# Patient Record
Sex: FEMALE | Race: WHITE | NOT HISPANIC OR LATINO | Employment: FULL TIME | URBAN - METROPOLITAN AREA
[De-identification: names, ages, dates, MRNs, and addresses within clinical notes are randomized per-mention and may not be internally consistent; named-entity substitution may affect disease eponyms.]

---

## 2019-11-26 PROBLEM — I10 ESSENTIAL HYPERTENSION: Status: ACTIVE | Noted: 2019-11-26

## 2019-11-26 PROBLEM — E55.9 VITAMIN D DEFICIENCY: Status: ACTIVE | Noted: 2019-11-26

## 2019-11-26 PROBLEM — E74.39 GLUCOSE INTOLERANCE: Status: ACTIVE | Noted: 2019-11-26

## 2019-11-26 PROBLEM — G47.33 OBSTRUCTIVE SLEEP APNEA: Status: ACTIVE | Noted: 2019-11-26

## 2019-11-26 PROBLEM — E03.9 HYPOTHYROIDISM: Status: ACTIVE | Noted: 2019-11-26

## 2019-11-26 PROBLEM — E04.9 GOITER, NODULAR: Status: ACTIVE | Noted: 2019-11-26

## 2019-11-26 PROBLEM — E66.01 MORBIDLY OBESE: Status: ACTIVE | Noted: 2019-11-26

## 2019-11-26 PROBLEM — G47.00 INSOMNIA: Status: ACTIVE | Noted: 2019-11-26

## 2019-11-26 PROBLEM — F41.9 ANXIETY: Status: ACTIVE | Noted: 2019-11-26

## 2019-12-23 PROBLEM — R01.1 UNDIAGNOSED CARDIAC MURMURS: Status: ACTIVE | Noted: 2019-12-23

## 2019-12-23 PROBLEM — R06.02 SHORTNESS OF BREATH: Status: ACTIVE | Noted: 2019-12-23

## 2019-12-30 ENCOUNTER — OFFICE VISIT (OUTPATIENT)
Dept: DERMATOLOGY | Facility: CLINIC | Age: 49
End: 2019-12-30
Payer: COMMERCIAL

## 2019-12-30 VITALS — BODY MASS INDEX: 47.09 KG/M2 | WEIGHT: 293 LBS | HEIGHT: 66 IN

## 2019-12-30 DIAGNOSIS — L81.4 LENTIGINES: ICD-10-CM

## 2019-12-30 DIAGNOSIS — L82.1 SEBORRHEIC KERATOSES: Primary | ICD-10-CM

## 2019-12-30 DIAGNOSIS — D22.9 BENIGN NEVUS: ICD-10-CM

## 2019-12-30 PROCEDURE — 99999 PR PBB SHADOW E&M-EST. PATIENT-LVL II: CPT | Mod: PBBFAC,,, | Performed by: DERMATOLOGY

## 2019-12-30 PROCEDURE — 3008F PR BODY MASS INDEX (BMI) DOCUMENTED: ICD-10-PCS | Mod: CPTII,S$GLB,, | Performed by: DERMATOLOGY

## 2019-12-30 PROCEDURE — 99202 OFFICE O/P NEW SF 15 MIN: CPT | Mod: S$GLB,,, | Performed by: DERMATOLOGY

## 2019-12-30 PROCEDURE — 3008F BODY MASS INDEX DOCD: CPT | Mod: CPTII,S$GLB,, | Performed by: DERMATOLOGY

## 2019-12-30 PROCEDURE — 99202 PR OFFICE/OUTPT VISIT, NEW, LEVL II, 15-29 MIN: ICD-10-PCS | Mod: S$GLB,,, | Performed by: DERMATOLOGY

## 2019-12-30 PROCEDURE — 99999 PR PBB SHADOW E&M-EST. PATIENT-LVL II: ICD-10-PCS | Mod: PBBFAC,,, | Performed by: DERMATOLOGY

## 2019-12-30 NOTE — PROGRESS NOTES
Subjective:       Patient ID:  Christine Alfaro is a 49 y.o. female who presents for   Chief Complaint   Patient presents with    Mole     50 yo F presents for evaluation of a lesion on her scalp.  It has been present for unknown amount of time and is tender when she parker her hair. No prior treatments    Denies personal h/o skin cancer  Dad with h/o melanoma on face ( 15 y ago; worked outdoors)      Past Medical History:   Diagnosis Date    Anxiety     GERD (gastroesophageal reflux disease)     Hyperlipidemia     Hypertension     Hypothyroidism     Insomnia     Observed sleep apnea     Vitamin D deficiency      Review of Systems   Hematologic/Lymphatic: Bruises/bleeds easily.   Allergic/Immunologic: Positive for environmental allergies.        Objective:    Physical Exam   Constitutional: She appears well-developed and well-nourished. No distress.   Neurological: She is alert and oriented to person, place, and time. She is not disoriented.   Psychiatric: She has a normal mood and affect.   Skin:   Areas Examined (abnormalities noted in diagram):   Scalp / Hair Palpated and Inspected  Head / Face Inspection Performed  Neck Inspection Performed  Abdomen Inspection Performed  Back Inspection Performed  RUE Inspected  LUE Inspection Performed                   Diagram Legend     Erythematous scaling macule/papule c/w actinic keratosis       Vascular papule c/w angioma      Pigmented verrucoid papule/plaque c/w seborrheic keratosis      Yellow umbilicated papule c/w sebaceous hyperplasia      Irregularly shaped tan macule c/w lentigo     1-2 mm smooth white papules consistent with Milia      Movable subcutaneous cyst with punctum c/w epidermal inclusion cyst      Subcutaneous movable cyst c/w pilar cyst      Firm pink to brown papule c/w dermatofibroma      Pedunculated fleshy papule(s) c/w skin tag(s)      Evenly pigmented macule c/w junctional nevus     Mildly variegated pigmented, slightly  irregular-bordered macule c/w mildly atypical nevus      Flesh colored to evenly pigmented papule c/w intradermal nevus       Pink pearly papule/plaque c/w basal cell carcinoma      Erythematous hyperkeratotic cursted plaque c/w SCC      Surgical scar with no sign of skin cancer recurrence      Open and closed comedones      Inflammatory papules and pustules      Verrucoid papule consistent consistent with wart     Erythematous eczematous patches and plaques     Dystrophic onycholytic nail with subungual debris c/w onychomycosis     Umbilicated papule    Erythematous-base heme-crusted tan verrucoid plaque consistent with inflamed seborrheic keratosis     Erythematous Silvery Scaling Plaque c/w Psoriasis     See annotation      Assessment / Plan:        Seborrheic keratoses  These are benign inherited growths without a malignant potential. Reassurance given to patient. No treatment is necessary.     Lentigines  These are benign hyperpigmented sun induced lesions. Daily sun protection will reduce the number of new lesions    Benign nevus  Reassurance           Follow up in about 1 year (around 12/30/2020).

## 2020-01-06 ENCOUNTER — PATIENT MESSAGE (OUTPATIENT)
Dept: CARDIOLOGY | Facility: CLINIC | Age: 50
End: 2020-01-06

## 2020-01-28 ENCOUNTER — PATIENT MESSAGE (OUTPATIENT)
Dept: FAMILY MEDICINE | Facility: CLINIC | Age: 50
End: 2020-01-28

## 2020-01-28 DIAGNOSIS — E66.9 OBESITY, UNSPECIFIED CLASSIFICATION, UNSPECIFIED OBESITY TYPE, UNSPECIFIED WHETHER SERIOUS COMORBIDITY PRESENT: Primary | ICD-10-CM

## 2020-01-28 RX ORDER — PHENTERMINE HYDROCHLORIDE 37.5 MG/1
37.5 TABLET ORAL
Qty: 30 TABLET | Refills: 2 | Status: SHIPPED | OUTPATIENT
Start: 2020-01-28 | End: 2020-02-27

## 2020-03-08 ENCOUNTER — PATIENT MESSAGE (OUTPATIENT)
Dept: FAMILY MEDICINE | Facility: CLINIC | Age: 50
End: 2020-03-08

## 2020-03-09 RX ORDER — LISINOPRIL 20 MG/1
TABLET ORAL
Qty: 90 TABLET | Refills: 3 | Status: SHIPPED | OUTPATIENT
Start: 2020-03-09 | End: 2020-11-30 | Stop reason: SDUPTHER

## 2020-03-18 ENCOUNTER — PATIENT MESSAGE (OUTPATIENT)
Dept: CARDIOLOGY | Facility: CLINIC | Age: 50
End: 2020-03-18

## 2020-04-05 ENCOUNTER — NURSE TRIAGE (OUTPATIENT)
Dept: ADMINISTRATIVE | Facility: CLINIC | Age: 50
End: 2020-04-05

## 2020-04-05 NOTE — TELEPHONE ENCOUNTER
Patient presented c cough, fever,fatigue,body aches and headaches. No SOB. Highest fever 101.4 and is taking tylenol to treat. She was possibly exposed by her daughter that works at a nursing home, who  Also has symptoms. Patient is calling b/c she wants to be tested. Home care advice reviewed c patient. Instructed pat on [employer] [facility] care. Patient sates she will download denisse on phone and use [employer] [facility] care today. Routed this triage call to patients PCP.     Reason for Disposition   1] COVID-19 infection diagnosed or suspected AND [2] mild symptoms (fever, cough) AND [2] no trouble breathing or other complications    Additional Information   Negative: SEVERE difficulty breathing (e.g., struggling for each breath, speaks in single words)   Negative: Difficult to awaken or acting confused (e.g., disoriented, slurred speech)   Negative: Bluish (or gray) lips or face now   Negative: Shock suspected (e.g., cold/pale/clammy skin, too weak to stand, low BP, rapid pulse)   Negative: Sounds like a life-threatening emergency to the triager   Negative: [1] COVID-19 suspected (e.g., cough, fever, shortness of breath) AND [2] public health department recommends testing   Negative: [1] COVID-19 exposure AND [2] no symptoms   Negative: COVID-19 and Breastfeeding, questions about   Negative: SEVERE or constant chest pain (Exception: mild central chest pain, present only when coughing)   Negative: MODERATE difficulty breathing (e.g., speaks in phrases, SOB even at rest, pulse 100-120)   Negative: Patient sounds very sick or weak to the triager   Negative: MILD difficulty breathing (e.g., minimal/no SOB at rest, SOB with walking, pulse <100)   Negative: Chest pain   Negative: Fever > 103 F (39.4 C)   Negative: [1] Fever > 101 F (38.3 C) AND [2] age > 60   Negative: [1] Fever > 100.0 F (37.8 C) AND [2] bedridden (e.g., nursing home patient, CVA, chronic illness, recovering from surgery)   Negative: HIGH  RISK patient (e.g., age > 64 years, diabetes, heart or lung disease, weak immune system)   Negative: Fever present > 3 days (72 hours)   Negative: [1] Fever returns after gone for over 24 hours AND [2] symptoms worse or not improved   Negative: [1] Continuous (nonstop) coughing interferes with work or school AND [2] no improvement using cough treatment per protocol   Negative: Cough present > 3 weeks    Protocols used: CORONAVIRUS (COVID-19) DIAGNOSED OR [id number]

## 2020-05-06 ENCOUNTER — PATIENT MESSAGE (OUTPATIENT)
Dept: ADMINISTRATIVE | Facility: HOSPITAL | Age: 50
End: 2020-05-06

## 2020-06-10 RX ORDER — LEVOTHYROXINE SODIUM 200 UG/1
TABLET ORAL
Qty: 90 TABLET | Refills: 1 | Status: SHIPPED | OUTPATIENT
Start: 2020-06-10 | End: 2020-08-12

## 2020-06-10 NOTE — TELEPHONE ENCOUNTER
*Demetra pt* Please approve.  Last OV 11/29/19  Last lab 12/23/19  Last refill date 12/31/20 #90 x 1R

## 2020-06-18 ENCOUNTER — TELEPHONE (OUTPATIENT)
Dept: CARDIOLOGY | Facility: CLINIC | Age: 50
End: 2020-06-18

## 2020-06-18 ENCOUNTER — HOSPITAL ENCOUNTER (OUTPATIENT)
Dept: RADIOLOGY | Facility: HOSPITAL | Age: 50
Discharge: HOME | End: 2020-06-18
Attending: FAMILY MEDICINE
Payer: COMMERCIAL

## 2020-06-18 ENCOUNTER — OFFICE VISIT (OUTPATIENT)
Dept: OBSTETRICS AND GYNECOLOGY | Facility: CLINIC | Age: 50
End: 2020-06-18
Payer: COMMERCIAL

## 2020-06-18 VITALS
DIASTOLIC BLOOD PRESSURE: 76 MMHG | HEIGHT: 65 IN | SYSTOLIC BLOOD PRESSURE: 118 MMHG | WEIGHT: 293 LBS | BODY MASS INDEX: 48.82 KG/M2

## 2020-06-18 DIAGNOSIS — Z01.419 GYNECOLOGIC EXAM NORMAL: Primary | ICD-10-CM

## 2020-06-18 DIAGNOSIS — Z12.31 BREAST CANCER SCREENING BY MAMMOGRAM: ICD-10-CM

## 2020-06-18 PROCEDURE — 77063 MAMMO DIGITAL SCREENING BILAT WITH TOMOSYNTHESIS_CAD: ICD-10-PCS | Mod: 26,,, | Performed by: RADIOLOGY

## 2020-06-18 PROCEDURE — 77067 SCR MAMMO BI INCL CAD: CPT | Mod: TC,PN

## 2020-06-18 PROCEDURE — 77067 SCR MAMMO BI INCL CAD: CPT | Mod: 26,,, | Performed by: RADIOLOGY

## 2020-06-18 PROCEDURE — 77067 MAMMO DIGITAL SCREENING BILAT WITH TOMOSYNTHESIS_CAD: ICD-10-PCS | Mod: 26,,, | Performed by: RADIOLOGY

## 2020-06-18 PROCEDURE — 3074F SYST BP LT 130 MM HG: CPT | Mod: CPTII,S$GLB,, | Performed by: OBSTETRICS & GYNECOLOGY

## 2020-06-18 PROCEDURE — 99386 PREV VISIT NEW AGE 40-64: CPT | Mod: S$GLB,,, | Performed by: OBSTETRICS & GYNECOLOGY

## 2020-06-18 PROCEDURE — 3078F DIAST BP <80 MM HG: CPT | Mod: CPTII,S$GLB,, | Performed by: OBSTETRICS & GYNECOLOGY

## 2020-06-18 PROCEDURE — 99999 PR PBB SHADOW E&M-EST. PATIENT-LVL IV: ICD-10-PCS | Mod: PBBFAC,,, | Performed by: OBSTETRICS & GYNECOLOGY

## 2020-06-18 PROCEDURE — 99999 PR PBB SHADOW E&M-EST. PATIENT-LVL IV: CPT | Mod: PBBFAC,,, | Performed by: OBSTETRICS & GYNECOLOGY

## 2020-06-18 PROCEDURE — 3074F PR MOST RECENT SYSTOLIC BLOOD PRESSURE < 130 MM HG: ICD-10-PCS | Mod: CPTII,S$GLB,, | Performed by: OBSTETRICS & GYNECOLOGY

## 2020-06-18 PROCEDURE — 77063 BREAST TOMOSYNTHESIS BI: CPT | Mod: 26,,, | Performed by: RADIOLOGY

## 2020-06-18 PROCEDURE — 88175 CYTOPATH C/V AUTO FLUID REDO: CPT

## 2020-06-18 PROCEDURE — 99386 PR PREVENTIVE VISIT,NEW,40-64: ICD-10-PCS | Mod: S$GLB,,, | Performed by: OBSTETRICS & GYNECOLOGY

## 2020-06-18 PROCEDURE — 87624 HPV HI-RISK TYP POOLED RSLT: CPT

## 2020-06-18 PROCEDURE — 3078F PR MOST RECENT DIASTOLIC BLOOD PRESSURE < 80 MM HG: ICD-10-PCS | Mod: CPTII,S$GLB,, | Performed by: OBSTETRICS & GYNECOLOGY

## 2020-06-18 RX ORDER — LEVOCETIRIZINE DIHYDROCHLORIDE 5 MG/1
TABLET, FILM COATED ORAL
COMMUNITY
End: 2022-03-04

## 2020-06-18 RX ORDER — PHENTERMINE HYDROCHLORIDE 37.5 MG/1
TABLET ORAL
COMMUNITY
Start: 2020-04-30 | End: 2020-11-30

## 2020-06-18 RX ORDER — FLUCONAZOLE 150 MG/1
150 TABLET ORAL ONCE
Qty: 1 TABLET | Refills: 0 | Status: SHIPPED | OUTPATIENT
Start: 2020-06-18 | End: 2020-06-22

## 2020-06-18 RX ORDER — MONTELUKAST SODIUM 10 MG/1
TABLET ORAL
COMMUNITY
Start: 2020-04-05 | End: 2022-03-04

## 2020-06-18 NOTE — TELEPHONE ENCOUNTER
----- Message from Fox Matthews sent at 6/18/2020  1:22 PM CDT -----  Regarding: pt  Type: Needs Medical Advice    Who Called:  pt    Best Call Back Number: [phone]  Additional Information: pt needs to know if still needs ot come in for todays appt b/c is for 8 wk folllow up that was Rescheduled From Thu Apr 23, 2020 on 3/18/2020 2:05 PM to today. Pt is feeling ok.

## 2020-06-18 NOTE — LETTER
June 18, 2020      Layo Mccrary MD  [address] [address]           [facility]  [address] [facility] [address]  [facility] [address]  Phone: [phone]  Fax: [phone]          Patient: Christine Alfaro   MR Number: [id number]   YOB: 1970   Date of Visit: 6/18/2020       Dear Dr. Layo Mccrary:    Thank you for referring Christine Alfaro to me for evaluation. Attached you will find relevant portions of my assessment and plan of care.    If you have questions, please do not hesitate to call me. I look forward to following Christine Alfaro along with you.    Sincerely,    Mely Braxton MD    Enclosure  CC:  No Recipients    If you would like to receive this communication electronically, please contact [email or web address] or [phone] to request more information on [employer] Link access.    For providers and/or their staff who would like to refer a patient to [facility], please contact us through our one-stop-shop provider referral line, [facility], at [phone].    If you feel you have received this communication in error or would no longer like to receive these types of communications, please e-mail [email or web address]

## 2020-06-18 NOTE — PROGRESS NOTES
Chief Complaint   Patient presents with    [facility]    Well Woman    Mammogram       History of Present Illness: Christine Alfaro is a 49 y.o. female that presents today 6/18/2020 for well gyn visit.    Past Medical History:   Diagnosis Date    Anxiety     GERD (gastroesophageal reflux disease)     Hyperlipidemia     Hypertension     Hypothyroidism     Insomnia     Observed sleep apnea     Vitamin D deficiency        Past Surgical History:   Procedure Laterality Date    BTL      CHOLECYSTECTOMY      SURGICAL REMOVAL OF BONE SPUR      left heel    TONSILLECTOMY         Current Outpatient Medications   Medication Sig Dispense Refill    aspirin (ECOTRIN) 81 MG EC tablet Take 1 tablet (81 mg total) by mouth once daily. 90 tablet 3    atorvastatin (LIPITOR) 40 MG tablet Take 1 tablet (40 mg total) by mouth every evening. 90 tablet 3    escitalopram oxalate (LEXAPRO) 20 MG tablet Take 1 tablet (20 mg total) by mouth once daily. 90 tablet 3    fenofibrate 160 MG Tab Take 1 tablet (160 mg total) by mouth once daily. 90 tablet 3    furosemide (LASIX) 20 MG tablet Take 1 tablet (20 mg total) by mouth once daily. 90 tablet 3    levocetirizine (XYZAL) 5 MG tablet levocetirizine 5 mg tablet   TAKE 1 TABLET BY MOUTH EVERY DAY      levothyroxine (SYNTHROID) 200 MCG tablet TAKE 1 TABLET BY MOUTH DAILY 90 tablet 1    levothyroxine (SYNTHROID) 25 MCG tablet TK 1 T PO QAM BEFORE BREAKFAST  11    lisinopril (PRINIVIL,ZESTRIL) 20 MG tablet lisinopril 20 mg tablet   TAKE 1 TABLET BY MOUTH EVERY DAY 90 tablet 3    metFORMIN (GLUCOPHAGE-XR) 500 MG 24 hr tablet Take 1,000 mg by mouth 2 (two) times daily with meals. TAKES TWO TABLETS BID  3    montelukast (SINGULAIR) 10 mg tablet TK 1 T PO QD UTD UNTIL TOLD TO  STOP      nitroGLYCERIN (NITROSTAT) 0.4 MG SL tablet Place 1 tablet (0.4 mg total) under the tongue every 5 (five) minutes as needed. 25 tablet 0    omeprazole (PRILOSEC) 20 MG capsule Take 20 mg  by mouth.      phentermine (ADIPEX-P) 37.5 mg tablet TK 1 T PO QAM BEFORE BREAKFAST      traZODone (DESYREL) 100 MG tablet Take 1 tablet (100 mg total) by mouth every evening. 90 tablet 3     No current facility-administered medications for this visit.        Review of patient's allergies indicates:   Allergen Reactions    Sulfa (sulfonamide antibiotics) Rash and Swelling       Family History   Problem Relation Age of Onset    Heart disease Mother     Bone cancer Mother     Cancer Mother         cervical    Heart disease Father     Diabetes type I Father     Lung cancer Father     Heart disease Brother     No Known Problems Daughter     No Known Problems Daughter     Breast cancer Neg Hx     Ovarian cancer Neg Hx        Social History     Socioeconomic History    Marital status:      Spouse name: Not on file    Number of children: Not on file    Years of education: Not on file    Highest education level: Not on file   Occupational History    Not on file   Social Needs    Financial resource strain: Not on file    Food insecurity     Worry: Not on file     Inability: Not on file    Transportation needs     Medical: Not on file     Non-medical: Not on file   Tobacco Use    Smoking status: Never Smoker    Smokeless tobacco: Never Used   Substance and Sexual Activity    Alcohol use: Not Currently    Drug use: Never    Sexual activity: Yes   Lifestyle    Physical activity     Days per week: Not on file     Minutes per session: Not on file    Stress: Not on file   Relationships    Social connections     Talks on phone: Not on file     Gets together: Not on file     Attends [employer] service: Not on file     Active member of club or organization: Not on file     Attends meetings of clubs or organizations: Not on file     Relationship status: Not on file   Other Topics Concern    Not on file   Social History Narrative    Not on file       OB History    Para Term  AB Living  "  3 3 2 1   2   SAB TAB Ectopic Multiple Live Births           3      # Outcome Date GA Lbr Calvin/2nd Weight Sex Delivery Anes PTL Lv   3 Term      Vag-Spont   SHIRA   2 Term      Vag-Spont   SHIRA   1       Vag-Spont   ND       Review of Symptoms:  GENERAL: Denies weight gain or weight loss. Feeling well overall.   SKIN: Denies rash or lesions.   HEAD: Denies head injury or headache.   NODES: Denies enlarged lymph nodes.   CHEST: Denies chest pain or shortness of breath.   CARDIOVASCULAR: Denies palpitations or left sided chest pain.   ABDOMEN: No abdominal pain, constipation, diarrhea, nausea, vomiting or rectal bleeding.   URINARY: No frequency, dysuria, hematuria, or burning on urination.  HEMATOLOGIC: No easy bruisability or excessive bleeding.   MUSCULOSKELETAL: Denies joint pain or swelling.     /76   Ht 5' 5" (1.651 m)   Wt (!) 146.1 kg (322 lb 1.5 oz)   LMP 2020   Physical Exam:  APPEARANCE: Well nourished, well developed, in no acute distress.  SKIN: Normal skin turgor, no lesions.  NECK: Neck symmetric without masses   RESPIRATORY: Normal respiratory effort with no retractions or use of accessory muscles  CARDIOVASCULAR: Peripheral vascular system with no swelling no varicosities and palpation of pulses normal  LYMPHATIC: No enlargements of the lymph nodes noted in the neck, axillae, or groin  ABDOMEN: Soft. No tenderness or masses. No hepatosplenomegaly. No hernias.  BREASTS: Symmetrical, no skin changes or visible lesions. No palpable masses, nipple discharge or adenopathy bilaterally.  PELVIC: Normal external female genitalia without lesions. Normal hair distribution. Adequate perineal body, normal urethral meatus. Urethra with no masses.  Bladder nontender. Vagina moist and well rugated without lesions or discharge. Cervix pink and without lesions. No significant cystocele or rectocele. Bimanual exam showed uterus normal size, shape, position, mobile and nontender. Adnexa without " masses or tenderness. Urethra and bladder normal.   EXTREMITIES: No clubbing cyanosis or edema.    ASSESSMENT/PLAN:  Gynecologic exam normal  -     Liquid-Based Pap Smear, Screening  -     HPV High Risk Genotypes, PCR          Patient was counseled today on Pelvic exams and Pap Smear guidelines.   We discussed STD screening if at high risk for a STD.  We discussed recommendation for breast cancer screening with mammogram every other year after the age of 40 and annually after the age of 50.    We discussed colon cancer screening when indicated.   Osteoporosis screening discussed when indicated.   She was advised to see her primary care physician for all other health maintenance.     FOLLOW-UP with me for next routine visit.

## 2020-06-18 NOTE — TELEPHONE ENCOUNTER
Spoke to pt, she stated she will call back to reschedule her appt when she gets her school schedule.

## 2020-06-23 ENCOUNTER — PATIENT MESSAGE (OUTPATIENT)
Dept: FAMILY MEDICINE | Facility: CLINIC | Age: 50
End: 2020-06-23

## 2020-06-23 NOTE — LETTER
[facility]  [address]  [facility] [address]  Phone: [phone]  Fax: [phone]                 Christine Alfaro  1970          To whom it May concern    The above named patient, Mrs. Alfaro, was previously diagnosed with corona virus like symptoms.  She is currently asymptomatic and outside the range of infectivity.  Please allow her to in her your facility to receive treatment.    Sincerely,              Layo Mccrary MD

## 2020-06-24 LAB
FINAL PATHOLOGIC DIAGNOSIS: NORMAL
Lab: NORMAL

## 2020-06-26 LAB
HPV HR 12 DNA SPEC QL NAA+PROBE: NEGATIVE
HPV16 AG SPEC QL: NEGATIVE
HPV18 DNA SPEC QL NAA+PROBE: NEGATIVE

## 2020-07-08 ENCOUNTER — PATIENT MESSAGE (OUTPATIENT)
Dept: ENDOCRINOLOGY | Facility: CLINIC | Age: 50
End: 2020-07-08

## 2020-07-08 DIAGNOSIS — E03.9 HYPOTHYROIDISM, UNSPECIFIED TYPE: Primary | ICD-10-CM

## 2020-07-08 NOTE — TELEPHONE ENCOUNTER
Only certain metformin extended release have been recalled. She will need to contact her pharmacy to see if her Prescription was impacted  We have some available at [facility] if there is an issue    We can check a TSH. If normal ok to continue the diet supplement

## 2020-07-14 ENCOUNTER — LAB VISIT (OUTPATIENT)
Dept: LAB | Facility: HOSPITAL | Age: 50
End: 2020-07-14
Attending: INTERNAL MEDICINE
Payer: COMMERCIAL

## 2020-07-14 DIAGNOSIS — E03.9 HYPOTHYROIDISM, UNSPECIFIED TYPE: ICD-10-CM

## 2020-07-14 LAB
T4 FREE SERPL-MCNC: 0.87 NG/DL (ref 0.71–1.51)
TSH SERPL DL<=0.005 MIU/L-ACNC: 12.72 UIU/ML (ref 0.4–4)

## 2020-07-14 PROCEDURE — 84439 ASSAY OF FREE THYROXINE: CPT

## 2020-07-14 PROCEDURE — 36415 COLL VENOUS BLD VENIPUNCTURE: CPT | Mod: PO

## 2020-07-14 PROCEDURE — 84443 ASSAY THYROID STIM HORMONE: CPT

## 2020-07-15 ENCOUNTER — TELEPHONE (OUTPATIENT)
Dept: ENDOCRINOLOGY | Facility: CLINIC | Age: 50
End: 2020-07-15

## 2020-07-17 ENCOUNTER — PATIENT MESSAGE (OUTPATIENT)
Dept: FAMILY MEDICINE | Facility: CLINIC | Age: 50
End: 2020-07-17

## 2020-07-17 DIAGNOSIS — E74.39 GLUCOSE INTOLERANCE: Primary | ICD-10-CM

## 2020-07-17 RX ORDER — EMPAGLIFLOZIN 10 MG/1
10 TABLET, FILM COATED ORAL DAILY
Qty: 30 TABLET | Refills: 11 | Status: SHIPPED | OUTPATIENT
Start: 2020-07-17 | End: 2021-07-26

## 2020-08-11 ENCOUNTER — LAB VISIT (OUTPATIENT)
Dept: LAB | Facility: HOSPITAL | Age: 50
End: 2020-08-11
Attending: INTERNAL MEDICINE
Payer: COMMERCIAL

## 2020-08-11 DIAGNOSIS — E03.9 HYPOTHYROIDISM, UNSPECIFIED TYPE: ICD-10-CM

## 2020-08-11 PROCEDURE — 36415 COLL VENOUS BLD VENIPUNCTURE: CPT | Mod: PO

## 2020-08-11 PROCEDURE — 84443 ASSAY THYROID STIM HORMONE: CPT

## 2020-08-11 PROCEDURE — 84439 ASSAY OF FREE THYROXINE: CPT

## 2020-08-12 ENCOUNTER — TELEPHONE (OUTPATIENT)
Dept: ENDOCRINOLOGY | Facility: CLINIC | Age: 50
End: 2020-08-12

## 2020-08-12 DIAGNOSIS — E03.9 HYPOTHYROIDISM, UNSPECIFIED TYPE: Primary | ICD-10-CM

## 2020-08-12 LAB
T4 FREE SERPL-MCNC: 0.86 NG/DL (ref 0.71–1.51)
TSH SERPL DL<=0.005 MIU/L-ACNC: 11.03 UIU/ML (ref 0.4–4)

## 2020-08-12 RX ORDER — LEVOTHYROXINE SODIUM 125 UG/1
TABLET ORAL
Qty: 60 TABLET | Refills: 11 | Status: SHIPPED | OUTPATIENT
Start: 2020-08-12 | End: 2021-09-08

## 2020-08-12 NOTE — TELEPHONE ENCOUNTER
Let her know it looks like she still needs more synthroid    Increase synthroid from 225 mcg daily to:  Synthroid 125 mcg- take 2 tablets daily for a total of 250 mcg daily    Check TSH 6 weeks

## 2020-08-12 NOTE — TELEPHONE ENCOUNTER
----- Message from Halima Lundberg sent at 8/12/2020  4:04 PM CDT -----  Regarding: return call  Contact: Patient/[phone] (home)  Type:  Patient Returning Call    Who Called:  Patient/[phone] ([place])     Who Left Message for Patient:  n/a  Does the patient know what this is regarding?:  medicine changes

## 2020-08-12 NOTE — TELEPHONE ENCOUNTER
Spoke with pt who verbalized understanding  Will start new dose and then do labs in 6 weeks in [facility] 9/26/20

## 2020-09-16 ENCOUNTER — OFFICE VISIT (OUTPATIENT)
Dept: FAMILY MEDICINE | Facility: CLINIC | Age: 50
End: 2020-09-16
Payer: COMMERCIAL

## 2020-09-16 VITALS
WEIGHT: 293 LBS | OXYGEN SATURATION: 98 % | DIASTOLIC BLOOD PRESSURE: 70 MMHG | SYSTOLIC BLOOD PRESSURE: 118 MMHG | BODY MASS INDEX: 48.82 KG/M2 | HEIGHT: 65 IN | TEMPERATURE: 99 F | HEART RATE: 97 BPM

## 2020-09-16 DIAGNOSIS — B96.89 BACTERIAL SINUSITIS: Primary | ICD-10-CM

## 2020-09-16 DIAGNOSIS — J32.9 BACTERIAL SINUSITIS: Primary | ICD-10-CM

## 2020-09-16 PROCEDURE — 99999 PR PBB SHADOW E&M-EST. PATIENT-LVL IV: CPT | Mod: PBBFAC,,, | Performed by: FAMILY MEDICINE

## 2020-09-16 PROCEDURE — 3078F DIAST BP <80 MM HG: CPT | Mod: CPTII,S$GLB,, | Performed by: FAMILY MEDICINE

## 2020-09-16 PROCEDURE — 99214 PR OFFICE/OUTPT VISIT, EST, LEVL IV, 30-39 MIN: ICD-10-PCS | Mod: S$GLB,,, | Performed by: FAMILY MEDICINE

## 2020-09-16 PROCEDURE — 99214 OFFICE O/P EST MOD 30 MIN: CPT | Mod: S$GLB,,, | Performed by: FAMILY MEDICINE

## 2020-09-16 PROCEDURE — 3008F PR BODY MASS INDEX (BMI) DOCUMENTED: ICD-10-PCS | Mod: CPTII,S$GLB,, | Performed by: FAMILY MEDICINE

## 2020-09-16 PROCEDURE — 3008F BODY MASS INDEX DOCD: CPT | Mod: CPTII,S$GLB,, | Performed by: FAMILY MEDICINE

## 2020-09-16 PROCEDURE — 3074F SYST BP LT 130 MM HG: CPT | Mod: CPTII,S$GLB,, | Performed by: FAMILY MEDICINE

## 2020-09-16 PROCEDURE — 3078F PR MOST RECENT DIASTOLIC BLOOD PRESSURE < 80 MM HG: ICD-10-PCS | Mod: CPTII,S$GLB,, | Performed by: FAMILY MEDICINE

## 2020-09-16 PROCEDURE — 3074F PR MOST RECENT SYSTOLIC BLOOD PRESSURE < 130 MM HG: ICD-10-PCS | Mod: CPTII,S$GLB,, | Performed by: FAMILY MEDICINE

## 2020-09-16 PROCEDURE — 99999 PR PBB SHADOW E&M-EST. PATIENT-LVL IV: ICD-10-PCS | Mod: PBBFAC,,, | Performed by: FAMILY MEDICINE

## 2020-09-16 RX ORDER — LIFITEGRAST 50 MG/ML
SOLUTION/ DROPS OPHTHALMIC
COMMUNITY
Start: 2020-09-02

## 2020-09-16 RX ORDER — AMOXICILLIN AND CLAVULANATE POTASSIUM 500; 125 MG/1; MG/1
1 TABLET, FILM COATED ORAL 2 TIMES DAILY
Qty: 20 TABLET | Refills: 0 | Status: SHIPPED | OUTPATIENT
Start: 2020-09-16 | End: 2020-11-30

## 2020-09-16 RX ORDER — FLUTICASONE PROPIONATE 50 MCG
2 SPRAY, SUSPENSION (ML) NASAL
COMMUNITY
Start: 2020-09-01 | End: 2022-03-04

## 2020-09-16 NOTE — LETTER
September 16, 2020      [facility]  [address]  [place] [address]  Phone: [phone]  Fax: [phone]       Patient: Christine Alfaro   YOB: 1970  Date of Visit: 09/16/2020    To Whom It May Concern:    Arnel Alfaro  was at [facility] on 09/16/2020. I have a low suspicion for coronavirus and am treating her for sinusitis with follow up instruction. He may return to work/school on 9/17/2020 with no restrictions. If you have any questions or concerns, or if I can be of further assistance, please do not hesitate to contact me.    Sincerely,            Layo Mccrary MD

## 2020-09-16 NOTE — PROGRESS NOTES
THIS DOCUMENT WAS MADE IN PART WITH VOICE RECOGNITION SOFTWARE.  OCCASIONALLY THIS SOFTWARE WILL MISINTERPRET WORDS OR PHRASES.    Assessment and Plan:    1. Bacterial sinusitis  Recommended Flonase, nasal rinses, continue Singulair and Xyzal  If shortness of breath occurs or coughing worsens will have to get COVID test  - amoxicillin-clavulanate 500-125mg (AUGMENTIN) 500-125 mg Tab; Take 1 tablet (500 mg total) by mouth 2 (two) times daily.  Dispense: 20 tablet; Refill: 0        ______________________________________________________________________  Subjective:    Chief Complaint:  Chief Complaint   Patient presents with    Sinusitis     post nasal drip, congestion, scratchy throat, cough, and fatigue         HPI:  Christine is a 49 y.o. year old     Sinusitis symptoms  Associated with postnasal drip, congestion, scratchy throat, cough, fatigue, hoarse  Duration - 3 weeks  T-max 100°  Attempted treatments include anti-inflammatories, cool liquids  Sore throat severity 3/10        Answers for HPI/ROS submitted by the patient on 9/16/2020   Sore throat  Chronicity: recurrent  Onset: 1 to 4 weeks ago  Progression since onset: gradually worsening  Pain worse on: right  Fever: 100 - 100.9 F  Fever duration: 1 to 2 days  Pain - numeric: 3/10  hoarse voice: Yes  plugged ear sensation: Yes  swollen glands: No  strep: No  mono: No  Treatments tried: NSAIDs, cool liquids  Improvement on treatment: no relief  Pain severity: moderate    Past Medical History:  Past Medical History:   Diagnosis Date    Anxiety     GERD (gastroesophageal reflux disease)     Hyperlipidemia     Hypertension     Hypothyroidism     Insomnia     Observed sleep apnea     Vitamin D deficiency        Past Surgical History:  Past Surgical History:   Procedure Laterality Date    BTL      CHOLECYSTECTOMY      SURGICAL REMOVAL OF BONE SPUR      left heel    TONSILLECTOMY         Family History:  Family History   Problem Relation Age of Onset     Heart disease Mother     Bone cancer Mother     Cancer Mother         cervical    Heart disease Father     Diabetes type I Father     Lung cancer Father     Heart disease Brother     No Known Problems Daughter     No Known Problems Daughter     Breast cancer Neg Hx     Ovarian cancer Neg Hx        Social History:  Social History     Socioeconomic History    Marital status:      Spouse name: Not on file    Number of children: Not on file    Years of education: Not on file    Highest education level: Not on file   Occupational History    Not on file   Social Needs    Financial resource strain: Not hard at all    Food insecurity     Worry: Never true     Inability: Never true    Transportation needs     Medical: No     Non-medical: No   Tobacco Use    Smoking status: Never Smoker    Smokeless tobacco: Never Used   Substance and Sexual Activity    Alcohol use: Not Currently     Frequency: Monthly or less     Drinks per session: 1 or 2     Binge frequency: Never    Drug use: Never    Sexual activity: Yes   Lifestyle    Physical activity     Days per week: Not on file     Minutes per session: 20 min    Stress: Rather much   Relationships    Social connections     Talks on phone: More than three times a week     Gets together: Twice a week     Attends [employer] service: Not on file     Active member of club or organization: Yes     Attends meetings of clubs or organizations: More than 4 times per year     Relationship status:    Other Topics Concern    Not on file   Social History Narrative    Not on file       Medications:  Current Outpatient Medications on File Prior to Visit   Medication Sig Dispense Refill    aspirin (ECOTRIN) 81 MG EC tablet Take 1 tablet (81 mg total) by mouth once daily. 90 tablet 3    atorvastatin (LIPITOR) 40 MG tablet Take 1 tablet (40 mg total) by mouth every evening. 90 tablet 3    empagliflozin (JARDIANCE) 10 mg tablet Take 1 tablet (10 mg total) by  mouth once daily. 30 tablet 11    escitalopram oxalate (LEXAPRO) 20 MG tablet Take 1 tablet (20 mg total) by mouth once daily. 90 tablet 3    fenofibrate 160 MG Tab Take 1 tablet (160 mg total) by mouth once daily. 90 tablet 3    fluconazole (DIFLUCAN) 150 MG Tab TAKE 1 TABLET(150 MG) BY MOUTH 1 TIME FOR 1 DOSE 1 tablet 0    fluticasone propionate (FLONASE) 50 mcg/actuation nasal spray 2 sprays by Nasal route.      furosemide (LASIX) 20 MG tablet Take 1 tablet (20 mg total) by mouth once daily. 90 tablet 3    levocetirizine (XYZAL) 5 MG tablet levocetirizine 5 mg tablet   TAKE 1 TABLET BY MOUTH EVERY DAY      levothyroxine (SYNTHROID) 125 MCG tablet take 2 tablets daily for a total of 250 mcg daily 60 tablet 11    lisinopril (PRINIVIL,ZESTRIL) 20 MG tablet lisinopril 20 mg tablet   TAKE 1 TABLET BY MOUTH EVERY DAY 90 tablet 3    montelukast (SINGULAIR) 10 mg tablet TK 1 T PO QD UTD UNTIL TOLD TO  STOP      nitroGLYCERIN (NITROSTAT) 0.4 MG SL tablet Place 1 tablet (0.4 mg total) under the tongue every 5 (five) minutes as needed. 25 tablet 0    omeprazole (PRILOSEC) 20 MG capsule Take 20 mg by mouth.      phentermine (ADIPEX-P) 37.5 mg tablet TK 1 T PO QAM BEFORE BREAKFAST      traZODone (DESYREL) 100 MG tablet TAKE 1 TABLET(100 MG) BY MOUTH EVERY EVENING 90 tablet 3    XIIDRA 5 % Dpet APPLY 1 DROP TO BOTH EYES BID       No current facility-administered medications on file prior to visit.        Allergies:  Sulfamethoxazole-trimethoprim and Sulfa (sulfonamide antibiotics)    Immunizations:  Immunization History   Administered Date(s) Administered    Tdap 11/26/2019       Review of Systems:  Review of Systems   HENT: Positive for congestion, ear pain and sore throat. Negative for drooling, ear discharge and trouble swallowing.    Respiratory: Positive for cough and stridor. Negative for shortness of breath.    Gastrointestinal: Negative for abdominal pain, diarrhea and vomiting.   Musculoskeletal:  "Positive for neck pain.   Neurological: Positive for headaches.   All other systems reviewed and are negative.      Objective:    Vitals:  Vitals:    09/16/20 1336   BP: 118/70   Pulse: 97   Temp: 99 °F (37.2 °C)   TempSrc: Oral   SpO2: 98%   Weight: (!) 148.6 kg (327 lb 11.4 oz)   Height: 5' 5" (1.651 m)   PainSc:   5   PainLoc: Generalized       Physical Exam  Constitutional:       Appearance: She is well-developed.   HENT:      Head: Normocephalic.      Nose: Mucosal edema and rhinorrhea present.      Mouth/Throat:      Pharynx: Posterior oropharyngeal erythema present. No oropharyngeal exudate.   Neck:      Musculoskeletal: Normal range of motion and neck supple.   Cardiovascular:      Rate and Rhythm: Normal rate and regular rhythm.   Pulmonary:      Effort: Pulmonary effort is normal.      Breath sounds: Normal breath sounds.   Abdominal:      Palpations: Abdomen is soft.   Skin:     General: Skin is warm.      Findings: No rash.         Data:  No previous labs, imaging, or notes available.        Layo Mccrary MD  Family Medicine      "

## 2020-09-29 ENCOUNTER — PATIENT OUTREACH (OUTPATIENT)
Dept: ADMINISTRATIVE | Facility: OTHER | Age: 50
End: 2020-09-29

## 2020-09-29 NOTE — PROGRESS NOTES
LINKS immunization registry not responding  Care Everywhere updated  Health Maintenance updated  Chart reviewed for overdue Proactive [facility] Encounters (RE[facility]I) health maintenance testing (CRS, Breast Ca, Diabetic Eye Exam)   Orders entered:N/A

## 2020-10-01 ENCOUNTER — LAB VISIT (OUTPATIENT)
Dept: LAB | Facility: HOSPITAL | Age: 50
End: 2020-10-01
Attending: INTERNAL MEDICINE
Payer: COMMERCIAL

## 2020-10-01 DIAGNOSIS — E03.9 HYPOTHYROIDISM, UNSPECIFIED TYPE: ICD-10-CM

## 2020-10-01 PROCEDURE — 84443 ASSAY THYROID STIM HORMONE: CPT

## 2020-10-01 PROCEDURE — 36415 COLL VENOUS BLD VENIPUNCTURE: CPT | Mod: PO

## 2020-10-02 LAB — TSH SERPL DL<=0.005 MIU/L-ACNC: 1.75 UIU/ML (ref 0.4–4)

## 2020-10-22 ENCOUNTER — HOSPITAL ENCOUNTER (OUTPATIENT)
Dept: RADIOLOGY | Facility: HOSPITAL | Age: 50
Discharge: HOME | End: 2020-10-22
Attending: ORTHOPAEDIC SURGERY
Payer: COMMERCIAL

## 2020-10-22 ENCOUNTER — OFFICE VISIT (OUTPATIENT)
Dept: ORTHOPEDICS | Facility: CLINIC | Age: 50
End: 2020-10-22
Payer: COMMERCIAL

## 2020-10-22 VITALS — WEIGHT: 293 LBS | RESPIRATION RATE: 16 BRPM | BODY MASS INDEX: 48.82 KG/M2 | HEIGHT: 65 IN

## 2020-10-22 DIAGNOSIS — M25.562 ACUTE PAIN OF BOTH KNEES: ICD-10-CM

## 2020-10-22 DIAGNOSIS — M25.561 ACUTE PAIN OF BOTH KNEES: ICD-10-CM

## 2020-10-22 DIAGNOSIS — M25.562 ACUTE PAIN OF BOTH KNEES: Primary | ICD-10-CM

## 2020-10-22 DIAGNOSIS — M25.561 ACUTE PAIN OF BOTH KNEES: Primary | ICD-10-CM

## 2020-10-22 DIAGNOSIS — S83.422A SPRAIN OF LATERAL COLLATERAL LIGAMENT OF LEFT KNEE, INITIAL ENCOUNTER: Primary | ICD-10-CM

## 2020-10-22 PROCEDURE — 99999 PR PBB SHADOW E&M-EST. PATIENT-LVL IV: ICD-10-PCS | Mod: PBBFAC,,, | Performed by: ORTHOPAEDIC SURGERY

## 2020-10-22 PROCEDURE — 73564 X-RAY EXAM KNEE 4 OR MORE: CPT | Mod: 26,,, | Performed by: RADIOLOGY

## 2020-10-22 PROCEDURE — 73564 X-RAY EXAM KNEE 4 OR MORE: CPT | Mod: TC,50,PN

## 2020-10-22 PROCEDURE — 73564 XR KNEE ORTHO BILAT WITH FLEXION: ICD-10-PCS | Mod: 26,,, | Performed by: RADIOLOGY

## 2020-10-22 PROCEDURE — 3008F BODY MASS INDEX DOCD: CPT | Mod: CPTII,S$GLB,, | Performed by: ORTHOPAEDIC SURGERY

## 2020-10-22 PROCEDURE — 99204 PR OFFICE/OUTPT VISIT, NEW, LEVL IV, 45-59 MIN: ICD-10-PCS | Mod: S$GLB,,, | Performed by: ORTHOPAEDIC SURGERY

## 2020-10-22 PROCEDURE — 99999 PR PBB SHADOW E&M-EST. PATIENT-LVL IV: CPT | Mod: PBBFAC,,, | Performed by: ORTHOPAEDIC SURGERY

## 2020-10-22 PROCEDURE — 99204 OFFICE O/P NEW MOD 45 MIN: CPT | Mod: S$GLB,,, | Performed by: ORTHOPAEDIC SURGERY

## 2020-10-22 PROCEDURE — 3008F PR BODY MASS INDEX (BMI) DOCUMENTED: ICD-10-PCS | Mod: CPTII,S$GLB,, | Performed by: ORTHOPAEDIC SURGERY

## 2020-10-22 RX ORDER — METFORMIN HYDROCHLORIDE 500 MG/1
TABLET, EXTENDED RELEASE ORAL
COMMUNITY
Start: 2020-09-26 | End: 2020-11-30

## 2020-10-22 NOTE — PROGRESS NOTES
CC:  49-year-old female presents for evaluation of left knee pain.  The patient was leaving her home earlier today when her dog clipped her in the right leg causing her to stumble and twisted her left knee.  She had immediate burning pain on the lateral aspect of the left knee that was just below the knee joint and radiated down behind her ankle into the arch of her foot.  The pain is progressively gotten somewhat better but she does still have residual pain that she rates as a 4/10.  She has been taking Advil and icing the knee since the injury and that has helped somewhat with the pain.    ROS:    Constitution: Denies chills, fever, and sweats.  HENT: Denies headaches or blurry vision.  Cardiovascular: Denies chest pain or irregular heart beat.  Respiratory: Denies cough or shortness of breath.  Gastrointestinal: Denies abdominal pain, nausea, or vomiting.  Genitourinary:  Denies urinary incontinence, bladder and kidney issues  Musculoskeletal:  Denies muscle cramps.  Positive for left knee and calf pain  Neurological: Denies dizziness or focal weakness.  Psychiatric/Behavioral: Normal mental status.  Hematologic/Lymphatic: Denies bleeding problem or easy bruising/bleeding.  Skin: Denies rash or suspicious lesions.    Physical examination     Gen - No acute distress, well nourished, well groomed   Eyes - Extraoccular motions intact, pupils equally round and reactive to light and accommodation   ENT - normocephalic, atruamtic, oropharynx clear   Neck - Supple, no abnormal masses   Cardiovascular - regular rate and rhythm   Pulmonary - clear to auscultation bilaterally, no wheezes, ronchi, or rales   Abdomen - soft, non-tender, non-distended, positive bowel sounds   Psych - The patient is alert and oriented x3 with normal mood and affect    Examination of the Left Lower Extremity:     Motor function is intact distally EHL/FHL/TA/fitz   +2 dorsalis pedis and posterior tibial pulses   Sensation to light touch intact  distally dorsal, plantar, and first web space     Examination of the Left knee:    ROM 0 - 150   Effusion negative  Tenderness to palpation at the joint line positive over the lateral collateral ligament  Tenderness to palpation also noted along the peroneal muscle bellies  Pain noted with resisted eversion of foot  Pain during range of motion negative  Crepitation during range of motion negative     negative increased pain noted with flexion past 90   positive antalgic gait noted   negative Lachman's Test   negative Anterior Drawer Test   negative Posterior Drawer Test   negative McMurrays Test   negative Disco Test   negative Varus/Valgus instability    X-ray images were examined and personally interpreted by me.  Three views of the bilateral knees dated 10/22/2020 show some mild arthritic changes with narrowing the joint space and early periarticular osteophytes noted.    Dx:  Strain of lateral collateral ligament and peroneal muscle bellies of the left leg    Plan:  I had a long discussion about the findings with the patient.  At this point I do not think she needs bracing as she is minimally tender over the LCL and mostly tender over the peroneal muscle bellies.  We discussed continuing to use NSAIDs and ice as needed.  This should heal over the next 4-6 weeks.  If she is not progressively getting better over the next week or 2 she is going to follow up in the consider an MRI.

## 2020-11-10 ENCOUNTER — PATIENT OUTREACH (OUTPATIENT)
Dept: ADMINISTRATIVE | Facility: OTHER | Age: 50
End: 2020-11-10

## 2020-11-10 DIAGNOSIS — M25.531 RIGHT WRIST PAIN: Primary | ICD-10-CM

## 2020-11-10 DIAGNOSIS — M79.641 RIGHT HAND PAIN: ICD-10-CM

## 2020-11-11 ENCOUNTER — HOSPITAL ENCOUNTER (OUTPATIENT)
Dept: RADIOLOGY | Facility: HOSPITAL | Age: 50
Discharge: HOME | End: 2020-11-11
Attending: ORTHOPAEDIC SURGERY
Payer: COMMERCIAL

## 2020-11-11 ENCOUNTER — OFFICE VISIT (OUTPATIENT)
Dept: ORTHOPEDICS | Facility: CLINIC | Age: 50
End: 2020-11-11
Payer: COMMERCIAL

## 2020-11-11 VITALS — BODY MASS INDEX: 48.82 KG/M2 | HEIGHT: 65 IN | WEIGHT: 293 LBS | RESPIRATION RATE: 16 BRPM

## 2020-11-11 DIAGNOSIS — M18.11 ARTHRITIS OF CARPOMETACARPAL (CMC) JOINT OF RIGHT THUMB: Primary | ICD-10-CM

## 2020-11-11 DIAGNOSIS — M79.641 RIGHT HAND PAIN: ICD-10-CM

## 2020-11-11 DIAGNOSIS — M25.531 RIGHT WRIST PAIN: ICD-10-CM

## 2020-11-11 PROCEDURE — 73130 X-RAY EXAM OF HAND: CPT | Mod: 26,RT,, | Performed by: RADIOLOGY

## 2020-11-11 PROCEDURE — 73130 X-RAY EXAM OF HAND: CPT | Mod: TC,PO,RT

## 2020-11-11 PROCEDURE — 73110 X-RAY EXAM OF WRIST: CPT | Mod: 26,RT,, | Performed by: RADIOLOGY

## 2020-11-11 PROCEDURE — 99243 PR OFFICE CONSULTATION,LEVEL III: ICD-10-PCS | Mod: 25,S$GLB,, | Performed by: ORTHOPAEDIC SURGERY

## 2020-11-11 PROCEDURE — 99999 PR PBB SHADOW E&M-EST. PATIENT-LVL IV: ICD-10-PCS | Mod: PBBFAC,,, | Performed by: ORTHOPAEDIC SURGERY

## 2020-11-11 PROCEDURE — 99243 OFF/OP CNSLTJ NEW/EST LOW 30: CPT | Mod: 25,S$GLB,, | Performed by: ORTHOPAEDIC SURGERY

## 2020-11-11 PROCEDURE — 20600 SMALL JOINT ASPIRATION/INJECTION: R THUMB MCP: ICD-10-PCS | Mod: RT,S$GLB,, | Performed by: ORTHOPAEDIC SURGERY

## 2020-11-11 PROCEDURE — 73110 XR WRIST COMPLETE 3 VIEWS RIGHT: ICD-10-PCS | Mod: 26,RT,, | Performed by: RADIOLOGY

## 2020-11-11 PROCEDURE — 99999 PR PBB SHADOW E&M-EST. PATIENT-LVL IV: CPT | Mod: PBBFAC,,, | Performed by: ORTHOPAEDIC SURGERY

## 2020-11-11 PROCEDURE — 73130 XR HAND COMPLETE 3 VIEW RIGHT: ICD-10-PCS | Mod: 26,RT,, | Performed by: RADIOLOGY

## 2020-11-11 PROCEDURE — 73110 X-RAY EXAM OF WRIST: CPT | Mod: TC,PO,RT

## 2020-11-11 PROCEDURE — 20600 DRAIN/INJ JOINT/BURSA W/O US: CPT | Mod: RT,S$GLB,, | Performed by: ORTHOPAEDIC SURGERY

## 2020-11-11 RX ORDER — TRIAMCINOLONE ACETONIDE 40 MG/ML
40 INJECTION, SUSPENSION INTRA-ARTICULAR; INTRAMUSCULAR
Status: DISCONTINUED | OUTPATIENT
Start: 2020-11-11 | End: 2020-11-11 | Stop reason: HOSPADM

## 2020-11-11 RX ADMIN — TRIAMCINOLONE ACETONIDE 40 MG: 40 INJECTION, SUSPENSION INTRA-ARTICULAR; INTRAMUSCULAR at 03:11

## 2020-11-11 NOTE — PROGRESS NOTES
Past Medical History:   Diagnosis Date    Anxiety     GERD (gastroesophageal reflux disease)     Hyperlipidemia     Hypertension     Hypothyroidism     Insomnia     Observed sleep apnea     Vitamin D deficiency        Past Surgical History:   Procedure Laterality Date    BTL      CHOLECYSTECTOMY      SURGICAL REMOVAL OF BONE SPUR      left heel    TONSILLECTOMY         Current Outpatient Medications   Medication Sig    amoxicillin-clavulanate 500-125mg (AUGMENTIN) 500-125 mg Tab Take 1 tablet (500 mg total) by mouth 2 (two) times daily. (Patient not taking: Reported on 10/22/2020)    aspirin (ECOTRIN) 81 MG EC tablet Take 1 tablet (81 mg total) by mouth once daily.    atorvastatin (LIPITOR) 40 MG tablet Take 1 tablet (40 mg total) by mouth every evening.    empagliflozin (JARDIANCE) 10 mg tablet Take 1 tablet (10 mg total) by mouth once daily.    escitalopram oxalate (LEXAPRO) 20 MG tablet Take 1 tablet (20 mg total) by mouth once daily.    fenofibrate 160 MG Tab Take 1 tablet (160 mg total) by mouth once daily.    fluconazole (DIFLUCAN) 150 MG Tab TAKE 1 TABLET(150 MG) BY MOUTH 1 TIME FOR 1 DOSE (Patient not taking: Reported on 10/22/2020)    fluticasone propionate (FLONASE) 50 mcg/actuation nasal spray 2 sprays by Nasal route.    furosemide (LASIX) 20 MG tablet Take 1 tablet (20 mg total) by mouth once daily.    levocetirizine (XYZAL) 5 MG tablet levocetirizine 5 mg tablet   TAKE 1 TABLET BY MOUTH EVERY DAY    levothyroxine (SYNTHROID) 125 MCG tablet take 2 tablets daily for a total of 250 mcg daily    lisinopril (PRINIVIL,ZESTRIL) 20 MG tablet lisinopril 20 mg tablet   TAKE 1 TABLET BY MOUTH EVERY DAY    metFORMIN (GLUCOPHAGE-XR) 500 MG ER 24hr tablet     montelukast (SINGULAIR) 10 mg tablet TK 1 T PO QD UTD UNTIL TOLD TO  STOP    nitroGLYCERIN (NITROSTAT) 0.4 MG SL tablet Place 1 tablet (0.4 mg total) under the tongue every 5 (five) minutes as needed.    omeprazole (PRILOSEC) 20 MG  capsule Take 20 mg by mouth.    phentermine (ADIPEX-P) 37.5 mg tablet TK 1 T PO QAM BEFORE BREAKFAST    traZODone (DESYREL) 100 MG tablet TAKE 1 TABLET(100 MG) BY MOUTH EVERY EVENING    XIIDRA 5 % Dpet APPLY 1 DROP TO BOTH EYES BID     No current facility-administered medications for this visit.        Review of patient's allergies indicates:   Allergen Reactions    Sulfamethoxazole-trimethoprim     Sulfa (sulfonamide antibiotics) Rash and Swelling       Family History   Problem Relation Age of Onset    Heart disease Mother     Bone cancer Mother     Cancer Mother         cervical    Heart disease Father     Diabetes type I Father     Lung cancer Father     Heart disease Brother     No Known Problems Daughter     No Known Problems Daughter     Breast cancer Neg Hx     Ovarian cancer Neg Hx        Social History     Socioeconomic History    Marital status:      Spouse name: Not on file    Number of children: Not on file    Years of education: Not on file    Highest education level: Not on file   Occupational History    Not on file   Social Needs    Financial resource strain: Not hard at all    Food insecurity     Worry: Never true     Inability: Never true    Transportation needs     Medical: No     Non-medical: No   Tobacco Use    Smoking status: Never Smoker    Smokeless tobacco: Never Used   Substance and Sexual Activity    Alcohol use: Not Currently     Frequency: Monthly or less     Drinks per session: 1 or 2     Binge frequency: Never    Drug use: Never    Sexual activity: Yes   Lifestyle    Physical activity     Days per week: Not on file     Minutes per session: 20 min    Stress: Rather much   Relationships    Social connections     Talks on phone: More than three times a week     Gets together: Twice a week     Attends Protestant service: Not on file     Active member of club or organization: Yes     Attends meetings of clubs or organizations: More than 4 times per year      Relationship status:    Other Topics Concern    Not on file   Social History Narrative    Not on file       Chief Complaint:   Chief Complaint   Patient presents with    Right Wrist - Pain    Right Hand - Pain       History of present illness:  This is a 49-year-old female seen in consultation for Dr. Mccrary for right wrist and thumb pain.  Patient complains of numbness in the palm as well as pain in the thumb joint.  Pain mainly around the base of the thumb joint.  It has been going on for about a year and a half a getting worse.  She had been getting chiropractic care and some acupuncture which had been helping for a while.  Pain is up to 6/10.  Patient types and writes a lot.  Starting to affect her ability to work and do her schoolwork.      Review of Systems:    Constitution: Negative for chills, fever, and sweats.  Negative for unexplained weight loss.    HENT:  Negative for headaches and blurry vision.    Cardiovascular:Negative for chest pain or irregular heart beat. Negative for hypertension.    Respiratory:  Negative for cough and shortness of breath.    Gastrointestinal: Negative for abdominal pain, heartburn, melena, nausea, and vomitting.    Genitourinary:  Negative bladder incontinence and dysuria.    Musculoskeletal:  See HPI    Neurological: Negative for numbness.    Psychiatric/Behavioral: Negative for depression.  The patient is not nervous/anxious.      Endocrine: Negative for polyuria    Hematologic/Lymphatic: Negative for bleeding problem.  Does not bruise/bleed easily.    Skin: Negative for poor would healing and rash      Physical Examination:    Vital Signs:    Vitals:    11/11/20 1536   Resp: 16       Body mass index is 54.42 kg/m².    This a well-developed, well nourished patient in no acute distress.  They are alert and oriented and cooperative to examination.  Pt. walks without an antalgic gait.      Examination of the right hand and wrist shows no signs of rashes or  erythema. Patient has no masses ecchymosis or effusions. Patient has full range of motion of the wrist in flexion and extension as well as ulnar and radial deviation. The patient also has full range of motion of all joints in the hand. There are 2+ radial pulse and intact light touch sensation in all 5 digits.  Positive CMC joint pain    Examination of the left hand and wrist shows no signs of rashes or erythema. Patient has no masses ecchymosis or effusions. Patient has full range of motion of the wrist in flexion and extension as well as ulnar and radial deviation. The patient also has full range of motion of all joints in the hand. There are 2+ radial pulse and intact light touch sensation in all 5 digits. Nontender over the anatomic snuffbox. Negative Tinel's. Negative Finkelstein's test.         X-rays:  X-rays of the right hand and wrist are ordered and reviewed which show some mild CMC arthritis     Assessment::  Right thumb CMC arthritis    Plan:  I reviewed the findings with her today.  I gave her an information sheet about CMC arthritis.  Patient did elect for a CMC joint injection today.  Follow-up as needed.    This note was created using [employer] voice recognition software that occasionally misinterpreted phrases or words.    Consult note is delivered via Epic messaging service.

## 2020-11-11 NOTE — PROCEDURES
Small Joint Aspiration/Injection: R thumb MCP    Date/Time: 11/11/2020 3:45 PM  Performed by: Reed Taylor MD  Authorized by: Reed Taylor MD     Consent Done?:  Yes (Verbal)  Indications:  Pain  Site marked: the procedure site was marked    Timeout: prior to procedure the correct patient, procedure, and site was verified    Prep: patient was prepped and draped in usual sterile fashion      Local anesthesia used?: Yes    Anesthesia:  Local infiltration  Local anesthetic:  Lidocaine 1% without epinephrine and bupivacaine 0.25% without epinephrine  Anesthetic total (ml):  2    Location:  Thumb  Site:  R thumb MCP  Needle size:  22 G  Medications:  40 mg triamcinolone acetonide 40 mg/mL  Patient tolerance:  Patient tolerated the procedure well with no immediate complications

## 2020-11-11 NOTE — LETTER
November 11, 2020      Layo Mccrary MD  [address]  [facility] [address]           [facility]  [address] [place] [address]  Phone: [phone]          Patient: Christine Alfaro   MR Number: [id number]   YOB: 1970   Date of Visit: 11/11/2020       Dear Dr. Layo Mccarry:    Thank you for referring Christine Alfaro to me for evaluation. Attached you will find relevant portions of my assessment and plan of care.    If you have questions, please do not hesitate to call me. I look forward to following Christine Alfaro along with you.    Sincerely,    Reed Taylor MD    Enclosure  CC:  No Recipients    If you would like to receive this communication electronically, please contact [email or web address] or [phone] to request more information on [employer] Link access.    For providers and/or their staff who would like to refer a patient to [facility], please contact us through our one-stop-shop provider referral line, [facility], at [phone].    If you feel you have received this communication in error or would no longer like to receive these types of communications, please e-mail [email or web address]

## 2020-11-19 ENCOUNTER — PATIENT MESSAGE (OUTPATIENT)
Dept: FAMILY MEDICINE | Facility: CLINIC | Age: 50
End: 2020-11-19

## 2020-11-19 DIAGNOSIS — Z00.00 PREVENTATIVE HEALTH CARE: Primary | ICD-10-CM

## 2020-11-19 NOTE — TELEPHONE ENCOUNTER
Labs ordered  Typically Endocrinology takes care of the adrenal fatigue labs  She may inquire about this with Dr. donnelly

## 2020-11-20 ENCOUNTER — PATIENT MESSAGE (OUTPATIENT)
Dept: ENDOCRINOLOGY | Facility: CLINIC | Age: 50
End: 2020-11-20

## 2020-11-20 ENCOUNTER — PATIENT MESSAGE (OUTPATIENT)
Dept: FAMILY MEDICINE | Facility: CLINIC | Age: 50
End: 2020-11-20

## 2020-11-20 DIAGNOSIS — E03.9 HYPOTHYROIDISM, UNSPECIFIED TYPE: Primary | ICD-10-CM

## 2020-11-20 DIAGNOSIS — R53.83 FATIGUE, UNSPECIFIED TYPE: ICD-10-CM

## 2020-11-23 ENCOUNTER — TELEPHONE (OUTPATIENT)
Dept: CARDIOLOGY | Facility: CLINIC | Age: 50
End: 2020-11-23

## 2020-11-23 ENCOUNTER — PATIENT MESSAGE (OUTPATIENT)
Dept: CARDIOLOGY | Facility: CLINIC | Age: 50
End: 2020-11-23

## 2020-11-23 ENCOUNTER — OFFICE VISIT (OUTPATIENT)
Dept: CARDIOLOGY | Facility: CLINIC | Age: 50
End: 2020-11-23
Payer: COMMERCIAL

## 2020-11-23 VITALS
BODY MASS INDEX: 48.82 KG/M2 | DIASTOLIC BLOOD PRESSURE: 70 MMHG | HEIGHT: 65 IN | SYSTOLIC BLOOD PRESSURE: 130 MMHG | WEIGHT: 293 LBS

## 2020-11-23 DIAGNOSIS — E66.01 OBESITY, MORBID, BMI 50 OR HIGHER: ICD-10-CM

## 2020-11-23 DIAGNOSIS — G47.33 OBSTRUCTIVE SLEEP APNEA: ICD-10-CM

## 2020-11-23 DIAGNOSIS — I10 ESSENTIAL HYPERTENSION: Primary | ICD-10-CM

## 2020-11-23 DIAGNOSIS — E78.5 DYSLIPIDEMIA: ICD-10-CM

## 2020-11-23 DIAGNOSIS — I34.0 NONRHEUMATIC MITRAL VALVE REGURGITATION: ICD-10-CM

## 2020-11-23 DIAGNOSIS — U07.1 COVID-19 VIRUS DETECTED: ICD-10-CM

## 2020-11-23 PROCEDURE — 99214 OFFICE O/P EST MOD 30 MIN: CPT | Mod: 95,,, | Performed by: INTERNAL MEDICINE

## 2020-11-23 PROCEDURE — 3078F DIAST BP <80 MM HG: CPT | Mod: CPTII,,, | Performed by: INTERNAL MEDICINE

## 2020-11-23 PROCEDURE — 3075F SYST BP GE 130 - 139MM HG: CPT | Mod: CPTII,,, | Performed by: INTERNAL MEDICINE

## 2020-11-23 PROCEDURE — 3078F PR MOST RECENT DIASTOLIC BLOOD PRESSURE < 80 MM HG: ICD-10-PCS | Mod: CPTII,,, | Performed by: INTERNAL MEDICINE

## 2020-11-23 PROCEDURE — 3008F BODY MASS INDEX DOCD: CPT | Mod: CPTII,,, | Performed by: INTERNAL MEDICINE

## 2020-11-23 PROCEDURE — 3008F PR BODY MASS INDEX (BMI) DOCUMENTED: ICD-10-PCS | Mod: CPTII,,, | Performed by: INTERNAL MEDICINE

## 2020-11-23 PROCEDURE — 99214 PR OFFICE/OUTPT VISIT, EST, LEVL IV, 30-39 MIN: ICD-10-PCS | Mod: 95,,, | Performed by: INTERNAL MEDICINE

## 2020-11-23 PROCEDURE — 3075F PR MOST RECENT SYSTOLIC BLOOD PRESS GE 130-139MM HG: ICD-10-PCS | Mod: CPTII,,, | Performed by: INTERNAL MEDICINE

## 2020-11-23 RX ORDER — ATORVASTATIN CALCIUM 40 MG/1
40 TABLET, FILM COATED ORAL NIGHTLY
Qty: 90 TABLET | Refills: 1 | Status: SHIPPED | OUTPATIENT
Start: 2020-11-23 | End: 2021-08-21

## 2020-11-23 NOTE — PROGRESS NOTES
Subjective:    Patient ID:  Christine Alfaro is a 49 y.o. female who presents for Hypertension and Shortness of Breath        HPI The patient location is:  home  The chief complaint leading to consultation is:  Hypertension follow-up shortness of breath    Visit type:  Audio and video virtual visit, COVID-19    Face to Face time with patient: 20 min  0 minutes of total time spent on the encounter, which includes face to face time and non-face to face time preparing to see the patient (eg, review of tests), Obtaining and/or reviewing separately obtained history, Documenting clinical information in the electronic or other health record, Independently interpreting results (not separately reported) and communicating results to the patient/family/caregiver, or Care coordination (not separately reported).         Each patient to whom he or she provides medical services by telemedicine is:  (1) informed of the relationship between the physician and patient and the respective role of any other health care provider with respect to management of the patient; and (2) notified that he or she may decline to receive medical services by telemedicine and may withdraw from such care at any time.    Notes:   TEST FROM LAST YEAR, NEGATIVE STRESS ECHO, MILD MR RECENT LAB THYROID FUNCTION TESTS OKAY, DX WITH COVID NOW, USES CPAP DAILY AND BENEFITS FROM IT, SEE REVIEW OF SYSTEMS    Past Medical History:   Diagnosis Date    Anxiety     GERD (gastroesophageal reflux disease)     Hyperlipidemia     Hypertension     Hypothyroidism     Insomnia     Observed sleep apnea     Vitamin D deficiency      Past Surgical History:   Procedure Laterality Date    BTL      CHOLECYSTECTOMY      SURGICAL REMOVAL OF BONE SPUR      left heel    TONSILLECTOMY       Family History   Problem Relation Age of Onset    Heart disease Mother     Bone cancer Mother     Cancer Mother         cervical    Heart disease Father     Diabetes type I  Father     Lung cancer Father     Heart disease Brother     No Known Problems Daughter     No Known Problems Daughter     Breast cancer Neg Hx     Ovarian cancer Neg Hx      Social History     Socioeconomic History    Marital status:      Spouse name: Not on file    Number of children: Not on file    Years of education: Not on file    Highest education level: Not on file   Occupational History    Not on file   Social Needs    Financial resource strain: Not hard at all    Food insecurity     Worry: Never true     Inability: Never true    Transportation needs     Medical: No     Non-medical: No   Tobacco Use    Smoking status: Never Smoker    Smokeless tobacco: Never Used   Substance and Sexual Activity    Alcohol use: Not Currently     Frequency: Monthly or less     Drinks per session: 1 or 2     Binge frequency: Never    Drug use: Never    Sexual activity: Yes   Lifestyle    Physical activity     Days per week: Not on file     Minutes per session: 20 min    Stress: Rather much   Relationships    Social connections     Talks on phone: More than three times a week     Gets together: Twice a week     Attends Mu-ism service: Not on file     Active member of club or organization: Yes     Attends meetings of clubs or organizations: More than 4 times per year     Relationship status:    Other Topics Concern    Not on file   Social History Narrative    Not on file       Review of patient's allergies indicates:   Allergen Reactions    Sulfamethoxazole-trimethoprim     Sulfa (sulfonamide antibiotics) Rash and Swelling       Current Outpatient Medications:     amoxicillin-clavulanate 500-125mg (AUGMENTIN) 500-125 mg Tab, Take 1 tablet (500 mg total) by mouth 2 (two) times daily. (Patient not taking: Reported on 10/22/2020), Disp: 20 tablet, Rfl: 0    aspirin (ECOTRIN) 81 MG EC tablet, Take 1 tablet (81 mg total) by mouth once daily., Disp: 90 tablet, Rfl: 3    atorvastatin  (LIPITOR) 40 MG tablet, Take 1 tablet (40 mg total) by mouth every evening., Disp: 90 tablet, Rfl: 1    empagliflozin (JARDIANCE) 10 mg tablet, Take 1 tablet (10 mg total) by mouth once daily., Disp: 30 tablet, Rfl: 11    escitalopram oxalate (LEXAPRO) 20 MG tablet, TAKE 1 TABLET(20 MG) BY MOUTH EVERY DAY, Disp: 90 tablet, Rfl: 3    fluconazole (DIFLUCAN) 150 MG Tab, TAKE 1 TABLET(150 MG) BY MOUTH 1 TIME FOR 1 DOSE (Patient not taking: Reported on 10/22/2020), Disp: 1 tablet, Rfl: 0    fluticasone propionate (FLONASE) 50 mcg/actuation nasal spray, 2 sprays by Nasal route., Disp: , Rfl:     furosemide (LASIX) 20 MG tablet, Take 1 tablet (20 mg total) by mouth once daily., Disp: 90 tablet, Rfl: 3    levocetirizine (XYZAL) 5 MG tablet, levocetirizine 5 mg tablet  TAKE 1 TABLET BY MOUTH EVERY DAY, Disp: , Rfl:     levothyroxine (SYNTHROID) 125 MCG tablet, take 2 tablets daily for a total of 250 mcg daily, Disp: 60 tablet, Rfl: 11    lisinopril (PRINIVIL,ZESTRIL) 20 MG tablet, lisinopril 20 mg tablet  TAKE 1 TABLET BY MOUTH EVERY DAY, Disp: 90 tablet, Rfl: 3    metFORMIN (GLUCOPHAGE-XR) 500 MG ER 24hr tablet, , Disp: , Rfl:     montelukast (SINGULAIR) 10 mg tablet, TK 1 T PO QD UTD UNTIL TOLD TO  STOP, Disp: , Rfl:     nitroGLYCERIN (NITROSTAT) 0.4 MG SL tablet, Place 1 tablet (0.4 mg total) under the tongue every 5 (five) minutes as needed., Disp: 25 tablet, Rfl: 0    omeprazole (PRILOSEC) 20 MG capsule, Take 20 mg by mouth., Disp: , Rfl:     phentermine (ADIPEX-P) 37.5 mg tablet, TK 1 T PO QAM BEFORE BREAKFAST, Disp: , Rfl:     traZODone (DESYREL) 100 MG tablet, TAKE 1 TABLET(100 MG) BY MOUTH EVERY EVENING, Disp: 90 tablet, Rfl: 3    XIIDRA 5 % Dpet, APPLY 1 DROP TO BOTH EYES BID, Disp: , Rfl:     Review of Systems   Constitution: Positive for fever and malaise/fatigue. Negative for chills, diaphoresis and night sweats. Weight loss: SOME.   HENT: Negative for congestion, hearing loss and nosebleeds.   "  Eyes: Negative for blurred vision and visual disturbance.   Cardiovascular: Positive for dyspnea on exertion and leg swelling (L). Negative for chest pain (TIGHTNESS AT TIMES, ), claudication, cyanosis, irregular heartbeat, near-syncope, orthopnea, palpitations, paroxysmal nocturnal dyspnea and syncope.   Respiratory: Positive for cough and shortness of breath. Negative for hemoptysis and wheezing.    Endocrine: Negative for cold intolerance, heat intolerance and polyuria.   Hematologic/Lymphatic: Negative for adenopathy. Does not bruise/bleed easily.   Skin: Negative for itching and rash.   Musculoskeletal: Negative for back pain (CHRONIC), falls and joint pain.   Gastrointestinal: Negative for abdominal pain, change in bowel habit, dysphagia, heartburn, hematemesis, jaundice, melena and nausea.   Genitourinary: Negative for dysuria, flank pain and frequency.   Neurological: Negative for brief paralysis, dizziness, focal weakness, light-headedness, loss of balance, numbness (FEET), tremors and weakness.   Psychiatric/Behavioral: Negative for altered mental status, depression and memory loss. The patient is nervous/anxious.         Objective:      Vitals:    11/23/20 1435   BP: 130/70   Weight: (!) 145.2 kg (320 lb)   Height: 5' 5" (1.651 m)     Body mass index is 53.25 kg/m².    Physical Exam   Constitutional: She is oriented to person, place, and time. She appears well-developed and well-nourished.   Obese, limited physical exam due to virtual visit   HENT:   Head: Normocephalic and atraumatic.   Eyes: Conjunctivae are normal.   Neck:   NECK MOVEMENTS APPEARS TO BE NORMAL   Pulmonary/Chest: No respiratory distress.   Musculoskeletal:      Comments: NO ANKLE EDEMA   Neurological: She is alert and oriented to person, place, and time.   Skin: No rash noted. No pallor.   Psychiatric: She has a normal mood and affect. Her speech is normal and behavior is normal.               ..    Chemistry        Component Value " Date/Time     12/23/2019 0847    K 4.1 12/23/2019 0847     12/23/2019 0847    CO2 27 12/23/2019 0847    BUN 9 12/23/2019 0847    CREATININE 0.8 12/23/2019 0847    GLU 97 12/23/2019 0847        Component Value Date/Time    CALCIUM 9.2 12/23/2019 0847    ALKPHOS 96 12/23/2019 0847    AST 17 12/23/2019 0847    ALT 22 12/23/2019 0847    BILITOT 0.4 12/23/2019 0847    ESTGFRAFRICA >60.0 12/23/2019 0847    EGFRNONAA >60.0 12/23/2019 0847            ..  Lab Results   Component Value Date    CHOL 176 11/26/2019     Lab Results   Component Value Date    HDL 51 11/26/2019     Lab Results   Component Value Date    LDLCALC 52.4 (L) 11/26/2019     Lab Results   Component Value Date    TRIG 363 (H) 11/26/2019     Lab Results   Component Value Date    CHOLHDL 29.0 11/26/2019     ..  Lab Results   Component Value Date    WBC 9.07 12/23/2019    HGB 12.5 12/23/2019    HCT 41.7 12/23/2019    MCV 91 12/23/2019     12/23/2019       Test(s) Reviewed  I have reviewed the following in detail:  [x] Stress test   [] Angiography   [x] Echocardiogram   [] Labs   [] Other:       Assessment:         ICD-10-CM ICD-9-CM   1. Essential hypertension  I10 401.9   2. Nonrheumatic mitral valve regurgitation  I34.0 424.0   3. Dyslipidemia  E78.5 272.4   4. Obesity, morbid, BMI 50 or higher  E66.01 278.01     Problem List Items Addressed This Visit        Cardiac/Vascular    Dyslipidemia    Relevant Medications    atorvastatin (LIPITOR) 40 MG tablet    Essential hypertension - Primary    Nonrheumatic mitral valve regurgitation       Endocrine    Obesity, morbid, BMI 50 or higher           Plan:     ALL CV CLINICALLY STABLE, NO ANGINA, NO HF, NO TIA, NO CLINICAL ARRHYTHMIA,CONTINUE CURRENT MEDS, EDUCATION, DIET, EXERCISE, WEIGHT LOSS CONSIDER BARIATRIC OPTION, RETURN TO CLINIC IN 6 MONTHS WITH LABS, COVID EDUCATION      Essential hypertension    Nonrheumatic mitral valve regurgitation    Dyslipidemia  -     atorvastatin (LIPITOR) 40 MG  tablet; Take 1 tablet (40 mg total) by mouth every evening.  Dispense: 90 tablet; Refill: 1    Obesity, morbid, BMI 50 or higher    RTC Low level/low impact aerobic exercise 5x's/wk. Heart healthy diet and risk factor modification.    See labs and med orders.    Aerobic exercise 5x's/wk. Heart healthy diet and risk factor modification.    See labs and med orders.

## 2020-11-24 PROBLEM — U07.1 COVID-19 VIRUS DETECTED: Status: ACTIVE | Noted: 2020-11-24

## 2020-11-25 ENCOUNTER — LAB VISIT (OUTPATIENT)
Dept: LAB | Facility: HOSPITAL | Age: 50
End: 2020-11-25
Attending: INTERNAL MEDICINE
Payer: COMMERCIAL

## 2020-11-25 DIAGNOSIS — R53.83 FATIGUE, UNSPECIFIED TYPE: ICD-10-CM

## 2020-11-25 DIAGNOSIS — E03.9 HYPOTHYROIDISM, UNSPECIFIED TYPE: ICD-10-CM

## 2020-11-25 LAB
ALBUMIN SERPL BCP-MCNC: 3.3 G/DL (ref 3.5–5.2)
ALP SERPL-CCNC: 101 U/L (ref 55–135)
ALT SERPL W/O P-5'-P-CCNC: 24 U/L (ref 10–44)
ANION GAP SERPL CALC-SCNC: 10 MMOL/L (ref 8–16)
AST SERPL-CCNC: 17 U/L (ref 10–40)
BILIRUB SERPL-MCNC: 0.3 MG/DL (ref 0.1–1)
BUN SERPL-MCNC: 7 MG/DL (ref 6–20)
CALCIUM SERPL-MCNC: 8.7 MG/DL (ref 8.7–10.5)
CHLORIDE SERPL-SCNC: 105 MMOL/L (ref 95–110)
CO2 SERPL-SCNC: 24 MMOL/L (ref 23–29)
CORTIS SERPL-MCNC: 12.4 UG/DL
CREAT SERPL-MCNC: 0.7 MG/DL (ref 0.5–1.4)
ERYTHROCYTE [DISTWIDTH] IN BLOOD BY AUTOMATED COUNT: 14.7 % (ref 11.5–14.5)
EST. GFR  (AFRICAN AMERICAN): >60 ML/MIN/1.73 M^2
EST. GFR  (NON AFRICAN AMERICAN): >60 ML/MIN/1.73 M^2
GLUCOSE SERPL-MCNC: 108 MG/DL (ref 70–110)
HCT VFR BLD AUTO: 43.7 % (ref 37–48.5)
HGB BLD-MCNC: 13.4 G/DL (ref 12–16)
MCH RBC QN AUTO: 26.5 PG (ref 27–31)
MCHC RBC AUTO-ENTMCNC: 30.7 G/DL (ref 32–36)
MCV RBC AUTO: 87 FL (ref 82–98)
PLATELET # BLD AUTO: 227 K/UL (ref 150–350)
PMV BLD AUTO: 10.3 FL (ref 9.2–12.9)
POTASSIUM SERPL-SCNC: 3.8 MMOL/L (ref 3.5–5.1)
PROT SERPL-MCNC: 6.7 G/DL (ref 6–8.4)
RBC # BLD AUTO: 5.05 M/UL (ref 4–5.4)
SODIUM SERPL-SCNC: 139 MMOL/L (ref 136–145)
TSH SERPL DL<=0.005 MIU/L-ACNC: 0.95 UIU/ML (ref 0.4–4)
WBC # BLD AUTO: 7.36 K/UL (ref 3.9–12.7)

## 2020-11-25 PROCEDURE — 82533 TOTAL CORTISOL: CPT

## 2020-11-25 PROCEDURE — 85027 COMPLETE CBC AUTOMATED: CPT

## 2020-11-25 PROCEDURE — 82024 ASSAY OF ACTH: CPT

## 2020-11-25 PROCEDURE — 80053 COMPREHEN METABOLIC PANEL: CPT

## 2020-11-25 PROCEDURE — 84443 ASSAY THYROID STIM HORMONE: CPT

## 2020-11-27 ENCOUNTER — PATIENT MESSAGE (OUTPATIENT)
Dept: CARDIOLOGY | Facility: CLINIC | Age: 50
End: 2020-11-27

## 2020-11-27 ENCOUNTER — TELEPHONE (OUTPATIENT)
Dept: ENDOCRINOLOGY | Facility: CLINIC | Age: 50
End: 2020-11-27

## 2020-11-27 LAB — ACTH PLAS-MCNC: 11 PG/ML (ref 0–46)

## 2020-11-30 ENCOUNTER — PATIENT MESSAGE (OUTPATIENT)
Dept: FAMILY MEDICINE | Facility: CLINIC | Age: 50
End: 2020-11-30

## 2020-11-30 ENCOUNTER — OFFICE VISIT (OUTPATIENT)
Dept: FAMILY MEDICINE | Facility: CLINIC | Age: 50
End: 2020-11-30
Payer: COMMERCIAL

## 2020-11-30 VITALS
WEIGHT: 293 LBS | TEMPERATURE: 99 F | RESPIRATION RATE: 16 BRPM | DIASTOLIC BLOOD PRESSURE: 84 MMHG | HEIGHT: 65 IN | HEART RATE: 74 BPM | SYSTOLIC BLOOD PRESSURE: 138 MMHG | BODY MASS INDEX: 48.82 KG/M2

## 2020-11-30 DIAGNOSIS — G47.00 INSOMNIA, UNSPECIFIED TYPE: ICD-10-CM

## 2020-11-30 DIAGNOSIS — E03.9 HYPOTHYROIDISM, UNSPECIFIED TYPE: ICD-10-CM

## 2020-11-30 DIAGNOSIS — Z00.00 GENERAL MEDICAL EXAM: Primary | ICD-10-CM

## 2020-11-30 DIAGNOSIS — F41.9 ANXIETY: ICD-10-CM

## 2020-11-30 DIAGNOSIS — E78.5 DYSLIPIDEMIA: ICD-10-CM

## 2020-11-30 DIAGNOSIS — E74.39 GLUCOSE INTOLERANCE: ICD-10-CM

## 2020-11-30 DIAGNOSIS — I10 ESSENTIAL HYPERTENSION: ICD-10-CM

## 2020-11-30 DIAGNOSIS — L21.9 SEBORRHEIC DERMATITIS: ICD-10-CM

## 2020-11-30 DIAGNOSIS — Z23 IMMUNIZATION DUE: ICD-10-CM

## 2020-11-30 PROCEDURE — 3008F BODY MASS INDEX DOCD: CPT | Mod: CPTII,S$GLB,, | Performed by: FAMILY MEDICINE

## 2020-11-30 PROCEDURE — 3008F PR BODY MASS INDEX (BMI) DOCUMENTED: ICD-10-PCS | Mod: CPTII,S$GLB,, | Performed by: FAMILY MEDICINE

## 2020-11-30 PROCEDURE — 3079F PR MOST RECENT DIASTOLIC BLOOD PRESSURE 80-89 MM HG: ICD-10-PCS | Mod: CPTII,S$GLB,, | Performed by: FAMILY MEDICINE

## 2020-11-30 PROCEDURE — 99214 OFFICE O/P EST MOD 30 MIN: CPT | Mod: S$GLB,,, | Performed by: FAMILY MEDICINE

## 2020-11-30 PROCEDURE — 99214 PR OFFICE/OUTPT VISIT, EST, LEVL IV, 30-39 MIN: ICD-10-PCS | Mod: S$GLB,,, | Performed by: FAMILY MEDICINE

## 2020-11-30 PROCEDURE — 1126F AMNT PAIN NOTED NONE PRSNT: CPT | Mod: S$GLB,,, | Performed by: FAMILY MEDICINE

## 2020-11-30 PROCEDURE — 99999 PR PBB SHADOW E&M-EST. PATIENT-LVL IV: CPT | Mod: PBBFAC,,, | Performed by: FAMILY MEDICINE

## 2020-11-30 PROCEDURE — 3075F PR MOST RECENT SYSTOLIC BLOOD PRESS GE 130-139MM HG: ICD-10-PCS | Mod: CPTII,S$GLB,, | Performed by: FAMILY MEDICINE

## 2020-11-30 PROCEDURE — 3079F DIAST BP 80-89 MM HG: CPT | Mod: CPTII,S$GLB,, | Performed by: FAMILY MEDICINE

## 2020-11-30 PROCEDURE — 99999 PR PBB SHADOW E&M-EST. PATIENT-LVL IV: ICD-10-PCS | Mod: PBBFAC,,, | Performed by: FAMILY MEDICINE

## 2020-11-30 PROCEDURE — 3075F SYST BP GE 130 - 139MM HG: CPT | Mod: CPTII,S$GLB,, | Performed by: FAMILY MEDICINE

## 2020-11-30 PROCEDURE — 1126F PR PAIN SEVERITY QUANTIFIED, NO PAIN PRESENT: ICD-10-PCS | Mod: S$GLB,,, | Performed by: FAMILY MEDICINE

## 2020-11-30 RX ORDER — SUVOREXANT 20 MG/1
20 TABLET, FILM COATED ORAL NIGHTLY PRN
Qty: 30 TABLET | Refills: 2 | Status: SHIPPED | OUTPATIENT
Start: 2020-11-30 | End: 2020-12-30

## 2020-11-30 RX ORDER — KETOCONAZOLE 20 MG/ML
SHAMPOO, SUSPENSION TOPICAL
Qty: 120 ML | Refills: 3 | Status: SHIPPED | OUTPATIENT
Start: 2020-11-30 | End: 2022-03-04

## 2020-11-30 RX ORDER — LISINOPRIL 20 MG/1
TABLET ORAL
Qty: 90 TABLET | Refills: 3 | Status: SHIPPED | OUTPATIENT
Start: 2020-11-30 | End: 2020-12-09 | Stop reason: SDUPTHER

## 2020-11-30 RX ORDER — BUSPIRONE HYDROCHLORIDE 10 MG/1
10 TABLET ORAL 3 TIMES DAILY PRN
Qty: 90 TABLET | Refills: 2 | Status: SHIPPED | OUTPATIENT
Start: 2020-11-30 | End: 2021-02-03

## 2020-11-30 NOTE — PROGRESS NOTES
THIS DOCUMENT WAS MADE IN PART WITH VOICE RECOGNITION SOFTWARE.  OCCASIONALLY THIS SOFTWARE WILL MISINTERPRET WORDS OR PHRASES.    Assessment and Plan:    1. General medical exam    2. Immunization due    3. Dyslipidemia  Stable    4. Essential hypertension   medicine refilled  - lisinopriL (PRINIVIL,ZESTRIL) 20 MG tablet; lisinopril 20 mg tablet   TAKE 1 TABLET BY MOUTH EVERY DAY  Dispense: 90 tablet; Refill: 3    5. Hypothyroidism, unspecified type   stable    6. Insomnia, unspecified type   avoid Remeron due to weight gain side effect, trazodone not efficacious  - suvorexant (BELSOMRA) 20 mg Tab; Take 20 mg by mouth nightly as needed.  Dispense: 30 tablet; Refill: 2    7. Glucose intolerance   stable    8. Seborrheic dermatitis  - ketoconazole (NIZORAL) 2 % shampoo; Apply topically twice a week.  Dispense: 120 mL; Refill: 3    9. Anxiety  - busPIRone (BUSPAR) 10 MG tablet; Take 1 tablet (10 mg total) by mouth 3 (three) times daily as needed.  Dispense: 90 tablet; Refill: 2      ______________________________________________________________________  Subjective:    Chief Complaint:  Chief Complaint   Patient presents with    Annual Exam    Medication Problem     Pt would like to discuss trazadone and laexapro        HPI:  Christine is a 49 y.o. year old     Fatigue / Anxiety  CoVid postive 2 weeks ago   Emotional as well   Having severe insomnia        glucose intolerance  Rx-Jardiance  11/25/2020-A1c 5.8%     Dyslipidemia    Rx -Lipitor 40 mg   Denies any exertional chest discomfort shortness of breath   11/25/2020-total cholesterol 125, triglycerides 101, HDL 45, LDL 65    Hypothyroidism   Rx -Synthroid 125 mcg   Normal TSH on 11/25/2020    Essential hypertension   Rx -lisinopril 20 mg     GERD   Omeprazole 20 mg  Working well     Insomnia   Rx -trazodone 100 mg   Not working very well   Hasn't slept more than 4 hours at a time over last month     Past Medical History:  Past Medical History:   Diagnosis Date     Anxiety     GERD (gastroesophageal reflux disease)     Hyperlipidemia     Hypertension     Hypothyroidism     Insomnia     Observed sleep apnea     Vitamin D deficiency        Past Surgical History:  Past Surgical History:   Procedure Laterality Date    BTL      CHOLECYSTECTOMY      SURGICAL REMOVAL OF BONE SPUR      left heel    TONSILLECTOMY         Family History:  Family History   Problem Relation Age of Onset    Heart disease Mother     Bone cancer Mother     Cancer Mother         cervical    Heart disease Father     Diabetes type I Father     Lung cancer Father     Heart disease Brother     No Known Problems Daughter     No Known Problems Daughter     Breast cancer Neg Hx     Ovarian cancer Neg Hx        Social History:  Social History     Socioeconomic History    Marital status:      Spouse name: Not on file    Number of children: Not on file    Years of education: Not on file    Highest education level: Not on file   Occupational History    Not on file   Social Needs    Financial resource strain: Not hard at all    Food insecurity     Worry: Never true     Inability: Never true    Transportation needs     Medical: No     Non-medical: No   Tobacco Use    Smoking status: Never Smoker    Smokeless tobacco: Never Used   Substance and Sexual Activity    Alcohol use: Not Currently     Frequency: Monthly or less     Drinks per session: 1 or 2     Binge frequency: Never    Drug use: Never    Sexual activity: Yes   Lifestyle    Physical activity     Days per week: Not on file     Minutes per session: 20 min    Stress: Rather much   Relationships    Social connections     Talks on phone: More than three times a week     Gets together: Twice a week     Attends Restoration service: Not on file     Active member of club or organization: Yes     Attends meetings of clubs or organizations: More than 4 times per year     Relationship status:    Other Topics Concern    Not  on file   Social History Narrative    Not on file       Medications:  Current Outpatient Medications on File Prior to Visit   Medication Sig Dispense Refill    aspirin (ECOTRIN) 81 MG EC tablet Take 1 tablet (81 mg total) by mouth once daily. 90 tablet 3    atorvastatin (LIPITOR) 40 MG tablet Take 1 tablet (40 mg total) by mouth every evening. 90 tablet 1    empagliflozin (JARDIANCE) 10 mg tablet Take 1 tablet (10 mg total) by mouth once daily. 30 tablet 11    escitalopram oxalate (LEXAPRO) 20 MG tablet TAKE 1 TABLET(20 MG) BY MOUTH EVERY DAY 90 tablet 3    fluticasone propionate (FLONASE) 50 mcg/actuation nasal spray 2 sprays by Nasal route.      furosemide (LASIX) 20 MG tablet Take 1 tablet (20 mg total) by mouth once daily. 90 tablet 3    levocetirizine (XYZAL) 5 MG tablet levocetirizine 5 mg tablet   TAKE 1 TABLET BY MOUTH EVERY DAY      levothyroxine (SYNTHROID) 125 MCG tablet take 2 tablets daily for a total of 250 mcg daily 60 tablet 11    montelukast (SINGULAIR) 10 mg tablet TK 1 T PO QD UTD UNTIL TOLD TO  STOP      nitroGLYCERIN (NITROSTAT) 0.4 MG SL tablet Place 1 tablet (0.4 mg total) under the tongue every 5 (five) minutes as needed. 25 tablet 0    omeprazole (PRILOSEC) 20 MG capsule Take 20 mg by mouth.      XIIDRA 5 % Dpet APPLY 1 DROP TO BOTH EYES BID      [DISCONTINUED] lisinopril (PRINIVIL,ZESTRIL) 20 MG tablet lisinopril 20 mg tablet   TAKE 1 TABLET BY MOUTH EVERY DAY 90 tablet 3    [DISCONTINUED] traZODone (DESYREL) 100 MG tablet TAKE 1 TABLET(100 MG) BY MOUTH EVERY EVENING 90 tablet 3    [DISCONTINUED] amoxicillin-clavulanate 500-125mg (AUGMENTIN) 500-125 mg Tab Take 1 tablet (500 mg total) by mouth 2 (two) times daily. (Patient not taking: Reported on 10/22/2020) 20 tablet 0    [DISCONTINUED] fluconazole (DIFLUCAN) 150 MG Tab TAKE 1 TABLET(150 MG) BY MOUTH 1 TIME FOR 1 DOSE (Patient not taking: Reported on 10/22/2020) 1 tablet 0    [DISCONTINUED] metFORMIN (GLUCOPHAGE-XR) 500  "MG ER 24hr tablet       [DISCONTINUED] phentermine (ADIPEX-P) 37.5 mg tablet TK 1 T PO QAM BEFORE BREAKFAST       No current facility-administered medications on file prior to visit.        Allergies:  Sulfamethoxazole-trimethoprim and Sulfa (sulfonamide antibiotics)    Immunizations:  Immunization History   Administered Date(s) Administered    DTaP 01/01/1973, 02/01/1973, 04/01/1973, 08/01/1975, 04/01/1978    IPV 01/01/1973, 02/01/1973, 08/01/1975, 01/05/1978    Influenza Split 11/18/2007    MMR 01/12/1978, 10/19/1995    Td (ADULT) 04/17/1989, 08/07/2004    Tdap 11/26/2019       Review of Systems:  Review of Systems   Constitutional: Negative for activity change and unexpected weight change.   HENT: Negative for hearing loss, rhinorrhea and trouble swallowing.    Eyes: Negative for discharge and visual disturbance.   Respiratory: Positive for chest tightness and wheezing.    Cardiovascular: Positive for chest pain and palpitations.   Gastrointestinal: Negative for blood in stool, constipation, diarrhea and vomiting.   Endocrine: Negative for polydipsia and polyuria.   Genitourinary: Negative for difficulty urinating, dysuria, hematuria and menstrual problem.   Musculoskeletal: Positive for arthralgias and joint swelling. Negative for neck pain.   Neurological: Positive for weakness. Negative for headaches.   Psychiatric/Behavioral: Positive for dysphoric mood. Negative for confusion.   All other systems reviewed and are negative.      Objective:    Vitals:  Vitals:    11/30/20 1027   BP: 138/84   Pulse: 74   Resp: 16   Temp: 98.6 °F (37 °C)   TempSrc: Skin   Weight: (!) 147.8 kg (325 lb 13.4 oz)   Height: 5' 5" (1.651 m)   PainSc: 0-No pain       Physical Exam  Vitals signs reviewed.   Constitutional:       General: She is not in acute distress.  HENT:      Head: Normocephalic and atraumatic.   Eyes:      Pupils: Pupils are equal, round, and reactive to light.   Neck:      Musculoskeletal: Neck supple. "   Cardiovascular:      Rate and Rhythm: Normal rate and regular rhythm.      Heart sounds: No murmur. No friction rub.   Pulmonary:      Effort: Pulmonary effort is normal.      Breath sounds: Normal breath sounds.   Abdominal:      General: Bowel sounds are normal. There is no distension.      Palpations: Abdomen is soft.      Tenderness: There is no abdominal tenderness.   Skin:     General: Skin is warm and dry.      Findings: No rash.   Psychiatric:         Behavior: Behavior normal.         Data:  No previous labs, imaging, or notes available.        Layo Mccrary MD  Family Medicine

## 2020-12-09 ENCOUNTER — PATIENT MESSAGE (OUTPATIENT)
Dept: FAMILY MEDICINE | Facility: CLINIC | Age: 50
End: 2020-12-09

## 2020-12-09 DIAGNOSIS — I10 ESSENTIAL HYPERTENSION: ICD-10-CM

## 2020-12-09 RX ORDER — LISINOPRIL 20 MG/1
TABLET ORAL
Qty: 90 TABLET | Refills: 0 | Status: SHIPPED | OUTPATIENT
Start: 2020-12-09 | End: 2021-03-05

## 2020-12-10 ENCOUNTER — TELEPHONE (OUTPATIENT)
Dept: CARDIOLOGY | Facility: CLINIC | Age: 50
End: 2020-12-10

## 2020-12-10 NOTE — TELEPHONE ENCOUNTER
attempted  to call pt, no answer, left message to call back to schedule appt with dr Torrez on may and labs

## 2020-12-10 NOTE — TELEPHONE ENCOUNTER
Spoke to pt over the phone, appointment was schedule for 5/27/21 at 1:45 pm. Pt verbalized understanding

## 2020-12-10 NOTE — TELEPHONE ENCOUNTER
----- Message from Adele Chandler MA sent at 12/10/2020  4:05 PM CST -----  returning missed call to Devin  call back # [phone]

## 2020-12-30 ENCOUNTER — PATIENT MESSAGE (OUTPATIENT)
Dept: FAMILY MEDICINE | Facility: CLINIC | Age: 50
End: 2020-12-30

## 2020-12-30 DIAGNOSIS — G47.00 INSOMNIA, UNSPECIFIED TYPE: Primary | ICD-10-CM

## 2020-12-30 RX ORDER — TEMAZEPAM 15 MG/1
15 CAPSULE ORAL NIGHTLY PRN
Qty: 15 CAPSULE | Refills: 0 | Status: SHIPPED | OUTPATIENT
Start: 2020-12-30 | End: 2021-01-29

## 2021-01-07 ENCOUNTER — PATIENT MESSAGE (OUTPATIENT)
Dept: FAMILY MEDICINE | Facility: CLINIC | Age: 51
End: 2021-01-07

## 2021-01-10 RX ORDER — TRAZODONE HYDROCHLORIDE 150 MG/1
150 TABLET ORAL NIGHTLY
COMMUNITY
End: 2021-02-03 | Stop reason: SDUPTHER

## 2021-02-03 ENCOUNTER — OFFICE VISIT (OUTPATIENT)
Dept: FAMILY MEDICINE | Facility: CLINIC | Age: 51
End: 2021-02-03
Payer: COMMERCIAL

## 2021-02-03 DIAGNOSIS — F51.01 PRIMARY INSOMNIA: Primary | ICD-10-CM

## 2021-02-03 DIAGNOSIS — F41.9 ANXIETY: ICD-10-CM

## 2021-02-03 DIAGNOSIS — G47.33 OBSTRUCTIVE SLEEP APNEA: ICD-10-CM

## 2021-02-03 PROCEDURE — 99213 OFFICE O/P EST LOW 20 MIN: CPT | Mod: 95,,, | Performed by: FAMILY MEDICINE

## 2021-02-03 PROCEDURE — 99213 PR OFFICE/OUTPT VISIT, EST, LEVL III, 20-29 MIN: ICD-10-PCS | Mod: 95,,, | Performed by: FAMILY MEDICINE

## 2021-02-03 RX ORDER — BUSPIRONE HYDROCHLORIDE 10 MG/1
10 TABLET ORAL 2 TIMES DAILY
Qty: 90 TABLET | Refills: 2
Start: 2021-02-03 | End: 2022-03-04

## 2021-02-03 RX ORDER — TRAZODONE HYDROCHLORIDE 150 MG/1
150 TABLET ORAL NIGHTLY PRN
Qty: 90 TABLET | Refills: 3 | Status: SHIPPED | OUTPATIENT
Start: 2021-02-03 | End: 2022-03-04

## 2021-02-05 ENCOUNTER — TELEPHONE (OUTPATIENT)
Dept: FAMILY MEDICINE | Facility: CLINIC | Age: 51
End: 2021-02-05

## 2021-02-18 ENCOUNTER — OFFICE VISIT (OUTPATIENT)
Dept: DERMATOLOGY | Facility: CLINIC | Age: 51
End: 2021-02-18
Payer: COMMERCIAL

## 2021-02-18 VITALS — BODY MASS INDEX: 48.82 KG/M2 | WEIGHT: 293 LBS | HEIGHT: 65 IN

## 2021-02-18 DIAGNOSIS — L82.1 SEBORRHEIC KERATOSES: ICD-10-CM

## 2021-02-18 DIAGNOSIS — Z12.83 SCREENING EXAM FOR SKIN CANCER: ICD-10-CM

## 2021-02-18 DIAGNOSIS — L81.4 LENTIGINES: ICD-10-CM

## 2021-02-18 DIAGNOSIS — L30.9 ECZEMA, UNSPECIFIED TYPE: Primary | ICD-10-CM

## 2021-02-18 DIAGNOSIS — D22.9 MULTIPLE BENIGN NEVI: ICD-10-CM

## 2021-02-18 DIAGNOSIS — D18.01 ANGIOMA OF SKIN: ICD-10-CM

## 2021-02-18 DIAGNOSIS — L73.8 SEBACEOUS HYPERPLASIA: ICD-10-CM

## 2021-02-18 PROCEDURE — 99999 PR PBB SHADOW E&M-EST. PATIENT-LVL III: ICD-10-PCS | Mod: PBBFAC,,, | Performed by: DERMATOLOGY

## 2021-02-18 PROCEDURE — 99214 OFFICE O/P EST MOD 30 MIN: CPT | Mod: S$GLB,,, | Performed by: DERMATOLOGY

## 2021-02-18 PROCEDURE — 99214 PR OFFICE/OUTPT VISIT, EST, LEVL IV, 30-39 MIN: ICD-10-PCS | Mod: S$GLB,,, | Performed by: DERMATOLOGY

## 2021-02-18 PROCEDURE — 99999 PR PBB SHADOW E&M-EST. PATIENT-LVL III: CPT | Mod: PBBFAC,,, | Performed by: DERMATOLOGY

## 2021-02-18 RX ORDER — TRIAMCINOLONE ACETONIDE 1 MG/G
OINTMENT TOPICAL 2 TIMES DAILY
Qty: 80 G | Refills: 3 | Status: SHIPPED | OUTPATIENT
Start: 2021-02-18 | End: 2023-01-30

## 2021-02-26 ENCOUNTER — PATIENT MESSAGE (OUTPATIENT)
Dept: FAMILY MEDICINE | Facility: CLINIC | Age: 51
End: 2021-02-26

## 2021-03-08 ENCOUNTER — PATIENT MESSAGE (OUTPATIENT)
Dept: ENDOCRINOLOGY | Facility: CLINIC | Age: 51
End: 2021-03-08

## 2021-03-08 ENCOUNTER — PATIENT MESSAGE (OUTPATIENT)
Dept: FAMILY MEDICINE | Facility: CLINIC | Age: 51
End: 2021-03-08

## 2021-03-08 DIAGNOSIS — E03.9 HYPOTHYROIDISM, UNSPECIFIED TYPE: Primary | ICD-10-CM

## 2021-03-31 ENCOUNTER — TELEPHONE (OUTPATIENT)
Dept: RADIOLOGY | Facility: HOSPITAL | Age: 51
End: 2021-03-31

## 2021-04-05 ENCOUNTER — HOSPITAL ENCOUNTER (OUTPATIENT)
Dept: RADIOLOGY | Facility: HOSPITAL | Age: 51
Discharge: HOME | End: 2021-04-05
Attending: INTERNAL MEDICINE
Payer: COMMERCIAL

## 2021-04-05 DIAGNOSIS — E03.9 HYPOTHYROIDISM, UNSPECIFIED TYPE: ICD-10-CM

## 2021-04-05 PROCEDURE — 76536 US EXAM OF HEAD AND NECK: CPT | Mod: TC,PO

## 2021-04-05 PROCEDURE — 76536 US EXAM OF HEAD AND NECK: CPT | Mod: 26,,, | Performed by: RADIOLOGY

## 2021-04-05 PROCEDURE — 76536 US SOFT TISSUE HEAD NECK THYROID: ICD-10-PCS | Mod: 26,,, | Performed by: RADIOLOGY

## 2021-04-06 ENCOUNTER — PATIENT MESSAGE (OUTPATIENT)
Dept: FAMILY MEDICINE | Facility: CLINIC | Age: 51
End: 2021-04-06

## 2021-04-07 ENCOUNTER — PATIENT MESSAGE (OUTPATIENT)
Dept: FAMILY MEDICINE | Facility: CLINIC | Age: 51
End: 2021-04-07

## 2021-04-07 ENCOUNTER — OFFICE VISIT (OUTPATIENT)
Dept: FAMILY MEDICINE | Facility: CLINIC | Age: 51
End: 2021-04-07
Payer: COMMERCIAL

## 2021-04-07 DIAGNOSIS — J01.90 ACUTE SINUSITIS, RECURRENCE NOT SPECIFIED, UNSPECIFIED LOCATION: Primary | ICD-10-CM

## 2021-04-07 PROCEDURE — 99214 PR OFFICE/OUTPT VISIT, EST, LEVL IV, 30-39 MIN: ICD-10-PCS | Mod: 95,,, | Performed by: FAMILY MEDICINE

## 2021-04-07 PROCEDURE — 99214 OFFICE O/P EST MOD 30 MIN: CPT | Mod: 95,,, | Performed by: FAMILY MEDICINE

## 2021-04-07 RX ORDER — AMOXICILLIN AND CLAVULANATE POTASSIUM 875; 125 MG/1; MG/1
1 TABLET, FILM COATED ORAL EVERY 12 HOURS
Qty: 10 TABLET | Refills: 0 | Status: SHIPPED | OUTPATIENT
Start: 2021-04-07 | End: 2021-05-27

## 2021-04-09 ENCOUNTER — PATIENT MESSAGE (OUTPATIENT)
Dept: FAMILY MEDICINE | Facility: CLINIC | Age: 51
End: 2021-04-09

## 2021-04-09 DIAGNOSIS — E66.9 OBESITY, UNSPECIFIED CLASSIFICATION, UNSPECIFIED OBESITY TYPE, UNSPECIFIED WHETHER SERIOUS COMORBIDITY PRESENT: Primary | ICD-10-CM

## 2021-04-09 RX ORDER — PHENTERMINE HYDROCHLORIDE 37.5 MG/1
37.5 TABLET ORAL
Qty: 30 TABLET | Refills: 0 | Status: SHIPPED | OUTPATIENT
Start: 2021-04-09 | End: 2021-05-09

## 2021-04-29 ENCOUNTER — PATIENT MESSAGE (OUTPATIENT)
Dept: FAMILY MEDICINE | Facility: CLINIC | Age: 51
End: 2021-04-29

## 2021-04-29 DIAGNOSIS — R05.3 CHRONIC COUGH: Primary | ICD-10-CM

## 2021-04-29 RX ORDER — BENZONATATE 200 MG/1
200 CAPSULE ORAL 3 TIMES DAILY PRN
Qty: 30 CAPSULE | Refills: 2 | Status: SHIPPED | OUTPATIENT
Start: 2021-04-29 | End: 2021-05-09

## 2021-04-29 RX ORDER — ALBUTEROL SULFATE 90 UG/1
2 AEROSOL, METERED RESPIRATORY (INHALATION) EVERY 4 HOURS PRN
Qty: 18 G | Refills: 0 | Status: SHIPPED | OUTPATIENT
Start: 2021-04-29 | End: 2021-05-27

## 2021-04-30 ENCOUNTER — OFFICE VISIT (OUTPATIENT)
Dept: ENDOCRINOLOGY | Facility: CLINIC | Age: 51
End: 2021-04-30
Payer: COMMERCIAL

## 2021-04-30 DIAGNOSIS — E04.1 THYROID NODULE: ICD-10-CM

## 2021-04-30 DIAGNOSIS — E03.9 HYPOTHYROIDISM, UNSPECIFIED TYPE: Primary | ICD-10-CM

## 2021-04-30 PROCEDURE — 99213 OFFICE O/P EST LOW 20 MIN: CPT | Mod: 95,,, | Performed by: INTERNAL MEDICINE

## 2021-04-30 PROCEDURE — 99213 PR OFFICE/OUTPT VISIT, EST, LEVL III, 20-29 MIN: ICD-10-PCS | Mod: 95,,, | Performed by: INTERNAL MEDICINE

## 2021-05-10 ENCOUNTER — PATIENT MESSAGE (OUTPATIENT)
Dept: RESEARCH | Facility: HOSPITAL | Age: 51
End: 2021-05-10

## 2021-05-17 ENCOUNTER — PATIENT MESSAGE (OUTPATIENT)
Dept: FAMILY MEDICINE | Facility: CLINIC | Age: 51
End: 2021-05-17

## 2021-05-19 ENCOUNTER — PATIENT MESSAGE (OUTPATIENT)
Dept: FAMILY MEDICINE | Facility: CLINIC | Age: 51
End: 2021-05-19

## 2021-05-27 ENCOUNTER — OFFICE VISIT (OUTPATIENT)
Dept: CARDIOLOGY | Facility: CLINIC | Age: 51
End: 2021-05-27
Payer: COMMERCIAL

## 2021-05-27 VITALS
DIASTOLIC BLOOD PRESSURE: 85 MMHG | HEART RATE: 101 BPM | WEIGHT: 293 LBS | BODY MASS INDEX: 48.82 KG/M2 | SYSTOLIC BLOOD PRESSURE: 136 MMHG | HEIGHT: 65 IN

## 2021-05-27 DIAGNOSIS — G47.33 OBSTRUCTIVE SLEEP APNEA: Chronic | ICD-10-CM

## 2021-05-27 DIAGNOSIS — E66.01 OBESITY, MORBID, BMI 50 OR HIGHER: Chronic | ICD-10-CM

## 2021-05-27 DIAGNOSIS — E78.5 DYSLIPIDEMIA: Chronic | ICD-10-CM

## 2021-05-27 DIAGNOSIS — I10 ESSENTIAL HYPERTENSION: Primary | Chronic | ICD-10-CM

## 2021-05-27 DIAGNOSIS — I34.0 NONRHEUMATIC MITRAL VALVE REGURGITATION: Chronic | ICD-10-CM

## 2021-05-27 PROCEDURE — 99999 PR PBB SHADOW E&M-EST. PATIENT-LVL IV: CPT | Mod: PBBFAC,,, | Performed by: INTERNAL MEDICINE

## 2021-05-27 PROCEDURE — 99214 OFFICE O/P EST MOD 30 MIN: CPT | Mod: S$GLB,,, | Performed by: INTERNAL MEDICINE

## 2021-05-27 PROCEDURE — 99214 PR OFFICE/OUTPT VISIT, EST, LEVL IV, 30-39 MIN: ICD-10-PCS | Mod: S$GLB,,, | Performed by: INTERNAL MEDICINE

## 2021-05-27 PROCEDURE — 99999 PR PBB SHADOW E&M-EST. PATIENT-LVL IV: ICD-10-PCS | Mod: PBBFAC,,, | Performed by: INTERNAL MEDICINE

## 2021-05-27 RX ORDER — LISINOPRIL 20 MG/1
TABLET ORAL
Qty: 90 TABLET | Refills: 3 | Status: SHIPPED | OUTPATIENT
Start: 2021-05-27 | End: 2022-03-04 | Stop reason: SDUPTHER

## 2021-06-04 ENCOUNTER — OFFICE VISIT (OUTPATIENT)
Dept: FAMILY MEDICINE | Facility: CLINIC | Age: 51
End: 2021-06-04
Payer: COMMERCIAL

## 2021-06-04 ENCOUNTER — LAB VISIT (OUTPATIENT)
Dept: LAB | Facility: HOSPITAL | Age: 51
End: 2021-06-04
Attending: FAMILY MEDICINE
Payer: COMMERCIAL

## 2021-06-04 VITALS
HEIGHT: 65 IN | WEIGHT: 293 LBS | HEART RATE: 87 BPM | SYSTOLIC BLOOD PRESSURE: 110 MMHG | TEMPERATURE: 98 F | DIASTOLIC BLOOD PRESSURE: 78 MMHG | OXYGEN SATURATION: 98 % | BODY MASS INDEX: 48.82 KG/M2

## 2021-06-04 DIAGNOSIS — Z00.00 WELLNESS EXAMINATION: Primary | ICD-10-CM

## 2021-06-04 DIAGNOSIS — Z23 IMMUNIZATION DUE: ICD-10-CM

## 2021-06-04 DIAGNOSIS — G47.33 OBSTRUCTIVE SLEEP APNEA: ICD-10-CM

## 2021-06-04 DIAGNOSIS — G47.00 INSOMNIA, UNSPECIFIED TYPE: ICD-10-CM

## 2021-06-04 DIAGNOSIS — N91.5 OLIGOMENORRHEA, UNSPECIFIED TYPE: ICD-10-CM

## 2021-06-04 DIAGNOSIS — R41.840 ATTENTION DEFICIT: ICD-10-CM

## 2021-06-04 DIAGNOSIS — E03.9 HYPOTHYROIDISM, UNSPECIFIED TYPE: ICD-10-CM

## 2021-06-04 DIAGNOSIS — F41.9 ANXIETY: ICD-10-CM

## 2021-06-04 DIAGNOSIS — E74.39 GLUCOSE INTOLERANCE: ICD-10-CM

## 2021-06-04 DIAGNOSIS — I10 ESSENTIAL HYPERTENSION: ICD-10-CM

## 2021-06-04 DIAGNOSIS — E78.5 DYSLIPIDEMIA: ICD-10-CM

## 2021-06-04 DIAGNOSIS — Z12.39 ENCOUNTER FOR SCREENING FOR MALIGNANT NEOPLASM OF BREAST, UNSPECIFIED SCREENING MODALITY: ICD-10-CM

## 2021-06-04 DIAGNOSIS — Z00.00 WELLNESS EXAMINATION: ICD-10-CM

## 2021-06-04 LAB
ALBUMIN SERPL BCP-MCNC: 3.5 G/DL (ref 3.5–5.2)
ALP SERPL-CCNC: 146 U/L (ref 55–135)
ALT SERPL W/O P-5'-P-CCNC: 25 U/L (ref 10–44)
ANION GAP SERPL CALC-SCNC: 16 MMOL/L (ref 8–16)
AST SERPL-CCNC: 17 U/L (ref 10–40)
BASOPHILS # BLD AUTO: 0.05 K/UL (ref 0–0.2)
BASOPHILS NFR BLD: 0.4 % (ref 0–1.9)
BILIRUB SERPL-MCNC: 0.4 MG/DL (ref 0.1–1)
BUN SERPL-MCNC: 13 MG/DL (ref 6–20)
CALCIUM SERPL-MCNC: 9.4 MG/DL (ref 8.7–10.5)
CHLORIDE SERPL-SCNC: 101 MMOL/L (ref 95–110)
CHOLEST SERPL-MCNC: 156 MG/DL (ref 120–199)
CHOLEST/HDLC SERPL: 3.4 {RATIO} (ref 2–5)
CO2 SERPL-SCNC: 21 MMOL/L (ref 23–29)
CREAT SERPL-MCNC: 0.7 MG/DL (ref 0.5–1.4)
DIFFERENTIAL METHOD: ABNORMAL
EOSINOPHIL # BLD AUTO: 0.2 K/UL (ref 0–0.5)
EOSINOPHIL NFR BLD: 1.9 % (ref 0–8)
ERYTHROCYTE [DISTWIDTH] IN BLOOD BY AUTOMATED COUNT: 15.9 % (ref 11.5–14.5)
EST. GFR  (AFRICAN AMERICAN): >60 ML/MIN/1.73 M^2
EST. GFR  (NON AFRICAN AMERICAN): >60 ML/MIN/1.73 M^2
ESTIMATED AVG GLUCOSE: 117 MG/DL (ref 68–131)
FSH SERPL-ACNC: 3.8 MIU/ML
GLUCOSE SERPL-MCNC: 98 MG/DL (ref 70–110)
HBA1C MFR BLD: 5.7 % (ref 4–5.6)
HCT VFR BLD AUTO: 47.5 % (ref 37–48.5)
HDLC SERPL-MCNC: 46 MG/DL (ref 40–75)
HDLC SERPL: 29.5 % (ref 20–50)
HGB BLD-MCNC: 15 G/DL (ref 12–16)
IMM GRANULOCYTES # BLD AUTO: 0.06 K/UL (ref 0–0.04)
IMM GRANULOCYTES NFR BLD AUTO: 0.5 % (ref 0–0.5)
LDLC SERPL CALC-MCNC: 87 MG/DL (ref 63–159)
LYMPHOCYTES # BLD AUTO: 2.7 K/UL (ref 1–4.8)
LYMPHOCYTES NFR BLD: 24.1 % (ref 18–48)
MCH RBC QN AUTO: 27.8 PG (ref 27–31)
MCHC RBC AUTO-ENTMCNC: 31.6 G/DL (ref 32–36)
MCV RBC AUTO: 88 FL (ref 82–98)
MONOCYTES # BLD AUTO: 0.9 K/UL (ref 0.3–1)
MONOCYTES NFR BLD: 7.9 % (ref 4–15)
NEUTROPHILS # BLD AUTO: 7.3 K/UL (ref 1.8–7.7)
NEUTROPHILS NFR BLD: 65.2 % (ref 38–73)
NONHDLC SERPL-MCNC: 110 MG/DL
NRBC BLD-RTO: 0 /100 WBC
PLATELET # BLD AUTO: 239 K/UL (ref 150–450)
PMV BLD AUTO: 10.9 FL (ref 9.2–12.9)
POTASSIUM SERPL-SCNC: 4.1 MMOL/L (ref 3.5–5.1)
PROT SERPL-MCNC: 7 G/DL (ref 6–8.4)
RBC # BLD AUTO: 5.4 M/UL (ref 4–5.4)
SODIUM SERPL-SCNC: 138 MMOL/L (ref 136–145)
TRIGL SERPL-MCNC: 115 MG/DL (ref 30–150)
WBC # BLD AUTO: 11.18 K/UL (ref 3.9–12.7)

## 2021-06-04 PROCEDURE — 99999 PR PBB SHADOW E&M-EST. PATIENT-LVL V: ICD-10-PCS | Mod: PBBFAC,,, | Performed by: FAMILY MEDICINE

## 2021-06-04 PROCEDURE — 85025 COMPLETE CBC W/AUTO DIFF WBC: CPT | Performed by: FAMILY MEDICINE

## 2021-06-04 PROCEDURE — 3008F BODY MASS INDEX DOCD: CPT | Mod: CPTII,S$GLB,, | Performed by: FAMILY MEDICINE

## 2021-06-04 PROCEDURE — 83001 ASSAY OF GONADOTROPIN (FSH): CPT | Performed by: FAMILY MEDICINE

## 2021-06-04 PROCEDURE — 99999 PR PBB SHADOW E&M-EST. PATIENT-LVL V: CPT | Mod: PBBFAC,,, | Performed by: FAMILY MEDICINE

## 2021-06-04 PROCEDURE — 36415 COLL VENOUS BLD VENIPUNCTURE: CPT | Mod: PN | Performed by: FAMILY MEDICINE

## 2021-06-04 PROCEDURE — 82306 VITAMIN D 25 HYDROXY: CPT | Performed by: FAMILY MEDICINE

## 2021-06-04 PROCEDURE — 82607 VITAMIN B-12: CPT | Performed by: FAMILY MEDICINE

## 2021-06-04 PROCEDURE — 99396 PREV VISIT EST AGE 40-64: CPT | Mod: S$GLB,,, | Performed by: FAMILY MEDICINE

## 2021-06-04 PROCEDURE — 1126F AMNT PAIN NOTED NONE PRSNT: CPT | Mod: S$GLB,,, | Performed by: FAMILY MEDICINE

## 2021-06-04 PROCEDURE — 80053 COMPREHEN METABOLIC PANEL: CPT | Performed by: FAMILY MEDICINE

## 2021-06-04 PROCEDURE — 99396 PR PREVENTIVE VISIT,EST,40-64: ICD-10-PCS | Mod: S$GLB,,, | Performed by: FAMILY MEDICINE

## 2021-06-04 PROCEDURE — 83036 HEMOGLOBIN GLYCOSYLATED A1C: CPT | Performed by: FAMILY MEDICINE

## 2021-06-04 PROCEDURE — 80061 LIPID PANEL: CPT | Performed by: FAMILY MEDICINE

## 2021-06-04 PROCEDURE — 1126F PR PAIN SEVERITY QUANTIFIED, NO PAIN PRESENT: ICD-10-PCS | Mod: S$GLB,,, | Performed by: FAMILY MEDICINE

## 2021-06-04 PROCEDURE — 3008F PR BODY MASS INDEX (BMI) DOCUMENTED: ICD-10-PCS | Mod: CPTII,S$GLB,, | Performed by: FAMILY MEDICINE

## 2021-06-04 PROCEDURE — 86803 HEPATITIS C AB TEST: CPT | Performed by: FAMILY MEDICINE

## 2021-06-05 LAB
25(OH)D3+25(OH)D2 SERPL-MCNC: 53 NG/ML (ref 30–96)
VIT B12 SERPL-MCNC: 1282 PG/ML (ref 210–950)

## 2021-06-07 LAB — HCV AB SERPL QL IA: NEGATIVE

## 2021-07-05 ENCOUNTER — PATIENT MESSAGE (OUTPATIENT)
Dept: FAMILY MEDICINE | Facility: CLINIC | Age: 51
End: 2021-07-05

## 2021-07-05 DIAGNOSIS — R60.9 EDEMA, UNSPECIFIED TYPE: ICD-10-CM

## 2021-07-06 RX ORDER — FUROSEMIDE 20 MG/1
20 TABLET ORAL DAILY
Qty: 90 TABLET | Refills: 3 | Status: SHIPPED | OUTPATIENT
Start: 2021-07-06 | End: 2022-07-28 | Stop reason: SDUPTHER

## 2021-07-07 ENCOUNTER — PATIENT MESSAGE (OUTPATIENT)
Dept: ADMINISTRATIVE | Facility: HOSPITAL | Age: 51
End: 2021-07-07

## 2021-07-23 ENCOUNTER — LAB VISIT (OUTPATIENT)
Dept: LAB | Facility: HOSPITAL | Age: 51
End: 2021-07-23
Payer: COMMERCIAL

## 2021-07-23 DIAGNOSIS — F41.9 ANXIETY: ICD-10-CM

## 2021-07-23 DIAGNOSIS — E04.1 THYROID NODULE: ICD-10-CM

## 2021-07-23 DIAGNOSIS — G47.00 INSOMNIA: ICD-10-CM

## 2021-07-23 DIAGNOSIS — E55.9 VITAMIN D DEFICIENCY: ICD-10-CM

## 2021-07-23 DIAGNOSIS — Z13.220 SCREENING CHOLESTEROL LEVEL: ICD-10-CM

## 2021-07-23 DIAGNOSIS — R89.9 ABNORMAL LABORATORY TEST RESULT: ICD-10-CM

## 2021-07-23 DIAGNOSIS — E78.5 HYPERLIPIDEMIA: ICD-10-CM

## 2021-07-23 DIAGNOSIS — I10 HYPERTENSION: ICD-10-CM

## 2021-07-23 DIAGNOSIS — R63.5 WEIGHT GAIN: ICD-10-CM

## 2021-07-23 DIAGNOSIS — E07.9 THYROID DYSFUNCTION: ICD-10-CM

## 2021-07-23 DIAGNOSIS — Z00.00 ROUTINE GENERAL MEDICAL EXAMINATION AT A HEALTH CARE FACILITY: Primary | ICD-10-CM

## 2021-07-23 DIAGNOSIS — E66.9 OBESITY: ICD-10-CM

## 2021-07-23 DIAGNOSIS — Z79.899 ENCOUNTER FOR LONG-TERM (CURRENT) USE OF OTHER MEDICATIONS: ICD-10-CM

## 2021-07-23 DIAGNOSIS — G47.33 OBSTRUCTIVE SLEEP APNEA: ICD-10-CM

## 2021-07-23 DIAGNOSIS — D51.9 VITAMIN B12 DEFICIENCY ANEMIA: ICD-10-CM

## 2021-07-23 LAB
ERYTHROCYTE [SEDIMENTATION RATE] IN BLOOD BY WESTERGREN METHOD: 10 MM/HR (ref 0–20)
INSULIN COLLECTION INTERVAL: ABNORMAL
INSULIN SERPL-ACNC: 33.1 UU/ML
RHEUMATOID FACT SERPL-ACNC: <10 IU/ML (ref 0–15)
THYROGLOB AB SERPL IA-ACNC: 211.4 IU/ML (ref 0–3.9)
THYROPEROXIDASE IGG SERPL-ACNC: 707.6 IU/ML

## 2021-07-23 PROCEDURE — 84439 ASSAY OF FREE THYROXINE: CPT | Performed by: STUDENT IN AN ORGANIZED HEALTH CARE EDUCATION/TRAINING PROGRAM

## 2021-07-23 PROCEDURE — 82728 ASSAY OF FERRITIN: CPT | Performed by: STUDENT IN AN ORGANIZED HEALTH CARE EDUCATION/TRAINING PROGRAM

## 2021-07-23 PROCEDURE — 82306 VITAMIN D 25 HYDROXY: CPT | Performed by: STUDENT IN AN ORGANIZED HEALTH CARE EDUCATION/TRAINING PROGRAM

## 2021-07-23 PROCEDURE — 84144 ASSAY OF PROGESTERONE: CPT | Performed by: STUDENT IN AN ORGANIZED HEALTH CARE EDUCATION/TRAINING PROGRAM

## 2021-07-23 PROCEDURE — 84402 ASSAY OF FREE TESTOSTERONE: CPT | Performed by: STUDENT IN AN ORGANIZED HEALTH CARE EDUCATION/TRAINING PROGRAM

## 2021-07-23 PROCEDURE — 83002 ASSAY OF GONADOTROPIN (LH): CPT | Performed by: STUDENT IN AN ORGANIZED HEALTH CARE EDUCATION/TRAINING PROGRAM

## 2021-07-23 PROCEDURE — 83735 ASSAY OF MAGNESIUM: CPT | Performed by: STUDENT IN AN ORGANIZED HEALTH CARE EDUCATION/TRAINING PROGRAM

## 2021-07-23 PROCEDURE — 84140 ASSAY OF PREGNENOLONE: CPT | Performed by: STUDENT IN AN ORGANIZED HEALTH CARE EDUCATION/TRAINING PROGRAM

## 2021-07-23 PROCEDURE — 82626 DEHYDROEPIANDROSTERONE: CPT | Performed by: STUDENT IN AN ORGANIZED HEALTH CARE EDUCATION/TRAINING PROGRAM

## 2021-07-23 PROCEDURE — 82607 VITAMIN B-12: CPT | Performed by: STUDENT IN AN ORGANIZED HEALTH CARE EDUCATION/TRAINING PROGRAM

## 2021-07-23 PROCEDURE — 86431 RHEUMATOID FACTOR QUANT: CPT | Performed by: STUDENT IN AN ORGANIZED HEALTH CARE EDUCATION/TRAINING PROGRAM

## 2021-07-23 PROCEDURE — 86039 ANTINUCLEAR ANTIBODIES (ANA): CPT | Performed by: STUDENT IN AN ORGANIZED HEALTH CARE EDUCATION/TRAINING PROGRAM

## 2021-07-23 PROCEDURE — 84403 ASSAY OF TOTAL TESTOSTERONE: CPT | Performed by: STUDENT IN AN ORGANIZED HEALTH CARE EDUCATION/TRAINING PROGRAM

## 2021-07-23 PROCEDURE — 82670 ASSAY OF TOTAL ESTRADIOL: CPT | Performed by: STUDENT IN AN ORGANIZED HEALTH CARE EDUCATION/TRAINING PROGRAM

## 2021-07-23 PROCEDURE — 84270 ASSAY OF SEX HORMONE GLOBUL: CPT | Performed by: STUDENT IN AN ORGANIZED HEALTH CARE EDUCATION/TRAINING PROGRAM

## 2021-07-23 PROCEDURE — 36415 COLL VENOUS BLD VENIPUNCTURE: CPT | Mod: PO | Performed by: STUDENT IN AN ORGANIZED HEALTH CARE EDUCATION/TRAINING PROGRAM

## 2021-07-23 PROCEDURE — 82533 TOTAL CORTISOL: CPT | Performed by: STUDENT IN AN ORGANIZED HEALTH CARE EDUCATION/TRAINING PROGRAM

## 2021-07-23 PROCEDURE — 84146 ASSAY OF PROLACTIN: CPT | Performed by: STUDENT IN AN ORGANIZED HEALTH CARE EDUCATION/TRAINING PROGRAM

## 2021-07-23 PROCEDURE — 86235 NUCLEAR ANTIGEN ANTIBODY: CPT | Mod: 59 | Performed by: STUDENT IN AN ORGANIZED HEALTH CARE EDUCATION/TRAINING PROGRAM

## 2021-07-23 PROCEDURE — 86800 THYROGLOBULIN ANTIBODY: CPT | Performed by: STUDENT IN AN ORGANIZED HEALTH CARE EDUCATION/TRAINING PROGRAM

## 2021-07-23 PROCEDURE — 84100 ASSAY OF PHOSPHORUS: CPT | Performed by: STUDENT IN AN ORGANIZED HEALTH CARE EDUCATION/TRAINING PROGRAM

## 2021-07-23 PROCEDURE — 82679 ASSAY OF ESTRONE: CPT | Performed by: STUDENT IN AN ORGANIZED HEALTH CARE EDUCATION/TRAINING PROGRAM

## 2021-07-23 PROCEDURE — 80053 COMPREHEN METABOLIC PANEL: CPT | Performed by: STUDENT IN AN ORGANIZED HEALTH CARE EDUCATION/TRAINING PROGRAM

## 2021-07-23 PROCEDURE — 86225 DNA ANTIBODY NATIVE: CPT | Mod: 59 | Performed by: STUDENT IN AN ORGANIZED HEALTH CARE EDUCATION/TRAINING PROGRAM

## 2021-07-23 PROCEDURE — 83036 HEMOGLOBIN GLYCOSYLATED A1C: CPT | Performed by: STUDENT IN AN ORGANIZED HEALTH CARE EDUCATION/TRAINING PROGRAM

## 2021-07-23 PROCEDURE — 84482 T3 REVERSE: CPT | Performed by: STUDENT IN AN ORGANIZED HEALTH CARE EDUCATION/TRAINING PROGRAM

## 2021-07-23 PROCEDURE — 83001 ASSAY OF GONADOTROPIN (FSH): CPT | Performed by: STUDENT IN AN ORGANIZED HEALTH CARE EDUCATION/TRAINING PROGRAM

## 2021-07-23 PROCEDURE — 86376 MICROSOMAL ANTIBODY EACH: CPT | Performed by: STUDENT IN AN ORGANIZED HEALTH CARE EDUCATION/TRAINING PROGRAM

## 2021-07-23 PROCEDURE — 86038 ANTINUCLEAR ANTIBODIES: CPT | Performed by: STUDENT IN AN ORGANIZED HEALTH CARE EDUCATION/TRAINING PROGRAM

## 2021-07-23 PROCEDURE — 85651 RBC SED RATE NONAUTOMATED: CPT | Mod: PO | Performed by: STUDENT IN AN ORGANIZED HEALTH CARE EDUCATION/TRAINING PROGRAM

## 2021-07-23 PROCEDURE — 84443 ASSAY THYROID STIM HORMONE: CPT | Performed by: STUDENT IN AN ORGANIZED HEALTH CARE EDUCATION/TRAINING PROGRAM

## 2021-07-23 PROCEDURE — 83540 ASSAY OF IRON: CPT | Performed by: STUDENT IN AN ORGANIZED HEALTH CARE EDUCATION/TRAINING PROGRAM

## 2021-07-23 PROCEDURE — 84481 FREE ASSAY (FT-3): CPT | Performed by: STUDENT IN AN ORGANIZED HEALTH CARE EDUCATION/TRAINING PROGRAM

## 2021-07-23 PROCEDURE — 82746 ASSAY OF FOLIC ACID SERUM: CPT | Performed by: STUDENT IN AN ORGANIZED HEALTH CARE EDUCATION/TRAINING PROGRAM

## 2021-07-23 PROCEDURE — 86140 C-REACTIVE PROTEIN: CPT | Performed by: STUDENT IN AN ORGANIZED HEALTH CARE EDUCATION/TRAINING PROGRAM

## 2021-07-23 PROCEDURE — 83525 ASSAY OF INSULIN: CPT | Performed by: STUDENT IN AN ORGANIZED HEALTH CARE EDUCATION/TRAINING PROGRAM

## 2021-07-24 LAB
25(OH)D3+25(OH)D2 SERPL-MCNC: 41 NG/ML (ref 30–96)
ALBUMIN SERPL BCP-MCNC: 3.3 G/DL (ref 3.5–5.2)
ALP SERPL-CCNC: 135 U/L (ref 55–135)
ALT SERPL W/O P-5'-P-CCNC: 21 U/L (ref 10–44)
ANION GAP SERPL CALC-SCNC: 8 MMOL/L (ref 8–16)
AST SERPL-CCNC: 18 U/L (ref 10–40)
BILIRUB SERPL-MCNC: 0.5 MG/DL (ref 0.1–1)
BUN SERPL-MCNC: 10 MG/DL (ref 6–20)
CALCIUM SERPL-MCNC: 9.1 MG/DL (ref 8.7–10.5)
CHLORIDE SERPL-SCNC: 104 MMOL/L (ref 95–110)
CO2 SERPL-SCNC: 25 MMOL/L (ref 23–29)
CORTIS SERPL-MCNC: 9 UG/DL (ref 4.3–22.4)
CREAT SERPL-MCNC: 0.7 MG/DL (ref 0.5–1.4)
CRP SERPL-MCNC: 11.9 MG/L (ref 0–8.2)
EST. GFR  (AFRICAN AMERICAN): >60 ML/MIN/1.73 M^2
EST. GFR  (NON AFRICAN AMERICAN): >60 ML/MIN/1.73 M^2
ESTIMATED AVG GLUCOSE: 120 MG/DL (ref 68–131)
ESTRADIOL SERPL-MCNC: 160 PG/ML
FERRITIN SERPL-MCNC: 32 NG/ML (ref 20–300)
FOLATE SERPL-MCNC: 15 NG/ML (ref 4–24)
FSH SERPL-ACNC: 5.3 MIU/ML
GLUCOSE SERPL-MCNC: 99 MG/DL (ref 70–110)
HBA1C MFR BLD: 5.8 % (ref 4–5.6)
IRON SERPL-MCNC: 61 UG/DL (ref 30–160)
LH SERPL-ACNC: 8.2 MIU/ML
MAGNESIUM SERPL-MCNC: 2 MG/DL (ref 1.6–2.6)
PHOSPHATE SERPL-MCNC: 3.6 MG/DL (ref 2.7–4.5)
POTASSIUM SERPL-SCNC: 4 MMOL/L (ref 3.5–5.1)
PROGEST SERPL-MCNC: 1.6 NG/ML
PROLACTIN SERPL IA-MCNC: 16.6 NG/ML (ref 5.2–26.5)
PROT SERPL-MCNC: 6.8 G/DL (ref 6–8.4)
SODIUM SERPL-SCNC: 137 MMOL/L (ref 136–145)
T3FREE SERPL-MCNC: 2.7 PG/ML (ref 2.3–4.2)
T4 FREE SERPL-MCNC: 0.96 NG/DL (ref 0.71–1.51)
TESTOST SERPL-MCNC: 57 NG/DL (ref 5–73)
TSH SERPL DL<=0.005 MIU/L-ACNC: 3.52 UIU/ML (ref 0.4–4)
VIT B12 SERPL-MCNC: 907 PG/ML (ref 210–950)

## 2021-07-26 LAB
ANA PATTERN 1: NORMAL
ANA SER QL IF: POSITIVE
ANA TITR SER IF: NORMAL {TITER}
SHBG SERPL-SCNC: 54 NMOL/L

## 2021-07-27 LAB
DHEA SERPL-MCNC: 0.85 NG/ML (ref 0.63–4.7)
T3REVERSE SERPL-MCNC: 17.5 NG/DL (ref 9–27)
TESTOST FREE SERPL-MCNC: 0.6 PG/ML

## 2021-07-28 LAB
ANTI SM ANTIBODY: 0.07 RATIO (ref 0–0.99)
ANTI SM/RNP ANTIBODY: 0.15 RATIO (ref 0–0.99)
ANTI-SM INTERPRETATION: NEGATIVE
ANTI-SM/RNP INTERPRETATION: NEGATIVE
ANTI-SSA ANTIBODY: 0.06 RATIO (ref 0–0.99)
ANTI-SSA INTERPRETATION: NEGATIVE
ANTI-SSB ANTIBODY: 0.06 RATIO (ref 0–0.99)
ANTI-SSB INTERPRETATION: NEGATIVE
DNA TITER: NORMAL
DSDNA AB SER-ACNC: POSITIVE [IU]/ML
ESTRONE SERPL-MCNC: 273 PG/ML

## 2021-07-29 LAB — PREG SERPL-MCNC: 17 NG/DL (ref 33–248)

## 2021-08-13 ENCOUNTER — PATIENT MESSAGE (OUTPATIENT)
Dept: RHEUMATOLOGY | Facility: CLINIC | Age: 51
End: 2021-08-13

## 2021-08-17 ENCOUNTER — PATIENT MESSAGE (OUTPATIENT)
Dept: FAMILY MEDICINE | Facility: CLINIC | Age: 51
End: 2021-08-17

## 2021-08-18 DIAGNOSIS — G47.33 OBSTRUCTIVE SLEEP APNEA: Primary | ICD-10-CM

## 2021-08-19 ENCOUNTER — PATIENT OUTREACH (OUTPATIENT)
Dept: ADMINISTRATIVE | Facility: OTHER | Age: 51
End: 2021-08-19

## 2021-08-23 ENCOUNTER — OFFICE VISIT (OUTPATIENT)
Dept: RHEUMATOLOGY | Facility: CLINIC | Age: 51
End: 2021-08-23
Payer: COMMERCIAL

## 2021-08-23 ENCOUNTER — HOSPITAL ENCOUNTER (OUTPATIENT)
Dept: RADIOLOGY | Facility: HOSPITAL | Age: 51
Discharge: HOME | End: 2021-08-23
Attending: INTERNAL MEDICINE
Payer: COMMERCIAL

## 2021-08-23 VITALS
WEIGHT: 293 LBS | HEIGHT: 66 IN | HEART RATE: 84 BPM | BODY MASS INDEX: 47.09 KG/M2 | DIASTOLIC BLOOD PRESSURE: 85 MMHG | SYSTOLIC BLOOD PRESSURE: 150 MMHG

## 2021-08-23 DIAGNOSIS — R06.02 SHORTNESS OF BREATH AT REST: ICD-10-CM

## 2021-08-23 DIAGNOSIS — M25.50 POLYARTHRALGIA: Primary | ICD-10-CM

## 2021-08-23 DIAGNOSIS — M25.50 POLYARTHRALGIA: ICD-10-CM

## 2021-08-23 PROCEDURE — 3079F DIAST BP 80-89 MM HG: CPT | Mod: CPTII,S$GLB,, | Performed by: INTERNAL MEDICINE

## 2021-08-23 PROCEDURE — 99999 PR PBB SHADOW E&M-EST. PATIENT-LVL IV: CPT | Mod: PBBFAC,,, | Performed by: INTERNAL MEDICINE

## 2021-08-23 PROCEDURE — 71046 XR CHEST PA AND LATERAL: ICD-10-PCS | Mod: 26,,, | Performed by: RADIOLOGY

## 2021-08-23 PROCEDURE — 3008F PR BODY MASS INDEX (BMI) DOCUMENTED: ICD-10-PCS | Mod: CPTII,S$GLB,, | Performed by: INTERNAL MEDICINE

## 2021-08-23 PROCEDURE — 3044F PR MOST RECENT HEMOGLOBIN A1C LEVEL <7.0%: ICD-10-PCS | Mod: CPTII,S$GLB,, | Performed by: INTERNAL MEDICINE

## 2021-08-23 PROCEDURE — 77077 XR ARTHRITIS SURVEY: ICD-10-PCS | Mod: 26,,, | Performed by: RADIOLOGY

## 2021-08-23 PROCEDURE — 1159F MED LIST DOCD IN RCRD: CPT | Mod: CPTII,S$GLB,, | Performed by: INTERNAL MEDICINE

## 2021-08-23 PROCEDURE — 1159F PR MEDICATION LIST DOCUMENTED IN MEDICAL RECORD: ICD-10-PCS | Mod: CPTII,S$GLB,, | Performed by: INTERNAL MEDICINE

## 2021-08-23 PROCEDURE — 99205 OFFICE O/P NEW HI 60 MIN: CPT | Mod: S$GLB,,, | Performed by: INTERNAL MEDICINE

## 2021-08-23 PROCEDURE — 3079F PR MOST RECENT DIASTOLIC BLOOD PRESSURE 80-89 MM HG: ICD-10-PCS | Mod: CPTII,S$GLB,, | Performed by: INTERNAL MEDICINE

## 2021-08-23 PROCEDURE — 71046 X-RAY EXAM CHEST 2 VIEWS: CPT | Mod: 26,,, | Performed by: RADIOLOGY

## 2021-08-23 PROCEDURE — 1125F PR PAIN SEVERITY QUANTIFIED, PAIN PRESENT: ICD-10-PCS | Mod: CPTII,S$GLB,, | Performed by: INTERNAL MEDICINE

## 2021-08-23 PROCEDURE — 77077 JOINT SURVEY SINGLE VIEW: CPT | Mod: 26,,, | Performed by: RADIOLOGY

## 2021-08-23 PROCEDURE — 71046 X-RAY EXAM CHEST 2 VIEWS: CPT | Mod: TC

## 2021-08-23 PROCEDURE — 3077F PR MOST RECENT SYSTOLIC BLOOD PRESSURE >= 140 MM HG: ICD-10-PCS | Mod: CPTII,S$GLB,, | Performed by: INTERNAL MEDICINE

## 2021-08-23 PROCEDURE — 99999 PR PBB SHADOW E&M-EST. PATIENT-LVL IV: ICD-10-PCS | Mod: PBBFAC,,, | Performed by: INTERNAL MEDICINE

## 2021-08-23 PROCEDURE — 3008F BODY MASS INDEX DOCD: CPT | Mod: CPTII,S$GLB,, | Performed by: INTERNAL MEDICINE

## 2021-08-23 PROCEDURE — 77077 JOINT SURVEY SINGLE VIEW: CPT | Mod: TC

## 2021-08-23 PROCEDURE — 3077F SYST BP >= 140 MM HG: CPT | Mod: CPTII,S$GLB,, | Performed by: INTERNAL MEDICINE

## 2021-08-23 PROCEDURE — 3044F HG A1C LEVEL LT 7.0%: CPT | Mod: CPTII,S$GLB,, | Performed by: INTERNAL MEDICINE

## 2021-08-23 PROCEDURE — 99205 PR OFFICE/OUTPT VISIT, NEW, LEVL V, 60-74 MIN: ICD-10-PCS | Mod: S$GLB,,, | Performed by: INTERNAL MEDICINE

## 2021-08-23 PROCEDURE — 1125F AMNT PAIN NOTED PAIN PRSNT: CPT | Mod: CPTII,S$GLB,, | Performed by: INTERNAL MEDICINE

## 2021-08-23 RX ORDER — FENOFIBRATE 160 MG/1
TABLET ORAL
COMMUNITY
Start: 2021-05-22 | End: 2022-02-16

## 2021-08-23 RX ORDER — MELOXICAM 15 MG/1
15 TABLET ORAL DAILY
Qty: 30 TABLET | Refills: 5 | Status: SHIPPED | OUTPATIENT
Start: 2021-08-23 | End: 2022-03-04

## 2021-08-23 RX ORDER — TRAMADOL HYDROCHLORIDE 50 MG/1
50 TABLET ORAL EVERY 12 HOURS PRN
Qty: 60 TABLET | Refills: 5 | Status: SHIPPED | OUTPATIENT
Start: 2021-08-23 | End: 2022-06-24

## 2021-08-25 ENCOUNTER — PATIENT MESSAGE (OUTPATIENT)
Dept: RHEUMATOLOGY | Facility: CLINIC | Age: 51
End: 2021-08-25

## 2021-08-25 DIAGNOSIS — M25.50 POLYARTHRALGIA: Primary | ICD-10-CM

## 2021-08-26 ENCOUNTER — LAB VISIT (OUTPATIENT)
Dept: LAB | Facility: HOSPITAL | Age: 51
End: 2021-08-26
Attending: INTERNAL MEDICINE
Payer: COMMERCIAL

## 2021-08-26 DIAGNOSIS — M25.50 POLYARTHRALGIA: ICD-10-CM

## 2021-08-26 PROCEDURE — 84075 ASSAY ALKALINE PHOSPHATASE: CPT | Performed by: INTERNAL MEDICINE

## 2021-08-26 PROCEDURE — 86235 NUCLEAR ANTIGEN ANTIBODY: CPT | Mod: 59 | Performed by: INTERNAL MEDICINE

## 2021-08-26 PROCEDURE — 86431 RHEUMATOID FACTOR QUANT: CPT | Performed by: INTERNAL MEDICINE

## 2021-08-26 PROCEDURE — 86039 ANTINUCLEAR ANTIBODIES (ANA): CPT | Performed by: INTERNAL MEDICINE

## 2021-08-26 PROCEDURE — 84080 ASSAY ALKALINE PHOSPHATASES: CPT | Performed by: INTERNAL MEDICINE

## 2021-08-26 PROCEDURE — 86225 DNA ANTIBODY NATIVE: CPT | Performed by: INTERNAL MEDICINE

## 2021-08-26 PROCEDURE — 86225 DNA ANTIBODY NATIVE: CPT | Mod: 59 | Performed by: INTERNAL MEDICINE

## 2021-08-26 PROCEDURE — 86038 ANTINUCLEAR ANTIBODIES: CPT | Performed by: INTERNAL MEDICINE

## 2021-08-28 LAB
ANA PATTERN 1: NORMAL
ANA SER QL IF: POSITIVE
ANA TITR SER IF: NORMAL {TITER}
RHEUMATOID FACT SERPL-ACNC: <13 IU/ML (ref 0–15)

## 2021-09-02 LAB
ALP BONE CFR SERPL: 31 % (ref 28–66)
ALP INTEST CFR SERPL: 0 % (ref 1–24)
ALP ISOS SERPL-IMP: ABNORMAL
ALP LIVER CFR SERPL: 69 % (ref 25–69)
ALP MACROHEPATIC CFR SERPL: ABNORMAL %
ALP PLAC CFR SERPL: 0 %
ALP SERPL-CCNC: 132 U/L (ref 31–125)

## 2021-09-04 LAB
ANTI SM ANTIBODY: 0.08 RATIO (ref 0–0.99)
ANTI SM/RNP ANTIBODY: 0.21 RATIO (ref 0–0.99)
ANTI-SM INTERPRETATION: NEGATIVE
ANTI-SM/RNP INTERPRETATION: NEGATIVE
ANTI-SSA ANTIBODY: 0.08 RATIO (ref 0–0.99)
ANTI-SSA INTERPRETATION: NEGATIVE
ANTI-SSB ANTIBODY: 0.06 RATIO (ref 0–0.99)
ANTI-SSB INTERPRETATION: NEGATIVE
DNA TITER: NORMAL
DSDNA AB SER-ACNC: POSITIVE [IU]/ML
DSDNA AB SER-ACNC: POSITIVE [IU]/ML

## 2021-09-07 ENCOUNTER — PATIENT MESSAGE (OUTPATIENT)
Dept: RHEUMATOLOGY | Facility: CLINIC | Age: 51
End: 2021-09-07

## 2021-09-07 RX ORDER — HYDROXYCHLOROQUINE SULFATE 200 MG/1
200 TABLET, FILM COATED ORAL 2 TIMES DAILY
Qty: 60 TABLET | Refills: 3 | Status: SHIPPED | OUTPATIENT
Start: 2021-09-07 | End: 2022-01-21

## 2021-09-20 ENCOUNTER — OFFICE VISIT (OUTPATIENT)
Dept: RHEUMATOLOGY | Facility: CLINIC | Age: 51
End: 2021-09-20
Payer: COMMERCIAL

## 2021-09-20 VITALS
HEART RATE: 76 BPM | SYSTOLIC BLOOD PRESSURE: 152 MMHG | DIASTOLIC BLOOD PRESSURE: 80 MMHG | HEIGHT: 65 IN | WEIGHT: 293 LBS | BODY MASS INDEX: 48.82 KG/M2

## 2021-09-20 DIAGNOSIS — M25.50 POLYARTHRALGIA: Primary | ICD-10-CM

## 2021-09-20 DIAGNOSIS — M32.9 SYSTEMIC LUPUS ERYTHEMATOSUS, UNSPECIFIED SLE TYPE, UNSPECIFIED ORGAN INVOLVEMENT STATUS: Primary | ICD-10-CM

## 2021-09-20 PROCEDURE — 3008F BODY MASS INDEX DOCD: CPT | Mod: CPTII,S$GLB,, | Performed by: INTERNAL MEDICINE

## 2021-09-20 PROCEDURE — 99999 PR PBB SHADOW E&M-EST. PATIENT-LVL IV: CPT | Mod: PBBFAC,,, | Performed by: INTERNAL MEDICINE

## 2021-09-20 PROCEDURE — 99999 PR PBB SHADOW E&M-EST. PATIENT-LVL IV: ICD-10-PCS | Mod: PBBFAC,,, | Performed by: INTERNAL MEDICINE

## 2021-09-20 PROCEDURE — 3044F PR MOST RECENT HEMOGLOBIN A1C LEVEL <7.0%: ICD-10-PCS | Mod: CPTII,S$GLB,, | Performed by: INTERNAL MEDICINE

## 2021-09-20 PROCEDURE — 3079F PR MOST RECENT DIASTOLIC BLOOD PRESSURE 80-89 MM HG: ICD-10-PCS | Mod: CPTII,S$GLB,, | Performed by: INTERNAL MEDICINE

## 2021-09-20 PROCEDURE — 99215 OFFICE O/P EST HI 40 MIN: CPT | Mod: S$GLB,,, | Performed by: INTERNAL MEDICINE

## 2021-09-20 PROCEDURE — 3077F PR MOST RECENT SYSTOLIC BLOOD PRESSURE >= 140 MM HG: ICD-10-PCS | Mod: CPTII,S$GLB,, | Performed by: INTERNAL MEDICINE

## 2021-09-20 PROCEDURE — 3077F SYST BP >= 140 MM HG: CPT | Mod: CPTII,S$GLB,, | Performed by: INTERNAL MEDICINE

## 2021-09-20 PROCEDURE — 1159F MED LIST DOCD IN RCRD: CPT | Mod: CPTII,S$GLB,, | Performed by: INTERNAL MEDICINE

## 2021-09-20 PROCEDURE — 4010F PR ACE/ARB THEARPY RXD/TAKEN: ICD-10-PCS | Mod: CPTII,S$GLB,, | Performed by: INTERNAL MEDICINE

## 2021-09-20 PROCEDURE — 99215 PR OFFICE/OUTPT VISIT, EST, LEVL V, 40-54 MIN: ICD-10-PCS | Mod: S$GLB,,, | Performed by: INTERNAL MEDICINE

## 2021-09-20 PROCEDURE — 3044F HG A1C LEVEL LT 7.0%: CPT | Mod: CPTII,S$GLB,, | Performed by: INTERNAL MEDICINE

## 2021-09-20 PROCEDURE — 3079F DIAST BP 80-89 MM HG: CPT | Mod: CPTII,S$GLB,, | Performed by: INTERNAL MEDICINE

## 2021-09-20 PROCEDURE — 1159F PR MEDICATION LIST DOCUMENTED IN MEDICAL RECORD: ICD-10-PCS | Mod: CPTII,S$GLB,, | Performed by: INTERNAL MEDICINE

## 2021-09-20 PROCEDURE — 3008F PR BODY MASS INDEX (BMI) DOCUMENTED: ICD-10-PCS | Mod: CPTII,S$GLB,, | Performed by: INTERNAL MEDICINE

## 2021-09-20 PROCEDURE — 4010F ACE/ARB THERAPY RXD/TAKEN: CPT | Mod: CPTII,S$GLB,, | Performed by: INTERNAL MEDICINE

## 2021-09-20 RX ORDER — PREDNISONE 20 MG/1
20 TABLET ORAL DAILY
Qty: 10 TABLET | Refills: 0 | Status: SHIPPED | OUTPATIENT
Start: 2021-09-20 | End: 2021-11-09 | Stop reason: ALTCHOICE

## 2021-09-20 ASSESSMENT — ROUTINE ASSESSMENT OF PATIENT INDEX DATA (RAPID3)
PAIN SCORE: 3.5
TOTAL RAPID3 SCORE: 3.39
PATIENT GLOBAL ASSESSMENT SCORE: 5
PSYCHOLOGICAL DISTRESS SCORE: 1.1
FATIGUE SCORE: 7
AM STIFFNESS SCORE: 1, YES
MDHAQ FUNCTION SCORE: 0.5
WHEN YOU AWAKENED IN THE MORNING OVER THE LAST WEEK, PLEASE INDICATE THE AMOUNT OF TIME IT TAKES UNTIL YOU ARE AS LIMBER AS YOU WILL BE FOR THE DAY: 1 HOUR

## 2021-10-18 ENCOUNTER — LAB VISIT (OUTPATIENT)
Dept: LAB | Facility: HOSPITAL | Age: 51
End: 2021-10-18
Attending: INTERNAL MEDICINE
Payer: COMMERCIAL

## 2021-10-18 DIAGNOSIS — M25.50 POLYARTHRALGIA: ICD-10-CM

## 2021-10-18 LAB
ALBUMIN SERPL BCP-MCNC: 3.5 G/DL (ref 3.5–5.2)
ALP SERPL-CCNC: 127 U/L (ref 55–135)
ALT SERPL W/O P-5'-P-CCNC: 25 U/L (ref 10–44)
ANION GAP SERPL CALC-SCNC: 10 MMOL/L (ref 8–16)
AST SERPL-CCNC: 17 U/L (ref 10–40)
BASOPHILS # BLD AUTO: 0.06 K/UL (ref 0–0.2)
BASOPHILS NFR BLD: 0.9 % (ref 0–1.9)
BILIRUB SERPL-MCNC: 0.5 MG/DL (ref 0.1–1)
BUN SERPL-MCNC: 10 MG/DL (ref 6–20)
C3 SERPL-MCNC: 171 MG/DL (ref 50–180)
C4 SERPL-MCNC: 28 MG/DL (ref 11–44)
CALCIUM SERPL-MCNC: 8.9 MG/DL (ref 8.7–10.5)
CHLORIDE SERPL-SCNC: 101 MMOL/L (ref 95–110)
CO2 SERPL-SCNC: 27 MMOL/L (ref 23–29)
CREAT SERPL-MCNC: 0.9 MG/DL (ref 0.5–1.4)
CRP SERPL-MCNC: 9.1 MG/L (ref 0–8.2)
DIFFERENTIAL METHOD: ABNORMAL
EOSINOPHIL # BLD AUTO: 0.2 K/UL (ref 0–0.5)
EOSINOPHIL NFR BLD: 3.1 % (ref 0–8)
ERYTHROCYTE [DISTWIDTH] IN BLOOD BY AUTOMATED COUNT: 14.6 % (ref 11.5–14.5)
ERYTHROCYTE [SEDIMENTATION RATE] IN BLOOD BY WESTERGREN METHOD: 10 MM/HR (ref 0–20)
EST. GFR  (AFRICAN AMERICAN): >60 ML/MIN/1.73 M^2
EST. GFR  (NON AFRICAN AMERICAN): >60 ML/MIN/1.73 M^2
GLUCOSE SERPL-MCNC: 97 MG/DL (ref 70–110)
HCT VFR BLD AUTO: 45.2 % (ref 37–48.5)
HGB BLD-MCNC: 14 G/DL (ref 12–16)
IMM GRANULOCYTES # BLD AUTO: 0.02 K/UL (ref 0–0.04)
IMM GRANULOCYTES NFR BLD AUTO: 0.3 % (ref 0–0.5)
LYMPHOCYTES # BLD AUTO: 2.1 K/UL (ref 1–4.8)
LYMPHOCYTES NFR BLD: 33.1 % (ref 18–48)
MCH RBC QN AUTO: 28.6 PG (ref 27–31)
MCHC RBC AUTO-ENTMCNC: 31 G/DL (ref 32–36)
MCV RBC AUTO: 92 FL (ref 82–98)
MONOCYTES # BLD AUTO: 0.4 K/UL (ref 0.3–1)
MONOCYTES NFR BLD: 6.3 % (ref 4–15)
NEUTROPHILS # BLD AUTO: 3.6 K/UL (ref 1.8–7.7)
NEUTROPHILS NFR BLD: 56.3 % (ref 38–73)
NRBC BLD-RTO: 0 /100 WBC
PLATELET # BLD AUTO: 241 K/UL (ref 150–450)
PMV BLD AUTO: 10.8 FL (ref 9.2–12.9)
POTASSIUM SERPL-SCNC: 3.9 MMOL/L (ref 3.5–5.1)
PROT SERPL-MCNC: 7 G/DL (ref 6–8.4)
RBC # BLD AUTO: 4.89 M/UL (ref 4–5.4)
SODIUM SERPL-SCNC: 138 MMOL/L (ref 136–145)
T4 FREE SERPL-MCNC: 0.63 NG/DL (ref 0.71–1.51)
TSH SERPL DL<=0.005 MIU/L-ACNC: 16.14 UIU/ML (ref 0.4–4)
WBC # BLD AUTO: 6.37 K/UL (ref 3.9–12.7)

## 2021-10-18 PROCEDURE — 86160 COMPLEMENT ANTIGEN: CPT | Mod: 59 | Performed by: INTERNAL MEDICINE

## 2021-10-18 PROCEDURE — 86140 C-REACTIVE PROTEIN: CPT | Performed by: INTERNAL MEDICINE

## 2021-10-18 PROCEDURE — 86225 DNA ANTIBODY NATIVE: CPT | Performed by: INTERNAL MEDICINE

## 2021-10-18 PROCEDURE — 86160 COMPLEMENT ANTIGEN: CPT | Performed by: INTERNAL MEDICINE

## 2021-10-18 PROCEDURE — 85025 COMPLETE CBC W/AUTO DIFF WBC: CPT | Performed by: INTERNAL MEDICINE

## 2021-10-18 PROCEDURE — 84443 ASSAY THYROID STIM HORMONE: CPT | Performed by: INTERNAL MEDICINE

## 2021-10-18 PROCEDURE — 36415 COLL VENOUS BLD VENIPUNCTURE: CPT | Mod: PO | Performed by: INTERNAL MEDICINE

## 2021-10-18 PROCEDURE — 85651 RBC SED RATE NONAUTOMATED: CPT | Mod: PO | Performed by: INTERNAL MEDICINE

## 2021-10-18 PROCEDURE — 80053 COMPREHEN METABOLIC PANEL: CPT | Performed by: INTERNAL MEDICINE

## 2021-10-18 PROCEDURE — 86225 DNA ANTIBODY NATIVE: CPT | Mod: 59 | Performed by: INTERNAL MEDICINE

## 2021-10-18 PROCEDURE — 84439 ASSAY OF FREE THYROXINE: CPT | Performed by: INTERNAL MEDICINE

## 2021-10-20 LAB
DNA TITER: 2560
DSDNA AB SER-ACNC: POSITIVE [IU]/ML

## 2021-10-21 ENCOUNTER — PATIENT MESSAGE (OUTPATIENT)
Dept: RHEUMATOLOGY | Facility: CLINIC | Age: 51
End: 2021-10-21
Payer: COMMERCIAL

## 2021-10-22 DIAGNOSIS — M32.9 SYSTEMIC LUPUS ERYTHEMATOSUS, UNSPECIFIED SLE TYPE, UNSPECIFIED ORGAN INVOLVEMENT STATUS: Primary | ICD-10-CM

## 2021-10-22 RX ORDER — PREDNISONE 20 MG/1
40 TABLET ORAL DAILY
Qty: 14 TABLET | Refills: 0 | Status: SHIPPED | OUTPATIENT
Start: 2021-10-22 | End: 2021-10-29

## 2021-10-28 ENCOUNTER — PATIENT MESSAGE (OUTPATIENT)
Dept: RHEUMATOLOGY | Facility: CLINIC | Age: 51
End: 2021-10-28
Payer: COMMERCIAL

## 2021-11-08 ENCOUNTER — PATIENT MESSAGE (OUTPATIENT)
Dept: FAMILY MEDICINE | Facility: CLINIC | Age: 51
End: 2021-11-08

## 2021-11-08 ENCOUNTER — PATIENT MESSAGE (OUTPATIENT)
Dept: RHEUMATOLOGY | Facility: CLINIC | Age: 51
End: 2021-11-08

## 2021-11-09 ENCOUNTER — OFFICE VISIT (OUTPATIENT)
Dept: FAMILY MEDICINE | Facility: CLINIC | Age: 51
End: 2021-11-09
Payer: COMMERCIAL

## 2021-11-09 DIAGNOSIS — J01.00 ACUTE NON-RECURRENT MAXILLARY SINUSITIS: ICD-10-CM

## 2021-11-09 DIAGNOSIS — J06.9 UPPER RESPIRATORY TRACT INFECTION, UNSPECIFIED TYPE: Primary | ICD-10-CM

## 2021-11-09 DIAGNOSIS — R05.9 COUGH: ICD-10-CM

## 2021-11-09 PROCEDURE — 99214 OFFICE O/P EST MOD 30 MIN: CPT | Mod: 95,,, | Performed by: NURSE PRACTITIONER

## 2021-11-09 PROCEDURE — 4010F PR ACE/ARB THEARPY RXD/TAKEN: ICD-10-PCS | Mod: CPTII,95,, | Performed by: NURSE PRACTITIONER

## 2021-11-09 PROCEDURE — 4010F ACE/ARB THERAPY RXD/TAKEN: CPT | Mod: CPTII,95,, | Performed by: NURSE PRACTITIONER

## 2021-11-09 PROCEDURE — 3044F HG A1C LEVEL LT 7.0%: CPT | Mod: CPTII,95,, | Performed by: NURSE PRACTITIONER

## 2021-11-09 PROCEDURE — 99214 PR OFFICE/OUTPT VISIT, EST, LEVL IV, 30-39 MIN: ICD-10-PCS | Mod: 95,,, | Performed by: NURSE PRACTITIONER

## 2021-11-09 PROCEDURE — 3044F PR MOST RECENT HEMOGLOBIN A1C LEVEL <7.0%: ICD-10-PCS | Mod: CPTII,95,, | Performed by: NURSE PRACTITIONER

## 2021-11-09 RX ORDER — AMOXICILLIN AND CLAVULANATE POTASSIUM 875; 125 MG/1; MG/1
1 TABLET, FILM COATED ORAL EVERY 12 HOURS
Qty: 14 TABLET | Refills: 0 | Status: SHIPPED | OUTPATIENT
Start: 2021-11-09 | End: 2021-11-16

## 2021-11-09 RX ORDER — METHYLPREDNISOLONE 4 MG/1
TABLET ORAL
Qty: 21 EACH | Refills: 0 | Status: SHIPPED | OUTPATIENT
Start: 2021-11-09 | End: 2021-11-30

## 2021-11-09 RX ORDER — ALBUTEROL SULFATE 90 UG/1
2 AEROSOL, METERED RESPIRATORY (INHALATION) EVERY 6 HOURS PRN
Qty: 18 G | Refills: 0 | Status: SHIPPED | OUTPATIENT
Start: 2021-11-09 | End: 2023-09-08

## 2021-12-01 ENCOUNTER — PATIENT MESSAGE (OUTPATIENT)
Dept: RHEUMATOLOGY | Facility: CLINIC | Age: 51
End: 2021-12-01

## 2021-12-03 ENCOUNTER — LAB VISIT (OUTPATIENT)
Dept: LAB | Facility: HOSPITAL | Age: 51
End: 2021-12-03
Attending: INTERNAL MEDICINE
Payer: COMMERCIAL

## 2021-12-03 DIAGNOSIS — M32.9 SYSTEMIC LUPUS ERYTHEMATOSUS, UNSPECIFIED SLE TYPE, UNSPECIFIED ORGAN INVOLVEMENT STATUS: ICD-10-CM

## 2021-12-03 LAB — ERYTHROCYTE [SEDIMENTATION RATE] IN BLOOD BY WESTERGREN METHOD: 14 MM/HR (ref 0–20)

## 2021-12-03 PROCEDURE — 86160 COMPLEMENT ANTIGEN: CPT | Mod: 59 | Performed by: INTERNAL MEDICINE

## 2021-12-03 PROCEDURE — 36415 COLL VENOUS BLD VENIPUNCTURE: CPT | Mod: PO | Performed by: INTERNAL MEDICINE

## 2021-12-03 PROCEDURE — 85025 COMPLETE CBC W/AUTO DIFF WBC: CPT | Performed by: INTERNAL MEDICINE

## 2021-12-03 PROCEDURE — 0034U TPMT NUDT15 GENES: CPT | Performed by: INTERNAL MEDICINE

## 2021-12-03 PROCEDURE — 80053 COMPREHEN METABOLIC PANEL: CPT | Performed by: INTERNAL MEDICINE

## 2021-12-03 PROCEDURE — 86160 COMPLEMENT ANTIGEN: CPT | Performed by: INTERNAL MEDICINE

## 2021-12-03 PROCEDURE — 86225 DNA ANTIBODY NATIVE: CPT | Performed by: INTERNAL MEDICINE

## 2021-12-03 PROCEDURE — 86225 DNA ANTIBODY NATIVE: CPT | Mod: 59 | Performed by: INTERNAL MEDICINE

## 2021-12-03 PROCEDURE — 86140 C-REACTIVE PROTEIN: CPT | Performed by: INTERNAL MEDICINE

## 2021-12-03 PROCEDURE — 85651 RBC SED RATE NONAUTOMATED: CPT | Mod: PO | Performed by: INTERNAL MEDICINE

## 2021-12-04 LAB
ALBUMIN SERPL BCP-MCNC: 3.6 G/DL (ref 3.5–5.2)
ALP SERPL-CCNC: 127 U/L (ref 55–135)
ALT SERPL W/O P-5'-P-CCNC: 25 U/L (ref 10–44)
ANION GAP SERPL CALC-SCNC: 13 MMOL/L (ref 8–16)
AST SERPL-CCNC: 18 U/L (ref 10–40)
BASOPHILS # BLD AUTO: 0.08 K/UL (ref 0–0.2)
BASOPHILS NFR BLD: 0.8 % (ref 0–1.9)
BILIRUB SERPL-MCNC: 0.4 MG/DL (ref 0.1–1)
BUN SERPL-MCNC: 10 MG/DL (ref 6–20)
C3 SERPL-MCNC: 168 MG/DL (ref 50–180)
C4 SERPL-MCNC: 27 MG/DL (ref 11–44)
CALCIUM SERPL-MCNC: 9.3 MG/DL (ref 8.7–10.5)
CHLORIDE SERPL-SCNC: 101 MMOL/L (ref 95–110)
CO2 SERPL-SCNC: 24 MMOL/L (ref 23–29)
CREAT SERPL-MCNC: 0.7 MG/DL (ref 0.5–1.4)
CRP SERPL-MCNC: 7.7 MG/L (ref 0–8.2)
DIFFERENTIAL METHOD: ABNORMAL
EOSINOPHIL # BLD AUTO: 0.3 K/UL (ref 0–0.5)
EOSINOPHIL NFR BLD: 2.5 % (ref 0–8)
ERYTHROCYTE [DISTWIDTH] IN BLOOD BY AUTOMATED COUNT: 14.1 % (ref 11.5–14.5)
EST. GFR  (AFRICAN AMERICAN): >60 ML/MIN/1.73 M^2
EST. GFR  (NON AFRICAN AMERICAN): >60 ML/MIN/1.73 M^2
GLUCOSE SERPL-MCNC: 118 MG/DL (ref 70–110)
HCT VFR BLD AUTO: 44.3 % (ref 37–48.5)
HGB BLD-MCNC: 13.8 G/DL (ref 12–16)
IMM GRANULOCYTES # BLD AUTO: 0.06 K/UL (ref 0–0.04)
IMM GRANULOCYTES NFR BLD AUTO: 0.6 % (ref 0–0.5)
LYMPHOCYTES # BLD AUTO: 3.6 K/UL (ref 1–4.8)
LYMPHOCYTES NFR BLD: 35.7 % (ref 18–48)
MCH RBC QN AUTO: 28.9 PG (ref 27–31)
MCHC RBC AUTO-ENTMCNC: 31.2 G/DL (ref 32–36)
MCV RBC AUTO: 93 FL (ref 82–98)
MONOCYTES # BLD AUTO: 0.6 K/UL (ref 0.3–1)
MONOCYTES NFR BLD: 5.7 % (ref 4–15)
NEUTROPHILS # BLD AUTO: 5.5 K/UL (ref 1.8–7.7)
NEUTROPHILS NFR BLD: 54.7 % (ref 38–73)
NRBC BLD-RTO: 0 /100 WBC
PLATELET # BLD AUTO: 246 K/UL (ref 150–450)
PMV BLD AUTO: 11.1 FL (ref 9.2–12.9)
POTASSIUM SERPL-SCNC: 4.1 MMOL/L (ref 3.5–5.1)
PROT SERPL-MCNC: 6.7 G/DL (ref 6–8.4)
RBC # BLD AUTO: 4.77 M/UL (ref 4–5.4)
SODIUM SERPL-SCNC: 138 MMOL/L (ref 136–145)
WBC # BLD AUTO: 10.07 K/UL (ref 3.9–12.7)

## 2021-12-06 ENCOUNTER — TELEPHONE (OUTPATIENT)
Dept: BARIATRICS | Facility: CLINIC | Age: 51
End: 2021-12-06

## 2021-12-07 LAB
DNA TITER: NORMAL
DSDNA AB SER-ACNC: POSITIVE [IU]/ML
NUDT15 GENOTYPE: NORMAL
NUDT15 PHENOTYPE: NORMAL
TPMT ADDITIONAL INFORMATION: NORMAL
TPMT DISCLAIMER: NORMAL
TPMT GENOTYPE RESULT: NORMAL
TPMT INTERPRETATION: NORMAL
TPMT METHOD: NORMAL
TPMT PHENOTYPE: NORMAL
TPMT REVIEWED BY: NORMAL

## 2021-12-14 ENCOUNTER — PATIENT MESSAGE (OUTPATIENT)
Dept: BARIATRICS | Facility: CLINIC | Age: 51
End: 2021-12-14

## 2021-12-14 ENCOUNTER — PATIENT MESSAGE (OUTPATIENT)
Dept: RHEUMATOLOGY | Facility: CLINIC | Age: 51
End: 2021-12-14

## 2021-12-20 ENCOUNTER — PATIENT MESSAGE (OUTPATIENT)
Dept: RHEUMATOLOGY | Facility: CLINIC | Age: 51
End: 2021-12-20

## 2021-12-21 ENCOUNTER — PATIENT OUTREACH (OUTPATIENT)
Dept: ADMINISTRATIVE | Facility: OTHER | Age: 51
End: 2021-12-21

## 2021-12-28 ENCOUNTER — TELEPHONE (OUTPATIENT)
Dept: BARIATRICS | Facility: CLINIC | Age: 51
End: 2021-12-28

## 2021-12-30 ENCOUNTER — OFFICE VISIT (OUTPATIENT)
Dept: RHEUMATOLOGY | Facility: CLINIC | Age: 51
End: 2021-12-30
Payer: COMMERCIAL

## 2021-12-30 DIAGNOSIS — M19.90 INFLAMMATORY ARTHRITIS: ICD-10-CM

## 2021-12-30 DIAGNOSIS — M32.9 SYSTEMIC LUPUS ERYTHEMATOSUS, UNSPECIFIED SLE TYPE, UNSPECIFIED ORGAN INVOLVEMENT STATUS: Primary | ICD-10-CM

## 2021-12-30 PROCEDURE — 3044F PR MOST RECENT HEMOGLOBIN A1C LEVEL <7.0%: ICD-10-PCS | Mod: CPTII,95,, | Performed by: INTERNAL MEDICINE

## 2021-12-30 PROCEDURE — 4010F PR ACE/ARB THEARPY RXD/TAKEN: ICD-10-PCS | Mod: CPTII,95,, | Performed by: INTERNAL MEDICINE

## 2021-12-30 PROCEDURE — 99214 PR OFFICE/OUTPT VISIT, EST, LEVL IV, 30-39 MIN: ICD-10-PCS | Mod: 95,,, | Performed by: INTERNAL MEDICINE

## 2021-12-30 PROCEDURE — 4010F ACE/ARB THERAPY RXD/TAKEN: CPT | Mod: CPTII,95,, | Performed by: INTERNAL MEDICINE

## 2021-12-30 PROCEDURE — 99214 OFFICE O/P EST MOD 30 MIN: CPT | Mod: 95,,, | Performed by: INTERNAL MEDICINE

## 2021-12-30 PROCEDURE — 3044F HG A1C LEVEL LT 7.0%: CPT | Mod: CPTII,95,, | Performed by: INTERNAL MEDICINE

## 2021-12-30 PROCEDURE — 1159F PR MEDICATION LIST DOCUMENTED IN MEDICAL RECORD: ICD-10-PCS | Mod: CPTII,95,, | Performed by: INTERNAL MEDICINE

## 2021-12-30 PROCEDURE — 1159F MED LIST DOCD IN RCRD: CPT | Mod: CPTII,95,, | Performed by: INTERNAL MEDICINE

## 2021-12-30 RX ORDER — FOLIC ACID 1 MG/1
1 TABLET ORAL DAILY
Qty: 30 TABLET | Refills: 11 | Status: SHIPPED | OUTPATIENT
Start: 2021-12-30 | End: 2022-12-14 | Stop reason: SDUPTHER

## 2021-12-30 RX ORDER — METHOTREXATE 2.5 MG/1
TABLET ORAL
Qty: 24 TABLET | Refills: 0 | Status: SHIPPED | OUTPATIENT
Start: 2021-12-30 | End: 2022-02-01 | Stop reason: SDUPTHER

## 2021-12-30 RX ORDER — CYCLOBENZAPRINE HCL 10 MG
10 TABLET ORAL NIGHTLY
Qty: 30 TABLET | Refills: 6 | Status: SHIPPED | OUTPATIENT
Start: 2021-12-30 | End: 2022-01-29

## 2022-01-05 ENCOUNTER — TELEPHONE (OUTPATIENT)
Dept: RHEUMATOLOGY | Facility: CLINIC | Age: 52
End: 2022-01-05
Payer: COMMERCIAL

## 2022-01-06 ENCOUNTER — PATIENT MESSAGE (OUTPATIENT)
Dept: RHEUMATOLOGY | Facility: CLINIC | Age: 52
End: 2022-01-06
Payer: COMMERCIAL

## 2022-01-06 RX ORDER — PREDNISONE 20 MG/1
20 TABLET ORAL DAILY
Qty: 10 TABLET | Refills: 0 | Status: SHIPPED | OUTPATIENT
Start: 2022-01-06 | End: 2022-03-04

## 2022-01-25 RX ORDER — HYDROXYCHLOROQUINE SULFATE 200 MG/1
200 TABLET, FILM COATED ORAL 2 TIMES DAILY
Qty: 180 TABLET | Refills: 3 | Status: SHIPPED | OUTPATIENT
Start: 2022-01-25 | End: 2022-04-25

## 2022-01-26 ENCOUNTER — PATIENT MESSAGE (OUTPATIENT)
Dept: FAMILY MEDICINE | Facility: CLINIC | Age: 52
End: 2022-01-26
Payer: COMMERCIAL

## 2022-01-27 ENCOUNTER — LAB VISIT (OUTPATIENT)
Dept: LAB | Facility: HOSPITAL | Age: 52
End: 2022-01-27
Attending: INTERNAL MEDICINE
Payer: COMMERCIAL

## 2022-01-27 DIAGNOSIS — M32.9 SYSTEMIC LUPUS ERYTHEMATOSUS, UNSPECIFIED SLE TYPE, UNSPECIFIED ORGAN INVOLVEMENT STATUS: ICD-10-CM

## 2022-01-27 LAB
ALBUMIN SERPL BCP-MCNC: 3.5 G/DL (ref 3.5–5.2)
ALP SERPL-CCNC: 133 U/L (ref 55–135)
ALT SERPL W/O P-5'-P-CCNC: 26 U/L (ref 10–44)
ANION GAP SERPL CALC-SCNC: 10 MMOL/L (ref 8–16)
AST SERPL-CCNC: 17 U/L (ref 10–40)
BASOPHILS # BLD AUTO: 0.09 K/UL (ref 0–0.2)
BASOPHILS NFR BLD: 0.8 % (ref 0–1.9)
BILIRUB SERPL-MCNC: 0.4 MG/DL (ref 0.1–1)
BUN SERPL-MCNC: 11 MG/DL (ref 6–20)
C3 SERPL-MCNC: 182 MG/DL (ref 50–180)
C4 SERPL-MCNC: 29 MG/DL (ref 11–44)
CALCIUM SERPL-MCNC: 9.3 MG/DL (ref 8.7–10.5)
CHLORIDE SERPL-SCNC: 100 MMOL/L (ref 95–110)
CO2 SERPL-SCNC: 26 MMOL/L (ref 23–29)
CREAT SERPL-MCNC: 0.7 MG/DL (ref 0.5–1.4)
CRP SERPL-MCNC: 15.4 MG/L (ref 0–8.2)
DIFFERENTIAL METHOD: ABNORMAL
EOSINOPHIL # BLD AUTO: 0.3 K/UL (ref 0–0.5)
EOSINOPHIL NFR BLD: 2.3 % (ref 0–8)
ERYTHROCYTE [DISTWIDTH] IN BLOOD BY AUTOMATED COUNT: 14.2 % (ref 11.5–14.5)
ERYTHROCYTE [SEDIMENTATION RATE] IN BLOOD BY WESTERGREN METHOD: 14 MM/HR (ref 0–20)
EST. GFR  (AFRICAN AMERICAN): >60 ML/MIN/1.73 M^2
EST. GFR  (NON AFRICAN AMERICAN): >60 ML/MIN/1.73 M^2
GLUCOSE SERPL-MCNC: 96 MG/DL (ref 70–110)
HCT VFR BLD AUTO: 46.8 % (ref 37–48.5)
HGB BLD-MCNC: 14.4 G/DL (ref 12–16)
IMM GRANULOCYTES # BLD AUTO: 0.06 K/UL (ref 0–0.04)
IMM GRANULOCYTES NFR BLD AUTO: 0.5 % (ref 0–0.5)
LYMPHOCYTES # BLD AUTO: 3.4 K/UL (ref 1–4.8)
LYMPHOCYTES NFR BLD: 29.5 % (ref 18–48)
MCH RBC QN AUTO: 28.3 PG (ref 27–31)
MCHC RBC AUTO-ENTMCNC: 30.8 G/DL (ref 32–36)
MCV RBC AUTO: 92 FL (ref 82–98)
MONOCYTES # BLD AUTO: 0.8 K/UL (ref 0.3–1)
MONOCYTES NFR BLD: 7.1 % (ref 4–15)
NEUTROPHILS # BLD AUTO: 6.9 K/UL (ref 1.8–7.7)
NEUTROPHILS NFR BLD: 59.8 % (ref 38–73)
NRBC BLD-RTO: 0 /100 WBC
PLATELET # BLD AUTO: 225 K/UL (ref 150–450)
PMV BLD AUTO: 11 FL (ref 9.2–12.9)
POTASSIUM SERPL-SCNC: 4.1 MMOL/L (ref 3.5–5.1)
PROT SERPL-MCNC: 7 G/DL (ref 6–8.4)
RBC # BLD AUTO: 5.08 M/UL (ref 4–5.4)
SODIUM SERPL-SCNC: 136 MMOL/L (ref 136–145)
WBC # BLD AUTO: 11.49 K/UL (ref 3.9–12.7)

## 2022-01-27 PROCEDURE — 86225 DNA ANTIBODY NATIVE: CPT | Performed by: INTERNAL MEDICINE

## 2022-01-27 PROCEDURE — 80053 COMPREHEN METABOLIC PANEL: CPT | Performed by: INTERNAL MEDICINE

## 2022-01-27 PROCEDURE — 36415 COLL VENOUS BLD VENIPUNCTURE: CPT | Mod: PO | Performed by: INTERNAL MEDICINE

## 2022-01-27 PROCEDURE — 85651 RBC SED RATE NONAUTOMATED: CPT | Mod: PO | Performed by: INTERNAL MEDICINE

## 2022-01-27 PROCEDURE — 86160 COMPLEMENT ANTIGEN: CPT | Performed by: INTERNAL MEDICINE

## 2022-01-27 PROCEDURE — 86160 COMPLEMENT ANTIGEN: CPT | Mod: 59 | Performed by: INTERNAL MEDICINE

## 2022-01-27 PROCEDURE — 85025 COMPLETE CBC W/AUTO DIFF WBC: CPT | Performed by: INTERNAL MEDICINE

## 2022-01-27 PROCEDURE — 86140 C-REACTIVE PROTEIN: CPT | Performed by: INTERNAL MEDICINE

## 2022-01-27 PROCEDURE — 86225 DNA ANTIBODY NATIVE: CPT | Mod: 59 | Performed by: INTERNAL MEDICINE

## 2022-01-31 LAB
DNA TITER: NORMAL
DSDNA AB SER-ACNC: POSITIVE [IU]/ML

## 2022-02-01 RX ORDER — METHOTREXATE 2.5 MG/1
15 TABLET ORAL
Qty: 32 TABLET | Refills: 0 | Status: SHIPPED | OUTPATIENT
Start: 2022-02-01 | End: 2022-03-03 | Stop reason: SDUPTHER

## 2022-02-08 ENCOUNTER — TELEPHONE (OUTPATIENT)
Dept: BARIATRICS | Facility: CLINIC | Age: 52
End: 2022-02-08
Payer: COMMERCIAL

## 2022-02-08 NOTE — TELEPHONE ENCOUNTER
----- Message from Dipti Valdez sent at 2/8/2022  1:50 PM CST -----  Contact: [phone]  Pt returning missed call from Barbara. Pt would like a call back.    [phone]

## 2022-02-10 ENCOUNTER — PATIENT OUTREACH (OUTPATIENT)
Dept: ADMINISTRATIVE | Facility: OTHER | Age: 52
End: 2022-02-10
Payer: COMMERCIAL

## 2022-02-10 DIAGNOSIS — Z12.11 COLON CANCER SCREENING: Primary | ICD-10-CM

## 2022-02-10 NOTE — PROGRESS NOTES
LINKS immunization registry updated  Care Everywhere updated  Health Maintenance updated  Chart reviewed for overdue Proactive [facility] Encounters (REJI) health maintenance testing (CRS, Breast Ca, Diabetic Eye Exam)   Orders entered: fit kit  HgA1c order not entered.  Patient does not have diabetes diagnosis

## 2022-02-11 ENCOUNTER — PATIENT MESSAGE (OUTPATIENT)
Dept: ADMINISTRATIVE | Facility: HOSPITAL | Age: 52
End: 2022-02-11
Payer: COMMERCIAL

## 2022-02-11 ENCOUNTER — PATIENT OUTREACH (OUTPATIENT)
Dept: ADMINISTRATIVE | Facility: HOSPITAL | Age: 52
End: 2022-02-11
Payer: COMMERCIAL

## 2022-02-11 NOTE — PROGRESS NOTES
2022 Care Everywhere updates requested and reviewed.  Immunizations reconciled. Media reports reviewed.  Duplicate HM overrides and  orders removed.  Overdue HM topic chart audit and/or requested.  Overdue lab testing linked to upcoming lab appointments if applies.  Lab Sense Platform, and Pieceable reviewed    Lab testing     DIS reviewed      Mammogram     Health Maintenance Due   Topic Date Due    Colorectal Cancer Screening  Never done    Mammogram  2021    COVID-19 Vaccine (3 - Booster for Moderna series) 10/15/2021    Hemoglobin A1c  2022

## 2022-02-14 ENCOUNTER — CLINICAL SUPPORT (OUTPATIENT)
Dept: BARIATRICS | Facility: CLINIC | Age: 52
End: 2022-02-14
Payer: COMMERCIAL

## 2022-02-14 ENCOUNTER — PATIENT MESSAGE (OUTPATIENT)
Dept: BARIATRICS | Facility: CLINIC | Age: 52
End: 2022-02-14

## 2022-02-14 ENCOUNTER — PATIENT MESSAGE (OUTPATIENT)
Dept: FAMILY MEDICINE | Facility: CLINIC | Age: 52
End: 2022-02-14
Payer: COMMERCIAL

## 2022-02-14 ENCOUNTER — TELEPHONE (OUTPATIENT)
Dept: BARIATRICS | Facility: CLINIC | Age: 52
End: 2022-02-14
Payer: COMMERCIAL

## 2022-02-14 VITALS — WEIGHT: 293 LBS | BODY MASS INDEX: 48.82 KG/M2 | HEIGHT: 65 IN

## 2022-02-14 DIAGNOSIS — Z71.3 DIETARY COUNSELING AND SURVEILLANCE: ICD-10-CM

## 2022-02-14 DIAGNOSIS — I10 ESSENTIAL HYPERTENSION: Primary | ICD-10-CM

## 2022-02-14 DIAGNOSIS — E66.01 MORBID OBESITY WITH BODY MASS INDEX (BMI) OF 50.0 TO 59.9 IN ADULT: ICD-10-CM

## 2022-02-14 DIAGNOSIS — Z00.00 WELLNESS EXAMINATION: Primary | ICD-10-CM

## 2022-02-14 PROCEDURE — 99499 UNLISTED E&M SERVICE: CPT | Mod: 95,,, | Performed by: DIETITIAN, REGISTERED

## 2022-02-14 PROCEDURE — 99499 NO LOS: ICD-10-PCS | Mod: 95,,, | Performed by: DIETITIAN, REGISTERED

## 2022-02-14 NOTE — PROGRESS NOTES
"The patient location is:  Patient Home   The chief complaint leading to consultation is: morbid obesity in work up for bariatric surgery  Visit type: Virtual visit with synchronous audio and video  Total time spent with patient: 60 minutes  Each patient to whom he or she provides medical services by telemedicine is:  (1) informed of the relationship between the physician and patient and the respective role of any other health care provider with respect to management of the patient; and (2) notified that he or she may decline to receive medical services by telemedicine and may withdraw from such care at any time.  Nutrition Handout located in AVS section of pt's [facility] and/or sent as a message via [facility] portal.  Pt informed of handout and how to access this information in Link To Media denisse.  NUTRITIONAL CONSULT    Referring Physician: Undecided   Reason for MNT Referral: Initial assessment for Undecided work-up    PAST MEDICAL HISTORY:   51 y.o. female  Body mass index is 56.58 kg/m²..  Weight history includes highest wt: 340 lbs current wt lowest after leaving [employer] 35 years ago 140 lbs.Last 2 years put on 40 lbs and diagnosed with lupus  Dieting attempts include 5 years [facility] weight loss protein shake lost 80 lbs.  But gained wt after program.  Weight Watchers lost some, tracked macros and lost good amt, tried adipex.  Difficulty with keeping weight off once she loses weight    Past Medical History:   Diagnosis Date    Anxiety     GERD (gastroesophageal reflux disease)     Hyperlipidemia     Hypertension     Hypothyroidism     Insomnia     Observed sleep apnea     Vitamin D deficiency        CLINICAL DATA:  51 y.o.-year-old White female.  Height: 5'5"  Weight: 340 lbs  IBW: 141 lbs  BMI: 56.58  The patient's goal weight (50% EBW): 240 lbs  Personal goal weight: Under 200 lbs 5 years ago    Goal for Bariatric Surgery: to improve health, to improve quality of life, to lose weight and to " prevent future medical conditions    DAILY NUTRITIONAL NEEDS: pre-op nutritional bariatric guidelines to promote weight loss  6729-8304 Calories    Grams Protein    NUTRITION & HEALTH HISTORY:  Greatest challenge: stress eater and struggle at lunch grab and go    Current diet recall:   B: Protein shake   L: fast food work for [employer] hamburger with fries  D: Roast rice and gravy home cooked meals with vegetables    Current Diet:  Meal pattern: protein shake for breakfast, lunch at work usually fast food and dinner is a home cooked meal  Protein supplements: Umeng Trim protein shake 24 gms of protein   Snacking: 3 / day popcorn, beef jerky or unkind one a day pb chocolate cup  Vegetables: Likes a variety. Eats daily.  Fruits: Likes a few. Eats once per week.  Beverages: water, diet soda, sweet tea and no milk Loaded tea herbal life product one a day, diet soda and water, no milk  Dining out: Weekly. 5 times per week for lunchMostly fast food and take-out.  Cooking at home: Weekly.6-7 times per week dinners  Mostly baked, grilled and crock pot meat, fish, starchy CHO and vegetables.    Exercise:  Past exercise: Adequate    Current exercise: Adequate  5 times per week knees hurt  Restrictions to exercise:lupus    Vitamins / Minerals / Herbs:   Multivitamin  Vitamin D   Took Vit D rx in past    Labs:   No recent    Food Allergies:   None reported    Social:  Works regular daytime shifts.  Lives with   Grocery shopping and food prep self shopping and  food preps  Patient believes the household will be supportive after surgery.  Alcohol: None.  Smoking: None.    ASSESSMENT:  · Patient reports attempts at weight loss, only to regain lost weight.  · Patient demonstrated knowledge of healthy eating behaviors and exercise patterns; admits to not eating healthy and not exercising at this point.  · Patient demonstrates willingness to change lifestyle and make behavior modifications as evidenced by good  exercise and including protein drinks.        Barriers to Education: none    Stage of change: action    NUTRITION DIAGNOSIS:     Morbid Obesity related to Excessive carbohydrate intake, Excessive calorie intake and Inadequate protein intake as evidence by BMI.    BARIATRIC DIET DISCUSSION/PLAN:  Discussed diet after surgery and related to patient's food record.  Reviewed nutrition guidelines for before and after surgery.  Answered all questions.  Work on Bariatric Nutrition Checklist.  Work on expanding variety of vegetables.  Work on gradually cutting back on starchy CHO in the diet.  1200-calorie diet.  1500-calorie diet.  5-6 meals per day.  Start including protein supplements in the diet plan daily.  Reduce frequency of dining out.  More grocery shopping and meal preparation at home.  Return to clinic.    RECOMMENDATIONS:  Patient is a potential candidate for bariatric surgery.    Needs additional visit(s) with RD.    Patient verbalized understanding.    Expect good  compliance after surgery at this time.    Communicated nutrition plan with bariatric team.    SESSION TIME:  60 minutes

## 2022-02-14 NOTE — TELEPHONE ENCOUNTER
Noted pt was not contacted last week to cancel appts, as [facility] has not sent in her approval for evaluation.  Attempted to reach patient to discuss that she has not been approved yet for her evaluation.  No answer.  Left VM that we would do a no charge for her virtual visit with Alida and would cancel her appt with the BRIANNE.  Appt cancelled.  Direct call back number provided

## 2022-02-16 ENCOUNTER — PATIENT MESSAGE (OUTPATIENT)
Dept: RHEUMATOLOGY | Facility: CLINIC | Age: 52
End: 2022-02-16
Payer: COMMERCIAL

## 2022-02-16 RX ORDER — FENOFIBRATE 160 MG/1
TABLET ORAL
Qty: 90 TABLET | Refills: 1 | Status: SHIPPED | OUTPATIENT
Start: 2022-02-16 | End: 2022-03-04

## 2022-02-16 NOTE — TELEPHONE ENCOUNTER
Refill Authorization Note   Christine Alfaro  is requesting a refill authorization.  Brief Assessment and Rationale for Refill:  Approve    -Medication-Related Problems Identified: Requires labs  Medication Therapy Plan:       Medication Reconciliation Completed: No   Comments:   --->Care Gap information included below if applicable.   Orders Placed This Encounter    fenofibrate 160 MG Tab      Requested Prescriptions   Signed Prescriptions Disp Refills    fenofibrate 160 MG Tab 90 tablet 1     Sig: TAKE 1 TABLET(160 MG) BY MOUTH EVERY DAY       Cardiovascular:  Antilipid - Fibric Acid Derivatives Passed - 2/16/2022  5:22 AM        Passed - Patient is at least 18 years old        Passed - Negative Pregnancy Status Check        Passed - Valid encounter within last 15 months     Recent Visits  Date Type Provider Dept   06/04/21 Office Visit Layo Mccrary MD [facility]   04/07/21 Office Visit Layo Mccrary MD [facility]   02/03/21 Office Visit Layo Mccrary MD [facility]   11/30/20 Office Visit Layo Mccrary MD [facility]   09/16/20 Office Visit Layo Mccrary MD [facility]   Showing recent visits within past 720 days and meeting all other requirements  Future Appointments  No visits were found meeting these conditions.  Showing future appointments within next 150 days and meeting all other requirements      Future Appointments              In 2 days MANDIE, YOHAN [facility] - [place], [place]    In 1 week Layo Mccrary MD [facility], [facility]    In 2 weeks [facility] [address]    In 2 months Idania Torrez MD [place] [facility] [place]                Passed - ALT is 119 or below and within 360 days     ALT   Date Value Ref Range Status   01/27/2022 26 10 - 44 U/L Final   12/03/2021 25 10 - 44 U/L Final   10/18/2021 25 10 - 44 U/L Final              Passed - AST is 131  or below and within 360 days     AST   Date Value Ref Range Status   01/27/2022 17 10 - 40 U/L Final   12/03/2021 18 10 - 40 U/L Final   10/18/2021 17 10 - 40 U/L Final              Passed - Cr is 1.39 or below and within 360 days     Lab Results   Component Value Date    CREATININE 0.7 01/27/2022    CREATININE 0.7 12/03/2021    CREATININE 0.9 10/18/2021              Passed - eGFR is 30 or above and within 360 days     Lab Results   Component Value Date    EGFRNONAA >60.0 01/27/2022    EGFRNONAA >60.0 12/03/2021    EGFRNONAA >60.0 10/18/2021                Passed - WBC within 360 days     WBC   Date Value Ref Range Status   01/27/2022 11.49 3.90 - 12.70 K/uL Final   12/03/2021 10.07 3.90 - 12.70 K/uL Final   10/18/2021 6.37 3.90 - 12.70 K/uL Final              Passed - Total Cholesterol within 360 days     Lab Results   Component Value Date    CHOL 156 06/04/2021    CHOL 131 11/25/2020    CHOL 176 11/26/2019              Passed - LDL within 360 days     LDL Cholesterol   Date Value Ref Range Status   06/04/2021 87.0 63.0 - 159.0 mg/dL Final     Comment:     The National Cholesterol Education Program (NCEP) has set the  following guidelines (reference values) for LDL Cholesterol:  Optimal.......................<130 mg/dL  Borderline High...............130-159 mg/dL  High..........................160-189 mg/dL  Very High.....................>190 mg/dL              Passed - HDL within 360 days     HDL   Date Value Ref Range Status   06/04/2021 46 40 - 75 mg/dL Final     Comment:     The National Cholesterol Education Program (NCEP) has set the  following guidelines (reference values) for HDL Cholesterol:  Low...............<40 mg/dL  Optimal...........>60 mg/dL              Passed - Triglycerides within 360 days     Lab Results   Component Value Date    TRIG 115 06/04/2021    TRIG 101 11/25/2020    TRIG 363 (H) 11/26/2019                  Appointments  past 12m or future 3m with PCP    Date Provider   Last Visit    6/4/2021 Layo Mccrary MD   Next Visit   2/25/2022 Layo Mccrary MD   ED visits in past 90 days: 0     Note composed:9:43 AM 02/16/2022

## 2022-02-16 NOTE — TELEPHONE ENCOUNTER
Care Due:                  Date            Visit Type   Department     Provider  --------------------------------------------------------------------------------                                EP -                              PRIMARY      [facility] FAM[facility]  Last Visit: 06-      CARE ([facility])   MEDICINE       Layo CHOPRA                              FOLLOWUP/OF  [facility] FAMILY  Next Visit: 02-      FICE VISIT   MEDICINE       Layo Mccrary                                                            Last  Test          Frequency    Reason                     Performed    Due Date  --------------------------------------------------------------------------------    HBA1C.......  6 months...  JARDIANCE................  07- 01-    Powered by [employer]. Reference number: [id number].   2/16/2022 5:23:25 AM CST

## 2022-02-17 RX ORDER — PREDNISONE 5 MG/1
TABLET ORAL
Qty: 90 TABLET | Refills: 1 | Status: SHIPPED | OUTPATIENT
Start: 2022-02-17 | End: 2023-01-30

## 2022-02-18 ENCOUNTER — LAB VISIT (OUTPATIENT)
Dept: LAB | Facility: HOSPITAL | Age: 52
End: 2022-02-18
Attending: FAMILY MEDICINE
Payer: COMMERCIAL

## 2022-02-18 DIAGNOSIS — Z00.00 WELLNESS EXAMINATION: ICD-10-CM

## 2022-02-18 LAB
ESTIMATED AVG GLUCOSE: 117 MG/DL (ref 68–131)
HBA1C MFR BLD: 5.7 % (ref 4–5.6)
TSH SERPL DL<=0.005 MIU/L-ACNC: 0.81 UIU/ML (ref 0.4–4)

## 2022-02-18 PROCEDURE — 36415 COLL VENOUS BLD VENIPUNCTURE: CPT | Mod: PO | Performed by: FAMILY MEDICINE

## 2022-02-18 PROCEDURE — 83036 HEMOGLOBIN GLYCOSYLATED A1C: CPT | Performed by: FAMILY MEDICINE

## 2022-02-18 PROCEDURE — 84443 ASSAY THYROID STIM HORMONE: CPT | Performed by: FAMILY MEDICINE

## 2022-03-03 ENCOUNTER — OFFICE VISIT (OUTPATIENT)
Dept: RHEUMATOLOGY | Facility: CLINIC | Age: 52
End: 2022-03-03
Payer: COMMERCIAL

## 2022-03-03 VITALS
HEART RATE: 96 BPM | DIASTOLIC BLOOD PRESSURE: 91 MMHG | SYSTOLIC BLOOD PRESSURE: 168 MMHG | WEIGHT: 293 LBS | BODY MASS INDEX: 48.82 KG/M2 | HEIGHT: 65 IN

## 2022-03-03 DIAGNOSIS — M19.90 INFLAMMATORY ARTHRITIS: ICD-10-CM

## 2022-03-03 DIAGNOSIS — M32.9 SYSTEMIC LUPUS ERYTHEMATOSUS, UNSPECIFIED SLE TYPE, UNSPECIFIED ORGAN INVOLVEMENT STATUS: Primary | ICD-10-CM

## 2022-03-03 PROCEDURE — 99214 PR OFFICE/OUTPT VISIT, EST, LEVL IV, 30-39 MIN: ICD-10-PCS | Mod: S$GLB,,, | Performed by: INTERNAL MEDICINE

## 2022-03-03 PROCEDURE — 3080F DIAST BP >= 90 MM HG: CPT | Mod: CPTII,S$GLB,, | Performed by: INTERNAL MEDICINE

## 2022-03-03 PROCEDURE — 3044F HG A1C LEVEL LT 7.0%: CPT | Mod: CPTII,S$GLB,, | Performed by: INTERNAL MEDICINE

## 2022-03-03 PROCEDURE — 99999 PR PBB SHADOW E&M-EST. PATIENT-LVL IV: ICD-10-PCS | Mod: PBBFAC,,, | Performed by: INTERNAL MEDICINE

## 2022-03-03 PROCEDURE — 3008F BODY MASS INDEX DOCD: CPT | Mod: CPTII,S$GLB,, | Performed by: INTERNAL MEDICINE

## 2022-03-03 PROCEDURE — 3077F SYST BP >= 140 MM HG: CPT | Mod: CPTII,S$GLB,, | Performed by: INTERNAL MEDICINE

## 2022-03-03 PROCEDURE — 3044F PR MOST RECENT HEMOGLOBIN A1C LEVEL <7.0%: ICD-10-PCS | Mod: CPTII,S$GLB,, | Performed by: INTERNAL MEDICINE

## 2022-03-03 PROCEDURE — 3080F PR MOST RECENT DIASTOLIC BLOOD PRESSURE >= 90 MM HG: ICD-10-PCS | Mod: CPTII,S$GLB,, | Performed by: INTERNAL MEDICINE

## 2022-03-03 PROCEDURE — 3008F PR BODY MASS INDEX (BMI) DOCUMENTED: ICD-10-PCS | Mod: CPTII,S$GLB,, | Performed by: INTERNAL MEDICINE

## 2022-03-03 PROCEDURE — 3077F PR MOST RECENT SYSTOLIC BLOOD PRESSURE >= 140 MM HG: ICD-10-PCS | Mod: CPTII,S$GLB,, | Performed by: INTERNAL MEDICINE

## 2022-03-03 PROCEDURE — 99214 OFFICE O/P EST MOD 30 MIN: CPT | Mod: S$GLB,,, | Performed by: INTERNAL MEDICINE

## 2022-03-03 PROCEDURE — 99999 PR PBB SHADOW E&M-EST. PATIENT-LVL IV: CPT | Mod: PBBFAC,,, | Performed by: INTERNAL MEDICINE

## 2022-03-03 RX ORDER — ABATACEPT 125 MG/ML
125 INJECTION, SOLUTION SUBCUTANEOUS
Qty: 4 ML | Refills: 11 | Status: SHIPPED | OUTPATIENT
Start: 2022-03-03 | End: 2022-11-16 | Stop reason: ALTCHOICE

## 2022-03-03 RX ORDER — METHOTREXATE 2.5 MG/1
20 TABLET ORAL
Qty: 96 TABLET | Refills: 0 | Status: SHIPPED | OUTPATIENT
Start: 2022-03-03 | End: 2022-03-04

## 2022-03-03 NOTE — LETTER
March 3, 2022    Christine Alfaro  [address]  [place] [address]         [id number]  [address]  [place] [address]  Phone: [phone]  Fax: [phone] March 3, 2022     Patient: Christine Alfaro   YOB: 1970   Date of Visit: 3/3/2022       To Whom It May Concern:  Christine Alfaro may return to work  On 3/4/22, with no restrictions.    If you have any questions or concerns, please don't hesitate to call.    Sincerely,        Ирина Rodríguez MD

## 2022-03-03 NOTE — PROGRESS NOTES
Answers for HPI/ROS submitted by the patient on 3/2/2022  fever: No  eye redness: Yes  mouth sores: Yes  headaches: Yes  shortness of breath: Yes  chest pain: No  trouble swallowing: No  diarrhea: No  constipation: No  unexpected weight change: No  genital sore: No  dysuria: No  During the last 3 days, have you had a skin rash?: No  Bruises or bleeds easily: No  cough: No

## 2022-03-03 NOTE — PROGRESS NOTES
Subjective:       Patient ID: Christine Alfaro is a 50 y.o. female.    Chief Complaint: Disease Management    HPI 50 year old with PMH of Hashimotos disease, psoriasis,HTN, HL, ETELVINA , GERD, mitral valve regurgitation, left ankle spur removal  here for evaluation. She has been on cpap for many years.  She does not wake up well rested. She has been tired for years, but worse over the last 8 months. Last year, she started to have joint pain. She is having pain in toes, fingers, left knee, elbows, right jaw. Pain level is as high as 7/10, aching and non radiating. Reports swelling in left ankle and feet. Reports swelling in feet and lower legs for a year. In morning, she reports her hands are swollen. She has pain in mid to lower back since she was 18. She gets pustules on occasion in her hands for a year. She is getting mouth ulcer where she has dental bridge.  She clenches her teeth at night. Denies malar rash. She has dry eyes and dry mouth for last year. She takes ibuprofen 800mg po BID prn for last year to help her joint pain. It helps her take edge off.  She has had psoriasis for 3 years.  Reports dry cough for last year. Reports photosensitivity for 3 years. Denies raynauds. Denies history of thrombosis. She has had 2 miscarriages. She reports she had one miscarriage at 6 months due to blood incompatibility.  She had another miscarriage at 2.5 months. She thinks her thyroid might not have been well controlled. Over the last year and half, she has had chest pain with coughing and sometimes it can radiate to back.    She was able to have 2 healthy pregnancies.      Family hx: uncle: RA  Mother:psoriasis       Interval history:She is taking plaquenil 200mg po BID. She is taking MTX 6 pills once a week. She taken 7 doses of the 15 mg dosing.  She is taking prednisone 5 mg a day. She recently had flare involve right knee and left elbow/wrist.  She takes tramadol 50mg in morning. She takes flexeril 10mg po qhs. She  "is gaining a lot of weight on the prednisone.         She is taking tramadol 50mg po BID. At 8 oclock pm, her feet and ankles are swollen at end of day.  She has also has pain in hands and also with swelling.  Denies photosensitivity. Denies oral ulcers. She thinks her mouth is dry.     Past Medical History:   Diagnosis Date    Anxiety     GERD (gastroesophageal reflux disease)     Hyperlipidemia     Hypertension     Hypothyroidism     Insomnia     Observed sleep apnea     Vitamin D deficiency        Review of Systems   Constitutional: Positive for fatigue. Negative for activity change, appetite change, chills, diaphoresis, fever and unexpected weight change.   HENT: Negative for congestion, ear discharge, ear pain, facial swelling, mouth sores, sinus pressure, sneezing, sore throat, tinnitus and trouble swallowing.    Eyes: Negative for photophobia, pain, discharge, redness, itching and visual disturbance.   Respiratory: Negative for apnea, cough, chest tightness, shortness of breath, wheezing and stridor.    Cardiovascular: Negative for chest pain and leg swelling.   Gastrointestinal: Negative for abdominal distention, abdominal pain, anal bleeding, blood in stool, constipation, diarrhea and nausea.   Endocrine: Negative for cold intolerance and heat intolerance.   Genitourinary: Negative for difficulty urinating, dysuria and genital sores.   Musculoskeletal: Positive for arthralgias, back pain, gait problem and joint swelling. Negative for neck pain and neck stiffness.   Skin: Positive for rash. Negative for color change, pallor and wound.   Neurological: Negative for dizziness, seizures, light-headedness, numbness and headaches.   Hematological: Negative for adenopathy. Does not bruise/bleed easily.   Psychiatric/Behavioral: Negative for sleep disturbance. The patient is not nervous/anxious.            Objective:   BP (!) 150/85   Pulse 84   Ht 5' 6" (1.676 m)   Wt (!) 154.4 kg (340 lb 6.2 oz)   BMI " 54.94 kg/m²      Physical Exam   Constitutional: She is oriented to person, place, and time.   HENT:   Head: Normocephalic and atraumatic.   Right Ear: External ear normal.   Left Ear: External ear normal.   Nose: Nose normal.   Mouth/Throat: Oropharynx is clear and moist. No oropharyngeal exudate.   Eyes: Conjunctivae and EOM are normal. Pupils are equal, round, and reactive to light. Right eye exhibits no discharge. Left eye exhibits no discharge. No scleral icterus.   Neck: No JVD present. No thyromegaly present.   Cardiovascular: Normal rate, regular rhythm, normal heart sounds and intact distal pulses.  Exam reveals no gallop and no friction rub.    No murmur heard.  Pulmonary/Chest: Effort normal and breath sounds normal. No respiratory distress. She has no wheezes. She has no rales. She exhibits no tenderness.   Abdominal: Soft. Bowel sounds are normal. She exhibits no distension and no mass. There is no abdominal tenderness. There is no rebound and no guarding.   Lymphadenopathy:     She has no cervical adenopathy.   Neurological: She is alert and oriented to person, place, and time. She displays normal reflexes. No cranial nerve deficit. She exhibits normal muscle tone. Gait normal. Coordination normal.   Skin: Skin is dry. Rash noted. No erythema. No pallor.     Rash in forearms   Psychiatric: Affect and judgment normal.   Musculoskeletal: Tenderness present. No deformity or edema.          No data to display     Assessment:      51 year old with PMH of Hashimotos disease  ,HTN, HL, ETELVINA , GERD, mitral valve regurgitation, left ankle spur removal  here for evaluation of joint pain.  Given +thu, dsdna,arthritis, photosensitivity,  Pleurisy, and fatigue, this is concerning for SLE. Her main issue is inflammatory arthritis.  GIVEN THAT SHE HAS SLE, cannot do anti- tnf.          Plan:       Problem List Items Addressed This Visit     None        Start Orencia 125 mg sub q once a week (Risks of TNF inhibitor  discussed with patient and not limited to cell count abnormalities, malignancy, allergic  reaction to medication, and increased risk of infection. Patient agrees with starting medication.      Continue plaquenil 200mg po BID (Risks of starting plaquenil discussed. Risks include eye toxicity and agrees on timely follow up with optho to avoid risks of eye toxicity. Other risks include rashes such has hyperpigmentation and vertigo.  Continue flexeril 10mg po qhs   Increase MTX from  6 pills once a week to 8  pills once a week   Continue folic acid 1 mg po qday  Labs in a month and before next visit    #obesity: encourage weight loss       30 * minutes of total time spent on the encounter, which includes face to face time and non-face to face time preparing to see the patient (eg, review of tests), Obtaining and/or reviewing separately obtained history, Documenting clinical information in the electronic or other health record, Independently interpreting results (not separately reported) and communicating results to the patient/family/caregiver, or Care coordination (not separately reported).

## 2022-03-04 ENCOUNTER — OFFICE VISIT (OUTPATIENT)
Dept: FAMILY MEDICINE | Facility: CLINIC | Age: 52
End: 2022-03-04
Payer: COMMERCIAL

## 2022-03-04 ENCOUNTER — PATIENT MESSAGE (OUTPATIENT)
Dept: RHEUMATOLOGY | Facility: CLINIC | Age: 52
End: 2022-03-04
Payer: COMMERCIAL

## 2022-03-04 VITALS
RESPIRATION RATE: 18 BRPM | DIASTOLIC BLOOD PRESSURE: 74 MMHG | TEMPERATURE: 99 F | HEART RATE: 115 BPM | HEIGHT: 65 IN | BODY MASS INDEX: 48.82 KG/M2 | WEIGHT: 293 LBS | OXYGEN SATURATION: 97 % | SYSTOLIC BLOOD PRESSURE: 128 MMHG

## 2022-03-04 DIAGNOSIS — F41.9 ANXIETY: ICD-10-CM

## 2022-03-04 DIAGNOSIS — I10 ESSENTIAL HYPERTENSION: Chronic | ICD-10-CM

## 2022-03-04 DIAGNOSIS — E66.01 OBESITY, MORBID, BMI 50 OR HIGHER: ICD-10-CM

## 2022-03-04 DIAGNOSIS — E78.5 DYSLIPIDEMIA: ICD-10-CM

## 2022-03-04 DIAGNOSIS — E03.9 HYPOTHYROIDISM, UNSPECIFIED TYPE: ICD-10-CM

## 2022-03-04 DIAGNOSIS — E74.39 GLUCOSE INTOLERANCE: Primary | ICD-10-CM

## 2022-03-04 DIAGNOSIS — M32.9 SLE WITH NORMAL KIDNEYS: ICD-10-CM

## 2022-03-04 PROCEDURE — 99214 OFFICE O/P EST MOD 30 MIN: CPT | Mod: S$GLB,,, | Performed by: FAMILY MEDICINE

## 2022-03-04 PROCEDURE — 99999 PR PBB SHADOW E&M-EST. PATIENT-LVL III: CPT | Mod: PBBFAC,,, | Performed by: FAMILY MEDICINE

## 2022-03-04 PROCEDURE — 99999 PR PBB SHADOW E&M-EST. PATIENT-LVL III: ICD-10-PCS | Mod: PBBFAC,,, | Performed by: FAMILY MEDICINE

## 2022-03-04 PROCEDURE — 99214 PR OFFICE/OUTPT VISIT, EST, LEVL IV, 30-39 MIN: ICD-10-PCS | Mod: S$GLB,,, | Performed by: FAMILY MEDICINE

## 2022-03-04 RX ORDER — SEMAGLUTIDE 1.34 MG/ML
INJECTION, SOLUTION SUBCUTANEOUS
Qty: 1 PEN | Refills: 0 | Status: SHIPPED | OUTPATIENT
Start: 2022-03-04 | End: 2022-03-31

## 2022-03-04 RX ORDER — ATORVASTATIN CALCIUM 40 MG/1
40 TABLET, FILM COATED ORAL NIGHTLY
Qty: 90 TABLET | Refills: 3 | Status: SHIPPED | OUTPATIENT
Start: 2022-03-04 | End: 2023-04-03 | Stop reason: SDUPTHER

## 2022-03-04 RX ORDER — CYCLOBENZAPRINE HCL 10 MG
10 TABLET ORAL NIGHTLY
COMMUNITY
Start: 2022-02-01 | End: 2022-08-08

## 2022-03-04 RX ORDER — LISINOPRIL 20 MG/1
TABLET ORAL
Qty: 90 TABLET | Refills: 3 | Status: SHIPPED | OUTPATIENT
Start: 2022-03-04 | End: 2023-04-29 | Stop reason: SDUPTHER

## 2022-03-04 NOTE — PROGRESS NOTES
THIS DOCUMENT WAS MADE IN PART WITH VOICE RECOGNITION SOFTWARE.  OCCASIONALLY THIS SOFTWARE WILL MISINTERPRET WORDS OR PHRASES.    Assessment and Plan:    1. Glucose intolerance  semaglutide (OZEMPIC) 0.25 mg or 0.5 mg(2 mg/1.5 mL) pen injector   2. Dyslipidemia  atorvastatin (LIPITOR) 40 MG tablet   3. Essential hypertension  lisinopriL (PRINIVIL,ZESTRIL) 20 MG tablet    CONTROLLED   4. Obesity, morbid, BMI 50 or higher  semaglutide (OZEMPIC) 0.25 mg or 0.5 mg(2 mg/1.5 mL) pen injector   5. Hypothyroidism, unspecified type     6. Anxiety     7. SLE with normal kidneys          plan   Thyroid in glucose intolerance are stable  Will discontinue Jardiance and try Ozempic for improved weight loss and glucose management.  Will follow-up with rheumatology for psoriasis and lupus management   Gave instruction on weaning off of Lexapro, hopefully this will help with weight loss as well   Fit kit and mammogram ordered and scheduled.   Continue omeprazole for GERD symptoms    ______________________________________________________________________  Subjective:    Chief Complaint:  Follow up      HPI:  Christine is a 51 y.o. year old      hypothyroidism   Recent thyroid studies normal   Currently on 250 mcg levothyroxine    Glucose intolerance, pre diabetes  Currently on Jardiance   Recent A1c 5.7%   Reports medication compliance     SLE   Followed by Rheumatology on [place]   Taking combination of methotrexate, prednisone, Plaquenil  Awaiting approval of Orencia   reports feeling much better since having lupus diagnosed and treated    Psoriasis   No improvement in scalp symptoms with ketoconazole shampoo for suspected seborrheic dermatitis.  Hopeful that treatments for lupus will improve psoriasis symptoms    Obesity   Has gained significant weight with prednisone treatment for lupus.  Patient is interested in medicine to help with this problem  Currently on Jardiance.      Anxiety   Currently on Lexapro  Reports having new  job as decreased stress significantly and is interested in discontinuation of medication    Due for colon cancer screening, breast cancer screening     GERD   Omeprazole working well    Past Medical History:  Past Medical History:   Diagnosis Date    Anxiety     GERD (gastroesophageal reflux disease)     Hyperlipidemia     Hypertension     Hypothyroidism     Insomnia     Observed sleep apnea     Vitamin D deficiency        Past Surgical History:  Past Surgical History:   Procedure Laterality Date    BTL      CHOLECYSTECTOMY      SURGICAL REMOVAL OF BONE SPUR      left heel    TONSILLECTOMY      TUBAL LIGATION  8/1995       Family History:  Family History   Problem Relation Age of Onset    Heart disease Mother     Bone cancer Mother     Cancer Mother         cervical    Hypertension Mother     Heart disease Father     Diabetes type I Father     Lung cancer Father     Diabetes Father     Hypertension Father     Heart disease Brother     No Known Problems Daughter     No Known Problems Daughter     Breast cancer Neg Hx     Ovarian cancer Neg Hx        Social History:  Social History     Socioeconomic History    Marital status:    Tobacco Use    Smoking status: Never Smoker    Smokeless tobacco: Never Used   Substance and Sexual Activity    Alcohol use: Not Currently     Alcohol/week: 0.0 standard drinks    Drug use: Never    Sexual activity: Yes     Partners: Male     Birth control/protection: See Surgical Hx     Social Determinants of Health     Financial Resource Strain: Low Risk     Difficulty of Paying Living Expenses: Not hard at all   Food Insecurity: No Food Insecurity    Worried About Running Out of Food in the Last Year: Never true    Ran Out of Food in the Last Year: Never true   Transportation Needs: No Transportation Needs    Lack of Transportation (Medical): No    Lack of Transportation (Non-Medical): No   Physical Activity: Insufficiently Active    Days of  Exercise per Week: 2 days    Minutes of Exercise per Session: 20 min   Stress: Stress Concern Present    Feeling of Stress : To some extent   Social Connections: Unknown    Frequency of Communication with Friends and Family: More than three times a week    Frequency of Social Gatherings with Friends and Family: More than three times a week    Active Member of Clubs or Organizations: Yes    Attends Club or Organization Meetings: More than 4 times per year    Marital Status:    Housing Stability: Low Risk     Unable to Pay for Housing in the Last Year: No    Number of Places Lived in the Last Year: 1    Unstable Housing in the Last Year: No       Medications:  Current Outpatient Medications on File Prior to Visit   Medication Sig Dispense Refill    abatacept (ORENCIA CLICKJECT) 125 mg/mL AtIn Inject 125 mg into the skin every 7 days. 4 mL 11    albuterol (PROVENTIL HFA) 90 mcg/actuation inhaler Inhale 2 puffs into the lungs every 6 (six) hours as needed for Wheezing. Rescue 18 g 0    cyclobenzaprine (FLEXERIL) 10 MG tablet Take 10 mg by mouth nightly.      folic acid (FOLVITE) 1 MG tablet Take 1 tablet (1 mg total) by mouth once daily. 30 tablet 11    furosemide (LASIX) 20 MG tablet Take 1 tablet (20 mg total) by mouth once daily. 90 tablet 3    hydrOXYchloroQUINE (PLAQUENIL) 200 mg tablet Take 1 tablet (200 mg total) by mouth 2 (two) times daily. 180 tablet 3    JARDIANCE 10 mg tablet TAKE 1 TABLET(10 MG) BY MOUTH EVERY DAY 30 tablet 11    levothyroxine (SYNTHROID) 125 MCG tablet TAKE 2 TABLETS(250 MCG) BY MOUTH DAILY 60 tablet 11    methotrexate 2.5 MG Tab TAKE 8 TABLETS BY MOUTH EVERY 7 DAYS 102 tablet 0    omeprazole (PRILOSEC) 20 MG capsule Take 20 mg by mouth.      predniSONE (DELTASONE) 5 MG tablet Take 2 tablets daily for 2 weeks and then one tablet daily 90 tablet 1    triamcinolone acetonide 0.1% (KENALOG) 0.1 % ointment Apply topically 2 (two) times daily. 80 g 3    XIIDRA 5 %  Dpet APPLY 1 DROP TO BOTH EYES BID      [DISCONTINUED] atorvastatin (LIPITOR) 40 MG tablet TAKE 1 TABLET(40 MG) BY MOUTH EVERY EVENING 90 tablet 0    [DISCONTINUED] EScitalopram oxalate (LEXAPRO) 20 MG tablet TAKE 1 TABLET(20 MG) BY MOUTH EVERY DAY 90 tablet 3    [DISCONTINUED] fluticasone propionate (FLONASE) 50 mcg/actuation nasal spray 2 sprays by Nasal route.       [DISCONTINUED] ketoconazole (NIZORAL) 2 % shampoo Apply topically twice a week. 120 mL 3    [DISCONTINUED] levocetirizine (XYZAL) 5 MG tablet levocetirizine 5 mg tablet   TAKE 1 TABLET BY MOUTH EVERY DAY      [DISCONTINUED] lisinopriL (PRINIVIL,ZESTRIL) 20 MG tablet TAKE 1 TABLET BY MOUTH EVERY DAY 90 tablet 3    aspirin (ECOTRIN) 81 MG EC tablet Take 1 tablet (81 mg total) by mouth once daily. 90 tablet 3    traMADoL (ULTRAM) 50 mg tablet Take 1 tablet (50 mg total) by mouth every 12 (twelve) hours as needed for Pain. (Patient not taking: No sig reported) 60 tablet 5    [DISCONTINUED] busPIRone (BUSPAR) 10 MG tablet Take 1 tablet (10 mg total) by mouth 2 (two) times daily. (Patient taking differently: Take 10 mg by mouth 2 (two) times daily.) 90 tablet 2    [DISCONTINUED] fenofibrate 160 MG Tab TAKE 1 TABLET(160 MG) BY MOUTH EVERY DAY (Patient not taking: No sig reported) 90 tablet 1    [DISCONTINUED] hydrOXYchloroQUINE (PLAQUENIL) 200 mg tablet TAKE 1 TABLET(200 MG) BY MOUTH TWICE DAILY 60 tablet 4    [DISCONTINUED] meloxicam (MOBIC) 15 MG tablet Take 1 tablet (15 mg total) by mouth once daily. 30 tablet 5    [DISCONTINUED] methotrexate 2.5 MG Tab Take 8 tablets (20 mg total) by mouth every 7 days. 96 tablet 0    [DISCONTINUED] montelukast (SINGULAIR) 10 mg tablet TK 1 T PO QD UTD UNTIL TOLD TO  STOP      [DISCONTINUED] predniSONE (DELTASONE) 20 MG tablet Take 1 tablet (20 mg total) by mouth once daily. 10 tablet 0    [DISCONTINUED] traZODone (DESYREL) 100 MG tablet TAKE 1 TABLET(100 MG) BY MOUTH EVERY EVENING 90 tablet 3     "[DISCONTINUED] traZODone (DESYREL) 150 MG tablet Take 1 tablet (150 mg total) by mouth nightly as needed for Insomnia. 90 tablet 3     No current facility-administered medications on file prior to visit.       Allergies:  Sulfamethoxazole-trimethoprim and Sulfa (sulfonamide antibiotics)    Immunizations:  Immunization History   Administered Date(s) Administered    COVID-19, MRNA, LN-S, PF (MODERNA FULL 0.5 ML DOSE) 03/11/2021, 04/15/2021    DTaP 01/01/1973, 02/01/1973, 04/01/1973, 08/01/1975, 04/01/1978    IPV 01/01/1973, 02/01/1973, 08/01/1975, 01/05/1978    Influenza Split 11/18/2007    MMR 01/12/1978, 10/19/1995    Td (ADULT) 04/17/1989, 08/07/2004    Tdap 11/26/2019       Review of Systems:  Review of Systems   All other systems reviewed and are negative.      Objective:    Vitals:  Vitals:    03/04/22 1526   BP: 128/74   Pulse: (!) 115   Resp: 18   Temp: 98.8 °F (37.1 °C)   SpO2: 97%   Weight: (!) 156.5 kg (345 lb 2.1 oz)   Height: 5' 5" (1.651 m)   PainSc:   4       Physical Exam  Vitals reviewed.   Constitutional:       General: She is not in acute distress.  HENT:      Head: Normocephalic and atraumatic.   Eyes:      Pupils: Pupils are equal, round, and reactive to light.   Cardiovascular:      Rate and Rhythm: Normal rate and regular rhythm.      Heart sounds: No murmur heard.    No friction rub.   Pulmonary:      Effort: Pulmonary effort is normal.      Breath sounds: Normal breath sounds.   Abdominal:      General: Bowel sounds are normal. There is no distension.      Palpations: Abdomen is soft.      Tenderness: There is no abdominal tenderness.   Musculoskeletal:      Cervical back: Neck supple.   Skin:     General: Skin is warm and dry.      Findings: No rash.   Psychiatric:         Behavior: Behavior normal.             Layo Mccrary MD  Family Medicine      "

## 2022-03-10 ENCOUNTER — PATIENT MESSAGE (OUTPATIENT)
Dept: RHEUMATOLOGY | Facility: CLINIC | Age: 52
End: 2022-03-10
Payer: COMMERCIAL

## 2022-03-30 ENCOUNTER — SPECIALTY PHARMACY (OUTPATIENT)
Dept: PHARMACY | Facility: CLINIC | Age: 52
End: 2022-03-30
Payer: COMMERCIAL

## 2022-03-30 DIAGNOSIS — E74.39 GLUCOSE INTOLERANCE: ICD-10-CM

## 2022-03-30 DIAGNOSIS — E66.01 OBESITY, MORBID, BMI 50 OR HIGHER: ICD-10-CM

## 2022-03-30 NOTE — TELEPHONE ENCOUNTER
Drug/Indication: Orencia Clickjet 125 mg/mL for inflammatory arthritis with psoriasis   Dose: 125 mg q7d    -Currently on MTX and plaquenil. ANC wnl. CASTILLO positive (concurrent SLE)   -Rx received. Medication appropriate. PA required.    -PA submitted CMM key: [id number] approved from 02/28/2022 to 09/26/2022;  Case ID: [id number]    -Test claim successful    -Forward to BI

## 2022-03-30 NOTE — TELEPHONE ENCOUNTER
Benefits Investigation -Orencia Clickjet 125 mg/mL     Insurance name: [employer]   Rep name: [facility] website    Copay amount: $80   Deductible: $0  OOPmax: $3500 (remaining 2918.86)  OSP in network? Yes     BI complete. Forward to FA.

## 2022-03-30 NOTE — TELEPHONE ENCOUNTER
Care Due:                  Date            Visit Type   Department     Provider  --------------------------------------------------------------------------------                                EP -                              PRIMARY      [facility] FAMILY  Last Visit: 03-      CARE ([facility])   MEDICINE       Layo Mccrary                              EP -                              [facility]  Next Visit: 05-      CARE ([facility])   MEDICINE       Layo Mccrary                                                            Last  Test          Frequency    Reason                     Performed    Due Date  --------------------------------------------------------------------------------    Lipid Panel.  12 months..  atorvastatin.............  06- 05-    Powered by Automated [employer] Desk by "[employer]. Reference number: [id number].   3/30/2022 3:01:11 PM CDT

## 2022-03-31 NOTE — TELEPHONE ENCOUNTER
Incoming call from patient. Informed her of the below information and pt grants her consent to obtain [employer] card on her behalf.

## 2022-03-31 NOTE — TELEPHONE ENCOUNTER
Refill Routing Note   Medication(s) are not appropriate for processing by [facility] for the following reason(s):      - Medication is a new start (<3 months)    [facility] action(s):  Defer       Medication Therapy Plan: Medication is new <3months  --->Care Gap information included in message below if applicable.   Medication reconciliation completed: No   Automatic Epic Generated Protocol Data:        Requested Prescriptions   Pending Prescriptions Disp Refills    OZEMPIC 0.25 mg or 0.5 mg(2 mg/1.5 mL) pen injector [Pharmacy Med Name: Ozempic 0.25 mg or 0.5 mg (2 mg/1.5 mL) subcutaneous pen injector] 1 pen 1     Sig: Administer 0.25 mg subcutaneously weekly for 2 weeks, then increase dose to 0.50 mg weekly       Endocrinology:  Diabetes - GLP-1 Receptor Agonists Passed - 3/31/2022  1:32 PM        Passed - Patient is at least 18 years old        Passed - Negative Pregnancy Status Check        Passed - Valid encounter within last 15 months     Recent Visits  Date Type Provider Dept   03/04/22 Office Visit Layo Mccrary MD [facility] Family Medicine   06/04/21 Office Visit Layo Mccrary MD [facility] Family Medicine   04/07/21 Office Visit Layo Mccrary MD [facility]   02/03/21 Office Visit Layo Mccrary MD [facility]   11/30/20 Office Visit Layo Mccrary MD [facility] Family [facility]   09/16/20 Office Visit Layo Mccrary MD [facility]   Showing recent visits within past 720 days and meeting all other requirements  Future Appointments  No visits were found meeting these conditions.  Showing future appointments within next 150 days and meeting all other requirements      Future Appointments              In 4 days [facility], [facility]    In 1 week [facility]    In 1 month [facility]    In 1 month Layo Mccrary MD [facility]    In 2 months SPECIMEN,  [facility] - [facility]    In 2 months LAB[facility]ATORY, [facility]    In 2 months MD Tomás [id number], [facility]                Passed - Matches previous order       Previous Authorizing Provider: Layo Mccrary MD (OZEMPIC 0.25 mg or 0.5 mg(2 mg/1.5 mL) pen injector)  Previous Pharmacy: [facility] [address]            Passed - No ED/Hospital visits since last PCP visit     Last PCP Visit: 3/4/2022 Last Admission:  Last ED Visit:           Passed - HBA1C within 180 days     Lab Results   Component Value Date    HGBA1C 5.7 (H) 02/18/2022    HGBA1C 5.8 (H) 07/23/2021    HGBA1C 5.7 (H) 06/04/2021                    Appointments  past 12m or future 3m with PCP    Date Provider   Last Visit   3/4/2022 Layo Mccrary MD   Next Visit   5/6/2022 Layo Mccrary MD   ED visits in past 90 days: 0        Note composed:1:34 PM 03/31/2022

## 2022-03-31 NOTE — TELEPHONE ENCOUNTER
Orencia copay card    BIN: [id number]  GROUP: [id number]  PCN: [employer]   Member ID: [id number]    Successful test claim. $5 copay. Sending to initials.

## 2022-04-01 RX ORDER — SEMAGLUTIDE 1.34 MG/ML
1 INJECTION, SOLUTION SUBCUTANEOUS
Qty: 2 PEN | Refills: 11 | Status: SHIPPED | OUTPATIENT
Start: 2022-04-01 | End: 2023-02-10

## 2022-04-04 ENCOUNTER — LAB VISIT (OUTPATIENT)
Dept: LAB | Facility: HOSPITAL | Age: 52
End: 2022-04-04
Attending: INTERNAL MEDICINE
Payer: COMMERCIAL

## 2022-04-04 DIAGNOSIS — M32.9 SYSTEMIC LUPUS ERYTHEMATOSUS, UNSPECIFIED SLE TYPE, UNSPECIFIED ORGAN INVOLVEMENT STATUS: ICD-10-CM

## 2022-04-04 LAB
ALBUMIN SERPL BCP-MCNC: 3.3 G/DL (ref 3.5–5.2)
ALP SERPL-CCNC: 130 U/L (ref 55–135)
ALT SERPL W/O P-5'-P-CCNC: 29 U/L (ref 10–44)
ANION GAP SERPL CALC-SCNC: 11 MMOL/L (ref 8–16)
AST SERPL-CCNC: 18 U/L (ref 10–40)
BASOPHILS # BLD AUTO: 0.08 K/UL (ref 0–0.2)
BASOPHILS NFR BLD: 0.9 % (ref 0–1.9)
BILIRUB SERPL-MCNC: 0.3 MG/DL (ref 0.1–1)
BUN SERPL-MCNC: 10 MG/DL (ref 6–20)
CALCIUM SERPL-MCNC: 9.2 MG/DL (ref 8.7–10.5)
CHLORIDE SERPL-SCNC: 101 MMOL/L (ref 95–110)
CO2 SERPL-SCNC: 27 MMOL/L (ref 23–29)
CREAT SERPL-MCNC: 0.8 MG/DL (ref 0.5–1.4)
CRP SERPL-MCNC: 7.2 MG/L (ref 0–8.2)
DIFFERENTIAL METHOD: ABNORMAL
EOSINOPHIL # BLD AUTO: 0.2 K/UL (ref 0–0.5)
EOSINOPHIL NFR BLD: 2.7 % (ref 0–8)
ERYTHROCYTE [DISTWIDTH] IN BLOOD BY AUTOMATED COUNT: 15.2 % (ref 11.5–14.5)
ERYTHROCYTE [SEDIMENTATION RATE] IN BLOOD BY WESTERGREN METHOD: 13 MM/HR (ref 0–20)
EST. GFR  (AFRICAN AMERICAN): >60 ML/MIN/1.73 M^2
EST. GFR  (NON AFRICAN AMERICAN): >60 ML/MIN/1.73 M^2
GLUCOSE SERPL-MCNC: 86 MG/DL (ref 70–110)
HCT VFR BLD AUTO: 43.7 % (ref 37–48.5)
HGB BLD-MCNC: 13.8 G/DL (ref 12–16)
IMM GRANULOCYTES # BLD AUTO: 0.03 K/UL (ref 0–0.04)
IMM GRANULOCYTES NFR BLD AUTO: 0.3 % (ref 0–0.5)
LYMPHOCYTES # BLD AUTO: 3.2 K/UL (ref 1–4.8)
LYMPHOCYTES NFR BLD: 35.4 % (ref 18–48)
MCH RBC QN AUTO: 28.2 PG (ref 27–31)
MCHC RBC AUTO-ENTMCNC: 31.6 G/DL (ref 32–36)
MCV RBC AUTO: 89 FL (ref 82–98)
MONOCYTES # BLD AUTO: 0.6 K/UL (ref 0.3–1)
MONOCYTES NFR BLD: 7 % (ref 4–15)
NEUTROPHILS # BLD AUTO: 4.8 K/UL (ref 1.8–7.7)
NEUTROPHILS NFR BLD: 53.7 % (ref 38–73)
NRBC BLD-RTO: 0 /100 WBC
PLATELET # BLD AUTO: 250 K/UL (ref 150–450)
PMV BLD AUTO: 10.4 FL (ref 9.2–12.9)
POTASSIUM SERPL-SCNC: 3.9 MMOL/L (ref 3.5–5.1)
PROT SERPL-MCNC: 6.6 G/DL (ref 6–8.4)
RBC # BLD AUTO: 4.89 M/UL (ref 4–5.4)
SODIUM SERPL-SCNC: 139 MMOL/L (ref 136–145)
WBC # BLD AUTO: 8.89 K/UL (ref 3.9–12.7)

## 2022-04-04 PROCEDURE — 85651 RBC SED RATE NONAUTOMATED: CPT | Mod: PO | Performed by: INTERNAL MEDICINE

## 2022-04-04 PROCEDURE — 85025 COMPLETE CBC W/AUTO DIFF WBC: CPT | Performed by: INTERNAL MEDICINE

## 2022-04-04 PROCEDURE — 80053 COMPREHEN METABOLIC PANEL: CPT | Performed by: INTERNAL MEDICINE

## 2022-04-04 PROCEDURE — 86140 C-REACTIVE PROTEIN: CPT | Performed by: INTERNAL MEDICINE

## 2022-04-04 PROCEDURE — 36415 COLL VENOUS BLD VENIPUNCTURE: CPT | Mod: PO | Performed by: INTERNAL MEDICINE

## 2022-04-12 ENCOUNTER — PATIENT MESSAGE (OUTPATIENT)
Dept: ADMINISTRATIVE | Facility: HOSPITAL | Age: 52
End: 2022-04-12
Payer: COMMERCIAL

## 2022-04-12 ENCOUNTER — PATIENT OUTREACH (OUTPATIENT)
Dept: ADMINISTRATIVE | Facility: HOSPITAL | Age: 52
End: 2022-04-12
Payer: COMMERCIAL

## 2022-04-12 NOTE — PROGRESS NOTES
Non-compliant GAP report chart review - Chart review completed for the following HM test if overdue  Colonoscopy,   Care Everywhere and Media reports - updates requested and reviewed.      COLON CANCER SCREENING    HAVE YET TO RETURN KIT that was dispensed .  Reminder sent

## 2022-04-15 ENCOUNTER — PATIENT MESSAGE (OUTPATIENT)
Dept: FAMILY MEDICINE | Facility: CLINIC | Age: 52
End: 2022-04-15
Payer: COMMERCIAL

## 2022-04-15 DIAGNOSIS — E66.01 OBESITY, MORBID, BMI 50 OR HIGHER: ICD-10-CM

## 2022-04-15 DIAGNOSIS — E74.39 GLUCOSE INTOLERANCE: Primary | ICD-10-CM

## 2022-04-18 ENCOUNTER — SPECIALTY PHARMACY (OUTPATIENT)
Dept: PHARMACY | Facility: CLINIC | Age: 52
End: 2022-04-18
Payer: COMMERCIAL

## 2022-04-18 ENCOUNTER — PATIENT MESSAGE (OUTPATIENT)
Dept: ADMINISTRATIVE | Facility: OTHER | Age: 52
End: 2022-04-18
Payer: COMMERCIAL

## 2022-04-18 DIAGNOSIS — L40.50 PSORIATIC ARTHRITIS: Primary | ICD-10-CM

## 2022-04-18 NOTE — TELEPHONE ENCOUNTER
Specialty Pharmacy - Initial Clinical Assessment    Specialty Medication Orders Linked to Encounter      Flowsheet Row Most Recent Value   Medication #1 abatacept (ORENCIA CLICKJECT) 125 mg/mL AtIn (Order#[id number], Rx#[id number])          Patient Diagnosis   L40.50 - Psoriatic arthritis    Subjective    Christine Alfaro is a 51 y.o. female, who is followed by the specialty pharmacy service for management and education.    Recent Encounters       Date Type Provider Description    04/18/2022 Specialty Pharmacy Luz Moss PharmD Initial Clinical Assessment    03/30/2022 Specialty Pharmacy Bright Faust PharmD Referral Authorization          Clinical call attempts since last clinical assessment   No call attempts found.     Current Outpatient Medications   Medication Sig    abatacept (ORENCIA CLICKJECT) 125 mg/mL AtIn Inject 1 mL (125 mg) into the skin every 7 days.    albuterol (PROVENTIL HFA) 90 mcg/actuation inhaler Inhale 2 puffs into the lungs every 6 (six) hours as needed for Wheezing. Rescue    aspirin (ECOTRIN) 81 MG EC tablet Take 1 tablet (81 mg total) by mouth once daily.    atorvastatin (LIPITOR) 40 MG tablet Take 1 tablet (40 mg total) by mouth every evening.    cyclobenzaprine (FLEXERIL) 10 MG tablet Take 10 mg by mouth nightly.    folic acid (FOLVITE) 1 MG tablet Take 1 tablet (1 mg total) by mouth once daily.    furosemide (LASIX) 20 MG tablet Take 1 tablet (20 mg total) by mouth once daily.    hydrOXYchloroQUINE (PLAQUENIL) 200 mg tablet Take 1 tablet (200 mg total) by mouth 2 (two) times daily.    levothyroxine (SYNTHROID) 125 MCG tablet TAKE 2 TABLETS(250 MCG) BY MOUTH DAILY    lisinopriL (PRINIVIL,ZESTRIL) 20 MG tablet TAKE 1 TABLET BY MOUTH EVERY DAY    methotrexate 2.5 MG Tab TAKE 8 TABLETS BY MOUTH EVERY 7 DAYS    omeprazole (PRILOSEC) 20 MG capsule Take 20 mg by mouth.    predniSONE (DELTASONE) 5 MG tablet Take 2 tablets daily for 2 weeks and then one tablet daily    semaglutide (OZEMPIC) 1  mg/dose (2 mg/1.5 mL) PnIj Inject 1 mg into the skin every 7 days.    traMADoL (ULTRAM) 50 mg tablet Take 1 tablet (50 mg total) by mouth every 12 (twelve) hours as needed for Pain. (Patient not taking: No sig reported)    triamcinolone acetonide 0.1% (KENALOG) 0.1 % ointment Apply topically 2 (two) times daily.    XIIDRA 5 % Dpet APPLY 1 DROP TO BOTH EYES BID   Last reviewed on 3/4/2022  4:28 PM by Layo Mccrary MD    Review of patient's allergies indicates:   Allergen Reactions    Sulfamethoxazole-trimethoprim     Sulfa (sulfonamide antibiotics) Rash and Swelling   Last reviewed on  3/4/2022 4:28 PM by Layo Mccrary    Drug Interactions    Drug interactions evaluated: yes  Clinically relevant drug interactions identified: no  Provided the patient with educational material regarding drug interactions: not applicable         Adverse Effects    *All other systems reviewed and are negative       Assessment Questions - Documented Responses      Flowsheet Row Most Recent Value   Assessment    Medication Reconciliation completed for patient Yes   During the past 4 weeks, has patient missed any activities due to condition or medication? No   During the past 4 weeks, did patient have any of the following urgent care visits? None   Goals of Therapy Status Discussed (new start)   Status of the patients ability to self-administer: Is Able   All education points have been covered with patient? Yes, supplemental printed education provided   Welcome packet contents reviewed and discussed with patient? Yes   Assesment completed? Yes   Plan Therapy being initiated   Do you need to open a clinical intervention (i-vent)? No   Do you want to schedule first shipment? Yes          Refill Questions - Documented Responses      Flowsheet Row Most Recent Value   Patient Availability and HIPAA Verification    Does patient want to proceed with activity? Yes   HIPAA/medical authority confirmed? Yes   Relationship to patient of person  "spoken to? Self   Refill Screening Questions    When does the patient need to receive the medication? 04/20/22   Refill Delivery Questions    How will the patient receive the medication? Delivery Munira   When does the patient need to receive the medication? 04/20/22   Shipping Address Home   Address in [facility] confirmed and updated if neccessary? Yes   Expected Copay ($) 5   Is the patient able to afford the medication copay? Yes   Payment Method CC on file   Days supply of Refill 28   Supplies needed? No supplies needed   Refill activity completed? Yes   Refill activity plan Refill scheduled   Shipment/Pickup Date: 04/20/22            Objective    She has a past medical history of Anxiety, GERD (gastroesophageal reflux disease), Hyperlipidemia, Hypertension, Hypothyroidism, Insomnia, Observed sleep apnea, and Vitamin D deficiency.    Tried/failed medications: MTX, Plaquenil    BP Readings from Last 4 Encounters:   03/04/22 128/74   03/03/22 (!) 168/91   09/20/21 (!) 152/80   08/23/21 (!) 150/85     Ht Readings from Last 4 Encounters:   03/04/22 5' 5" (1.651 m)   03/03/22 5' 5" (1.651 m)   02/14/22 5' 5" (1.651 m)   09/20/21 5' 5" (1.651 m)     Wt Readings from Last 4 Encounters:   03/04/22 (!) 156.5 kg (345 lb 2.1 oz)   03/03/22 (!) 158.7 kg (349 lb 13.9 oz)   02/14/22 (!) 154.2 kg (340 lb)   09/20/21 (!) 154 kg (339 lb 8.1 oz)     Recent Labs   Lab Result Units 04/04/22  0719 01/27/22  1054   Creatinine mg/dL 0.8 0.7   ALT U/L 29 26   AST U/L 18 17     The goals of prescribed drug therapy management include:  Supporting patient to meet the prescriber's medical treatment objectives  Improving or maintaining quality of life  Maintaining optimal therapy adherence  Minimizing and managing side effects      Goals of Therapy Status: Discussed (new start)    Assessment/Plan  Patient plans to start Orencia   Indication, dosage, appropriateness, effectiveness, safety and convenience of her specialty medication(s) " were reviewed today.     Patient Education   Patient received education on the following:   Expectations and possible outcomes of therapy  Proper use, timely administration, and missed dose management  Duration of therapy  Side effects, including prevention, minimization, and management  Contraindications and safety precautions  New or changed medications, including prescribe and over the counter medications and supplements  Reviews recommended vaccinations, as appropriate  Storage, safe handling, and disposal    Tasks added this encounter   5/11/2022 - Refill Call (Auto Added)   Tasks due within next 3 months   No tasks due.     Luz Moss, PharmD  [facility]  [address] [place] [address]  Phone: [phone]  Fax: [phone]

## 2022-04-20 ENCOUNTER — PATIENT OUTREACH (OUTPATIENT)
Dept: ADMINISTRATIVE | Facility: OTHER | Age: 52
End: 2022-04-20
Payer: COMMERCIAL

## 2022-04-20 NOTE — PROGRESS NOTES
Health Maintenance Due   Topic Date Due    Colorectal Cancer Screening  Never done     Updates were requested from care everywhere.  Chart was reviewed for overdue Proactive [facility] Encounters (REJI) topics (CRS, Breast Cancer Screening, Eye exam)  Health Maintenance has been updated.  LINKS immunization registry triggered.  Immunizations were reconciled.

## 2022-04-22 ENCOUNTER — OFFICE VISIT (OUTPATIENT)
Dept: ENDOCRINOLOGY | Facility: CLINIC | Age: 52
End: 2022-04-22
Payer: COMMERCIAL

## 2022-04-22 VITALS
WEIGHT: 293 LBS | HEART RATE: 104 BPM | DIASTOLIC BLOOD PRESSURE: 78 MMHG | HEIGHT: 65 IN | BODY MASS INDEX: 48.82 KG/M2 | SYSTOLIC BLOOD PRESSURE: 120 MMHG

## 2022-04-22 DIAGNOSIS — R73.03 PREDIABETES: Primary | ICD-10-CM

## 2022-04-22 DIAGNOSIS — K21.9 GASTROESOPHAGEAL REFLUX DISEASE, UNSPECIFIED WHETHER ESOPHAGITIS PRESENT: ICD-10-CM

## 2022-04-22 DIAGNOSIS — E66.01 MORBID OBESITY WITH BMI OF 50.0-59.9, ADULT: ICD-10-CM

## 2022-04-22 DIAGNOSIS — E03.9 HYPOTHYROIDISM, UNSPECIFIED TYPE: ICD-10-CM

## 2022-04-22 PROCEDURE — 3044F PR MOST RECENT HEMOGLOBIN A1C LEVEL <7.0%: ICD-10-PCS | Mod: CPTII,S$GLB,, | Performed by: NURSE PRACTITIONER

## 2022-04-22 PROCEDURE — 3078F PR MOST RECENT DIASTOLIC BLOOD PRESSURE < 80 MM HG: ICD-10-PCS | Mod: CPTII,S$GLB,, | Performed by: NURSE PRACTITIONER

## 2022-04-22 PROCEDURE — 3008F PR BODY MASS INDEX (BMI) DOCUMENTED: ICD-10-PCS | Mod: CPTII,S$GLB,, | Performed by: NURSE PRACTITIONER

## 2022-04-22 PROCEDURE — 1160F PR REVIEW ALL MEDS BY PRESCRIBER/CLIN PHARMACIST DOCUMENTED: ICD-10-PCS | Mod: CPTII,S$GLB,, | Performed by: NURSE PRACTITIONER

## 2022-04-22 PROCEDURE — 3008F BODY MASS INDEX DOCD: CPT | Mod: CPTII,S$GLB,, | Performed by: NURSE PRACTITIONER

## 2022-04-22 PROCEDURE — 1159F PR MEDICATION LIST DOCUMENTED IN MEDICAL RECORD: ICD-10-PCS | Mod: CPTII,S$GLB,, | Performed by: NURSE PRACTITIONER

## 2022-04-22 PROCEDURE — 99214 OFFICE O/P EST MOD 30 MIN: CPT | Mod: S$GLB,,, | Performed by: NURSE PRACTITIONER

## 2022-04-22 PROCEDURE — 1160F RVW MEDS BY RX/DR IN RCRD: CPT | Mod: CPTII,S$GLB,, | Performed by: NURSE PRACTITIONER

## 2022-04-22 PROCEDURE — 4010F PR ACE/ARB THEARPY RXD/TAKEN: ICD-10-PCS | Mod: CPTII,S$GLB,, | Performed by: NURSE PRACTITIONER

## 2022-04-22 PROCEDURE — 4010F ACE/ARB THERAPY RXD/TAKEN: CPT | Mod: CPTII,S$GLB,, | Performed by: NURSE PRACTITIONER

## 2022-04-22 PROCEDURE — 99214 PR OFFICE/OUTPT VISIT, EST, LEVL IV, 30-39 MIN: ICD-10-PCS | Mod: S$GLB,,, | Performed by: NURSE PRACTITIONER

## 2022-04-22 PROCEDURE — 3074F SYST BP LT 130 MM HG: CPT | Mod: CPTII,S$GLB,, | Performed by: NURSE PRACTITIONER

## 2022-04-22 PROCEDURE — 3078F DIAST BP <80 MM HG: CPT | Mod: CPTII,S$GLB,, | Performed by: NURSE PRACTITIONER

## 2022-04-22 PROCEDURE — 1159F MED LIST DOCD IN RCRD: CPT | Mod: CPTII,S$GLB,, | Performed by: NURSE PRACTITIONER

## 2022-04-22 PROCEDURE — 3074F PR MOST RECENT SYSTOLIC BLOOD PRESSURE < 130 MM HG: ICD-10-PCS | Mod: CPTII,S$GLB,, | Performed by: NURSE PRACTITIONER

## 2022-04-22 PROCEDURE — 99999 PR PBB SHADOW E&M-EST. PATIENT-LVL IV: ICD-10-PCS | Mod: PBBFAC,,, | Performed by: NURSE PRACTITIONER

## 2022-04-22 PROCEDURE — 99999 PR PBB SHADOW E&M-EST. PATIENT-LVL IV: CPT | Mod: PBBFAC,,, | Performed by: NURSE PRACTITIONER

## 2022-04-22 PROCEDURE — 3044F HG A1C LEVEL LT 7.0%: CPT | Mod: CPTII,S$GLB,, | Performed by: NURSE PRACTITIONER

## 2022-04-22 RX ORDER — PHENTERMINE HYDROCHLORIDE 37.5 MG/1
TABLET ORAL
COMMUNITY
End: 2022-04-22

## 2022-04-22 RX ORDER — ESCITALOPRAM OXALATE 20 MG/1
20 TABLET ORAL DAILY
COMMUNITY
Start: 2022-03-28 | End: 2023-01-11 | Stop reason: SDUPTHER

## 2022-04-22 RX ORDER — FLASH GLUCOSE SENSOR
1 KIT MISCELLANEOUS
Qty: 2 KIT | Refills: 6 | Status: SHIPPED | OUTPATIENT
Start: 2022-04-22 | End: 2023-01-30

## 2022-04-22 RX ORDER — SEMAGLUTIDE 1.34 MG/ML
INJECTION, SOLUTION SUBCUTANEOUS
COMMUNITY
Start: 2022-04-01 | End: 2023-01-30

## 2022-04-22 RX ORDER — OMEPRAZOLE 40 MG/1
40 CAPSULE, DELAYED RELEASE ORAL DAILY
Qty: 30 CAPSULE | Refills: 6 | Status: SHIPPED | OUTPATIENT
Start: 2022-04-22 | End: 2022-09-26

## 2022-05-11 ENCOUNTER — PATIENT MESSAGE (OUTPATIENT)
Dept: PHARMACY | Facility: CLINIC | Age: 52
End: 2022-05-11
Payer: COMMERCIAL

## 2022-05-17 ENCOUNTER — PATIENT MESSAGE (OUTPATIENT)
Dept: ENDOCRINOLOGY | Facility: CLINIC | Age: 52
End: 2022-05-17
Payer: COMMERCIAL

## 2022-05-17 ENCOUNTER — SPECIALTY PHARMACY (OUTPATIENT)
Dept: PHARMACY | Facility: CLINIC | Age: 52
End: 2022-05-17
Payer: COMMERCIAL

## 2022-05-17 DIAGNOSIS — R73.03 PREDIABETES: Primary | ICD-10-CM

## 2022-05-17 RX ORDER — FLASH GLUCOSE SCANNING READER
1 EACH MISCELLANEOUS CONTINUOUS
Qty: 1 EACH | Refills: 0 | Status: SHIPPED | OUTPATIENT
Start: 2022-05-17 | End: 2023-01-30

## 2022-05-17 NOTE — TELEPHONE ENCOUNTER
Specialty Pharmacy - Refill Coordination    Specialty Medication Orders Linked to Encounter    Flowsheet Row Most Recent Value   Medication #1 abatacept (ORENCIA CLICKJECT) 125 mg/mL AtIn (Order#[id number], Rx#[id number])          Refill Questions - Documented Responses    Flowsheet Row Most Recent Value   Patient Availability and HIPAA Verification    Does patient want to proceed with activity? Yes   HIPAA/medical authority confirmed? Yes   Relationship to patient of person spoken to? Self   Refill Screening Questions    Changes to allergies? No   Changes to medications? No   New conditions since last clinic visit? No   Unplanned office visit, urgent care, ED, or hospital admission in the last 4 weeks? No   How does patient/caregiver feel medication is working? Good   Financial problems or insurance changes? No   How many doses of your specialty medications were missed in the last 4 weeks? 0   Would patient like to speak to a pharmacist? No   When does the patient need to receive the medication? 05/27/22   Refill Delivery Questions    How will the patient receive the medication? Delivery Munira   When does the patient need to receive the medication? 05/27/22   Shipping Address Home   Address in [facility] confirmed and updated if neccessary? Yes   Expected Copay ($) 5   Is the patient able to afford the medication copay? Yes   Payment Method CC on file   Days supply of Refill 28   Supplies needed? No supplies needed   Refill activity completed? Yes   Refill activity plan Refill scheduled   Shipment/Pickup Date: 05/19/22          Current Outpatient Medications   Medication Sig    abatacept (ORENCIA CLICKJECT) 125 mg/mL AtIn Inject 1 mL (125 mg) into the skin every 7 days.    albuterol (PROVENTIL HFA) 90 mcg/actuation inhaler Inhale 2 puffs into the lungs every 6 (six) hours as needed for Wheezing. Rescue    aspirin (ECOTRIN) 81 MG EC tablet Take 1 tablet (81 mg total) by mouth once daily.    atorvastatin  (LIPITOR) 40 MG tablet Take 1 tablet (40 mg total) by mouth every evening.    cyclobenzaprine (FLEXERIL) 10 MG tablet Take 10 mg by mouth nightly.    EScitalopram oxalate (LEXAPRO) 20 MG tablet Take 20 mg by mouth once daily.    flash glucose sensor (FREESTYLE SYDNIE 2 SENSOR) Kit 1 Device by Misc.(Non-Drug; Combo Route) route every 14 (fourteen) days.    folic acid (FOLVITE) 1 MG tablet Take 1 tablet (1 mg total) by mouth once daily.    furosemide (LASIX) 20 MG tablet Take 1 tablet (20 mg total) by mouth once daily.    levothyroxine (SYNTHROID) 125 MCG tablet TAKE 2 TABLETS(250 MCG) BY MOUTH DAILY    lisinopriL (PRINIVIL,ZESTRIL) 20 MG tablet TAKE 1 TABLET BY MOUTH EVERY DAY    methotrexate 2.5 MG Tab TAKE 8 TABLETS BY MOUTH EVERY 7 DAYS    omeprazole (PRILOSEC) 40 MG capsule Take 1 capsule (40 mg total) by mouth once daily.    OZEMPIC 1 mg/dose (4 mg/3 mL) Inject 1 mg into the skin every 7 days.    predniSONE (DELTASONE) 5 MG tablet Take 2 tablets daily for 2 weeks and then one tablet daily (Patient not taking: Reported on 4/22/2022)    semaglutide (OZEMPIC) 1 mg/dose (2 mg/1.5 mL) PnIj Inject 1 mg into the skin every 7 days.    traMADoL (ULTRAM) 50 mg tablet Take 1 tablet (50 mg total) by mouth every 12 (twelve) hours as needed for Pain. (Patient not taking: No sig reported)    triamcinolone acetonide 0.1% (KENALOG) 0.1 % ointment Apply topically 2 (two) times daily.    XIIDRA 5 % Dpet APPLY 1 DROP TO BOTH EYES BID   Last reviewed on 4/22/2022  3:48 PM by Jayla Pool, APRN,FNP-C    Review of patient's allergies indicates:   Allergen Reactions    Sulfamethoxazole-trimethoprim     Sulfa (sulfonamide antibiotics) Rash and Swelling    Last reviewed on  4/22/2022 3:48 PM by Jayla Pool      Tasks added this encounter   6/17/2022 - Refill Call (Auto Added)   Tasks due within next 3 months   No tasks due.     Rolanda Navas  [facility]  [address]  [address]  [facility] [address]  Phone: [phone]  Fax: [phone]

## 2022-05-31 ENCOUNTER — PATIENT MESSAGE (OUTPATIENT)
Dept: ADMINISTRATIVE | Facility: HOSPITAL | Age: 52
End: 2022-05-31
Payer: COMMERCIAL

## 2022-06-03 ENCOUNTER — PATIENT OUTREACH (OUTPATIENT)
Dept: ADMINISTRATIVE | Facility: HOSPITAL | Age: 52
End: 2022-06-03
Payer: COMMERCIAL

## 2022-06-03 ENCOUNTER — LAB VISIT (OUTPATIENT)
Dept: LAB | Facility: HOSPITAL | Age: 52
End: 2022-06-03
Attending: INTERNAL MEDICINE
Payer: COMMERCIAL

## 2022-06-03 ENCOUNTER — PATIENT MESSAGE (OUTPATIENT)
Dept: ADMINISTRATIVE | Facility: HOSPITAL | Age: 52
End: 2022-06-03
Payer: COMMERCIAL

## 2022-06-03 DIAGNOSIS — M32.9 SYSTEMIC LUPUS ERYTHEMATOSUS, UNSPECIFIED SLE TYPE, UNSPECIFIED ORGAN INVOLVEMENT STATUS: ICD-10-CM

## 2022-06-03 LAB
ALBUMIN SERPL BCP-MCNC: 3.5 G/DL (ref 3.5–5.2)
ALP SERPL-CCNC: 162 U/L (ref 55–135)
ALT SERPL W/O P-5'-P-CCNC: 40 U/L (ref 10–44)
ANION GAP SERPL CALC-SCNC: 10 MMOL/L (ref 8–16)
AST SERPL-CCNC: 23 U/L (ref 10–40)
BASOPHILS # BLD AUTO: 0.07 K/UL (ref 0–0.2)
BASOPHILS NFR BLD: 0.6 % (ref 0–1.9)
BILIRUB SERPL-MCNC: 0.5 MG/DL (ref 0.1–1)
BUN SERPL-MCNC: 10 MG/DL (ref 6–20)
C3 SERPL-MCNC: 174 MG/DL (ref 50–180)
C4 SERPL-MCNC: 29 MG/DL (ref 11–44)
CALCIUM SERPL-MCNC: 9.1 MG/DL (ref 8.7–10.5)
CHLORIDE SERPL-SCNC: 102 MMOL/L (ref 95–110)
CO2 SERPL-SCNC: 26 MMOL/L (ref 23–29)
CREAT SERPL-MCNC: 0.7 MG/DL (ref 0.5–1.4)
CRP SERPL-MCNC: 8.5 MG/L (ref 0–8.2)
DIFFERENTIAL METHOD: ABNORMAL
EOSINOPHIL # BLD AUTO: 0.2 K/UL (ref 0–0.5)
EOSINOPHIL NFR BLD: 1.6 % (ref 0–8)
ERYTHROCYTE [DISTWIDTH] IN BLOOD BY AUTOMATED COUNT: 15.7 % (ref 11.5–14.5)
ERYTHROCYTE [SEDIMENTATION RATE] IN BLOOD BY WESTERGREN METHOD: 10 MM/HR (ref 0–20)
EST. GFR  (AFRICAN AMERICAN): >60 ML/MIN/1.73 M^2
EST. GFR  (NON AFRICAN AMERICAN): >60 ML/MIN/1.73 M^2
GLUCOSE SERPL-MCNC: 80 MG/DL (ref 70–110)
HCT VFR BLD AUTO: 44.8 % (ref 37–48.5)
HGB BLD-MCNC: 13.9 G/DL (ref 12–16)
IMM GRANULOCYTES # BLD AUTO: 0.05 K/UL (ref 0–0.04)
IMM GRANULOCYTES NFR BLD AUTO: 0.5 % (ref 0–0.5)
LYMPHOCYTES # BLD AUTO: 3.6 K/UL (ref 1–4.8)
LYMPHOCYTES NFR BLD: 32.4 % (ref 18–48)
MCH RBC QN AUTO: 30.2 PG (ref 27–31)
MCHC RBC AUTO-ENTMCNC: 31 G/DL (ref 32–36)
MCV RBC AUTO: 97 FL (ref 82–98)
MONOCYTES # BLD AUTO: 0.8 K/UL (ref 0.3–1)
MONOCYTES NFR BLD: 6.8 % (ref 4–15)
NEUTROPHILS # BLD AUTO: 6.4 K/UL (ref 1.8–7.7)
NEUTROPHILS NFR BLD: 58.1 % (ref 38–73)
NRBC BLD-RTO: 0 /100 WBC
PLATELET # BLD AUTO: 243 K/UL (ref 150–450)
PMV BLD AUTO: 11.1 FL (ref 9.2–12.9)
POTASSIUM SERPL-SCNC: 4.8 MMOL/L (ref 3.5–5.1)
PROT SERPL-MCNC: 6.8 G/DL (ref 6–8.4)
RBC # BLD AUTO: 4.6 M/UL (ref 4–5.4)
SODIUM SERPL-SCNC: 138 MMOL/L (ref 136–145)
WBC # BLD AUTO: 11.01 K/UL (ref 3.9–12.7)

## 2022-06-03 PROCEDURE — 86160 COMPLEMENT ANTIGEN: CPT | Performed by: INTERNAL MEDICINE

## 2022-06-03 PROCEDURE — 86160 COMPLEMENT ANTIGEN: CPT | Mod: 59 | Performed by: INTERNAL MEDICINE

## 2022-06-03 PROCEDURE — 36415 COLL VENOUS BLD VENIPUNCTURE: CPT | Mod: PO | Performed by: INTERNAL MEDICINE

## 2022-06-03 PROCEDURE — 80053 COMPREHEN METABOLIC PANEL: CPT | Performed by: INTERNAL MEDICINE

## 2022-06-03 PROCEDURE — 86140 C-REACTIVE PROTEIN: CPT | Performed by: INTERNAL MEDICINE

## 2022-06-03 PROCEDURE — 86225 DNA ANTIBODY NATIVE: CPT | Performed by: INTERNAL MEDICINE

## 2022-06-03 PROCEDURE — 85025 COMPLETE CBC W/AUTO DIFF WBC: CPT | Performed by: INTERNAL MEDICINE

## 2022-06-03 PROCEDURE — 85651 RBC SED RATE NONAUTOMATED: CPT | Mod: PO | Performed by: INTERNAL MEDICINE

## 2022-06-03 PROCEDURE — 86225 DNA ANTIBODY NATIVE: CPT | Mod: 59 | Performed by: INTERNAL MEDICINE

## 2022-06-03 NOTE — PROGRESS NOTES
2022 Care Everywhere updates requested and reviewed.  Immunizations reconciled. Media reports reviewed.  Duplicate HM overrides and  orders removed.  Overdue HM topic chart audit and/or requested.  Overdue lab testing linked to upcoming lab appointments if applies.  Mammogram scheduled 2022      Health Maintenance Due   Topic Date Due    Colorectal Cancer Screening  Never done    Mammogram  2021

## 2022-06-07 LAB
DNA TITER: NORMAL
DSDNA AB SER-ACNC: POSITIVE [IU]/ML

## 2022-06-08 ENCOUNTER — PATIENT MESSAGE (OUTPATIENT)
Dept: RHEUMATOLOGY | Facility: CLINIC | Age: 52
End: 2022-06-08
Payer: COMMERCIAL

## 2022-06-10 ENCOUNTER — PATIENT MESSAGE (OUTPATIENT)
Dept: FAMILY MEDICINE | Facility: CLINIC | Age: 52
End: 2022-06-10
Payer: COMMERCIAL

## 2022-06-10 DIAGNOSIS — Z12.11 COLON CANCER SCREENING: Primary | ICD-10-CM

## 2022-06-10 DIAGNOSIS — M32.9 SYSTEMIC LUPUS ERYTHEMATOSUS, UNSPECIFIED SLE TYPE, UNSPECIFIED ORGAN INVOLVEMENT STATUS: Primary | ICD-10-CM

## 2022-06-13 ENCOUNTER — PATIENT MESSAGE (OUTPATIENT)
Dept: FAMILY MEDICINE | Facility: CLINIC | Age: 52
End: 2022-06-13
Payer: COMMERCIAL

## 2022-06-13 ENCOUNTER — TELEPHONE (OUTPATIENT)
Dept: GASTROENTEROLOGY | Facility: CLINIC | Age: 52
End: 2022-06-13
Payer: COMMERCIAL

## 2022-06-16 ENCOUNTER — PATIENT MESSAGE (OUTPATIENT)
Dept: FAMILY MEDICINE | Facility: CLINIC | Age: 52
End: 2022-06-16
Payer: COMMERCIAL

## 2022-06-17 ENCOUNTER — PATIENT MESSAGE (OUTPATIENT)
Dept: PHARMACY | Facility: CLINIC | Age: 52
End: 2022-06-17
Payer: COMMERCIAL

## 2022-06-21 ENCOUNTER — SPECIALTY PHARMACY (OUTPATIENT)
Dept: PHARMACY | Facility: CLINIC | Age: 52
End: 2022-06-21
Payer: COMMERCIAL

## 2022-06-21 NOTE — TELEPHONE ENCOUNTER
Specialty Pharmacy - Refill Coordination    Specialty Medication Orders Linked to Encounter    Flowsheet Row Most Recent Value   Medication #1 abatacept (ORENCIA CLICKJECT) 125 mg/mL AtIn (Order#[id number], Rx#[id number])          Refill Questions - Documented Responses    Flowsheet Row Most Recent Value   Patient Availability and HIPAA Verification    Does patient want to proceed with activity? Yes   HIPAA/medical authority confirmed? Yes   Relationship to patient of person spoken to? Self   Refill Screening Questions    Changes to allergies? No   Changes to medications? No   New conditions since last clinic visit? No   Unplanned office visit, urgent care, ED, or hospital admission in the last 4 weeks? No   How does patient/caregiver feel medication is working? Good   Financial problems or insurance changes? No   How many doses of your specialty medications were missed in the last 4 weeks? 0   Would patient like to speak to a pharmacist? No   When does the patient need to receive the medication? 06/24/22   Refill Delivery Questions    How will the patient receive the medication? Delivery Munira   When does the patient need to receive the medication? 06/24/22   Shipping Address Home   Address in [facility] confirmed and updated if neccessary? Yes   Expected Copay ($) 5   Is the patient able to afford the medication copay? Yes   Payment Method CC on file   Days supply of Refill 28   Supplies needed? No supplies needed   Refill activity completed? Yes   Refill activity plan Refill scheduled   Shipment/Pickup Date: 06/23/22          Current Outpatient Medications   Medication Sig    abatacept (ORENCIA CLICKJECT) 125 mg/mL AtIn Inject 1 mL (125 mg) into the skin every 7 days.    albuterol (PROVENTIL HFA) 90 mcg/actuation inhaler Inhale 2 puffs into the lungs every 6 (six) hours as needed for Wheezing. Rescue    aspirin (ECOTRIN) 81 MG EC tablet Take 1 tablet (81 mg total) by mouth once daily.    atorvastatin  (LIPITOR) 40 MG tablet Take 1 tablet (40 mg total) by mouth every evening.    cyclobenzaprine (FLEXERIL) 10 MG tablet Take 10 mg by mouth nightly.    EScitalopram oxalate (LEXAPRO) 20 MG tablet Take 20 mg by mouth once daily.    flash glucose scanning reader (FREESTYLE SYDNIE 2 READER) Misc 1 Device by Misc.(Non-Drug; Combo Route) route continuous.    flash glucose sensor (FREESTYLE SYDNIE 2 SENSOR) Kit 1 Device by Misc.(Non-Drug; Combo Route) route every 14 (fourteen) days.    folic acid (FOLVITE) 1 MG tablet Take 1 tablet (1 mg total) by mouth once daily.    furosemide (LASIX) 20 MG tablet Take 1 tablet (20 mg total) by mouth once daily.    levothyroxine (SYNTHROID) 125 MCG tablet TAKE 2 TABLETS(250 MCG) BY MOUTH DAILY    lisinopriL (PRINIVIL,ZESTRIL) 20 MG tablet TAKE 1 TABLET BY MOUTH EVERY DAY    methotrexate 2.5 MG Tab TAKE 8 TABLETS BY MOUTH EVERY 7 DAYS    omeprazole (PRILOSEC) 40 MG capsule Take 1 capsule (40 mg total) by mouth once daily.    OZEMPIC 1 mg/dose (4 mg/3 mL) Inject 1 mg into the skin every 7 days.    predniSONE (DELTASONE) 5 MG tablet Take 2 tablets daily for 2 weeks and then one tablet daily (Patient not taking: Reported on 4/22/2022)    semaglutide (OZEMPIC) 1 mg/dose (2 mg/1.5 mL) PnIj Inject 1 mg into the skin every 7 days.    traMADoL (ULTRAM) 50 mg tablet Take 1 tablet (50 mg total) by mouth every 12 (twelve) hours as needed for Pain. (Patient not taking: No sig reported)    triamcinolone acetonide 0.1% (KENALOG) 0.1 % ointment Apply topically 2 (two) times daily.    XIIDRA 5 % Dpet APPLY 1 DROP TO BOTH EYES BID   Last reviewed on 4/22/2022  3:48 PM by Jayla Pool, APRN,FNP-C    Review of patient's allergies indicates:   Allergen Reactions    Sulfamethoxazole-trimethoprim     Sulfa (sulfonamide antibiotics) Rash and Swelling    Last reviewed on  4/22/2022 3:48 PM by Jayla Pool      Tasks added this encounter   7/15/2022 - Refill Call (Auto Added)   Tasks due  within next 3 months   No tasks due.     Miriam Chavez, Patient Care Assistant  [facility]  [address]  [facility] [address]  Phone: [phone]  Fax: [phone]

## 2022-06-23 ENCOUNTER — PATIENT MESSAGE (OUTPATIENT)
Dept: RHEUMATOLOGY | Facility: CLINIC | Age: 52
End: 2022-06-23
Payer: COMMERCIAL

## 2022-06-24 ENCOUNTER — OFFICE VISIT (OUTPATIENT)
Dept: RHEUMATOLOGY | Facility: CLINIC | Age: 52
End: 2022-06-24
Payer: COMMERCIAL

## 2022-06-24 DIAGNOSIS — M32.9 SYSTEMIC LUPUS ERYTHEMATOSUS, UNSPECIFIED SLE TYPE, UNSPECIFIED ORGAN INVOLVEMENT STATUS: Primary | ICD-10-CM

## 2022-06-24 PROCEDURE — 1159F PR MEDICATION LIST DOCUMENTED IN MEDICAL RECORD: ICD-10-PCS | Mod: CPTII,95,, | Performed by: INTERNAL MEDICINE

## 2022-06-24 PROCEDURE — 4010F PR ACE/ARB THEARPY RXD/TAKEN: ICD-10-PCS | Mod: CPTII,95,, | Performed by: INTERNAL MEDICINE

## 2022-06-24 PROCEDURE — 99214 PR OFFICE/OUTPT VISIT, EST, LEVL IV, 30-39 MIN: ICD-10-PCS | Mod: 95,,, | Performed by: INTERNAL MEDICINE

## 2022-06-24 PROCEDURE — 4010F ACE/ARB THERAPY RXD/TAKEN: CPT | Mod: CPTII,95,, | Performed by: INTERNAL MEDICINE

## 2022-06-24 PROCEDURE — 99214 OFFICE O/P EST MOD 30 MIN: CPT | Mod: 95,,, | Performed by: INTERNAL MEDICINE

## 2022-06-24 PROCEDURE — 1159F MED LIST DOCD IN RCRD: CPT | Mod: CPTII,95,, | Performed by: INTERNAL MEDICINE

## 2022-06-24 PROCEDURE — 3044F PR MOST RECENT HEMOGLOBIN A1C LEVEL <7.0%: ICD-10-PCS | Mod: CPTII,95,, | Performed by: INTERNAL MEDICINE

## 2022-06-24 PROCEDURE — 3044F HG A1C LEVEL LT 7.0%: CPT | Mod: CPTII,95,, | Performed by: INTERNAL MEDICINE

## 2022-06-24 RX ORDER — HYDROXYCHLOROQUINE SULFATE 200 MG/1
200 TABLET, FILM COATED ORAL 2 TIMES DAILY
COMMUNITY
Start: 2022-06-06

## 2022-06-24 ASSESSMENT — ROUTINE ASSESSMENT OF PATIENT INDEX DATA (RAPID3)
PSYCHOLOGICAL DISTRESS SCORE: 3.3
PAIN SCORE: 7
PATIENT GLOBAL ASSESSMENT SCORE: 6
TOTAL RAPID3 SCORE: 5.67
AM STIFFNESS SCORE: 1, YES
FATIGUE SCORE: 6.5
MDHAQ FUNCTION SCORE: 1.2

## 2022-06-24 NOTE — PROGRESS NOTES
Subjective:       Patient ID: Christine Alfaro is a 50 y.o. female.    Chief Complaint: Disease Management    HPI 50 year old with PMH of Hashimotos disease, psoriasis,HTN, HL, ETELVINA , GERD, mitral valve regurgitation, left ankle spur removal  here for evaluation. She has been on cpap for many years.  She does not wake up well rested. She has been tired for years, but worse over the last 8 months. Last year, she started to have joint pain. She is having pain in toes, fingers, left knee, elbows, right jaw. Pain level is as high as 7/10, aching and non radiating. Reports swelling in left ankle and feet. Reports swelling in feet and lower legs for a year. In morning, she reports her hands are swollen. She has pain in mid to lower back since she was 18. She gets pustules on occasion in her hands for a year. She is getting mouth ulcer where she has dental bridge.  She clenches her teeth at night. Denies malar rash. She has dry eyes and dry mouth for last year. She takes ibuprofen 800mg po BID prn for last year to help her joint pain. It helps her take edge off.  She has had psoriasis for 3 years.  Reports dry cough for last year. Reports photosensitivity for 3 years. Denies raynauds. Denies history of thrombosis. She has had 2 miscarriages. She reports she had one miscarriage at 6 months due to blood incompatibility.  She had another miscarriage at 2.5 months. She thinks her thyroid might not have been well controlled. Over the last year and half, she has had chest pain with coughing and sometimes it can radiate to back.    She was able to have 2 healthy pregnancies.      Family hx: uncle: RA  Mother:psoriasis       Interval history:She is taking plaquenil 200mg po BID.She has taken Orencia for 2 months. She is taking MTX 8 pills once a week.  She stopped prednisone a month ago.  She recently had flare involve right knee and left elbow/wrist.  She takes tramadol 50mg in morning. She takes flexeril 10mg po qhs. She is  "gaining a lot of weight on the prednisone.  She is taking tramadol 50mg po BID. At 8 oclock pm, her feet and ankles are swollen at end of day.  She has also has pain in hands and also with swelling.  Denies photosensitivity. Denies oral ulcers. She thinks her mouth is dry.     Past Medical History:   Diagnosis Date    Anxiety     GERD (gastroesophageal reflux disease)     Hyperlipidemia     Hypertension     Hypothyroidism     Insomnia     Observed sleep apnea     Vitamin D deficiency        Review of Systems   Constitutional: Positive for fatigue. Negative for activity change, appetite change, chills, diaphoresis, fever and unexpected weight change.   HENT: Negative for congestion, ear discharge, ear pain, facial swelling, mouth sores, sinus pressure, sneezing, sore throat, tinnitus and trouble swallowing.    Eyes: Negative for photophobia, pain, discharge, redness, itching and visual disturbance.   Respiratory: Negative for apnea, cough, chest tightness, shortness of breath, wheezing and stridor.    Cardiovascular: Negative for chest pain and leg swelling.   Gastrointestinal: Negative for abdominal distention, abdominal pain, anal bleeding, blood in stool, constipation, diarrhea and nausea.   Endocrine: Negative for cold intolerance and heat intolerance.   Genitourinary: Negative for difficulty urinating, dysuria and genital sores.   Musculoskeletal: Positive for arthralgias, back pain, gait problem and joint swelling. Negative for neck pain and neck stiffness.   Skin: Positive for rash. Negative for color change, pallor and wound.   Neurological: Negative for dizziness, seizures, light-headedness, numbness and headaches.   Hematological: Negative for adenopathy. Does not bruise/bleed easily.   Psychiatric/Behavioral: Negative for sleep disturbance. The patient is not nervous/anxious.            Objective:   BP (!) 150/85   Pulse 84   Ht 5' 6" (1.676 m)   Wt (!) 154.4 kg (340 lb 6.2 oz)   BMI 54.94 " kg/m²      Physical Exam   Constitutional: She is oriented to person, place, and time.   HENT:   Head: Normocephalic and atraumatic.   Right Ear: External ear normal.   Left Ear: External ear normal.   Nose: Nose normal.   Mouth/Throat: Oropharynx is clear and moist. No oropharyngeal exudate.   Eyes: Conjunctivae and EOM are normal. Pupils are equal, round, and reactive to light. Right eye exhibits no discharge. Left eye exhibits no discharge. No scleral icterus.   Neck: No JVD present. No thyromegaly present.   Cardiovascular: Normal rate, regular rhythm, normal heart sounds and intact distal pulses.  Exam reveals no gallop and no friction rub.    No murmur heard.  Pulmonary/Chest: Effort normal and breath sounds normal. No respiratory distress. She has no wheezes. She has no rales. She exhibits no tenderness.   Abdominal: Soft. Bowel sounds are normal. She exhibits no distension and no mass. There is no abdominal tenderness. There is no rebound and no guarding.   Lymphadenopathy:     She has no cervical adenopathy.   Neurological: She is alert and oriented to person, place, and time. She displays normal reflexes. No cranial nerve deficit. She exhibits normal muscle tone. Gait normal. Coordination normal.   Skin: Skin is dry. Rash noted. No erythema. No pallor.     Rash in forearms   Psychiatric: Affect and judgment normal.   Musculoskeletal: Tenderness present. No deformity or edema.          No data to display     Assessment:      51 year old with PMH of Hashimotos disease  ,HTN, HL, ETELVINA , GERD, mitral valve regurgitation, left ankle spur removal  here for evaluation of joint pain.  Given +thu, dsdna,arthritis, photosensitivity,  Pleurisy, and fatigue, this is concerning for SLE. Her main issue is inflammatory arthritis.  GIVEN THAT SHE HAS SLE, cannot do anti- tnf.  She is off steroids. Still reports fatigue but improvement in joint pain and swelling with Orencia injections and MTX 20mg once  A week.  Can  consider switching her from orencia to benlysta. Or can consider doing imuran and Orencia infusions.          Plan:       Problem List Items Addressed This Visit     None        -continue Orencia 125 mg sub q once a week (Risks of TNF inhibitor discussed with patient and not limited to cell count abnormalities, malignancy, allergic  reaction to medication, and increased risk of infection. Patient agrees with starting medication.  Continue plaquenil 200mg po BID (Risks of starting plaquenil discussed. Risks include eye toxicity and agrees on timely follow up with optho to avoid risks of eye toxicity. Other risks include rashes such has hyperpigmentation and vertigo.  Continue flexeril 10mg po qhs   -continue MTX  8  pills once a week   Continue folic acid e1 mg po qday  Labs  before next visit    #obesity: encourage weight loss       The patient location is: home  The chief complaint leading to consultation is: joint pain    Visit type: audiovisual    Face to Face time with patient: 30   minutes of total time spent on the encounter, which includes face to face time and non-face to face time preparing to see the patient (eg, review of tests), Obtaining and/or reviewing separately obtained history, Documenting clinical information in the electronic or other health record, Independently interpreting results (not separately reported) and communicating results to the patient/family/caregiver, or Care coordination (not separately reported).         Each patient to whom he or she provides medical services by telemedicine is:  (1) informed of the relationship between the physician and patient and the respective role of any other health care provider with respect to management of the patient; and (2) notified that he or she may decline to receive medical services by telemedicine and may withdraw from such care at any time.    Notes:

## 2022-06-24 NOTE — PROGRESS NOTES
Rapid3 Question Responses and Scores 6/24/2022   MDHAQ Score 1.2   Psychologic Score 3.3   Pain Score 7   When you awakened in the morning OVER THE LAST WEEK, did you feel stiff? Yes   If Yes, please indicate the number of hours until you are as limber as you will be for the day 2   Fatigue Score 6.5   Global Health Score 6   RAPID3 Score 5.67       Answers for HPI/ROS submitted by the patient on 6/24/2022  fever: No  eye redness: Yes  mouth sores: Yes  headaches: No  shortness of breath: Yes  chest pain: No  trouble swallowing: No  diarrhea: Yes  constipation: No  unexpected weight change: No  genital sore: No  dysuria: No  During the last 3 days, have you had a skin rash?: No  Bruises or bleeds easily: No  cough: No

## 2022-06-30 ENCOUNTER — TELEPHONE (OUTPATIENT)
Dept: GASTROENTEROLOGY | Facility: CLINIC | Age: 52
End: 2022-06-30
Payer: COMMERCIAL

## 2022-06-30 NOTE — TELEPHONE ENCOUNTER
----- Message from Breana Ramos sent at 6/30/2022  4:18 PM CDT -----  Contact: Patient  Type: Patient Call Back         Who called: Patient         What is the request in detail: calling to resched colonoscopy; needs for any other; Thursday if possible      Best call back number: [phone]         Additional Information:            Thank You

## 2022-07-15 ENCOUNTER — SPECIALTY PHARMACY (OUTPATIENT)
Dept: PHARMACY | Facility: CLINIC | Age: 52
End: 2022-07-15
Payer: COMMERCIAL

## 2022-07-15 NOTE — TELEPHONE ENCOUNTER
Outgouing call: Spoke with patient about Orencia refill, she has 2 on hand 1 which she's injection on Sunday 7/17. The 2nd one is for the following week. I asked did she miss a dose she said no it just arrived later than her normal injection date. She asked if [facility] will follow up in a week to set up for the following injection. [facility] will follow up.

## 2022-07-19 ENCOUNTER — PATIENT MESSAGE (OUTPATIENT)
Dept: PHARMACY | Facility: CLINIC | Age: 52
End: 2022-07-19
Payer: COMMERCIAL

## 2022-07-22 ENCOUNTER — PATIENT MESSAGE (OUTPATIENT)
Dept: CARDIOLOGY | Facility: CLINIC | Age: 52
End: 2022-07-22
Payer: COMMERCIAL

## 2022-07-22 NOTE — TELEPHONE ENCOUNTER
Specialty Pharmacy - Refill Coordination    Specialty Medication Orders Linked to Encounter    Flowsheet Row Most Recent Value   Medication #1 abatacept (ORENCIA CLICKJECT) 125 mg/mL AtIn (Order#[id number], Rx#[id number])          Refill Questions - Documented Responses    Flowsheet Row Most Recent Value   Patient Availability and HIPAA Verification    Does patient want to proceed with activity? Yes   HIPAA/medical authority confirmed? Yes   Relationship to patient of person spoken to? Self   Refill Screening Questions    Changes to allergies? No   Changes to medications? No   New conditions since last clinic visit? No   Unplanned office visit, urgent care, ED, or hospital admission in the last 4 weeks? No   How does patient/caregiver feel medication is working? Very good   Financial problems or insurance changes? No   How many doses of your specialty medications were missed in the last 4 weeks? 0   Would patient like to speak to a pharmacist? No   When does the patient need to receive the medication? 07/31/22   Refill Delivery Questions    How will the patient receive the medication? Delivery Munira   When does the patient need to receive the medication? 07/31/22   Shipping Address Home   Address in [facility] confirmed and updated if neccessary? Yes   Expected Copay ($) 5   Is the patient able to afford the medication copay? Yes   Payment Method CC on file   Days supply of Refill 28   Supplies needed? No supplies needed   Refill activity completed? Yes   Refill activity plan Refill scheduled   Shipment/Pickup Date: 07/25/22          Current Outpatient Medications   Medication Sig    abatacept (ORENCIA CLICKJECT) 125 mg/mL AtIn Inject 1 mL (125 mg) into the skin every 7 days.    albuterol (PROVENTIL HFA) 90 mcg/actuation inhaler Inhale 2 puffs into the lungs every 6 (six) hours as needed for Wheezing. Rescue    aspirin (ECOTRIN) 81 MG EC tablet Take 1 tablet (81 mg total) by mouth once daily.     atorvastatin (LIPITOR) 40 MG tablet Take 1 tablet (40 mg total) by mouth every evening.    cyclobenzaprine (FLEXERIL) 10 MG tablet Take 10 mg by mouth nightly.    EScitalopram oxalate (LEXAPRO) 20 MG tablet Take 20 mg by mouth once daily.    flash glucose scanning reader (FREESTYLE SYDNIE 2 READER) Misc 1 Device by Misc.(Non-Drug; Combo Route) route continuous.    flash glucose sensor (FREESTYLE SYDNIE 2 SENSOR) Kit 1 Device by Misc.(Non-Drug; Combo Route) route every 14 (fourteen) days.    folic acid (FOLVITE) 1 MG tablet Take 1 tablet (1 mg total) by mouth once daily.    furosemide (LASIX) 20 MG tablet Take 1 tablet (20 mg total) by mouth once daily.    hydrOXYchloroQUINE (PLAQUENIL) 200 mg tablet Take 200 mg by mouth 2 (two) times daily.    levothyroxine (SYNTHROID) 125 MCG tablet TAKE 2 TABLETS(250 MCG) BY MOUTH DAILY    lisinopriL (PRINIVIL,ZESTRIL) 20 MG tablet TAKE 1 TABLET BY MOUTH EVERY DAY    methotrexate 2.5 MG Tab TAKE 8 TABLETS BY MOUTH EVERY 7 DAYS    omeprazole (PRILOSEC) 40 MG capsule Take 1 capsule (40 mg total) by mouth once daily.    OZEMPIC 1 mg/dose (4 mg/3 mL) Inject 1 mg into the skin every 7 days.    predniSONE (DELTASONE) 5 MG tablet Take 2 tablets daily for 2 weeks and then one tablet daily (Patient not taking: No sig reported)    semaglutide (OZEMPIC) 1 mg/dose (2 mg/1.5 mL) PnIj Inject 1 mg into the skin every 7 days.    triamcinolone acetonide 0.1% (KENALOG) 0.1 % ointment Apply topically 2 (two) times daily. (Patient not taking: Reported on 6/24/2022)    XIIDRA 5 % Dpet APPLY 1 DROP TO BOTH EYES BID   Last reviewed on 6/24/2022  4:04 PM by Corinne Cundiff, MA    Review of patient's allergies indicates:   Allergen Reactions    Sulfamethoxazole-trimethoprim     Sulfa (sulfonamide antibiotics) Rash and Swelling    Last reviewed on  6/24/2022 4:02 PM by Corinne Cundiff      Tasks added this encounter   8/21/2022 - Refill Call (Auto Added)   Tasks due within next 3 months    No tasks due.     Belen Hill, PharmD  [facility]  [address]  [facility] [address]  Phone: [phone]  Fax: [phone]

## 2022-07-25 ENCOUNTER — LAB VISIT (OUTPATIENT)
Dept: LAB | Facility: HOSPITAL | Age: 52
End: 2022-07-25
Attending: INTERNAL MEDICINE
Payer: COMMERCIAL

## 2022-07-25 DIAGNOSIS — E78.5 DYSLIPIDEMIA: Chronic | ICD-10-CM

## 2022-07-25 DIAGNOSIS — I10 ESSENTIAL HYPERTENSION: Chronic | ICD-10-CM

## 2022-07-25 LAB
ALBUMIN SERPL BCP-MCNC: 3.3 G/DL (ref 3.5–5.2)
ALP SERPL-CCNC: 132 U/L (ref 55–135)
ALT SERPL W/O P-5'-P-CCNC: 30 U/L (ref 10–44)
ANION GAP SERPL CALC-SCNC: 10 MMOL/L (ref 8–16)
AST SERPL-CCNC: 19 U/L (ref 10–40)
BILIRUB SERPL-MCNC: 0.5 MG/DL (ref 0.1–1)
BUN SERPL-MCNC: 11 MG/DL (ref 6–20)
CALCIUM SERPL-MCNC: 8.9 MG/DL (ref 8.7–10.5)
CHLORIDE SERPL-SCNC: 103 MMOL/L (ref 95–110)
CHOLEST SERPL-MCNC: 149 MG/DL (ref 120–199)
CHOLEST/HDLC SERPL: 3.1 {RATIO} (ref 2–5)
CO2 SERPL-SCNC: 23 MMOL/L (ref 23–29)
CREAT SERPL-MCNC: 0.7 MG/DL (ref 0.5–1.4)
EST. GFR  (AFRICAN AMERICAN): >60 ML/MIN/1.73 M^2
EST. GFR  (NON AFRICAN AMERICAN): >60 ML/MIN/1.73 M^2
GLUCOSE SERPL-MCNC: 89 MG/DL (ref 70–110)
HDLC SERPL-MCNC: 48 MG/DL (ref 40–75)
HDLC SERPL: 32.2 % (ref 20–50)
LDLC SERPL CALC-MCNC: 70 MG/DL (ref 63–159)
NONHDLC SERPL-MCNC: 101 MG/DL
POTASSIUM SERPL-SCNC: 4.2 MMOL/L (ref 3.5–5.1)
PROT SERPL-MCNC: 6.7 G/DL (ref 6–8.4)
SODIUM SERPL-SCNC: 136 MMOL/L (ref 136–145)
TRIGL SERPL-MCNC: 155 MG/DL (ref 30–150)

## 2022-07-25 PROCEDURE — 36415 COLL VENOUS BLD VENIPUNCTURE: CPT | Mod: PO | Performed by: INTERNAL MEDICINE

## 2022-07-25 PROCEDURE — 80061 LIPID PANEL: CPT | Performed by: INTERNAL MEDICINE

## 2022-07-25 PROCEDURE — 80053 COMPREHEN METABOLIC PANEL: CPT | Performed by: INTERNAL MEDICINE

## 2022-07-27 NOTE — PROGRESS NOTES
Subjective:    Patient ID:  Christine Alfaro is a 51 y.o. female who presents for Hypertension        HPI    DISCUSSED LABS AND GOALS LDL 70, HDL 48, , CMP OK, DOING WELL,DX WITH LUPUS, WILL QUALIFY FOR BARIATRIC NEXT YEAR, SEE ROS  Past Medical History:   Diagnosis Date    Anxiety     GERD (gastroesophageal reflux disease)     Hyperlipidemia     Hypertension     Hypothyroidism     Insomnia     Observed sleep apnea     Vitamin D deficiency      Past Surgical History:   Procedure Laterality Date    BTL      CHOLECYSTECTOMY      SURGICAL REMOVAL OF BONE SPUR      left heel    TONSILLECTOMY      TUBAL LIGATION  8/1995     Family History   Problem Relation Age of Onset    Heart disease Mother     Bone cancer Mother     Cancer Mother         cervical    Hypertension Mother     Heart disease Father     Diabetes type I Father     Lung cancer Father     Diabetes Father     Hypertension Father     Heart disease Brother     No Known Problems Daughter     No Known Problems Daughter     Breast cancer Neg Hx     Ovarian cancer Neg Hx      Social History     Socioeconomic History    Marital status:    Tobacco Use    Smoking status: Never Smoker    Smokeless tobacco: Never Used   Substance and Sexual Activity    Alcohol use: Not Currently     Alcohol/week: 0.0 standard drinks    Drug use: Never    Sexual activity: Yes     Partners: Male     Birth control/protection: See Surgical Hx     Social Determinants of Health     Financial Resource Strain: Low Risk     Difficulty of Paying Living Expenses: Not hard at all   Food Insecurity: No Food Insecurity    Worried About Running Out of Food in the Last Year: Never true    Ran Out of Food in the Last Year: Never true   Transportation Needs: No Transportation Needs    Lack of Transportation (Medical): No    Lack of Transportation (Non-Medical): No   Physical Activity: Insufficiently Active    Days of Exercise per Week: 2 days     Minutes of Exercise per Session: 20 min   Stress: No Stress Concern Present    Feeling of Stress : Only a little   Social Connections: Unknown    Frequency of Communication with Friends and Family: More than three times a week    Frequency of Social Gatherings with Friends and Family: Twice a week    Active Member of Clubs or Organizations: Yes    Attends Club or Organization Meetings: More than 4 times per year    Marital Status:    Housing Stability: Low Risk     Unable to Pay for Housing in the Last Year: No    Number of Places Lived in the Last Year: 1    Unstable Housing in the Last Year: No       Review of patient's allergies indicates:   Allergen Reactions    Sulfamethoxazole-trimethoprim     Sulfa (sulfonamide antibiotics) Rash and Swelling       Current Outpatient Medications:     abatacept (ORENCIA CLICKJECT) 125 mg/mL AtIn, Inject 1 mL (125 mg) into the skin every 7 days., Disp: 4 mL, Rfl: 11    albuterol (PROVENTIL HFA) 90 mcg/actuation inhaler, Inhale 2 puffs into the lungs every 6 (six) hours as needed for Wheezing. Rescue, Disp: 18 g, Rfl: 0    atorvastatin (LIPITOR) 40 MG tablet, Take 1 tablet (40 mg total) by mouth every evening., Disp: 90 tablet, Rfl: 3    cyclobenzaprine (FLEXERIL) 10 MG tablet, Take 10 mg by mouth nightly., Disp: , Rfl:     EScitalopram oxalate (LEXAPRO) 20 MG tablet, Take 20 mg by mouth once daily., Disp: , Rfl:     flash glucose scanning reader (FREESTYLE SYDNIE 2 READER) Misc, 1 Device by Misc.(Non-Drug; Combo Route) route continuous., Disp: 1 each, Rfl: 0    flash glucose sensor (FREESTYLE SYDNIE 2 SENSOR) Kit, 1 Device by Misc.(Non-Drug; Combo Route) route every 14 (fourteen) days., Disp: 2 kit, Rfl: 6    folic acid (FOLVITE) 1 MG tablet, Take 1 tablet (1 mg total) by mouth once daily., Disp: 30 tablet, Rfl: 11    hydrOXYchloroQUINE (PLAQUENIL) 200 mg tablet, Take 200 mg by mouth 2 (two) times daily., Disp: , Rfl:     levothyroxine (SYNTHROID) 125  MCG tablet, TAKE 2 TABLETS(250 MCG) BY MOUTH DAILY, Disp: 60 tablet, Rfl: 11    lisinopriL (PRINIVIL,ZESTRIL) 20 MG tablet, TAKE 1 TABLET BY MOUTH EVERY DAY, Disp: 90 tablet, Rfl: 3    methotrexate 2.5 MG Tab, TAKE 8 TABLETS BY MOUTH EVERY 7 DAYS, Disp: 102 tablet, Rfl: 0    omeprazole (PRILOSEC) 40 MG capsule, Take 1 capsule (40 mg total) by mouth once daily., Disp: 30 capsule, Rfl: 6    OZEMPIC 1 mg/dose (4 mg/3 mL), Inject 1 mg into the skin every 7 days., Disp: , Rfl:     predniSONE (DELTASONE) 5 MG tablet, Take 2 tablets daily for 2 weeks and then one tablet daily, Disp: 90 tablet, Rfl: 1    semaglutide (OZEMPIC) 1 mg/dose (2 mg/1.5 mL) PnIj, Inject 1 mg into the skin every 7 days., Disp: 2 pen, Rfl: 11    triamcinolone acetonide 0.1% (KENALOG) 0.1 % ointment, Apply topically 2 (two) times daily., Disp: 80 g, Rfl: 3    XIIDRA 5 % Dpet, APPLY 1 DROP TO BOTH EYES BID, Disp: , Rfl:     aspirin (ECOTRIN) 81 MG EC tablet, Take 1 tablet (81 mg total) by mouth once daily., Disp: 90 tablet, Rfl: 3    furosemide (LASIX) 20 MG tablet, Take 1 tablet (20 mg total) by mouth once daily., Disp: 90 tablet, Rfl: 3    Review of Systems   Constitutional: Negative for chills, diaphoresis, fever, malaise/fatigue and night sweats. Weight gain: SOME.   HENT: Negative for congestion and nosebleeds.    Eyes: Negative for blurred vision and visual disturbance.   Cardiovascular: Negative for chest pain, claudication, cyanosis, dyspnea on exertion (MILD), irregular heartbeat, leg swelling (L), near-syncope, orthopnea, palpitations, paroxysmal nocturnal dyspnea and syncope.   Respiratory: Negative for cough, hemoptysis, shortness of breath and wheezing.    Endocrine: Negative.    Hematologic/Lymphatic: Negative for adenopathy. Does not bruise/bleed easily.   Skin: Negative for itching and rash.   Musculoskeletal: Positive for arthritis and back pain (CHRONIC). Negative for falls.   Gastrointestinal: Negative for abdominal pain,  "change in bowel habit, hematemesis, jaundice, melena and nausea.   Genitourinary: Negative for dysuria and flank pain.   Neurological: Negative for brief paralysis, focal weakness, headaches, light-headedness, loss of balance, numbness and weakness.   Psychiatric/Behavioral: Negative for altered mental status, depression and memory loss.   Allergic/Immunologic: Negative for environmental allergies and hives.        Objective:      Vitals:    07/28/22 1628   BP: 136/82   Pulse: 99   Weight: (!) 161.7 kg (356 lb 7.7 oz)   Height: 5' 5" (1.651 m)   PainSc: 0-No pain     Body mass index is 59.32 kg/m².    Physical Exam  Constitutional:       Appearance: She is well-developed. She is morbidly obese.   HENT:      Head: Normocephalic and atraumatic.   Eyes:      General: No scleral icterus.     Extraocular Movements: Extraocular movements intact.   Neck:      Vascular: Normal carotid pulses. No JVD.   Cardiovascular:      Rate and Rhythm: Normal rate and regular rhythm.  No extrasystoles are present.     Pulses:           Carotid pulses are 2+ on the right side and 2+ on the left side.       Radial pulses are 2+ on the right side and 2+ on the left side.        Posterior tibial pulses are 2+ on the right side and 2+ on the left side.      Heart sounds: Murmur heard.    Systolic murmur is present with a grade of 1/6 at the lower left sternal border.    No gallop. No S4 sounds.   Pulmonary:      Effort: No respiratory distress.      Breath sounds: No rhonchi or rales.   Abdominal:      Palpations: Abdomen is soft. There is no hepatomegaly.      Tenderness: There is no abdominal tenderness.   Musculoskeletal:      Cervical back: Neck supple.      Right lower leg: Right lower leg edema: TRACE.      Left lower leg: Left lower leg edema: TRACE.   Skin:     General: Skin is warm and dry.      Capillary Refill: Capillary refill takes less than 2 seconds.   Neurological:      General: No focal deficit present.      Mental Status: " She is alert and oriented to person, place, and time.      Cranial Nerves: Cranial nerves 2-12 are intact.   Psychiatric:         Mood and Affect: Mood normal.         Speech: Speech normal.         Behavior: Behavior normal.                 ..    Chemistry        Component Value Date/Time     07/25/2022 0719    K 4.2 07/25/2022 0719     07/25/2022 0719    CO2 23 07/25/2022 0719    BUN 11 07/25/2022 0719    CREATININE 0.7 07/25/2022 0719    GLU 89 07/25/2022 0719        Component Value Date/Time    CALCIUM 8.9 07/25/2022 0719    ALKPHOS 132 07/25/2022 0719    AST 19 07/25/2022 0719    ALT 30 07/25/2022 0719    BILITOT 0.5 07/25/2022 0719    ESTGFRAFRICA >60.0 07/25/2022 0719    EGFRNONAA >60.0 07/25/2022 0719            ..  Lab Results   Component Value Date    CHOL 149 07/25/2022    CHOL 156 06/04/2021    CHOL 131 11/25/2020     Lab Results   Component Value Date    HDL 48 07/25/2022    HDL 46 06/04/2021    HDL 45 11/25/2020     Lab Results   Component Value Date    LDLCALC 70.0 07/25/2022    LDLCALC 87.0 06/04/2021    LDLCALC 65.8 11/25/2020     Lab Results   Component Value Date    TRIG 155 (H) 07/25/2022    TRIG 115 06/04/2021    TRIG 101 11/25/2020     Lab Results   Component Value Date    CHOLHDL 32.2 07/25/2022    CHOLHDL 29.5 06/04/2021    CHOLHDL 34.4 11/25/2020     ..  Lab Results   Component Value Date    WBC 11.01 06/03/2022    HGB 13.9 06/03/2022    HCT 44.8 06/03/2022    MCV 97 06/03/2022     06/03/2022       Test(s) Reviewed  I have reviewed the following in detail:  [] Stress test   [] Angiography   [] Echocardiogram   [x] Labs   [] Other:       Assessment:         ICD-10-CM ICD-9-CM   1. Nonrheumatic mitral valve regurgitation  I34.0 424.0   2. Edema, unspecified type  R60.9 782.3   3. Dyslipidemia  E78.5 272.4   4. Essential hypertension  I10 401.9   5. ETELVINA on CPAP  G47.33 327.23    Z99.89 V46.8   6. Obesity, morbid, BMI 50 or higher  E66.01 278.01     Problem List Items  Addressed This Visit        Cardiac/Vascular    Dyslipidemia    Relevant Orders    Comprehensive Metabolic Panel    Essential hypertension    Relevant Orders    Comprehensive Metabolic Panel    Nonrheumatic mitral valve regurgitation - Primary       Endocrine    Obesity, morbid, BMI 50 or higher       Other    ETELVINA on CPAP    Edema    Relevant Medications    furosemide (LASIX) 20 MG tablet    Other Relevant Orders    Comprehensive Metabolic Panel           Plan:         ALL CV CLINICALLY STABLE, NO ANGINA, NO HF, NO TIA, NO CLINICAL ARRHYTHMIA,CONTINUE CURRENT MEDS, EDUCATION, DIET, EXERCISE, NEEDS SIGNIFICANT EIGHT LOSS, RTC IN 1 YEAR WITH  LABS  Nonrheumatic mitral valve regurgitation    Edema, unspecified type  -     furosemide (LASIX) 20 MG tablet; Take 1 tablet (20 mg total) by mouth once daily.  Dispense: 90 tablet; Refill: 3  -     Comprehensive Metabolic Panel; Future; Expected date: 07/28/2023    Dyslipidemia  -     Comprehensive Metabolic Panel; Future; Expected date: 07/28/2023    Essential hypertension  -     Comprehensive Metabolic Panel; Future; Expected date: 07/28/2023    ETELVINA on CPAP    Obesity, morbid, BMI 50 or higher    RTC Low level/low impact aerobic exercise 5x's/wk. Heart healthy diet and risk factor modification.    See labs and med orders.    Aerobic exercise 5x's/wk. Heart healthy diet and risk factor modification.    See labs and med orders.

## 2022-07-28 ENCOUNTER — OFFICE VISIT (OUTPATIENT)
Dept: CARDIOLOGY | Facility: CLINIC | Age: 52
End: 2022-07-28
Payer: COMMERCIAL

## 2022-07-28 VITALS
DIASTOLIC BLOOD PRESSURE: 82 MMHG | HEIGHT: 65 IN | BODY MASS INDEX: 48.82 KG/M2 | WEIGHT: 293 LBS | SYSTOLIC BLOOD PRESSURE: 136 MMHG | HEART RATE: 99 BPM

## 2022-07-28 DIAGNOSIS — R60.9 EDEMA, UNSPECIFIED TYPE: ICD-10-CM

## 2022-07-28 DIAGNOSIS — G47.33 OSA ON CPAP: Chronic | ICD-10-CM

## 2022-07-28 DIAGNOSIS — E78.5 DYSLIPIDEMIA: Chronic | ICD-10-CM

## 2022-07-28 DIAGNOSIS — E66.01 OBESITY, MORBID, BMI 50 OR HIGHER: ICD-10-CM

## 2022-07-28 DIAGNOSIS — I10 ESSENTIAL HYPERTENSION: Chronic | ICD-10-CM

## 2022-07-28 DIAGNOSIS — I34.0 NONRHEUMATIC MITRAL VALVE REGURGITATION: Primary | Chronic | ICD-10-CM

## 2022-07-28 PROCEDURE — 3079F DIAST BP 80-89 MM HG: CPT | Mod: CPTII,S$GLB,, | Performed by: INTERNAL MEDICINE

## 2022-07-28 PROCEDURE — 3075F PR MOST RECENT SYSTOLIC BLOOD PRESS GE 130-139MM HG: ICD-10-PCS | Mod: CPTII,S$GLB,, | Performed by: INTERNAL MEDICINE

## 2022-07-28 PROCEDURE — 3075F SYST BP GE 130 - 139MM HG: CPT | Mod: CPTII,S$GLB,, | Performed by: INTERNAL MEDICINE

## 2022-07-28 PROCEDURE — 3079F PR MOST RECENT DIASTOLIC BLOOD PRESSURE 80-89 MM HG: ICD-10-PCS | Mod: CPTII,S$GLB,, | Performed by: INTERNAL MEDICINE

## 2022-07-28 PROCEDURE — 3008F BODY MASS INDEX DOCD: CPT | Mod: CPTII,S$GLB,, | Performed by: INTERNAL MEDICINE

## 2022-07-28 PROCEDURE — 4010F ACE/ARB THERAPY RXD/TAKEN: CPT | Mod: CPTII,S$GLB,, | Performed by: INTERNAL MEDICINE

## 2022-07-28 PROCEDURE — 99999 PR PBB SHADOW E&M-EST. PATIENT-LVL III: ICD-10-PCS | Mod: PBBFAC,,, | Performed by: INTERNAL MEDICINE

## 2022-07-28 PROCEDURE — 3008F PR BODY MASS INDEX (BMI) DOCUMENTED: ICD-10-PCS | Mod: CPTII,S$GLB,, | Performed by: INTERNAL MEDICINE

## 2022-07-28 PROCEDURE — 99214 PR OFFICE/OUTPT VISIT, EST, LEVL IV, 30-39 MIN: ICD-10-PCS | Mod: S$GLB,,, | Performed by: INTERNAL MEDICINE

## 2022-07-28 PROCEDURE — 4010F PR ACE/ARB THEARPY RXD/TAKEN: ICD-10-PCS | Mod: CPTII,S$GLB,, | Performed by: INTERNAL MEDICINE

## 2022-07-28 PROCEDURE — 99999 PR PBB SHADOW E&M-EST. PATIENT-LVL III: CPT | Mod: PBBFAC,,, | Performed by: INTERNAL MEDICINE

## 2022-07-28 PROCEDURE — 99214 OFFICE O/P EST MOD 30 MIN: CPT | Mod: S$GLB,,, | Performed by: INTERNAL MEDICINE

## 2022-07-28 PROCEDURE — 3044F PR MOST RECENT HEMOGLOBIN A1C LEVEL <7.0%: ICD-10-PCS | Mod: CPTII,S$GLB,, | Performed by: INTERNAL MEDICINE

## 2022-07-28 PROCEDURE — 3044F HG A1C LEVEL LT 7.0%: CPT | Mod: CPTII,S$GLB,, | Performed by: INTERNAL MEDICINE

## 2022-07-28 RX ORDER — FUROSEMIDE 20 MG/1
20 TABLET ORAL DAILY
Qty: 90 TABLET | Refills: 3 | Status: SHIPPED | OUTPATIENT
Start: 2022-07-28 | End: 2023-09-13 | Stop reason: SDUPTHER

## 2022-08-04 ENCOUNTER — PATIENT MESSAGE (OUTPATIENT)
Dept: ENDOCRINOLOGY | Facility: CLINIC | Age: 52
End: 2022-08-04
Payer: COMMERCIAL

## 2022-08-05 ENCOUNTER — LAB VISIT (OUTPATIENT)
Dept: LAB | Facility: HOSPITAL | Age: 52
End: 2022-08-05
Attending: NURSE PRACTITIONER
Payer: COMMERCIAL

## 2022-08-05 DIAGNOSIS — R73.03 PREDIABETES: ICD-10-CM

## 2022-08-05 LAB
ESTIMATED AVG GLUCOSE: 103 MG/DL (ref 68–131)
HBA1C MFR BLD: 5.2 % (ref 4–5.6)

## 2022-08-05 PROCEDURE — 36415 COLL VENOUS BLD VENIPUNCTURE: CPT | Mod: PO | Performed by: NURSE PRACTITIONER

## 2022-08-05 PROCEDURE — 83036 HEMOGLOBIN GLYCOSYLATED A1C: CPT | Performed by: NURSE PRACTITIONER

## 2022-08-08 ENCOUNTER — PATIENT MESSAGE (OUTPATIENT)
Dept: ENDOCRINOLOGY | Facility: CLINIC | Age: 52
End: 2022-08-08
Payer: COMMERCIAL

## 2022-08-10 DIAGNOSIS — Z12.31 OTHER SCREENING MAMMOGRAM: ICD-10-CM

## 2022-08-18 ENCOUNTER — TELEPHONE (OUTPATIENT)
Dept: FAMILY MEDICINE | Facility: CLINIC | Age: 52
End: 2022-08-18
Payer: COMMERCIAL

## 2022-08-22 ENCOUNTER — SPECIALTY PHARMACY (OUTPATIENT)
Dept: PHARMACY | Facility: CLINIC | Age: 52
End: 2022-08-22
Payer: COMMERCIAL

## 2022-08-22 NOTE — TELEPHONE ENCOUNTER
Specialty Pharmacy - Refill Coordination    Specialty Medication Orders Linked to Encounter    Flowsheet Row Most Recent Value   Medication #1 abatacept (ORENCIA CLICKJECT) 125 mg/mL AtIn (Order#[id number], Rx#[id number])          Refill Questions - Documented Responses    Flowsheet Row Most Recent Value   Patient Availability and HIPAA Verification    Does patient want to proceed with activity? Yes   HIPAA/medical authority confirmed? Yes   Relationship to patient of person spoken to? Self   Refill Screening Questions    Changes to allergies? No   Changes to medications? No   New conditions since last clinic visit? No   Unplanned office visit, urgent care, ED, or hospital admission in the last 4 weeks? No   How does patient/caregiver feel medication is working? Very good   Financial problems or insurance changes? No   How many doses of your specialty medications were missed in the last 4 weeks? 0   Would patient like to speak to a pharmacist? No   When does the patient need to receive the medication? 08/28/22   Refill Delivery Questions    How will the patient receive the medication? Delivery Munira   When does the patient need to receive the medication? 08/28/22   Shipping Address Home   Address in [facility] confirmed and updated if neccessary? Yes   Expected Copay ($) 5   Is the patient able to afford the medication copay? Yes   Payment Method CC on file   Days supply of Refill 28   Supplies needed? No supplies needed   Refill activity completed? Yes   Refill activity plan Refill scheduled   Shipment/Pickup Date: 08/24/22          Current Outpatient Medications   Medication Sig    abatacept (ORENCIA CLICKJECT) 125 mg/mL AtIn Inject 1 mL (125 mg) into the skin every 7 days.    albuterol (PROVENTIL HFA) 90 mcg/actuation inhaler Inhale 2 puffs into the lungs every 6 (six) hours as needed for Wheezing. Rescue    aspirin (ECOTRIN) 81 MG EC tablet Take 1 tablet (81 mg total) by mouth once daily.     atorvastatin (LIPITOR) 40 MG tablet Take 1 tablet (40 mg total) by mouth every evening.    cyclobenzaprine (FLEXERIL) 10 MG tablet TAKE ONE TABLET EVERY EVENING    EScitalopram oxalate (LEXAPRO) 20 MG tablet Take 20 mg by mouth once daily.    flash glucose scanning reader (FREESTYLE SYDNIE 2 READER) Misc 1 Device by Misc.(Non-Drug; Combo Route) route continuous.    flash glucose sensor (FREESTYLE SYDNIE 2 SENSOR) Kit 1 Device by Misc.(Non-Drug; Combo Route) route every 14 (fourteen) days.    folic acid (FOLVITE) 1 MG tablet Take 1 tablet (1 mg total) by mouth once daily.    furosemide (LASIX) 20 MG tablet Take 1 tablet (20 mg total) by mouth once daily.    hydrOXYchloroQUINE (PLAQUENIL) 200 mg tablet Take 200 mg by mouth 2 (two) times daily.    levothyroxine (SYNTHROID) 125 MCG tablet TAKE 2 TABLETS(250 MCG) BY MOUTH DAILY    lisinopriL (PRINIVIL,ZESTRIL) 20 MG tablet TAKE 1 TABLET BY MOUTH EVERY DAY    methotrexate 2.5 MG Tab TAKE 8 TABLETS BY MOUTH EVERY 7 DAYS    omeprazole (PRILOSEC) 40 MG capsule Take 1 capsule (40 mg total) by mouth once daily.    OZEMPIC 1 mg/dose (4 mg/3 mL) Inject 1 mg into the skin every 7 days.    predniSONE (DELTASONE) 5 MG tablet Take 2 tablets daily for 2 weeks and then one tablet daily    semaglutide (OZEMPIC) 1 mg/dose (2 mg/1.5 mL) PnIj Inject 1 mg into the skin every 7 days.    triamcinolone acetonide 0.1% (KENALOG) 0.1 % ointment Apply topically 2 (two) times daily.    XIIDRA 5 % Dpet APPLY 1 DROP TO BOTH EYES BID   Last reviewed on 6/24/2022  4:04 PM by Corinne Cundiff, MA    Review of patient's allergies indicates:   Allergen Reactions    Sulfamethoxazole-trimethoprim     Sulfa (sulfonamide antibiotics) Rash and Swelling    Last reviewed on  7/28/2022 4:29 PM by Franny Avery      Tasks added this encounter   9/18/2022 - Refill Call (Auto Added)   Tasks due within next 3 months   No tasks due.     [facility]  [address] [place] [address]  [address]  [facility]  [facility] [address]  Phone: [phone]  Fax: [phone]

## 2022-08-25 ENCOUNTER — TELEPHONE (OUTPATIENT)
Dept: FAMILY MEDICINE | Facility: CLINIC | Age: 52
End: 2022-08-25
Payer: COMMERCIAL

## 2022-08-25 ENCOUNTER — TELEPHONE (OUTPATIENT)
Dept: GASTROENTEROLOGY | Facility: CLINIC | Age: 52
End: 2022-08-25
Payer: COMMERCIAL

## 2022-08-25 RX ORDER — POLYETHYLENE GLYCOL 3350, SODIUM SULFATE ANHYDROUS, SODIUM BICARBONATE, SODIUM CHLORIDE, POTASSIUM CHLORIDE 236; 22.74; 6.74; 5.86; 2.97 G/4L; G/4L; G/4L; G/4L; G/4L
4 POWDER, FOR SOLUTION ORAL ONCE
Qty: 4000 ML | Refills: 0 | Status: SHIPPED | OUTPATIENT
Start: 2022-08-25 | End: 2022-08-25

## 2022-08-25 NOTE — TELEPHONE ENCOUNTER
Called to remind patient of colonoscopy with Dr Peraza on 8/30/22. Prep will need to be changed to Golytely as Mag Cit has been recalled. New prep instructions will be sent to her through patient portal and Rx called to pharmacy. Someone from surgery center will call a day or two before with an arrive time.

## 2022-09-02 ENCOUNTER — PATIENT MESSAGE (OUTPATIENT)
Dept: FAMILY MEDICINE | Facility: CLINIC | Age: 52
End: 2022-09-02
Payer: COMMERCIAL

## 2022-09-02 DIAGNOSIS — N30.00 ACUTE INFECTIVE CYSTITIS: Primary | ICD-10-CM

## 2022-09-02 RX ORDER — CEPHALEXIN 500 MG/1
500 CAPSULE ORAL EVERY 12 HOURS
Qty: 10 CAPSULE | Refills: 0 | Status: SHIPPED | OUTPATIENT
Start: 2022-09-02 | End: 2023-03-03

## 2022-09-07 ENCOUNTER — TELEPHONE (OUTPATIENT)
Dept: BARIATRICS | Facility: CLINIC | Age: 52
End: 2022-09-07
Payer: COMMERCIAL

## 2022-09-19 ENCOUNTER — SPECIALTY PHARMACY (OUTPATIENT)
Dept: PHARMACY | Facility: CLINIC | Age: 52
End: 2022-09-19
Payer: COMMERCIAL

## 2022-09-19 NOTE — TELEPHONE ENCOUNTER
Specialty Pharmacy - Refill Coordination    Specialty Medication Orders Linked to Encounter      Flowsheet Row Most Recent Value   Medication #1 abatacept (ORENCIA CLICKJECT) 125 mg/mL AtIn (Order#[id number], Rx#[id number])            Refill Questions - Documented Responses      Flowsheet Row Most Recent Value   Patient Availability and HIPAA Verification    Does patient want to proceed with activity? Yes   HIPAA/medical authority confirmed? Yes   Relationship to patient of person spoken to? Self   Refill Screening Questions    Changes to allergies? No   Changes to medications? No   New conditions since last clinic visit? No   Unplanned office visit, urgent care, ED, or hospital admission in the last 4 weeks? No   How does patient/caregiver feel medication is working? Good   Financial problems or insurance changes? No   How many doses of your specialty medications were missed in the last 4 weeks? 0   Would patient like to speak to a pharmacist? No   When does the patient need to receive the medication? 09/25/22   Refill Delivery Questions    How will the patient receive the medication? Delivery Munira   When does the patient need to receive the medication? 09/25/22   Shipping Address Home   Address in [facility] confirmed and updated if neccessary? Yes   Expected Copay ($) 5   Is the patient able to afford the medication copay? Yes   Payment Method CC on file   Days supply of Refill 28   Supplies needed? No supplies needed   Refill activity completed? Yes   Refill activity plan Refill scheduled   Shipment/Pickup Date: 09/21/22            Current Outpatient Medications   Medication Sig    abatacept (ORENCIA CLICKJECT) 125 mg/mL AtIn Inject 1 mL (125 mg) into the skin every 7 days.    albuterol (PROVENTIL HFA) 90 mcg/actuation inhaler Inhale 2 puffs into the lungs every 6 (six) hours as needed for Wheezing. Rescue    aspirin (ECOTRIN) 81 MG EC tablet Take 1 tablet (81 mg total) by mouth once daily.     atorvastatin (LIPITOR) 40 MG tablet Take 1 tablet (40 mg total) by mouth every evening.    cephALEXin (KEFLEX) 500 MG capsule Take 1 capsule (500 mg total) by mouth every 12 (twelve) hours.    cyclobenzaprine (FLEXERIL) 10 MG tablet TAKE ONE TABLET EVERY EVENING    EScitalopram oxalate (LEXAPRO) 20 MG tablet Take 20 mg by mouth once daily.    flash glucose scanning reader (FREESTYLE SYDNIE 2 READER) Misc 1 Device by Misc.(Non-Drug; Combo Route) route continuous.    flash glucose sensor (FREESTYLE SYDNIE 2 SENSOR) Kit 1 Device by Misc.(Non-Drug; Combo Route) route every 14 (fourteen) days.    folic acid (FOLVITE) 1 MG tablet Take 1 tablet (1 mg total) by mouth once daily.    furosemide (LASIX) 20 MG tablet Take 1 tablet (20 mg total) by mouth once daily.    hydrOXYchloroQUINE (PLAQUENIL) 200 mg tablet Take 200 mg by mouth 2 (two) times daily.    levothyroxine (SYNTHROID) 125 MCG tablet TAKE 2 TABLETS(250 MCG) BY MOUTH DAILY    lisinopriL (PRINIVIL,ZESTRIL) 20 MG tablet TAKE 1 TABLET BY MOUTH EVERY DAY    methotrexate 2.5 MG Tab TAKE 8 TABLETS BY MOUTH EVERY 7 DAYS    omeprazole (PRILOSEC) 40 MG capsule Take 1 capsule (40 mg total) by mouth once daily.    OZEMPIC 1 mg/dose (4 mg/3 mL) Inject 1 mg into the skin every 7 days.    predniSONE (DELTASONE) 5 MG tablet Take 2 tablets daily for 2 weeks and then one tablet daily    semaglutide (OZEMPIC) 1 mg/dose (2 mg/1.5 mL) PnIj Inject 1 mg into the skin every 7 days.    triamcinolone acetonide 0.1% (KENALOG) 0.1 % ointment Apply topically 2 (two) times daily.    XIIDRA 5 % Dpet APPLY 1 DROP TO BOTH EYES BID   Last reviewed on 8/30/2022 10:03 AM by Bettina Tian RN    Review of patient's allergies indicates:   Allergen Reactions    Sulfamethoxazole-trimethoprim     Sulfa (sulfonamide antibiotics) Rash and Swelling    Last reviewed on  9/2/2022 5:24 PM by Layo Mccrary      Tasks added this encounter   10/16/2022 - Refill Call (Auto Added)   Tasks due within next 3 months    No tasks due.     [facility]  [address]  [facility] [address]  Phone: [phone]  Fax: [phone]

## 2022-09-20 ENCOUNTER — TELEPHONE (OUTPATIENT)
Dept: BARIATRICS | Facility: CLINIC | Age: 52
End: 2022-09-20
Payer: COMMERCIAL

## 2022-09-26 ENCOUNTER — TELEPHONE (OUTPATIENT)
Dept: BARIATRICS | Facility: CLINIC | Age: 52
End: 2022-09-26
Payer: COMMERCIAL

## 2022-09-26 ENCOUNTER — PATIENT MESSAGE (OUTPATIENT)
Dept: BARIATRICS | Facility: CLINIC | Age: 52
End: 2022-09-26
Payer: COMMERCIAL

## 2022-09-26 NOTE — TELEPHONE ENCOUNTER
Attempted to reach pt, LVM to call [phone] to schedule consultation appointments with BRIANNE and dietician, to discuss insurance requirements including OGB approved requiring 4 mos. Msd, and introduce to bariatric program. Bariatric dashboard status updated to financial services phone appointment completed. Guarantor note copied into bariatric dashboard. Also sent portal message for patient to reply and with seminar info.

## 2022-09-26 NOTE — TELEPHONE ENCOUNTER
Spoke with pt and would like to proceed with appointments to workup for wt loss surgery. Discussed Bariatric program, standard and possible orders, insurance requirements found in guarantor note including OGB approval and 4mo msd, introduced the assigned nurse coordinator and explained any of the bariatric nurses could help if assigned coordinator not available.   Scheduled BRIANNE consult and nutrition consult. Pt aware to look out for and complete the online assigned questionnaire as soon as possible as this is a requirement. Dashboard updated to consult scheduled and guarantor note copied into bariatric dashboard. Scheduling information added to spreadsheet for Barbara Patel, access navigator. Mailing address confirmed, explained  to expect a packet in mail  to complete and bring to nutrition appointment.  Instructed pt to call [phone] with any further questions.

## 2022-09-27 ENCOUNTER — TELEPHONE (OUTPATIENT)
Dept: BARIATRICS | Facility: CLINIC | Age: 52
End: 2022-09-27
Payer: COMMERCIAL

## 2022-09-28 ENCOUNTER — PATIENT MESSAGE (OUTPATIENT)
Dept: RHEUMATOLOGY | Facility: CLINIC | Age: 52
End: 2022-09-28
Payer: COMMERCIAL

## 2022-09-30 ENCOUNTER — HOSPITAL ENCOUNTER (OUTPATIENT)
Dept: RADIOLOGY | Facility: HOSPITAL | Age: 52
Discharge: HOME | End: 2022-09-30
Attending: FAMILY MEDICINE
Payer: COMMERCIAL

## 2022-09-30 VITALS — BODY MASS INDEX: 47.09 KG/M2 | HEIGHT: 66 IN | WEIGHT: 293 LBS

## 2022-09-30 DIAGNOSIS — Z12.31 OTHER SCREENING MAMMOGRAM: ICD-10-CM

## 2022-09-30 PROCEDURE — 77063 BREAST TOMOSYNTHESIS BI: CPT | Mod: TC,PO

## 2022-09-30 PROCEDURE — 77063 MAMMO DIGITAL SCREENING BILAT WITH TOMO: ICD-10-PCS | Mod: 26,,, | Performed by: RADIOLOGY

## 2022-09-30 PROCEDURE — 77067 MAMMO DIGITAL SCREENING BILAT WITH TOMO: ICD-10-PCS | Mod: 26,,, | Performed by: RADIOLOGY

## 2022-09-30 PROCEDURE — 77067 SCR MAMMO BI INCL CAD: CPT | Mod: 26,,, | Performed by: RADIOLOGY

## 2022-09-30 PROCEDURE — 77063 BREAST TOMOSYNTHESIS BI: CPT | Mod: 26,,, | Performed by: RADIOLOGY

## 2022-10-05 ENCOUNTER — PATIENT MESSAGE (OUTPATIENT)
Dept: RHEUMATOLOGY | Facility: CLINIC | Age: 52
End: 2022-10-05
Payer: COMMERCIAL

## 2022-10-05 DIAGNOSIS — M32.9 SYSTEMIC LUPUS ERYTHEMATOSUS, UNSPECIFIED SLE TYPE, UNSPECIFIED ORGAN INVOLVEMENT STATUS: Primary | ICD-10-CM

## 2022-10-06 ENCOUNTER — PATIENT MESSAGE (OUTPATIENT)
Dept: BARIATRICS | Facility: CLINIC | Age: 52
End: 2022-10-06
Payer: COMMERCIAL

## 2022-10-07 ENCOUNTER — CLINICAL SUPPORT (OUTPATIENT)
Dept: BARIATRICS | Facility: CLINIC | Age: 52
End: 2022-10-07
Payer: COMMERCIAL

## 2022-10-07 DIAGNOSIS — E66.01 MORBID OBESITY WITH BODY MASS INDEX (BMI) OF 50.0 TO 59.9 IN ADULT: ICD-10-CM

## 2022-10-07 DIAGNOSIS — G47.33 OSA ON CPAP: ICD-10-CM

## 2022-10-07 DIAGNOSIS — Z71.3 DIETARY COUNSELING AND SURVEILLANCE: ICD-10-CM

## 2022-10-07 DIAGNOSIS — I10 ESSENTIAL HYPERTENSION: Primary | ICD-10-CM

## 2022-10-07 PROCEDURE — 99499 NO LOS: ICD-10-PCS | Mod: 95,,, | Performed by: DIETITIAN, REGISTERED

## 2022-10-07 PROCEDURE — 97803 MED NUTRITION INDIV SUBSEQ: CPT | Mod: 95,,, | Performed by: DIETITIAN, REGISTERED

## 2022-10-07 PROCEDURE — 99499 UNLISTED E&M SERVICE: CPT | Mod: 95,,, | Performed by: DIETITIAN, REGISTERED

## 2022-10-07 PROCEDURE — 97803 PR MED NUTR THER, SUBSQ, INDIV, EA 15 MIN: ICD-10-PCS | Mod: 95,,, | Performed by: DIETITIAN, REGISTERED

## 2022-10-07 NOTE — PROGRESS NOTES
"The patient location is:  Patient Home   The chief complaint leading to consultation is: morbid obesity in work up for bariatric surgery  Visit type: Virtual visit with synchronous audio and video  Total time spent with patient: 60 minutes  Each patient to whom he or she provides medical services by telemedicine is:  (1) informed of the relationship between the physician and patient and the respective role of any other health care provider with respect to management of the patient; and (2) notified that he or she may decline to receive medical services by telemedicine and may withdraw from such care at any time.  Nutrition Handout located in AVS section of pt's [facility] and/or sent as a message via [facility] portal.  Pt informed of handout and how to access this information in [employer] denisse.  NUTRITION NOTE    Referring Physician: Undecided   Reason for MNT Referral:  Gastric  Undecided    Patient presents for f/u visit for MSD with weight gain over the past month; total weight loss by making numerous dietary and lifestyle changes.    CLINICAL DATA:  51 y.o. female.    Current Weight: 347 lbs  Weight Change Since Initial Visit: Gain of 7 lbs  Ideal Body Weight: 144 lbs  Body mass index is 56.01 kg/m².  Initial Wt: 340 lbs    DAILY NUTRITIONAL NEEDS: pre-op nutritional bariatric guidelines to promote weight loss  [id number] Calories    Grams Protein    CURRENT DIET:  Regular diet.  Diet Recall: Food records are not present.  B: bagel cream cheese and coffee with splenda  L: crunchy roll or sonic hamburger half and half tea  D:chili or subway steak and cheese 6" with 1 cookies small pepsi    Diet Includes: regular meal plan  Meal Pattern: Irregular.  Protein Supplements: 0-1 per day. [employer] protein powder 0 gm sugar, 20 gms protein mixed water  Snacking: Inadequate. Snacks include  none .  Vegetables: Likes a variety. Eats 2-3 times per week.  Fruits: Likes a few. Eats rarely.  Beverages: water, soda, " sugar-free beverages, coffee without sugar, and lunch half and half tea  Dining Out:  Weekly. 5 times per week Mostly fast food, restaurants, and take-out.  Cooking at home: Daily. Mostly  crock pot  meat, fish, starchy CHO, and vegetables. Cut out rice and pasta     CURRENT EXERCISE:  Adequate  Godfrey Chi once a week intensively 45 mins  Godfrey Chi daily on her own 20 minutes    Vitamins / Minerals / Herbs:   One a day   Vitamin D  Olli  Folic Acid    LABS:    No recent      Food Allergies:   Eggs blow stomach up    Social:  Works regular daytime shifts.  Alcohol: None.  Smoking: None.    ASSESSMENT:  Patient demonstrates some willingness to change lifestyle habits as evidenced by dietary changes and decreased starchy carbs .    Doing poorly with working on greatest challenges (irregular meal patterns and emotional eating).    Barriers to Education:  none  Stage of Change:  determination    NUTRITION DIAGNOSIS:  Morbid Obesity related to Excessive carbohydrate intake, Excessive calorie intake, and Inadequate protein intake as evidence by BMI.  Status: Same    PLAN:  Pt is potential candidate for surgery.    Diet: Adjust diet plan.  Work on expanding variety of vegetables.  Work on gradually cutting back on starchy CHO in the diet.  1200-calorie diet.  1500-calorie diet.  5-6 meals per day.  Start including protein supplements in the diet plan daily.  Return to clinic.    Exercise: Maintain.    Behavior Modification: Begin to document food & activity logs daily.      Return to clinic in one month.  Needs additional visit(s) with RD.    Communicated nutrition plan with bariatric team.    SESSION TIME:  30 minutes

## 2022-10-08 ENCOUNTER — PATIENT MESSAGE (OUTPATIENT)
Dept: BARIATRICS | Facility: CLINIC | Age: 52
End: 2022-10-08
Payer: COMMERCIAL

## 2022-10-10 VITALS — WEIGHT: 293 LBS | BODY MASS INDEX: 56.01 KG/M2

## 2022-10-13 ENCOUNTER — OFFICE VISIT (OUTPATIENT)
Dept: BARIATRICS | Facility: CLINIC | Age: 52
End: 2022-10-13
Payer: COMMERCIAL

## 2022-10-13 VITALS
HEART RATE: 76 BPM | WEIGHT: 293 LBS | DIASTOLIC BLOOD PRESSURE: 59 MMHG | OXYGEN SATURATION: 97 % | HEIGHT: 66 IN | SYSTOLIC BLOOD PRESSURE: 129 MMHG | BODY MASS INDEX: 47.09 KG/M2

## 2022-10-13 DIAGNOSIS — E66.01 MORBID OBESITY WITH BODY MASS INDEX (BMI) OF 50.0 TO 59.9 IN ADULT: ICD-10-CM

## 2022-10-13 DIAGNOSIS — I10 ESSENTIAL HYPERTENSION: Primary | ICD-10-CM

## 2022-10-13 DIAGNOSIS — G47.33 OSA ON CPAP: ICD-10-CM

## 2022-10-13 PROCEDURE — 4010F ACE/ARB THERAPY RXD/TAKEN: CPT | Mod: CPTII,S$GLB,, | Performed by: NURSE PRACTITIONER

## 2022-10-13 PROCEDURE — 3078F DIAST BP <80 MM HG: CPT | Mod: CPTII,S$GLB,, | Performed by: NURSE PRACTITIONER

## 2022-10-13 PROCEDURE — 1159F PR MEDICATION LIST DOCUMENTED IN MEDICAL RECORD: ICD-10-PCS | Mod: CPTII,S$GLB,, | Performed by: NURSE PRACTITIONER

## 2022-10-13 PROCEDURE — 1160F RVW MEDS BY RX/DR IN RCRD: CPT | Mod: CPTII,S$GLB,, | Performed by: NURSE PRACTITIONER

## 2022-10-13 PROCEDURE — 4010F PR ACE/ARB THEARPY RXD/TAKEN: ICD-10-PCS | Mod: CPTII,S$GLB,, | Performed by: NURSE PRACTITIONER

## 2022-10-13 PROCEDURE — 99999 PR PBB SHADOW E&M-EST. PATIENT-LVL V: ICD-10-PCS | Mod: PBBFAC,,, | Performed by: NURSE PRACTITIONER

## 2022-10-13 PROCEDURE — 3044F HG A1C LEVEL LT 7.0%: CPT | Mod: CPTII,S$GLB,, | Performed by: NURSE PRACTITIONER

## 2022-10-13 PROCEDURE — 99999 PR PBB SHADOW E&M-EST. PATIENT-LVL V: CPT | Mod: PBBFAC,,, | Performed by: NURSE PRACTITIONER

## 2022-10-13 PROCEDURE — 3078F PR MOST RECENT DIASTOLIC BLOOD PRESSURE < 80 MM HG: ICD-10-PCS | Mod: CPTII,S$GLB,, | Performed by: NURSE PRACTITIONER

## 2022-10-13 PROCEDURE — 1159F MED LIST DOCD IN RCRD: CPT | Mod: CPTII,S$GLB,, | Performed by: NURSE PRACTITIONER

## 2022-10-13 PROCEDURE — 3044F PR MOST RECENT HEMOGLOBIN A1C LEVEL <7.0%: ICD-10-PCS | Mod: CPTII,S$GLB,, | Performed by: NURSE PRACTITIONER

## 2022-10-13 PROCEDURE — 99205 PR OFFICE/OUTPT VISIT, NEW, LEVL V, 60-74 MIN: ICD-10-PCS | Mod: S$GLB,,, | Performed by: NURSE PRACTITIONER

## 2022-10-13 PROCEDURE — 1160F PR REVIEW ALL MEDS BY PRESCRIBER/CLIN PHARMACIST DOCUMENTED: ICD-10-PCS | Mod: CPTII,S$GLB,, | Performed by: NURSE PRACTITIONER

## 2022-10-13 PROCEDURE — 3074F SYST BP LT 130 MM HG: CPT | Mod: CPTII,S$GLB,, | Performed by: NURSE PRACTITIONER

## 2022-10-13 PROCEDURE — 3074F PR MOST RECENT SYSTOLIC BLOOD PRESSURE < 130 MM HG: ICD-10-PCS | Mod: CPTII,S$GLB,, | Performed by: NURSE PRACTITIONER

## 2022-10-13 PROCEDURE — 99205 OFFICE O/P NEW HI 60 MIN: CPT | Mod: S$GLB,,, | Performed by: NURSE PRACTITIONER

## 2022-10-13 NOTE — PATIENT INSTRUCTIONS
Prior to surgery you will need to complete:  - Dietitian consult and follow up appointments as needed  - Labs  - Chest X-ray  - EKG  - Psychological evaluation, Please call psychiatry [phone] to schedule  - Stress test       In preparation for bariatric surgery, please complete the following:   Discuss your current medications with your primary care provider, remember your medications will need to be crushed, chewable, or in liquid form for the first 2-4 weeks after a gastric bypass or sleeve.  For a gastric band, your medications will need to be crushed indefinitely.    If you take a blood thinner such as: Coumadin (warfarin), Pradaxa (dabigatran), or Plavix (clopidogrel), you will need to speak with your prescribing provider on how or if this medication can be stopped before surgery.   If you take a medication for depression or anxiety, remember to discuss this with the psychologist or psychiatrist that you see.   If you take medication for arthritis on a daily basis that is considered a non-steroidal anti-inflammatory (NSAID), please discuss with your prescribing physician an alternative medication.  After having gastric bypass or gastric sleeve, this group of medications is not appropriate to take due to increased risk of bleeding stomach ulcers.      DEFINITIONS  Appointments: Pre-scheduled meetings or consultations with any physician, advanced practice provider, dietitian, or psychologist, and labs, imaging studies, sleep studies, etc.   Late cancellation: Cancelling an appointment 24-48 hours prior to scheduled time.  No-Show appointment:  is when   You do NOT arrive to your appointment at the time its scheduled.  You call to cancel or cancel via [facility] less than 24 hours in advance of your scheduled appointment  You show up 15 minutes AFTER your scheduled appointment time without any notification of being late.     POLICY  You are allowed up to 3 cancellations for appointments.   After 3  cancellations your case will be placed on hold for 2 months and after that time you can resume the program.   You are allowed only 1 no-show for an appointment.   You will be re-scheduled one time and if there is a 2nd no-show at any point, your case will be placed on hold for 3 months.  After 3 months you can resume the program.     Upon resuming the program after being placed on hold for either above mentioned reasons, if you have a late cancel or no show for any appointment, the bariatric team will review if youre an appropriate candidate for surgery at the monthly meeting.

## 2022-10-13 NOTE — PROGRESS NOTES
BARIATRIC NEW PATIENT EVALUATION    CHIEF COMPLAINT:   Morbid obesity, body mass index is 55.58 kg/m². and inability to lose weight.    HPI:  Christine Alfaro is a 51 y.o. morbidly obese female. Her current body mass index is 55.58 kg/m². She has multiple associated comorbidities including essential hypertension, hyperlipidemia, and SLE .  She has struggled with excess weight since 2012.  Her highest adult weight was 344 lbs at age 51, and her lowest adult weight was 240 lbs at age 42.  The patient has tried Weight Watchers and ozempic, nutrisystem .  The patient was most successful with [employer] study with a weight loss of 80 lbs.  Her current exercise includes yoga 6 times a week. She denies any history of eating disorder such as anorexia, bulimia, or taking laxatives for weight loss, and denies any addiction including illicit substances, alcohol, or gambling.  Patient states she has a good  support system.  She lives with family.  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  OTC meds control reflux and diet. The patient's goal is 230-250 lbs.    ESS: Score of 7, reviewed 10/13/2022.  Does not need Sleep Study.    Pre op weight-344  IBW-144      PAST MEDICAL HISTORY:  Past Medical History:   Diagnosis Date    Anxiety     GERD (gastroesophageal reflux disease)     Hyperlipidemia     Hypertension     Hypothyroidism     Insomnia     Observed sleep apnea     Prediabetes     Rheumatoid arthritis, unspecified     Vitamin D deficiency        PAST SURGICAL HISTORY:  Past Surgical History:   Procedure Laterality Date    BTL      CHOLECYSTECTOMY      COLONOSCOPY N/A 8/30/2022    Procedure: COLONOSCOPY;  Surgeon: Drew Peraza MD;  Location: [facility];  Service: Endoscopy;  Laterality: N/A;    SURGICAL REMOVAL OF BONE SPUR      left heel    TONSILLECTOMY      TUBAL LIGATION  8/1995       FAMILY HISTORY:  Family History   Problem Relation Age of Onset    Heart  disease Mother     Bone cancer Mother     Cancer Mother         cervical    Hypertension Mother     Heart disease Father     Diabetes type I Father     Lung cancer Father     Diabetes Father     Hypertension Father     Heart disease Brother     No Known Problems Daughter     No Known Problems Daughter     Breast cancer Neg Hx     Ovarian cancer Neg Hx         SOCIAL HISTORY:  Social History     Socioeconomic History    Marital status:    Tobacco Use    Smoking status: Never    Smokeless tobacco: Never   Substance and Sexual Activity    Alcohol use: Not Currently     Alcohol/week: 0.0 standard drinks    Drug use: Never    Sexual activity: Yes     Partners: Male     Birth control/protection: See Surgical Hx     Social Determinants of Health     Financial Resource Strain: Low Risk     Difficulty of Paying Living Expenses: Not hard at all   Food Insecurity: No Food Insecurity    Worried About Running Out of Food in the Last Year: Never true    Ran Out of Food in the Last Year: Never true   Transportation Needs: No Transportation Needs    Lack of Transportation (Medical): No    Lack of Transportation (Non-Medical): No   Physical Activity: Insufficiently Active    Days of Exercise per Week: 4 days    Minutes of Exercise per Session: 20 min   Stress: Stress Concern Present    Feeling of Stress : To some extent   Social Connections: Unknown    Frequency of Communication with Friends and Family: More than three times a week    Frequency of Social Gatherings with Friends and Family: More than three times a week    Active Member of Clubs or Organizations: Yes    Attends Club or Organization Meetings: More than 4 times per year    Marital Status:    Housing Stability: Low Risk     Unable to Pay for Housing in the Last Year: No    Number of Places Lived in the Last Year: 1    Unstable Housing in the Last Year: No       MEDICATIONS:  Medications have been reviewed.    ALLERGIES:  Allergies have been  reviewed.    Review of Systems   Constitutional:  Negative for chills and fever.   HENT:  Negative for ear pain, nosebleeds and sore throat.    Eyes:  Negative for blurred vision and double vision.   Respiratory:  Negative for cough and shortness of breath.    Cardiovascular:  Negative for chest pain, palpitations, orthopnea, claudication and leg swelling.   Gastrointestinal:  Negative for abdominal pain, constipation, diarrhea, heartburn, nausea and vomiting.   Genitourinary:  Negative for dysuria and urgency.   Musculoskeletal:  Positive for joint pain. Negative for back pain.   Skin:  Negative for rash.   Neurological:  Negative for dizziness, tingling, focal weakness and headaches.   Endo/Heme/Allergies:  Does not bruise/bleed easily.   Psychiatric/Behavioral:  Negative for depression and suicidal ideas.      Vitals:    10/13/22 1304   Weight: (!) 156.2 kg (344 lb 5.7 oz)   PainSc:   4       Physical Exam  Vitals and nursing note reviewed.   Constitutional:       Appearance: She is well-developed. She is morbidly obese.   HENT:      Head: Normocephalic.      Nose: Nose normal.      Mouth/Throat:      Mouth: Mucous membranes are moist.   Eyes:      Extraocular Movements: Extraocular movements intact.   Cardiovascular:      Rate and Rhythm: Normal rate and regular rhythm.      Heart sounds: Normal heart sounds.   Pulmonary:      Effort: Pulmonary effort is normal.      Breath sounds: Normal breath sounds.   Abdominal:      General: Bowel sounds are normal.      Palpations: Abdomen is soft.   Musculoskeletal:         General: Normal range of motion.      Cervical back: Normal range of motion.   Skin:     General: Skin is warm and dry.      Capillary Refill: Capillary refill takes less than 2 seconds.   Neurological:      Mental Status: She is alert and oriented to person, place, and time.   Psychiatric:         Mood and Affect: Mood normal.        DIAGNOSIS:  1. Morbid obesity, body mass index is 55.58 kg/m². and  inability to lose weight.  2. Co-morbidities: essential hypertension, hyperlipidemia, and SLE    PLAN:  The patient is a good candidate for Bariatric Surgery. The patient is interested in laparoscopic melissa-en-y gastric bypass with Dr. Ribeiro. The surgery and post-op care was discussed in detail with the patient. All questions were answered.    The patient understands that bariatric surgery is a tool to aid in weight loss and that they need to be committed to the diet and exercise post-operatively for successful weight loss. Discussed with patient that bariatric surgery is not the easy way out and that it will take plenty of dedication on the patient's part to be successful. Also discussed the possibility of weight regain if the patient strays from the diet guidelines or exercise requirements. Patient verbalized understanding and wishes to proceed with the work-up.    Discussed concerns with steroid use for lupus flare pre or post op.     Estimated Goal weight is 240-250 lbs.    ORDERS:  1. Chest X-Ray and EKG Stress Echo (will turn 52 in Dec)  2. Psychological Consult, Bariatric Dietician Consult, and Rheumatology clearance   3. Bariatric Labs: Per orders.    PCP: Layo Mccrary MD  RTC: As scheduled.      60 minute visit, over 50% of time spent counseling patient face to face on surgical options, risks, benefits, expected diet, recommended exercise regimen, and expected weight loss

## 2022-10-17 ENCOUNTER — SPECIALTY PHARMACY (OUTPATIENT)
Dept: PHARMACY | Facility: CLINIC | Age: 52
End: 2022-10-17
Payer: COMMERCIAL

## 2022-10-17 ENCOUNTER — OFFICE VISIT (OUTPATIENT)
Dept: PSYCHIATRY | Facility: CLINIC | Age: 52
End: 2022-10-17
Payer: COMMERCIAL

## 2022-10-17 DIAGNOSIS — Z01.818 PREOPERATIVE EVALUATION TO RULE OUT SURGICAL CONTRAINDICATION: Primary | ICD-10-CM

## 2022-10-17 DIAGNOSIS — I10 ESSENTIAL HYPERTENSION: ICD-10-CM

## 2022-10-17 DIAGNOSIS — G47.33 OSA ON CPAP: ICD-10-CM

## 2022-10-17 DIAGNOSIS — E66.01 MORBID OBESITY: ICD-10-CM

## 2022-10-17 DIAGNOSIS — E78.5 DYSLIPIDEMIA: ICD-10-CM

## 2022-10-17 DIAGNOSIS — L40.50 PSORIATIC ARTHRITIS: Primary | ICD-10-CM

## 2022-10-17 PROCEDURE — 3044F PR MOST RECENT HEMOGLOBIN A1C LEVEL <7.0%: ICD-10-PCS | Mod: CPTII,95,, | Performed by: PSYCHOLOGIST

## 2022-10-17 PROCEDURE — 3044F HG A1C LEVEL LT 7.0%: CPT | Mod: CPTII,95,, | Performed by: PSYCHOLOGIST

## 2022-10-17 PROCEDURE — 90791 PR PSYCHIATRIC DIAGNOSTIC EVALUATION: ICD-10-PCS | Mod: 95,,, | Performed by: PSYCHOLOGIST

## 2022-10-17 PROCEDURE — 99999 PR PBB SHADOW E&M-EST. PATIENT-LVL II: ICD-10-PCS | Mod: PBBFAC,,, | Performed by: PSYCHOLOGIST

## 2022-10-17 PROCEDURE — 90791 PSYCH DIAGNOSTIC EVALUATION: CPT | Mod: 95,,, | Performed by: PSYCHOLOGIST

## 2022-10-17 PROCEDURE — 4010F ACE/ARB THERAPY RXD/TAKEN: CPT | Mod: CPTII,95,, | Performed by: PSYCHOLOGIST

## 2022-10-17 PROCEDURE — 4010F PR ACE/ARB THEARPY RXD/TAKEN: ICD-10-PCS | Mod: CPTII,95,, | Performed by: PSYCHOLOGIST

## 2022-10-17 PROCEDURE — 99999 PR PBB SHADOW E&M-EST. PATIENT-LVL II: CPT | Mod: PBBFAC,,, | Performed by: PSYCHOLOGIST

## 2022-10-17 NOTE — TELEPHONE ENCOUNTER
Incoming call from patient for orencia refill. PA required. Patient due to inject 10/23. Routing to [facility].
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
## 2022-10-18 ENCOUNTER — TELEPHONE (OUTPATIENT)
Dept: BARIATRICS | Facility: CLINIC | Age: 52
End: 2022-10-18
Payer: COMMERCIAL

## 2022-10-18 ENCOUNTER — PATIENT MESSAGE (OUTPATIENT)
Dept: BARIATRICS | Facility: CLINIC | Age: 52
End: 2022-10-18
Payer: COMMERCIAL

## 2022-10-18 ENCOUNTER — HOSPITAL ENCOUNTER (OUTPATIENT)
Dept: RADIOLOGY | Facility: HOSPITAL | Age: 52
Discharge: HOME | End: 2022-10-18
Attending: NURSE PRACTITIONER
Payer: COMMERCIAL

## 2022-10-18 ENCOUNTER — PATIENT MESSAGE (OUTPATIENT)
Dept: FAMILY MEDICINE | Facility: CLINIC | Age: 52
End: 2022-10-18
Payer: COMMERCIAL

## 2022-10-18 ENCOUNTER — CLINICAL SUPPORT (OUTPATIENT)
Dept: CARDIOLOGY | Facility: HOSPITAL | Age: 52
End: 2022-10-18
Attending: NURSE PRACTITIONER
Payer: COMMERCIAL

## 2022-10-18 DIAGNOSIS — I10 ESSENTIAL HYPERTENSION: ICD-10-CM

## 2022-10-18 DIAGNOSIS — G47.33 OSA ON CPAP: ICD-10-CM

## 2022-10-18 DIAGNOSIS — E66.01 MORBID OBESITY WITH BODY MASS INDEX (BMI) OF 50.0 TO 59.9 IN ADULT: ICD-10-CM

## 2022-10-18 PROCEDURE — 93005 ELECTROCARDIOGRAM TRACING: CPT | Mod: PO

## 2022-10-18 PROCEDURE — 71046 XR CHEST PA AND LATERAL: ICD-10-PCS | Mod: 26,,, | Performed by: RADIOLOGY

## 2022-10-18 PROCEDURE — 93010 ELECTROCARDIOGRAM REPORT: CPT | Mod: ,,, | Performed by: INTERNAL MEDICINE

## 2022-10-18 PROCEDURE — 71046 X-RAY EXAM CHEST 2 VIEWS: CPT | Mod: 26,,, | Performed by: RADIOLOGY

## 2022-10-18 PROCEDURE — 71046 X-RAY EXAM CHEST 2 VIEWS: CPT | Mod: TC,FY,PO

## 2022-10-18 PROCEDURE — 93010 EKG 12-LEAD: ICD-10-PCS | Mod: ,,, | Performed by: INTERNAL MEDICINE

## 2022-10-18 NOTE — TELEPHONE ENCOUNTER
----- Message from Aleisha Christine, PhD sent at 10/17/2022  3:21 PM CDT -----  Patient is psychologically cleared for bariatric surgery. No recommendations made.

## 2022-10-19 RX ORDER — LEVOTHYROXINE SODIUM 125 UG/1
TABLET ORAL
Qty: 60 TABLET | Refills: 2 | Status: SHIPPED | OUTPATIENT
Start: 2022-10-19 | End: 2023-01-11

## 2022-10-19 NOTE — TELEPHONE ENCOUNTER
Calling to check status of PA  CaseID: [id number] still pending review  Updated to urgent as dose is due Sunday   Est turn around time is 24h

## 2022-10-19 NOTE — TELEPHONE ENCOUNTER
No new care gaps identified.  [facility] Embedded Care Gaps. Reference number: [id number]. 10/19/2022   8:20:48 AM CDT

## 2022-10-20 NOTE — TELEPHONE ENCOUNTER
Specialty Pharmacy - Refill Coordination  Specialty Pharmacy - Medication/Referral Authorization    Specialty Medication Orders Linked to Encounter      Flowsheet Row Most Recent Value   Medication #1 abatacept (ORENCIA CLICKJECT) 125 mg/mL AtIn (Order#[id number], Rx#[id number])            Refill Questions - Documented Responses      Flowsheet Row Most Recent Value   Patient Availability and HIPAA Verification    Does patient want to proceed with activity? Yes   HIPAA/medical authority confirmed? Yes   Relationship to patient of person spoken to? Self   Refill Screening Questions    Changes to allergies? No   Changes to medications? No   New conditions since last clinic visit? No   Unplanned office visit, urgent care, ED, or hospital admission in the last 4 weeks? No   How does patient/caregiver feel medication is working? Very good   Financial problems or insurance changes? No   How many doses of your specialty medications were missed in the last 4 weeks? 0   Would patient like to speak to a pharmacist? No   When does the patient need to receive the medication? 10/23/22   Refill Delivery Questions    How will the patient receive the medication? MEDRx   When does the patient need to receive the medication? 10/23/22   Shipping Address Home   Address in [facility] confirmed and updated if neccessary? Yes   Expected Copay ($) 5   Is the patient able to afford the medication copay? Yes   Payment Method CC on file   Days supply of Refill 28   Supplies needed? Alcohol Swabs   Refill activity completed? Yes   Refill activity plan Refill scheduled   Shipment/Pickup Date: 10/20/22            Current Outpatient Medications   Medication Sig    abatacept (ORENCIA CLICKJECT) 125 mg/mL AtIn Inject 1 mL (125 mg) into the skin every 7 days.    albuterol (PROVENTIL HFA) 90 mcg/actuation inhaler Inhale 2 puffs into the lungs every 6 (six) hours as needed for Wheezing. Rescue    aspirin (ECOTRIN) 81 MG EC tablet Take 1 tablet  (81 mg total) by mouth once daily.    atorvastatin (LIPITOR) 40 MG tablet Take 1 tablet (40 mg total) by mouth every evening.    cephALEXin (KEFLEX) 500 MG capsule Take 1 capsule (500 mg total) by mouth every 12 (twelve) hours.    cyclobenzaprine (FLEXERIL) 10 MG tablet TAKE ONE TABLET EVERY EVENING    EScitalopram oxalate (LEXAPRO) 20 MG tablet Take 20 mg by mouth once daily.    flash glucose scanning reader (FREESTYLE SYDNIE 2 READER) Misc 1 Device by Misc.(Non-Drug; Combo Route) route continuous.    flash glucose sensor (FREESTYLE SYDNIE 2 SENSOR) Kit 1 Device by Misc.(Non-Drug; Combo Route) route every 14 (fourteen) days.    folic acid (FOLVITE) 1 MG tablet Take 1 tablet (1 mg total) by mouth once daily.    furosemide (LASIX) 20 MG tablet Take 1 tablet (20 mg total) by mouth once daily.    hydrOXYchloroQUINE (PLAQUENIL) 200 mg tablet Take 200 mg by mouth 2 (two) times daily.    levothyroxine (SYNTHROID) 125 MCG tablet TAKE 2 TABLETS(250 MCG) BY MOUTH DAILY    lisinopriL (PRINIVIL,ZESTRIL) 20 MG tablet TAKE 1 TABLET BY MOUTH EVERY DAY    methotrexate 2.5 MG Tab TAKE 8 TABLETS BY MOUTH EVERY 7 DAYS    omeprazole (PRILOSEC) 40 MG capsule Take 1 capsule (40 mg total) by mouth once daily.    OZEMPIC 1 mg/dose (4 mg/3 mL) Inject 1 mg into the skin every 7 days.    predniSONE (DELTASONE) 5 MG tablet Take 2 tablets daily for 2 weeks and then one tablet daily    semaglutide (OZEMPIC) 1 mg/dose (2 mg/1.5 mL) PnIj Inject 1 mg into the skin every 7 days.    triamcinolone acetonide 0.1% (KENALOG) 0.1 % ointment Apply topically 2 (two) times daily.    XIIDRA 5 % Dpet APPLY 1 DROP TO BOTH EYES BID   Last reviewed on 10/13/2022  1:05 PM by Nely Vicente NP    Review of patient's allergies indicates:   Allergen Reactions    Sulfamethoxazole-trimethoprim     Sulfa (sulfonamide antibiotics) Rash and Swelling    Last reviewed on  10/19/2022 8:20 AM by Danielle Coco      Tasks added this encounter   11/16/2022 - Refill Call (Auto  Added)   Tasks due within next 3 months   1/18/2023 - Clinical - Follow Up Assesement (Annual)     Bright Faust, PharmD  [facility]  [address]  [facility] [address]  Phone: [phone]  Fax: [phone]

## 2022-10-24 ENCOUNTER — PATIENT MESSAGE (OUTPATIENT)
Dept: BARIATRICS | Facility: CLINIC | Age: 52
End: 2022-10-24
Payer: COMMERCIAL

## 2022-10-24 ENCOUNTER — HOSPITAL ENCOUNTER (OUTPATIENT)
Dept: CARDIOLOGY | Facility: HOSPITAL | Age: 52
Discharge: HOME | End: 2022-10-24
Attending: NURSE PRACTITIONER
Payer: COMMERCIAL

## 2022-10-24 ENCOUNTER — OFFICE VISIT (OUTPATIENT)
Dept: RHEUMATOLOGY | Facility: CLINIC | Age: 52
End: 2022-10-24
Payer: COMMERCIAL

## 2022-10-24 ENCOUNTER — PATIENT MESSAGE (OUTPATIENT)
Dept: RHEUMATOLOGY | Facility: CLINIC | Age: 52
End: 2022-10-24

## 2022-10-24 VITALS
HEIGHT: 66 IN | SYSTOLIC BLOOD PRESSURE: 142 MMHG | DIASTOLIC BLOOD PRESSURE: 82 MMHG | BODY MASS INDEX: 47.09 KG/M2 | HEART RATE: 119 BPM | WEIGHT: 293 LBS

## 2022-10-24 DIAGNOSIS — M32.9 SYSTEMIC LUPUS ERYTHEMATOSUS, UNSPECIFIED SLE TYPE, UNSPECIFIED ORGAN INVOLVEMENT STATUS: Primary | ICD-10-CM

## 2022-10-24 DIAGNOSIS — I10 ESSENTIAL HYPERTENSION: ICD-10-CM

## 2022-10-24 DIAGNOSIS — E66.01 MORBID OBESITY WITH BODY MASS INDEX (BMI) OF 50.0 TO 59.9 IN ADULT: ICD-10-CM

## 2022-10-24 DIAGNOSIS — G47.33 OSA ON CPAP: ICD-10-CM

## 2022-10-24 DIAGNOSIS — M06.9 RHEUMATOID ARTHRITIS FLARE: ICD-10-CM

## 2022-10-24 LAB
AORTIC ROOT ANNULUS: 3.05 CM
AV INDEX (PROSTH): 0.84
AV MEAN GRADIENT: 6 MMHG
AV PEAK GRADIENT: 10 MMHG
AV VALVE AREA: 3.67 CM2
AV VELOCITY RATIO: 0.78
BSA FOR ECHO PROCEDURE: 2.7 M2
CV ECHO LV RWT: 0.5 CM
CV STRESS BASE HR: 119 BPM
DIASTOLIC BLOOD PRESSURE: 82 MMHG
DOP CALC AO PEAK VEL: 1.62 M/S
DOP CALC AO VTI: 28.7 CM
DOP CALC LVOT AREA: 4.4 CM2
DOP CALC LVOT DIAMETER: 2.36 CM
DOP CALC LVOT PEAK VEL: 1.27 M/S
DOP CALC LVOT STROKE VOLUME: 105.37 CM3
DOP CALC RVOT PEAK VEL: 0.89 M/S
DOP CALC RVOT VTI: 18.6 CM
DOP CALCLVOT PEAK VEL VTI: 24.1 CM
E WAVE DECELERATION TIME: 138.79 MSEC
E/A RATIO: 1.08
E/E' RATIO: 8.09 M/S
ECHO LV POSTERIOR WALL: 1.31 CM (ref 0.6–1.1)
EJECTION FRACTION: 60 %
FRACTIONAL SHORTENING: 29 % (ref 28–44)
INTERVENTRICULAR SEPTUM: 1.21 CM (ref 0.6–1.1)
IVRT: 85.63 MSEC
LA MAJOR: 5.79 CM
LA MINOR: 5.99 CM
LA WIDTH: 3.9 CM
LEFT ATRIUM SIZE: 4.56 CM
LEFT ATRIUM VOLUME INDEX MOD: 19.7 ML/M2
LEFT ATRIUM VOLUME INDEX: 35.3 ML/M2
LEFT ATRIUM VOLUME MOD: 49.54 CM3
LEFT ATRIUM VOLUME: 89.01 CM3
LEFT INTERNAL DIMENSION IN SYSTOLE: 3.73 CM (ref 2.1–4)
LEFT VENTRICLE DIASTOLIC VOLUME INDEX: 52.97 ML/M2
LEFT VENTRICLE DIASTOLIC VOLUME: 133.48 ML
LEFT VENTRICLE MASS INDEX: 108 G/M2
LEFT VENTRICLE SYSTOLIC VOLUME INDEX: 23.6 ML/M2
LEFT VENTRICLE SYSTOLIC VOLUME: 59.38 ML
LEFT VENTRICULAR INTERNAL DIMENSION IN DIASTOLE: 5.27 CM (ref 3.5–6)
LEFT VENTRICULAR MASS: 272.14 G
LV LATERAL E/E' RATIO: 8.45 M/S
LV SEPTAL E/E' RATIO: 7.75 M/S
LVOT MG: 4.08 MMHG
LVOT MV: 0.98 CM/S
MV PEAK A VEL: 0.86 M/S
MV PEAK E VEL: 0.93 M/S
MV STENOSIS PRESSURE HALF TIME: 40.25 MS
MV VALVE AREA P 1/2 METHOD: 5.47 CM2
OHS CV CPX 1 MINUTE RECOVERY HEART RATE: 125 BPM
OHS CV CPX 85 PERCENT MAX PREDICTED HEART RATE MALE: 138
OHS CV CPX ESTIMATED METS: 5
OHS CV CPX MAX PREDICTED HEART RATE: 163
OHS CV CPX PATIENT IS FEMALE: 1
OHS CV CPX PATIENT IS MALE: 0
OHS CV CPX PEAK DIASTOLIC BLOOD PRESSURE: 80 MMHG
OHS CV CPX PEAK HEAR RATE: 153 BPM
OHS CV CPX PEAK RATE PRESSURE PRODUCT: ABNORMAL
OHS CV CPX PEAK SYSTOLIC BLOOD PRESSURE: 280 MMHG
OHS CV CPX PERCENT MAX PREDICTED HEART RATE ACHIEVED: 94
OHS CV CPX RATE PRESSURE PRODUCT PRESENTING: ABNORMAL
PULM VEIN S/D RATIO: 1.44
PV MEAN GRADIENT: 2.1 MMHG
PV PEAK D VEL: 0.41 M/S
PV PEAK S VEL: 0.59 M/S
PV PEAK VELOCITY: 0.9 CM/S
RA MAJOR: 4.5 CM
RA WIDTH: 4.02 CM
RIGHT VENTRICULAR END-DIASTOLIC DIMENSION: 2.74 CM
SINUS: 2.61 CM
STJ: 2.54 CM
STRESS ANGINA INDEX: 0
STRESS ECHO POST EXERCISE DUR MIN: 2 MINUTES
STRESS ECHO POST EXERCISE DUR SEC: 36 SECONDS
SYSTOLIC BLOOD PRESSURE: 142 MMHG
TDI LATERAL: 0.11 M/S
TDI SEPTAL: 0.12 M/S
TDI: 0.12 M/S
TRICUSPID ANNULAR PLANE SYSTOLIC EXCURSION: 2.67 CM

## 2022-10-24 PROCEDURE — 93351 STRESS TTE COMPLETE: CPT | Mod: 26,,, | Performed by: INTERNAL MEDICINE

## 2022-10-24 PROCEDURE — 93351 STRESS TTE COMPLETE: CPT | Mod: PO

## 2022-10-24 PROCEDURE — 3044F HG A1C LEVEL LT 7.0%: CPT | Mod: CPTII,95,, | Performed by: INTERNAL MEDICINE

## 2022-10-24 PROCEDURE — 4010F PR ACE/ARB THEARPY RXD/TAKEN: ICD-10-PCS | Mod: CPTII,95,, | Performed by: INTERNAL MEDICINE

## 2022-10-24 PROCEDURE — 3044F PR MOST RECENT HEMOGLOBIN A1C LEVEL <7.0%: ICD-10-PCS | Mod: CPTII,95,, | Performed by: INTERNAL MEDICINE

## 2022-10-24 PROCEDURE — 99214 OFFICE O/P EST MOD 30 MIN: CPT | Mod: 95,,, | Performed by: INTERNAL MEDICINE

## 2022-10-24 PROCEDURE — 93351 STRESS ECHO (CUPID ONLY): ICD-10-PCS | Mod: 26,,, | Performed by: INTERNAL MEDICINE

## 2022-10-24 PROCEDURE — 99214 PR OFFICE/OUTPT VISIT, EST, LEVL IV, 30-39 MIN: ICD-10-PCS | Mod: 95,,, | Performed by: INTERNAL MEDICINE

## 2022-10-24 PROCEDURE — 4010F ACE/ARB THERAPY RXD/TAKEN: CPT | Mod: CPTII,95,, | Performed by: INTERNAL MEDICINE

## 2022-10-24 NOTE — PROGRESS NOTES
Rapid3 Question Responses and Scores 10/22/2022   MDHAQ Score 1.1   Psychologic Score 3.3   Pain Score 6   When you awakened in the morning OVER THE LAST WEEK, did you feel stiff? Yes   If Yes, please indicate the number of hours until you are as limber as you will be for the day 2   Fatigue Score 8.5   Global Health Score 8   RAPID3 Score 5.89     Answers submitted by the patient for this visit:  Rheumatology Questionnaire (Submitted on 10/22/2022)  fever: Yes  eye redness: Yes  mouth sores: No  headaches: Yes  shortness of breath: No  chest pain: No  trouble swallowing: No  diarrhea: No  constipation: No  unexpected weight change: No  genital sore: No  dysuria: No  During the last 3 days, have you had a skin rash?: Yes  Bruises or bleeds easily: No  cough: No

## 2022-10-24 NOTE — PROGRESS NOTES
Subjective:       Patient ID: Christine Alfaro is a 50 y.o. female.    Chief Complaint: Disease Management    HPI 50 year old with PMH of Hashimotos disease, psoriasis,HTN, HL, ETELVINA , GERD, mitral valve regurgitation, left ankle spur removal  here for evaluation. She has been on cpap for many years.  She does not wake up well rested. She has been tired for years, but worse over the last 8 months. Last year, she started to have joint pain. She is having pain in toes, fingers, left knee, elbows, right jaw. Pain level is as high as 7/10, aching and non radiating. Reports swelling in left ankle and feet. Reports swelling in feet and lower legs for a year. In morning, she reports her hands are swollen. She has pain in mid to lower back since she was 18. She gets pustules on occasion in her hands for a year. She is getting mouth ulcer where she has dental bridge.  She clenches her teeth at night. Denies malar rash. She has dry eyes and dry mouth for last year. She takes ibuprofen 800mg po BID prn for last year to help her joint pain. It helps her take edge off.  She has had psoriasis for 3 years.  Reports dry cough for last year. Reports photosensitivity for 3 years. Denies raynauds. Denies history of thrombosis. She has had 2 miscarriages. She reports she had one miscarriage at 6 months due to blood incompatibility.  She had another miscarriage at 2.5 months. She thinks her thyroid might not have been well controlled. Over the last year and half, she has had chest pain with coughing and sometimes it can radiate to back.    She was able to have 2 healthy pregnancies.      Family hx: uncle: RA  Mother:psoriasis       Interval history: She is looking to get clearance for bariatric surgery in February. She is taking plaquenil 200mg po BID.She has taken Orencia. She is taking MTX 8 pills once a week.  She is off steroids. Pain level is 7/10. She is having pain in lower back, ankles and feet.  Reports her feet swell as well  "as her hands and ankles.  She recently had flare involve right knee and left elbow/wrist.  She has not been taking tramadol 50mg in morning. She takes flexeril 10mg po qhs.   Denies photosensitivity. Denies oral ulcers. She thinks her mouth is dry.     Past Medical History:   Diagnosis Date    Anxiety     GERD (gastroesophageal reflux disease)     Hyperlipidemia     Hypertension     Hypothyroidism     Insomnia     Observed sleep apnea     Vitamin D deficiency        Review of Systems   Constitutional: Positive for fatigue. Negative for activity change, appetite change, chills, diaphoresis, fever and unexpected weight change.   HENT: Negative for congestion, ear discharge, ear pain, facial swelling, mouth sores, sinus pressure, sneezing, sore throat, tinnitus and trouble swallowing.    Eyes: Negative for photophobia, pain, discharge, redness, itching and visual disturbance.   Respiratory: Negative for apnea, cough, chest tightness, shortness of breath, wheezing and stridor.    Cardiovascular: Negative for chest pain and leg swelling.   Gastrointestinal: Negative for abdominal distention, abdominal pain, anal bleeding, blood in stool, constipation, diarrhea and nausea.   Endocrine: Negative for cold intolerance and heat intolerance.   Genitourinary: Negative for difficulty urinating, dysuria and genital sores.   Musculoskeletal: Positive for arthralgias, back pain, gait problem and joint swelling. Negative for neck pain and neck stiffness.   Skin: Positive for rash. Negative for color change, pallor and wound.   Neurological: Negative for dizziness, seizures, light-headedness, numbness and headaches.   Hematological: Negative for adenopathy. Does not bruise/bleed easily.   Psychiatric/Behavioral: Negative for sleep disturbance. The patient is not nervous/anxious.            Objective:   BP (!) 150/85   Pulse 84   Ht 5' 6" (1.676 m)   Wt (!) 154.4 kg (340 lb 6.2 oz)   BMI 54.94 kg/m²      Physical Exam "   Constitutional: She is oriented to person, place, and time.   HENT:   Head: Normocephalic and atraumatic.   Right Ear: External ear normal.   Left Ear: External ear normal.   Nose: Nose normal.   Mouth/Throat: Oropharynx is clear and moist. No oropharyngeal exudate.   Eyes: Conjunctivae and EOM are normal. Pupils are equal, round, and reactive to light. Right eye exhibits no discharge. Left eye exhibits no discharge. No scleral icterus.   Neck: No JVD present. No thyromegaly present.   Cardiovascular: Normal rate, regular rhythm, normal heart sounds and intact distal pulses.  Exam reveals no gallop and no friction rub.    No murmur heard.  Pulmonary/Chest: Effort normal and breath sounds normal. No respiratory distress. She has no wheezes. She has no rales. She exhibits no tenderness.   Abdominal: Soft. Bowel sounds are normal. She exhibits no distension and no mass. There is no abdominal tenderness. There is no rebound and no guarding.   Lymphadenopathy:     She has no cervical adenopathy.   Neurological: She is alert and oriented to person, place, and time. She displays normal reflexes. No cranial nerve deficit. She exhibits normal muscle tone. Gait normal. Coordination normal.   Skin: Skin is dry. Rash noted. No erythema. No pallor.     Rash in forearms   Psychiatric: Affect and judgment normal.   Musculoskeletal: Tenderness present. No deformity or edema.          No data to display     Assessment:      51 year old with PMH of Hashimotos disease  ,HTN, HL, ETELVINA , GERD, mitral valve regurgitation, left ankle spur removal  here for evaluation of joint pain.  Given +thu, dsdna,arthritis, photosensitivity,  Pleurisy, and fatigue, this is concerning for SLE. Her main issue is inflammatory arthritis.  GIVEN THAT SHE HAS SLE, cannot do anti- tnf.  Given continued pain and swelling will switch her to Orencia infusions.    She is off steroids. Still reports fatigue but improvement in joint pain and swelling with Orencia  injections and MTX 20mg once  A week.  Can consider switching her from orencia to benlysta. Or can consider doing imuran.     I told her from my end she is cleared for bariatric surgery.      Plan:       Problem List Items Addressed This Visit       None          -switch from  Orencia 125 mg sub q once a week to Orencia infusions (therapy plan placed)  Continue plaquenil 200mg po BID   Continue flexeril 10mg po qhs   -continue MTX  8  pills once a week   Continue folic acid 1 mg po qday  Labs  before next visit    #obesity: encourage weight loss       The patient location is: home  The chief complaint leading to consultation is: joint pain    Visit type: audiovisual    Face to Face time with patient: 30   minutes of total time spent on the encounter, which includes face to face time and non-face to face time preparing to see the patient (eg, review of tests), Obtaining and/or reviewing separately obtained history, Documenting clinical information in the electronic or other health record, Independently interpreting results (not separately reported) and communicating results to the patient/family/caregiver, or Care coordination (not separately reported).         Each patient to whom he or she provides medical services by telemedicine is:  (1) informed of the relationship between the physician and patient and the respective role of any other health care provider with respect to management of the patient; and (2) notified that he or she may decline to receive medical services by telemedicine and may withdraw from such care at any time.    Notes:

## 2022-10-25 ENCOUNTER — PATIENT MESSAGE (OUTPATIENT)
Dept: RHEUMATOLOGY | Facility: CLINIC | Age: 52
End: 2022-10-25
Payer: COMMERCIAL

## 2022-10-26 ENCOUNTER — PATIENT MESSAGE (OUTPATIENT)
Dept: RHEUMATOLOGY | Facility: CLINIC | Age: 52
End: 2022-10-26
Payer: COMMERCIAL

## 2022-10-26 DIAGNOSIS — R30.0 DYSURIA: ICD-10-CM

## 2022-10-26 DIAGNOSIS — M32.9 SYSTEMIC LUPUS ERYTHEMATOSUS, UNSPECIFIED SLE TYPE, UNSPECIFIED ORGAN INVOLVEMENT STATUS: Primary | ICD-10-CM

## 2022-10-28 ENCOUNTER — PATIENT MESSAGE (OUTPATIENT)
Dept: RHEUMATOLOGY | Facility: CLINIC | Age: 52
End: 2022-10-28
Payer: COMMERCIAL

## 2022-10-28 PROBLEM — M06.9 RHEUMATOID ARTHRITIS FLARE: Status: ACTIVE | Noted: 2022-10-28

## 2022-10-28 RX ORDER — ACETAMINOPHEN 325 MG/1
650 TABLET ORAL
Status: CANCELLED | OUTPATIENT
Start: 2022-10-28

## 2022-10-28 RX ORDER — SODIUM CHLORIDE 0.9 % (FLUSH) 0.9 %
10 SYRINGE (ML) INJECTION
Status: CANCELLED | OUTPATIENT
Start: 2022-10-28

## 2022-10-28 RX ORDER — DIPHENHYDRAMINE HYDROCHLORIDE 50 MG/ML
50 INJECTION INTRAMUSCULAR; INTRAVENOUS ONCE AS NEEDED
Status: CANCELLED | OUTPATIENT
Start: 2022-10-28

## 2022-10-28 RX ORDER — METHYLPREDNISOLONE SOD SUCC 125 MG
100 VIAL (EA) INJECTION
Status: CANCELLED | OUTPATIENT
Start: 2022-10-28

## 2022-10-28 RX ORDER — HEPARIN 100 UNIT/ML
500 SYRINGE INTRAVENOUS
Status: CANCELLED | OUTPATIENT
Start: 2022-10-28

## 2022-10-28 RX ORDER — DIPHENHYDRAMINE HYDROCHLORIDE 50 MG/ML
25 INJECTION INTRAMUSCULAR; INTRAVENOUS
Status: CANCELLED | OUTPATIENT
Start: 2022-10-28

## 2022-10-28 RX ORDER — EPINEPHRINE 0.3 MG/.3ML
0.3 INJECTION SUBCUTANEOUS ONCE AS NEEDED
Status: CANCELLED | OUTPATIENT
Start: 2022-10-28

## 2022-11-01 ENCOUNTER — PATIENT MESSAGE (OUTPATIENT)
Dept: RHEUMATOLOGY | Facility: CLINIC | Age: 52
End: 2022-11-01
Payer: COMMERCIAL

## 2022-11-04 ENCOUNTER — PATIENT MESSAGE (OUTPATIENT)
Dept: BARIATRICS | Facility: CLINIC | Age: 52
End: 2022-11-04
Payer: COMMERCIAL

## 2022-11-16 ENCOUNTER — SPECIALTY PHARMACY (OUTPATIENT)
Dept: PHARMACY | Facility: CLINIC | Age: 52
End: 2022-11-16
Payer: COMMERCIAL

## 2022-11-18 ENCOUNTER — CLINICAL SUPPORT (OUTPATIENT)
Dept: BARIATRICS | Facility: CLINIC | Age: 52
End: 2022-11-18
Payer: COMMERCIAL

## 2022-11-18 VITALS — WEIGHT: 293 LBS | BODY MASS INDEX: 56.01 KG/M2

## 2022-11-18 DIAGNOSIS — E66.01 MORBID OBESITY WITH BODY MASS INDEX (BMI) OF 40.0 OR HIGHER: ICD-10-CM

## 2022-11-18 DIAGNOSIS — G47.33 OSA ON CPAP: ICD-10-CM

## 2022-11-18 DIAGNOSIS — Z71.3 DIETARY COUNSELING AND SURVEILLANCE: ICD-10-CM

## 2022-11-18 DIAGNOSIS — I10 ESSENTIAL HYPERTENSION: Primary | ICD-10-CM

## 2022-11-18 PROCEDURE — 99499 UNLISTED E&M SERVICE: CPT | Mod: 95,,, | Performed by: DIETITIAN, REGISTERED

## 2022-11-18 PROCEDURE — 97803 PR MED NUTR THER, SUBSQ, INDIV, EA 15 MIN: ICD-10-PCS | Mod: 95,,, | Performed by: DIETITIAN, REGISTERED

## 2022-11-18 PROCEDURE — 99499 NO LOS: ICD-10-PCS | Mod: 95,,, | Performed by: DIETITIAN, REGISTERED

## 2022-11-18 PROCEDURE — 97803 MED NUTRITION INDIV SUBSEQ: CPT | Mod: 95,,, | Performed by: DIETITIAN, REGISTERED

## 2022-11-18 NOTE — PROGRESS NOTES
The patient location is:  Patient Home   The chief complaint leading to consultation is: morbid obesity in work up for bariatric surgery  Visit type: Virtual visit with synchronous audio and video  Total time spent with patient: 30 minutes  Each patient to whom he or she provides medical services by telemedicine is:  (1) informed of the relationship between the physician and patient and the respective role of any other health care provider with respect to management of the patient; and (2) notified that he or she may decline to receive medical services by telemedicine and may withdraw from such care at any time.  Nutrition Handout located in AVS section of pt's [facility] and/or sent as a message via [facility] portal.  Pt informed of handout and how to access this information in [email or web address].  NUTRITION NOTE    Referring Physician: Titi Ribeiro MD  Reason for MNT Referral:  Gastric Bypass    Patient presents for f/u visit for MSD with weight at a standstill over the past month; total weight loss by making numerous dietary and lifestyle changes.  Eliminated carbonated beverages, increased water, eliminated rice and white potatoes,     CLINICAL DATA:  51 y.o. female.    Current Weight: 347 lbs  Weight Change Since Initial Visit: Gain of 7 lbs  Ideal Body Weight: 144 lbs  There is no height or weight on file to calculate BMI.  Last Wt: 347 lbs  Initial Wt: 340 lbs    DAILY NUTRITIONAL NEEDS: pre-op nutritional bariatric guidelines to promote weight loss  3442-4813 Calories    Grams Protein    CURRENT DIET:  Regular diet.  Diet Recall: Food records are not present.  B: Protein shake and sometimes  coffee with splenda  S: Premier Protein  L: soup or hamburger quita or chicken   D:Baked chicken with sweet potatoes    Diet Includes: regular meal plan  Meal Pattern: Improved.  Protein Supplements: 0-1 per day. Premier or Muscle Milk [employer] protein powder 0 gm sugar, 20 gms protein mixed water  Snacking:  Inadequate. Snacks include  none .  Vegetables: Likes a variety. Eats 2-3 times per week.  Fruits: Likes a few. Eats rarely.  Beverages: water, soda, sugar-free beverages, and coffee without sugar  Dining Out:  Weekly. 5 times per week Mostly fast food, restaurants, and take-out.  Cooking at home: Daily. Mostly  crock pot  meat, fish, starchy CHO, and vegetables. Cut out rice and pasta     CURRENT EXERCISE:  Adequate  Godfrey Chi once a week intensively 45 mins  Godfrey Chi daily on her own 20 minutes    Vitamins / Minerals / Herbs:   One a day   Vitamin D  Olli  Folic Acid    LABS:    No recent      Food Allergies:   Eggs blow stomach up    Social:  Works regular daytime shifts.  Alcohol: None. Does not drink  Smoking: None.    ASSESSMENT:  Patient demonstrates some willingness to change lifestyle habits as evidenced by dietary changes and decreased starchy carbs .    Doing poorly with working on greatest challenges (irregular meal patterns and emotional eating).    Barriers to Education:  none  Stage of Change:  determination    NUTRITION DIAGNOSIS:  Morbid Obesity related to Excessive carbohydrate intake, Excessive calorie intake, and Inadequate protein intake as evidence by BMI.  Status: Same    PLAN:  Pt is good candidate for surgery.    Diet: Adjust diet plan.  Work on expanding variety of vegetables.  Work on gradually cutting back on starchy CHO in the diet.  1200-calorie diet.  1500-calorie diet.  5-6 meals per day.  Start including protein supplements in the diet plan daily.  Return to clinic.    Exercise: Maintain.    Behavior Modification: Begin to document food & activity logs daily.      Return to clinic in one month.  Needs additional visit(s) with RD.    Communicated nutrition plan with bariatric team.    SESSION TIME:  30 minutes

## 2022-12-01 ENCOUNTER — PATIENT MESSAGE (OUTPATIENT)
Dept: FAMILY MEDICINE | Facility: CLINIC | Age: 52
End: 2022-12-01
Payer: COMMERCIAL

## 2022-12-05 ENCOUNTER — TELEPHONE (OUTPATIENT)
Dept: BARIATRICS | Facility: CLINIC | Age: 52
End: 2022-12-05
Payer: COMMERCIAL

## 2022-12-05 DIAGNOSIS — E74.39 GLUCOSE INTOLERANCE: ICD-10-CM

## 2022-12-05 DIAGNOSIS — Z71.3 DIETARY COUNSELING AND SURVEILLANCE: ICD-10-CM

## 2022-12-05 DIAGNOSIS — E66.01 MORBID OBESITY: Primary | ICD-10-CM

## 2022-12-05 DIAGNOSIS — E78.5 DYSLIPIDEMIA: ICD-10-CM

## 2022-12-05 DIAGNOSIS — I10 ESSENTIAL HYPERTENSION: ICD-10-CM

## 2022-12-05 DIAGNOSIS — G47.33 OSA ON CPAP: ICD-10-CM

## 2022-12-05 NOTE — TELEPHONE ENCOUNTER
Spoke with patient and confirmed the surgical procedure of LRNY with Dr Ribeiro on 2/2/2023.  Scheduled preop appts/surgery date/2 week and 8 week post op appts. All dates and times agreed upon. Pt aware that if they are required to have PCP clearance, it must be within 6 months of surgery, unless their medical history has changed, it should be dated, signed and in chart for preop appointment. All medications have been reviewed regarding the necessity to be crushed or broken into pieces smaller that the tip of a pencil eraser for 2 weeks following gastric sleeve surgery and 4 weeks following gastric bypass surgery. Pt instructed to stop taking all NSAIDS 1 week before surgery and for life after surgery. Pt aware that protein liquid diet start date is 1/19/2023. Patient is doing well with their diet. Patient was instructed about the progression of the diet phases. The patient's current weight is 348#, height is 5f6i, and BMI is 56.22. Refer to medical letter of necessity from Surgeon. Discussed the importance of increased physical activity and dieting lifestyle changes to improve weight loss and meet goals. Screened patient for history of UTI per protocol. Discussed with patient to avoid antibiotics and elective procedures involving sedation/anesthesia within 30 days of surgery unless cleared by the bariatric department. Patient instructed to call the bariatric clinic post op for any s/s of UTI. Patient's mailing address confirmed and informed to expect a manilla envelop containing bariatric surgery pre op booklet, appointment reminders, protein and fluid log sheets, and liquid diet and vitamin information sheets. Pt aware that all appts can be seen in [facility] patient portal at this time. Confirmed email address and informed patient that they will be enrolled in the Patient Reported Outcomes program to track their progress and successes. The first email will be sent 2-3 weeks before surgery and then every year  on your surgery anniversary date. Office phone and fax number given to patient for any future questions/concerns. Discussed the pre-surgery complex carbohydrate beverage to purchase in [facility] to drink 30 minutes before the surgery arrival time. Reviewed the policy of scheduling a covid test 72 hours prior to surgery if necessary.  Covid test not needed at this time.

## 2022-12-07 ENCOUNTER — PATIENT MESSAGE (OUTPATIENT)
Dept: BARIATRICS | Facility: CLINIC | Age: 52
End: 2022-12-07
Payer: COMMERCIAL

## 2022-12-12 ENCOUNTER — TELEPHONE (OUTPATIENT)
Dept: BARIATRICS | Facility: CLINIC | Age: 52
End: 2022-12-12
Payer: COMMERCIAL

## 2022-12-12 NOTE — TELEPHONE ENCOUNTER
Phoned patient to discuss the message from Dr Rodríguez and will have to send it back to her for more clarification as surgery in 2/2/2023 not 1/6/2023.  Her infusion in on 1/6/2023.

## 2022-12-12 NOTE — TELEPHONE ENCOUNTER
----- Message from Ирина Rodríguez MD sent at 12/7/2022  1:33 PM CST -----  Regarding: RE: infusion of abatacept that will be monthly  Hello  Ok for her to have surgery on Jan 6.  As far as resuming, Orencia infusions, she can resume 10 to 14 days after surgery as long as there is no infection and surgeon clears her to start.  She can continue the plaquenil before and after sugery.  Dr. Rodríguez  ----- Message -----  From: Yumiko Oliveira RN  Sent: 12/6/2022   9:05 AM CST  To: Yumiko Oliveira RN, Ирина Rodríguez MD  Subject: infusion of abatacept that will be monthly       Hi Dr Rodríguez,  I am not sure if this patient reached out to you, but she is now scheduled for her gastric bypass surgery on 2/2/2023.  She has been getting this abatacept infusion every 2 weeks and will then go to every 4 weeks I believe on Friday, January 6.  Would it be okay for her to have her surgery on 2/2/2023?  That would be 27 days after her last infusion.  And what are the recommendations for her to start back up on the abatacept after surgery?  How long would she have to wait to resume.  She is also on plaquenil.  Do you have any recommendations for holding and restarting the plaquenil?  Thanks for your help,  GERA Castaneda

## 2022-12-14 RX ORDER — FOLIC ACID 1 MG/1
1 TABLET ORAL DAILY
Qty: 30 TABLET | Refills: 11 | Status: SHIPPED | OUTPATIENT
Start: 2022-12-14 | End: 2024-01-05 | Stop reason: SDUPTHER

## 2022-12-15 ENCOUNTER — TELEPHONE (OUTPATIENT)
Dept: BARIATRICS | Facility: CLINIC | Age: 52
End: 2022-12-15
Payer: COMMERCIAL

## 2022-12-15 NOTE — TELEPHONE ENCOUNTER
Noted Dr Rodríguez's recommendations regarding the Orencia infusions.  Will leave surgery on 2/2/2023 since she stated it is okay.

## 2022-12-15 NOTE — TELEPHONE ENCOUNTER
----- Message from Ирина Rodrígeuz MD sent at 12/12/2022  3:14 PM CST -----  Regarding: RE: infusion of abatacept that will be monthly  Hello  Hold the infusion 4 weeks before surgery. If her infusion is January 6 then she can have the surgery feb 2 since that's pretty close or you can move the surgery to around feb 6.    Dr. Rodríguez  ----- Message -----  From: Yumiko Oliveira RN  Sent: 12/12/2022  12:01 PM CST  To: Ирина Rodríguez MD  Subject: FW: infusion of abatacept that will be month#    Hi Dr Rodríguez,  Can you please refer to the message again below.  Her surgery is not scheduled for 1/6/2023, that is the day she is scheduled to get an infusion.  Her surgery is scheduled for 2/2/2023.  Please clarify again the timing of infusions before surgery.  ThanksTonya  ----- Message -----  From: Ирина Rodríguez MD  Sent: 12/7/2022   1:34 PM CST  To: Yumiko Oliveira RN  Subject: RE: infusion of abatacept that will be month#    Hello  Ok for her to have surgery on Jan 6.  As far as resuming, Orencia infusions, she can resume 10 to 14 days after surgery as long as there is no infection and surgeon clears her to start.  She can continue the plaquenil before and after sugery.  Dr. Rodríguez  ----- Message -----  From: Yumiko Oliveira RN  Sent: 12/6/2022   9:05 AM CST  To: Yumiko Oliveira RN, Ирина Rodríguez MD  Subject: infusion of abatacept that will be monthly       Hi Dr Rodríguez,  I am not sure if this patient reached out to you, but she is now scheduled for her gastric bypass surgery on 2/2/2023.  She has been getting this abatacept infusion every 2 weeks and will then go to every 4 weeks I believe on Friday, January 6.  Would it be okay for her to have her surgery on 2/2/2023?  That would be 27 days after her last infusion.  And what are the recommendations for her to start back up on the abatacept after surgery?  How long would she have to wait to resume.  She is also on plaquenil.  Do you have any  recommendations for holding and restarting the plaquenil?  Thanks for your help,  GERA Castaneda

## 2022-12-19 ENCOUNTER — CLINICAL SUPPORT (OUTPATIENT)
Dept: BARIATRICS | Facility: CLINIC | Age: 52
End: 2022-12-19
Payer: COMMERCIAL

## 2022-12-19 VITALS — WEIGHT: 293 LBS | BODY MASS INDEX: 56.17 KG/M2

## 2022-12-19 DIAGNOSIS — I10 ESSENTIAL HYPERTENSION: Primary | ICD-10-CM

## 2022-12-19 DIAGNOSIS — G47.33 OSA ON CPAP: ICD-10-CM

## 2022-12-19 DIAGNOSIS — E66.01 MORBID OBESITY WITH BMI OF 50.0-59.9, ADULT: ICD-10-CM

## 2022-12-19 DIAGNOSIS — Z71.3 DIETARY COUNSELING AND SURVEILLANCE: ICD-10-CM

## 2022-12-19 PROCEDURE — 97803 MED NUTRITION INDIV SUBSEQ: CPT | Mod: 95,,, | Performed by: DIETITIAN, REGISTERED

## 2022-12-19 PROCEDURE — 97803 PR MED NUTR THER, SUBSQ, INDIV, EA 15 MIN: ICD-10-PCS | Mod: 95,,, | Performed by: DIETITIAN, REGISTERED

## 2022-12-19 PROCEDURE — 99499 UNLISTED E&M SERVICE: CPT | Mod: 95,,, | Performed by: DIETITIAN, REGISTERED

## 2022-12-19 PROCEDURE — 99499 NO LOS: ICD-10-PCS | Mod: 95,,, | Performed by: DIETITIAN, REGISTERED

## 2022-12-19 NOTE — PROGRESS NOTES
The patient location is:  Patient Home   The chief complaint leading to consultation is: morbid obesity in work up for bariatric surgery  Visit type: Virtual visit with synchronous audio and video  Total time spent with patient: 15 minutes  Each patient to whom he or she provides medical services by telemedicine is:  (1) informed of the relationship between the physician and patient and the respective role of any other health care provider with respect to management of the patient; and (2) notified that he or she may decline to receive medical services by telemedicine and may withdraw from such care at any time.  Nutrition Handout located in AVS section of pt's [facility] and/or sent as a message via [facility].  Pt informed of handout and how to access this information in [facility].  NUTRITION NOTE    Referring Physician: Titi Ribeiro MD  Reason for MNT Referral:  Gastric Bypass    Patient presents for f/u visit for MSD with weight at a standstill over the past month; total weight loss by making numerous dietary and lifestyle changes.      CLINICAL DATA:  52 y.o. female.    Current Weight: 348 lbs  Weight Change Since Initial Visit: Gain of 8 lbs  Ideal Body Weight: 144 lbs  Body mass index is 56.17 kg/m².  Last Wt: 347 lbs  Initial Wt: 340 lbs    DAILY NUTRITIONAL NEEDS: pre-op nutritional bariatric guidelines to promote weight loss  9222-7078 Calories    Grams Protein    CURRENT DIET:  Regular diet.  Diet Recall: Food records are not present.  B: Protein shake and sometimes  coffee with splenda  S: Premier Protein  L: difficult with eating out often with holidaysie egg rolls and won ton soup  D:Grilled chicken salad   S: 100 calorie almonds or sf jello  Diet Includes: regular meal plan  Meal Pattern: Improved.  Protein Supplements: 3 per day. Premier or Muscle Milk [employer] science protein powder 0 gm sugar, 20 gms protein mixed water  Snacking: Inadequate. Snacks include  none .  Vegetables: Likes  a variety. Eats daily.  Fruits: Likes a few. Eats rarely.  Beverages: water, sugar-free beverages, and coffee without sugar  Dining Out:  Weekly. Increased with holidays 5 times per week Mostly fast food, restaurants, and take-out.  Cooking at home: Daily. Mostly  crock pot  meat, fish, starchy CHO, and vegetables. Cut out rice and pasta     CURRENT EXERCISE:  Adequate  Godfrey Chi once a week intensively 45 mins  Godfrey Chi daily on her own 20 minutes  Stationary bike 20 mins per day    Vitamins / Minerals / Herbs:   One a day   Vitamin D    Folic Acid    LABS:    No recent      Food Allergies:   Eggs blow stomach up    Social:  Works regular daytime shifts.  Alcohol: None. Does not drink  Smoking: None.    ASSESSMENT:  Patient demonstrates some willingness to change lifestyle habits as evidenced by dietary changes, including protein drinks, and decreased starchy carbs .    Doing fairly well  with working on greatest challenges (irregular meal patterns and emotional eating).    Barriers to Education:  none  Stage of Change:  action    NUTRITION DIAGNOSIS:  Morbid Obesity related to Excessive carbohydrate intake, Excessive calorie intake, and Inadequate protein intake as evidence by BMI.  Status: Same    PLAN:  Pt is good candidate for surgery.    Diet: Adjust diet plan.  Work on expanding variety of vegetables.  Work on gradually cutting back on starchy CHO in the diet.  1200-calorie diet.  1500-calorie diet.  5-6 meals per day.  Start including protein supplements in the diet plan daily.  Return to clinic.    Exercise: Maintain.    Behavior Modification: Begin to document food & activity logs daily.      Return to clinic in one month.  Needs additional visit(s) with RD.  Pt to lose weight down to initial wt of 340 lbs.  Discussed with patient    Communicated nutrition plan with bariatric team.    SESSION TIME:  15  minutes

## 2023-01-04 ENCOUNTER — CLINICAL SUPPORT (OUTPATIENT)
Dept: BARIATRICS | Facility: CLINIC | Age: 53
End: 2023-01-04
Payer: COMMERCIAL

## 2023-01-04 VITALS — BODY MASS INDEX: 56.43 KG/M2 | WEIGHT: 293 LBS

## 2023-01-04 DIAGNOSIS — Z71.3 DIETARY COUNSELING: ICD-10-CM

## 2023-01-04 DIAGNOSIS — G47.33 OSA ON CPAP: ICD-10-CM

## 2023-01-04 DIAGNOSIS — E66.01 MORBID OBESITY WITH BMI OF 50.0-59.9, ADULT: ICD-10-CM

## 2023-01-04 DIAGNOSIS — I10 ESSENTIAL HYPERTENSION: Primary | ICD-10-CM

## 2023-01-04 PROCEDURE — 99999 PR PBB SHADOW E&M-EST. PATIENT-LVL I: ICD-10-PCS | Mod: PBBFAC,,, | Performed by: DIETITIAN, REGISTERED

## 2023-01-04 PROCEDURE — 99499 UNLISTED E&M SERVICE: CPT | Mod: S$GLB,,, | Performed by: DIETITIAN, REGISTERED

## 2023-01-04 PROCEDURE — 97803 MED NUTRITION INDIV SUBSEQ: CPT | Mod: S$GLB,,, | Performed by: DIETITIAN, REGISTERED

## 2023-01-04 PROCEDURE — 99999 PR PBB SHADOW E&M-EST. PATIENT-LVL I: CPT | Mod: PBBFAC,,, | Performed by: DIETITIAN, REGISTERED

## 2023-01-04 PROCEDURE — 97803 PR MED NUTR THER, SUBSQ, INDIV, EA 15 MIN: ICD-10-PCS | Mod: S$GLB,,, | Performed by: DIETITIAN, REGISTERED

## 2023-01-04 PROCEDURE — 99499 NO LOS: ICD-10-PCS | Mod: S$GLB,,, | Performed by: DIETITIAN, REGISTERED

## 2023-01-04 NOTE — PROGRESS NOTES
NUTRITION NOTE     Referring Physician: Titi Ribeiro MD  Reason for MNT Referral:  Gastric Bypass     Patient presents for f/u visit with weight at a standstill. Reports she has been making numerous dietary and lifestyle changes in preparation for surgery despite no weight loss. States she is retaining fluid in her ankles and also about to start her cycle. Surgery scheduled for Feb. Reviewed pre op liquid diet and vitamins needed for after surgery.     CLINICAL DATA:  52 y.o. female.     Current Weight: 349 lbs  Weight Change Since Initial Visit: Gain of 9 lbs  Ideal Body Weight: 144 lbs  Body mass index is 56.17 kg/m².     DAILY NUTRITIONAL NEEDS: pre-op nutritional bariatric guidelines to promote weight loss  1527-3287 Calories    Grams Protein     CURRENT DIET:  Regular diet.  Diet Recall: Food records are not present.    B: none, coffee with Truvia and skim milk  S: Premier Protein shake  L: won ton soup (only ate 2 won tons)  D: black eyed manuel over cabbage casserole (cream of mushroom soup, ground meat)   S: 100 calorie almonds or sf jello    Diet Includes:   Meal Pattern: Improved.  Protein Supplements: Premier or Muscle Milk Q science protein powder 0 gm sugar, 20 gms protein mixed water  Snacking: sf jello, nuts, fruits  Vegetables: Likes a variety. Doesn't like cauli-rice. Eats daily.  Fruits: Likes a few. Eats rarely.  Beverages: water, sugar-free beverages, and coffee without sugar  Dining Out:  Weekly. Increased with holidays 5 times per week Mostly fast food, restaurants, and take-out.  Cooking at home: Daily. Mostly crock pot  meat, fish, (limit starchy CHO), and vegetables. No rice at all.     CURRENT EXERCISE:  Godfrey Chi with a  once a week intensively 45 mins  Godfrey Chi daily on her own 20 minutes  Stationary bike 20 mins 3-4 days/week     Vitamins / Minerals / Herbs:   One a day   Vitamin D otc  Folic Acid     LABS:  Reviewed     Food Allergies:   Eggs blow stomach up      Social:  Works regular daytime shifts.  Alcohol: None. Does not drink  Smoking: None.     ASSESSMENT:  Patient demonstrates willingness to change lifestyle habits as evidenced by dietary changes, including protein drinks, decreased starchy carbs, and exercise.     Doing well with working on greatest challenges (irregular meal patterns and emotional eating).     Barriers to Education:  none  Stage of Change:  action     NUTRITION DIAGNOSIS:  Morbid Obesity related to Excessive carbohydrate intake, Excessive calorie intake, and Inadequate protein intake as evidence by BMI.  Status: Same     PLAN:  Pt is good candidate for surgery - Gastric Bypass.    Expect good compliance after surgery.    Will f/u before/after surgery as needed.     Communicated nutrition plan with bariatric team.     SESSION TIME:  15 minutes

## 2023-01-09 ENCOUNTER — PATIENT MESSAGE (OUTPATIENT)
Dept: BARIATRICS | Facility: CLINIC | Age: 53
End: 2023-01-09
Payer: COMMERCIAL

## 2023-01-10 ENCOUNTER — PATIENT MESSAGE (OUTPATIENT)
Dept: FAMILY MEDICINE | Facility: CLINIC | Age: 53
End: 2023-01-10
Payer: COMMERCIAL

## 2023-01-11 RX ORDER — LEVOTHYROXINE SODIUM 125 UG/1
TABLET ORAL
Qty: 180 TABLET | Refills: 1 | Status: SHIPPED | OUTPATIENT
Start: 2023-01-11 | End: 2023-09-06

## 2023-01-11 NOTE — TELEPHONE ENCOUNTER
Care Due:                  Date            Visit Type   Department     Provider  --------------------------------------------------------------------------------                                EP -                              PRIMARY      [facility] FAMILY  Last Visit: 03-      CARE ([facility])   MEDICINE       Layo Mccrary  Next Visit: None Scheduled  None         None Found                                                            Last  Test          Frequency    Reason                     Performed    Due Date  --------------------------------------------------------------------------------    Office Visit  12 months..  atorvastatin, lisinopriL,   03- 02-                             semaglutide..............    [employer] Embedded Care Gaps. Reference number: [id number]. 1/11/2023   9:03:09 AM CST

## 2023-01-11 NOTE — TELEPHONE ENCOUNTER
Refill Decision Note   Christine Alfaro  is requesting a refill authorization.  Brief Assessment and Rationale for Refill:  Approve     Medication Therapy Plan:    ACCEPTABLE USE PER ORC PROTOCOL ; RISK MINIMAL WHEN THYROID LABS NORMAL , T4 WNL    Medication Reconciliation Completed: No   Comments:     No Care Gaps recommended.     Note composed:9:29 AM 01/11/2023

## 2023-01-11 NOTE — TELEPHONE ENCOUNTER
No new care gaps identified.  [facility] Embedded Care Gaps. Reference number: [id number]. 1/11/2023   9:05:59 AM CST

## 2023-01-12 RX ORDER — ESCITALOPRAM OXALATE 20 MG/1
20 TABLET ORAL DAILY
Qty: 90 TABLET | Refills: 3 | Status: SHIPPED | OUTPATIENT
Start: 2023-01-12 | End: 2023-02-01 | Stop reason: CLARIF

## 2023-01-19 ENCOUNTER — TELEPHONE (OUTPATIENT)
Dept: BARIATRICS | Facility: CLINIC | Age: 53
End: 2023-01-19
Payer: COMMERCIAL

## 2023-01-19 NOTE — TELEPHONE ENCOUNTER
----- Message from Yumiko Oliveira RN sent at 12/5/2022  3:09 PM CST -----  Regarding: ld  2 week liquid diet starts 1/19/2023  LRNY surgery scheduled 2/2/2023  preop appt scheduled 1/25/2023  Schedule virtual pre op.  Please place Procare vitamin order.  Schedule 1 wk post op video visit for 2/9/2023

## 2023-01-20 ENCOUNTER — TELEPHONE (OUTPATIENT)
Dept: BARIATRICS | Facility: CLINIC | Age: 53
End: 2023-01-20
Payer: COMMERCIAL

## 2023-01-20 NOTE — TELEPHONE ENCOUNTER
NUTRITION NOTE    Bariatric Surgeon: Titi Ribeiro M.D.  Reason for MNT Referral: Pre-op liquid diet and nutrition instructions  Type of Surgery: Bypass  Date of Surgery: 2/2/23  Date of Pre-op Visit: 1/25/23  Most recent weight (clinic): 350 lbs    Pre-Op Liquid Diet  PT using Equate High Protein for preop liquid diet  Gatorade Zero, water, broth, SF Jell-o    Reminders  -  gms of protein per day  - 600-800 calories per day   - 64 oz liquids/day  - Less than 4gm sugar per shake  - Can drink sugar free, decaffeinated, non-carbonated, non-alcoholic drinks  - No fruits, juices, yogurt, or pudding  - No vitamins for 1 week before surgery  - No herbal supplements including green tea and fish oils for 2 weeks before surgery    Post-Op Liquid Diet   PT will use Body Fortress following surgery    Reminders  - If using protein powder, mix with water for days 1 and 2, by day 3 may try using skim, 1% milk or unsweetened almond/soy milk. By day 4, may use RTD protein shakes as tolerated    - Week 1: Aim for 40 g PRO and 24 oz fluids per day  - Week 2: Aim for 80 g PRO and 48 oz fluids per day  - Use 1 ounce medicine cups to measure fluid intake after surgery  - Use food log to track daily protein and fluid intake  - May experience nausea, vomiting, diarrhea, or constipation  - Not to lift anything more than 10 lbs for 6 week post-surgery    Supplements  PT has the following vitamins and minerals to start taking once discharged from hospital:    Multivitamin with iron   B-complex with thiamin   Calcium citrate with vitamin D   Vitamin B-12     Reminders  - Do not swallow large, whole pills for 2 weeks after surgery  - Take calcium 2 hours apart from iron    PT verbalized understanding of information provided with appropriate questions and comments.     SESSION TIME: 10 minutes

## 2023-01-23 ENCOUNTER — LAB VISIT (OUTPATIENT)
Dept: LAB | Facility: HOSPITAL | Age: 53
End: 2023-01-23
Attending: INTERNAL MEDICINE
Payer: COMMERCIAL

## 2023-01-23 DIAGNOSIS — I10 ESSENTIAL HYPERTENSION: ICD-10-CM

## 2023-01-23 DIAGNOSIS — E74.39 GLUCOSE INTOLERANCE: ICD-10-CM

## 2023-01-23 DIAGNOSIS — Z71.3 DIETARY COUNSELING AND SURVEILLANCE: ICD-10-CM

## 2023-01-23 DIAGNOSIS — G47.33 OSA ON CPAP: ICD-10-CM

## 2023-01-23 DIAGNOSIS — E78.5 DYSLIPIDEMIA: ICD-10-CM

## 2023-01-23 DIAGNOSIS — E66.01 MORBID OBESITY: ICD-10-CM

## 2023-01-23 DIAGNOSIS — M32.9 SYSTEMIC LUPUS ERYTHEMATOSUS, UNSPECIFIED SLE TYPE, UNSPECIFIED ORGAN INVOLVEMENT STATUS: ICD-10-CM

## 2023-01-23 LAB
ALBUMIN SERPL BCP-MCNC: 4.1 G/DL (ref 3.5–5.2)
ALP SERPL-CCNC: 131 U/L (ref 55–135)
ALT SERPL W/O P-5'-P-CCNC: 32 U/L (ref 10–44)
ANION GAP SERPL CALC-SCNC: 13 MMOL/L (ref 8–16)
AST SERPL-CCNC: 23 U/L (ref 10–40)
BASOPHILS # BLD AUTO: 0.06 K/UL (ref 0–0.2)
BASOPHILS NFR BLD: 0.5 % (ref 0–1.9)
BILIRUB SERPL-MCNC: 0.7 MG/DL (ref 0.1–1)
BUN SERPL-MCNC: 14 MG/DL (ref 6–20)
C3 SERPL-MCNC: 192 MG/DL (ref 50–180)
C4 SERPL-MCNC: 34 MG/DL (ref 11–44)
CALCIUM SERPL-MCNC: 10 MG/DL (ref 8.7–10.5)
CHLORIDE SERPL-SCNC: 100 MMOL/L (ref 95–110)
CO2 SERPL-SCNC: 24 MMOL/L (ref 23–29)
CREAT SERPL-MCNC: 0.8 MG/DL (ref 0.5–1.4)
CRP SERPL-MCNC: 8.2 MG/L (ref 0–8.2)
DIFFERENTIAL METHOD: ABNORMAL
EOSINOPHIL # BLD AUTO: 0.1 K/UL (ref 0–0.5)
EOSINOPHIL NFR BLD: 1 % (ref 0–8)
ERYTHROCYTE [DISTWIDTH] IN BLOOD BY AUTOMATED COUNT: 15.3 % (ref 11.5–14.5)
ERYTHROCYTE [SEDIMENTATION RATE] IN BLOOD BY WESTERGREN METHOD: 18 MM/HR (ref 0–20)
EST. GFR  (NO RACE VARIABLE): >60 ML/MIN/1.73 M^2
ESTIMATED AVG GLUCOSE: 114 MG/DL (ref 68–131)
GLUCOSE SERPL-MCNC: 80 MG/DL (ref 70–110)
HBA1C MFR BLD: 5.6 % (ref 4–5.6)
HCT VFR BLD AUTO: 47.1 % (ref 37–48.5)
HGB BLD-MCNC: 14.3 G/DL (ref 12–16)
IMM GRANULOCYTES # BLD AUTO: 0.04 K/UL (ref 0–0.04)
IMM GRANULOCYTES NFR BLD AUTO: 0.4 % (ref 0–0.5)
LYMPHOCYTES # BLD AUTO: 4.3 K/UL (ref 1–4.8)
LYMPHOCYTES NFR BLD: 37.7 % (ref 18–48)
MCH RBC QN AUTO: 28.8 PG (ref 27–31)
MCHC RBC AUTO-ENTMCNC: 30.4 G/DL (ref 32–36)
MCV RBC AUTO: 95 FL (ref 82–98)
MONOCYTES # BLD AUTO: 0.6 K/UL (ref 0.3–1)
MONOCYTES NFR BLD: 5.3 % (ref 4–15)
NEUTROPHILS # BLD AUTO: 6.3 K/UL (ref 1.8–7.7)
NEUTROPHILS NFR BLD: 55.1 % (ref 38–73)
NRBC BLD-RTO: 0 /100 WBC
PLATELET # BLD AUTO: 255 K/UL (ref 150–450)
PMV BLD AUTO: 11.6 FL (ref 9.2–12.9)
POTASSIUM SERPL-SCNC: 4.2 MMOL/L (ref 3.5–5.1)
PROT SERPL-MCNC: 7.2 G/DL (ref 6–8.4)
RBC # BLD AUTO: 4.96 M/UL (ref 4–5.4)
SODIUM SERPL-SCNC: 137 MMOL/L (ref 136–145)
WBC # BLD AUTO: 11.37 K/UL (ref 3.9–12.7)

## 2023-01-23 PROCEDURE — 36415 COLL VENOUS BLD VENIPUNCTURE: CPT | Mod: PO | Performed by: INTERNAL MEDICINE

## 2023-01-23 PROCEDURE — 86160 COMPLEMENT ANTIGEN: CPT | Mod: 59 | Performed by: INTERNAL MEDICINE

## 2023-01-23 PROCEDURE — 85025 COMPLETE CBC W/AUTO DIFF WBC: CPT | Performed by: INTERNAL MEDICINE

## 2023-01-23 PROCEDURE — 86225 DNA ANTIBODY NATIVE: CPT | Mod: 59 | Performed by: INTERNAL MEDICINE

## 2023-01-23 PROCEDURE — 86160 COMPLEMENT ANTIGEN: CPT | Performed by: INTERNAL MEDICINE

## 2023-01-23 PROCEDURE — 86140 C-REACTIVE PROTEIN: CPT | Performed by: INTERNAL MEDICINE

## 2023-01-23 PROCEDURE — 85651 RBC SED RATE NONAUTOMATED: CPT | Mod: PO | Performed by: INTERNAL MEDICINE

## 2023-01-23 PROCEDURE — 86225 DNA ANTIBODY NATIVE: CPT | Performed by: INTERNAL MEDICINE

## 2023-01-23 PROCEDURE — 83036 HEMOGLOBIN GLYCOSYLATED A1C: CPT | Performed by: SURGERY

## 2023-01-23 PROCEDURE — 80053 COMPREHEN METABOLIC PANEL: CPT | Performed by: INTERNAL MEDICINE

## 2023-01-25 ENCOUNTER — OFFICE VISIT (OUTPATIENT)
Dept: BARIATRICS | Facility: CLINIC | Age: 53
End: 2023-01-25
Payer: COMMERCIAL

## 2023-01-25 VITALS
SYSTOLIC BLOOD PRESSURE: 124 MMHG | OXYGEN SATURATION: 95 % | DIASTOLIC BLOOD PRESSURE: 59 MMHG | WEIGHT: 293 LBS | HEART RATE: 102 BPM | BODY MASS INDEX: 55.19 KG/M2

## 2023-01-25 DIAGNOSIS — K21.9 GASTROESOPHAGEAL REFLUX DISEASE, UNSPECIFIED WHETHER ESOPHAGITIS PRESENT: ICD-10-CM

## 2023-01-25 DIAGNOSIS — I10 ESSENTIAL HYPERTENSION: ICD-10-CM

## 2023-01-25 DIAGNOSIS — E66.01 MORBID OBESITY WITH BMI OF 50.0-59.9, ADULT: Primary | ICD-10-CM

## 2023-01-25 DIAGNOSIS — G47.33 OSA ON CPAP: ICD-10-CM

## 2023-01-25 DIAGNOSIS — Z01.818 PREOPERATIVE TESTING: ICD-10-CM

## 2023-01-25 DIAGNOSIS — M06.9 RHEUMATOID ARTHRITIS FLARE: ICD-10-CM

## 2023-01-25 DIAGNOSIS — M32.9 SLE WITH NORMAL KIDNEYS: ICD-10-CM

## 2023-01-25 LAB
DNA TITER: NORMAL
DSDNA AB SER-ACNC: POSITIVE [IU]/ML

## 2023-01-25 PROCEDURE — 3044F HG A1C LEVEL LT 7.0%: CPT | Mod: CPTII,S$GLB,, | Performed by: SURGERY

## 2023-01-25 PROCEDURE — 3074F SYST BP LT 130 MM HG: CPT | Mod: CPTII,S$GLB,, | Performed by: SURGERY

## 2023-01-25 PROCEDURE — 1159F PR MEDICATION LIST DOCUMENTED IN MEDICAL RECORD: ICD-10-PCS | Mod: CPTII,S$GLB,, | Performed by: SURGERY

## 2023-01-25 PROCEDURE — 3074F PR MOST RECENT SYSTOLIC BLOOD PRESSURE < 130 MM HG: ICD-10-PCS | Mod: CPTII,S$GLB,, | Performed by: SURGERY

## 2023-01-25 PROCEDURE — 1160F PR REVIEW ALL MEDS BY PRESCRIBER/CLIN PHARMACIST DOCUMENTED: ICD-10-PCS | Mod: CPTII,S$GLB,, | Performed by: SURGERY

## 2023-01-25 PROCEDURE — 99999 PR PBB SHADOW E&M-EST. PATIENT-LVL IV: ICD-10-PCS | Mod: PBBFAC,,, | Performed by: SURGERY

## 2023-01-25 PROCEDURE — 3078F PR MOST RECENT DIASTOLIC BLOOD PRESSURE < 80 MM HG: ICD-10-PCS | Mod: CPTII,S$GLB,, | Performed by: SURGERY

## 2023-01-25 PROCEDURE — 3044F PR MOST RECENT HEMOGLOBIN A1C LEVEL <7.0%: ICD-10-PCS | Mod: CPTII,S$GLB,, | Performed by: SURGERY

## 2023-01-25 PROCEDURE — 3078F DIAST BP <80 MM HG: CPT | Mod: CPTII,S$GLB,, | Performed by: SURGERY

## 2023-01-25 PROCEDURE — 99213 PR OFFICE/OUTPT VISIT, EST, LEVL III, 20-29 MIN: ICD-10-PCS | Mod: S$GLB,,, | Performed by: SURGERY

## 2023-01-25 PROCEDURE — 99999 PR PBB SHADOW E&M-EST. PATIENT-LVL IV: CPT | Mod: PBBFAC,,, | Performed by: SURGERY

## 2023-01-25 PROCEDURE — 3008F BODY MASS INDEX DOCD: CPT | Mod: CPTII,S$GLB,, | Performed by: SURGERY

## 2023-01-25 PROCEDURE — 1159F MED LIST DOCD IN RCRD: CPT | Mod: CPTII,S$GLB,, | Performed by: SURGERY

## 2023-01-25 PROCEDURE — 1160F RVW MEDS BY RX/DR IN RCRD: CPT | Mod: CPTII,S$GLB,, | Performed by: SURGERY

## 2023-01-25 PROCEDURE — 99213 OFFICE O/P EST LOW 20 MIN: CPT | Mod: S$GLB,,, | Performed by: SURGERY

## 2023-01-25 PROCEDURE — 3008F PR BODY MASS INDEX (BMI) DOCUMENTED: ICD-10-PCS | Mod: CPTII,S$GLB,, | Performed by: SURGERY

## 2023-01-25 RX ORDER — MUPIROCIN 20 MG/G
OINTMENT TOPICAL
Status: CANCELLED | OUTPATIENT
Start: 2023-01-25

## 2023-01-25 RX ORDER — PANTOPRAZOLE SODIUM 40 MG/10ML
40 INJECTION, POWDER, LYOPHILIZED, FOR SOLUTION INTRAVENOUS 2 TIMES DAILY
Status: CANCELLED | OUTPATIENT
Start: 2023-01-25

## 2023-01-25 RX ORDER — POLYETHYLENE GLYCOL 3350 17 G/17G
17 POWDER, FOR SOLUTION ORAL DAILY
Qty: 238 G | Refills: 0 | Status: SHIPPED | OUTPATIENT
Start: 2023-01-25 | End: 2023-02-17

## 2023-01-25 RX ORDER — FAMOTIDINE 10 MG/ML
20 INJECTION INTRAVENOUS ONCE
Status: CANCELLED | OUTPATIENT
Start: 2023-01-25 | End: 2023-01-25

## 2023-01-25 RX ORDER — GABAPENTIN 250 MG/5ML
300 SOLUTION ORAL 3 TIMES DAILY
Status: CANCELLED | OUTPATIENT
Start: 2023-01-25

## 2023-01-25 RX ORDER — HEPARIN SODIUM 5000 [USP'U]/ML
5000 INJECTION, SOLUTION INTRAVENOUS; SUBCUTANEOUS ONCE
Status: CANCELLED | OUTPATIENT
Start: 2023-01-25 | End: 2023-01-25

## 2023-01-25 RX ORDER — ACETAMINOPHEN 650 MG/20.3ML
500 LIQUID ORAL EVERY 8 HOURS
Status: CANCELLED | OUTPATIENT
Start: 2023-01-25 | End: 2023-01-26

## 2023-01-25 RX ORDER — SODIUM CHLORIDE, SODIUM LACTATE, POTASSIUM CHLORIDE, CALCIUM CHLORIDE 600; 310; 30; 20 MG/100ML; MG/100ML; MG/100ML; MG/100ML
INJECTION, SOLUTION INTRAVENOUS CONTINUOUS
Status: CANCELLED | OUTPATIENT
Start: 2023-01-25

## 2023-01-25 RX ORDER — OMEPRAZOLE 40 MG/1
40 CAPSULE, DELAYED RELEASE ORAL EVERY MORNING
Qty: 30 CAPSULE | Refills: 2 | Status: SHIPPED | OUTPATIENT
Start: 2023-01-25

## 2023-01-25 RX ORDER — DEXTROMETHORPHAN/PSEUDOEPHED 2.5-7.5/.8
40 DROPS ORAL 4 TIMES DAILY PRN
Status: CANCELLED | OUTPATIENT
Start: 2023-01-25

## 2023-01-25 RX ORDER — HYDROMORPHONE HYDROCHLORIDE 1 MG/ML
0.5 INJECTION, SOLUTION INTRAMUSCULAR; INTRAVENOUS; SUBCUTANEOUS
Status: CANCELLED | OUTPATIENT
Start: 2023-01-25

## 2023-01-25 RX ORDER — ONDANSETRON 2 MG/ML
8 INJECTION INTRAMUSCULAR; INTRAVENOUS EVERY 6 HOURS PRN
Status: CANCELLED | OUTPATIENT
Start: 2023-01-25

## 2023-01-25 RX ORDER — LIDOCAINE HYDROCHLORIDE 10 MG/ML
1 INJECTION, SOLUTION EPIDURAL; INFILTRATION; INTRACAUDAL; PERINEURAL ONCE
Status: CANCELLED | OUTPATIENT
Start: 2023-01-25 | End: 2023-01-25

## 2023-01-25 RX ORDER — PROMETHAZINE HYDROCHLORIDE 25 MG/1
SUPPOSITORY RECTAL
Qty: 15 SUPPOSITORY | Refills: 0 | Status: SHIPPED | OUTPATIENT
Start: 2023-01-25 | End: 2023-03-03

## 2023-01-25 RX ORDER — ENOXAPARIN SODIUM 100 MG/ML
60 INJECTION SUBCUTANEOUS EVERY 12 HOURS
Status: CANCELLED | OUTPATIENT
Start: 2023-01-25

## 2023-01-25 RX ORDER — PROCHLORPERAZINE EDISYLATE 5 MG/ML
5 INJECTION INTRAMUSCULAR; INTRAVENOUS EVERY 6 HOURS PRN
Status: CANCELLED | OUTPATIENT
Start: 2023-01-25

## 2023-01-25 RX ORDER — ONDANSETRON 8 MG/1
8 TABLET, ORALLY DISINTEGRATING ORAL EVERY 6 HOURS PRN
Qty: 30 TABLET | Refills: 0 | Status: SHIPPED | OUTPATIENT
Start: 2023-01-25 | End: 2023-03-03

## 2023-01-25 RX ORDER — HYDROCODONE BITARTRATE AND ACETAMINOPHEN 7.5; 325 MG/15ML; MG/15ML
15 SOLUTION ORAL 4 TIMES DAILY PRN
Qty: 118 ML | Refills: 0 | Status: SHIPPED | OUTPATIENT
Start: 2023-01-25 | End: 2023-03-03

## 2023-01-25 RX ORDER — SODIUM CHLORIDE 9 MG/ML
INJECTION, SOLUTION INTRAVENOUS CONTINUOUS
Status: CANCELLED | OUTPATIENT
Start: 2023-01-25

## 2023-01-25 RX ORDER — HYDROCODONE BITARTRATE AND ACETAMINOPHEN 7.5; 325 MG/15ML; MG/15ML
15 SOLUTION ORAL EVERY 4 HOURS PRN
Status: CANCELLED | OUTPATIENT
Start: 2023-01-26

## 2023-01-25 NOTE — PROGRESS NOTES
Subjective:  The patient is a 52 y.o. obese female who presents for pre op for gastric sleeve surgery.  She has multiple associated comorbidities including essential hypertension, hyperlipidemia, and SLE .  She has struggled with excess weight since 2012.  Her highest adult weight was 344 lbs at age 51, and her lowest adult weight was 240 lbs at age 42.  The patient has tried Weight Watchers and ozempic, nutrisystem .  The patient was most successful with [employer] research study with a weight loss of 80 lbs.  Her current exercise includes yoga 6 times a week. She denies any history of eating disorder such as anorexia, bulimia, or taking laxatives for weight loss, and denies any addiction including illicit substances, alcohol, or gambling.  Patient states she has a good  support system.  She lives with family.  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  OTC meds control reflux and diet. All workup has been reviewed in clinic today and there is nothing on the review that would prevent us from proceeding with surgery.  All questions were answered in clinic today prior to leaving.  Body mass index is 55.19 kg/m².       Patient Active Problem List    Diagnosis Date Noted    Rheumatoid arthritis flare 10/28/2022    Edema 07/28/2022    SLE with normal kidneys 03/04/2022    COVID-19 virus detected 11/24/2020    Nonrheumatic mitral valve regurgitation 11/23/2020    Shortness of breath 12/23/2019    Chest tightness 12/23/2019    Hypothyroidism 11/26/2019    Dyslipidemia 11/26/2019    Essential hypertension 11/26/2019    Insomnia 11/26/2019    Glucose intolerance 11/26/2019    Vitamin D deficiency 11/26/2019    ETELVINA on CPAP 11/26/2019    Goiter, nodular 11/26/2019    Obesity, morbid, BMI 50 or higher 11/26/2019    Anxiety 11/26/2019     Past Medical History:   Diagnosis Date    Anxiety     GERD (gastroesophageal reflux disease)     Hyperlipidemia     Hypertension      Hypothyroidism     Insomnia     Observed sleep apnea     Prediabetes     Rheumatoid arthritis, unspecified     Vitamin D deficiency       Past Surgical History:   Procedure Laterality Date    BTL      CHOLECYSTECTOMY      COLONOSCOPY N/A 8/30/2022    Procedure: COLONOSCOPY;  Surgeon: Drew Peraza MD;  Location: [facility];  Service: Endoscopy;  Laterality: N/A;    SURGICAL REMOVAL OF BONE SPUR      left heel    TONSILLECTOMY      TUBAL LIGATION  8/1995      (Not in a hospital admission)    Review of patient's allergies indicates:   Allergen Reactions    Sulfamethoxazole-trimethoprim     Sulfa (sulfonamide antibiotics) Rash and Swelling      Social History     Tobacco Use    Smoking status: Never    Smokeless tobacco: Never   Substance Use Topics    Alcohol use: Not Currently     Alcohol/week: 0.0 standard drinks      Family History   Problem Relation Age of Onset    Heart disease Mother     Bone cancer Mother     Cancer Mother         cervical    Hypertension Mother     Heart disease Father     Diabetes type I Father     Lung cancer Father     Diabetes Father     Hypertension Father     Heart disease Brother     No Known Problems Daughter     No Known Problems Daughter     Breast cancer Neg Hx     Ovarian cancer Neg Hx         Review of Systems  Constitutional: negative for anorexia, chills and fatigue  Eyes: negative for icterus, irritation and redness  Respiratory: negative for cough and dyspnea on exertion  Cardiovascular: negative for chest pain and chest pressure/discomfort  Gastrointestinal: negative for abdominal pain, change in bowel habits, constipation and diarrhea  Musculoskeletal:negative for arthralgias and back pain  Neurological: negative for coordination problems and dizziness  Behavioral/Psych: negative for anxiety and bad mood    Objective:  Vital signs in last 24 hours:  Vitals:    01/25/23 1320   BP: (!) 124/59   BP Location: Left arm   Patient Position: Sitting   Pulse: 102   SpO2: 95%    Weight: (!) 155.1 kg (341 lb 14.9 oz)          Weight History Current Weight Total Weight Loss   10/13/2022 344 -344   1/25/2023 341 -341       General appearance: alert, appears stated age and cooperative  Head: Normocephalic, without obvious abnormality, atraumatic  Eyes: negative findings: lids and lashes normal and conjunctivae and sclerae normal  Lungs: non labored breathing  Heart: regular rate and rhythm  Abdomen: soft, non-tender  Extremities: extremities normal, atraumatic, no cyanosis or edema  Skin: Skin color, texture, turgor normal. No rashes or lesions  Neurologic: Grossly normal    Data Review:  All Reviewed    Assessment/Plan:  Morbid obesity with failure of conservative therapy.    The patient was informed that risks include, but are not limited to: death, leak, obstruction, bleeding, and sepsis. Any of these could require further surgery. Other risks include DVT, PE, pneumonia, wound dehiscence, hernia, wound infection, the need for dilatations and the inability to lose appropriate weight and keep it off.     We discussed that our goal is to ameliorate her medical problems and not to obtain a specific body mass index. She understands the risks and benefits and wishes to proceed with the procedure. She has signed a consent form.       Titi Ribeiro MD

## 2023-01-25 NOTE — H&P (VIEW-ONLY)
Subjective:  The patient is a 52 y.o. obese female who presents for pre op for gastric sleeve surgery.  She has multiple associated comorbidities including essential hypertension, hyperlipidemia, and SLE .  She has struggled with excess weight since 2012.  Her highest adult weight was 344 lbs at age 51, and her lowest adult weight was 240 lbs at age 42.  The patient has tried Weight Watchers and ozempic, nutrisystem .  The patient was most successful with [employer] research study with a weight loss of 80 lbs.  Her current exercise includes yoga 6 times a week. She denies any history of eating disorder such as anorexia, bulimia, or taking laxatives for weight loss, and denies any addiction including illicit substances, alcohol, or gambling.  Patient states she has a good  support system.  She lives with family.  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  OTC meds control reflux and diet. All workup has been reviewed in clinic today and there is nothing on the review that would prevent us from proceeding with surgery.  All questions were answered in clinic today prior to leaving.  Body mass index is 55.19 kg/m².       Patient Active Problem List    Diagnosis Date Noted    Rheumatoid arthritis flare 10/28/2022    Edema 07/28/2022    SLE with normal kidneys 03/04/2022    COVID-19 virus detected 11/24/2020    Nonrheumatic mitral valve regurgitation 11/23/2020    Shortness of breath 12/23/2019    Chest tightness 12/23/2019    Hypothyroidism 11/26/2019    Dyslipidemia 11/26/2019    Essential hypertension 11/26/2019    Insomnia 11/26/2019    Glucose intolerance 11/26/2019    Vitamin D deficiency 11/26/2019    ETELVINA on CPAP 11/26/2019    Goiter, nodular 11/26/2019    Obesity, morbid, BMI 50 or higher 11/26/2019    Anxiety 11/26/2019     Past Medical History:   Diagnosis Date    Anxiety     GERD (gastroesophageal reflux disease)     Hyperlipidemia     Hypertension      Hypothyroidism     Insomnia     Observed sleep apnea     Prediabetes     Rheumatoid arthritis, unspecified     Vitamin D deficiency       Past Surgical History:   Procedure Laterality Date    BTL      CHOLECYSTECTOMY      COLONOSCOPY N/A 8/30/2022    Procedure: COLONOSCOPY;  Surgeon: Drew Peraza MD;  Location: [facility];  Service: Endoscopy;  Laterality: N/A;    SURGICAL REMOVAL OF BONE SPUR      left heel    TONSILLECTOMY      TUBAL LIGATION  8/1995      (Not in a hospital admission)    Review of patient's allergies indicates:   Allergen Reactions    Sulfamethoxazole-trimethoprim     Sulfa (sulfonamide antibiotics) Rash and Swelling      Social History     Tobacco Use    Smoking status: Never    Smokeless tobacco: Never   Substance Use Topics    Alcohol use: Not Currently     Alcohol/week: 0.0 standard drinks      Family History   Problem Relation Age of Onset    Heart disease Mother     Bone cancer Mother     Cancer Mother         cervical    Hypertension Mother     Heart disease Father     Diabetes type I Father     Lung cancer Father     Diabetes Father     Hypertension Father     Heart disease Brother     No Known Problems Daughter     No Known Problems Daughter     Breast cancer Neg Hx     Ovarian cancer Neg Hx         Review of Systems  Constitutional: negative for anorexia, chills and fatigue  Eyes: negative for icterus, irritation and redness  Respiratory: negative for cough and dyspnea on exertion  Cardiovascular: negative for chest pain and chest pressure/discomfort  Gastrointestinal: negative for abdominal pain, change in bowel habits, constipation and diarrhea  Musculoskeletal:negative for arthralgias and back pain  Neurological: negative for coordination problems and dizziness  Behavioral/Psych: negative for anxiety and bad mood    Objective:  Vital signs in last 24 hours:  Vitals:    01/25/23 1320   BP: (!) 124/59   BP Location: Left arm   Patient Position: Sitting   Pulse: 102   SpO2: 95%    Weight: (!) 155.1 kg (341 lb 14.9 oz)          Weight History Current Weight Total Weight Loss   10/13/2022 344 -344   1/25/2023 341 -341       General appearance: alert, appears stated age and cooperative  Head: Normocephalic, without obvious abnormality, atraumatic  Eyes: negative findings: lids and lashes normal and conjunctivae and sclerae normal  Lungs: non labored breathing  Heart: regular rate and rhythm  Abdomen: soft, non-tender  Extremities: extremities normal, atraumatic, no cyanosis or edema  Skin: Skin color, texture, turgor normal. No rashes or lesions  Neurologic: Grossly normal    Data Review:  All Reviewed    Assessment/Plan:  Morbid obesity with failure of conservative therapy.    The patient was informed that risks include, but are not limited to: death, leak, obstruction, bleeding, and sepsis. Any of these could require further surgery. Other risks include DVT, PE, pneumonia, wound dehiscence, hernia, wound infection, the need for dilatations and the inability to lose appropriate weight and keep it off.     We discussed that our goal is to ameliorate her medical problems and not to obtain a specific body mass index. She understands the risks and benefits and wishes to proceed with the procedure. She has signed a consent form.       Titi Ribeiro MD

## 2023-01-27 ENCOUNTER — PATIENT MESSAGE (OUTPATIENT)
Dept: FAMILY MEDICINE | Facility: CLINIC | Age: 53
End: 2023-01-27
Payer: COMMERCIAL

## 2023-01-30 ENCOUNTER — OFFICE VISIT (OUTPATIENT)
Dept: FAMILY MEDICINE | Facility: CLINIC | Age: 53
End: 2023-01-30
Payer: COMMERCIAL

## 2023-01-30 DIAGNOSIS — F41.9 ANXIETY: Primary | ICD-10-CM

## 2023-01-30 PROCEDURE — 3044F PR MOST RECENT HEMOGLOBIN A1C LEVEL <7.0%: ICD-10-PCS | Mod: CPTII,95,, | Performed by: FAMILY MEDICINE

## 2023-01-30 PROCEDURE — 1160F RVW MEDS BY RX/DR IN RCRD: CPT | Mod: CPTII,95,, | Performed by: FAMILY MEDICINE

## 2023-01-30 PROCEDURE — 3044F HG A1C LEVEL LT 7.0%: CPT | Mod: CPTII,95,, | Performed by: FAMILY MEDICINE

## 2023-01-30 PROCEDURE — 99214 OFFICE O/P EST MOD 30 MIN: CPT | Mod: 95,,, | Performed by: FAMILY MEDICINE

## 2023-01-30 PROCEDURE — 1160F PR REVIEW ALL MEDS BY PRESCRIBER/CLIN PHARMACIST DOCUMENTED: ICD-10-PCS | Mod: CPTII,95,, | Performed by: FAMILY MEDICINE

## 2023-01-30 PROCEDURE — 1159F MED LIST DOCD IN RCRD: CPT | Mod: CPTII,95,, | Performed by: FAMILY MEDICINE

## 2023-01-30 PROCEDURE — 1159F PR MEDICATION LIST DOCUMENTED IN MEDICAL RECORD: ICD-10-PCS | Mod: CPTII,95,, | Performed by: FAMILY MEDICINE

## 2023-01-30 PROCEDURE — 99214 PR OFFICE/OUTPT VISIT, EST, LEVL IV, 30-39 MIN: ICD-10-PCS | Mod: 95,,, | Performed by: FAMILY MEDICINE

## 2023-01-30 RX ORDER — BUPROPION HYDROCHLORIDE 100 MG/1
100 TABLET, EXTENDED RELEASE ORAL 2 TIMES DAILY
Qty: 60 TABLET | Refills: 11 | Status: SHIPPED | OUTPATIENT
Start: 2023-01-30 | End: 2024-02-15

## 2023-01-30 NOTE — PROGRESS NOTES
THIS DOCUMENT WAS MADE IN PART WITH VOICE RECOGNITION SOFTWARE.  OCCASIONALLY THIS SOFTWARE WILL MISINTERPRET WORDS OR PHRASES.    Assessment and Plan:    1. Anxiety  buPROPion (WELLBUTRIN SR) 100 MG TBSR 12 hr tablet          New med for anxiety  Will figure out documents for VARSHA / Service animal and will assist patient at that time     ______________________________________________________________________  Subjective:    Chief Complaint:  anxiety     HPI:  Christine is a 52 y.o. year old     The patient location is: [place]    Visit type: Audiovisual    Face to Face time with patient: 15 min  20 minutes of total time spent on the encounter, which includes face to face time and non-face to face time preparing to see the patient (eg, review of tests), Obtaining and/or reviewing separately obtained history, Documenting clinical information in the electronic or other health record, Independently interpreting results (not separately reported) and communicating results to the patient/family/caregiver, or Care coordination (not separately reported).     Each patient to whom he or she provides medical services by telemedicine is:  (1) informed of the relationship between the physician and patient and the respective role of any other health care provider with respect to management of the patient; and (2) notified that he or she may decline to receive medical services by telemedicine and may withdraw from such care at any time.    Notes:     Anxiety   Took a vacation from Lexapro   Anxiety exacerbated  Interested in alternative that will not cause wt gain  Also considering service animal / VARSHA      Hypothyroidism   Recent thyroid studies normal   Currently on 250 mcg levothyroxine     Glucose intolerance, pre diabetes  Currently on ozempic   Recent A1c 5.7%   Reports medication compliance      SLE   Followed by Rheumatology on [place]   Taking combination of methotrexate, prednisone, Plaquenil  Awaiting approval of Orencia    reports feeling much better since having lupus diagnosed and treated     Psoriasis   No improvement in scalp symptoms with ketoconazole shampoo for suspected seborrheic dermatitis.  Hopeful that treatments for lupus will improve psoriasis symptoms     Obesity   Has gained significant weight with prednisone treatment for lupus.  Patient is interested in medicine to help with this problem  Currently on Jardiance.       Anxiety   Currently on Lexapro  Prev med : Prozac      Due for colon cancer screening, breast cancer screening      GERD   Omeprazole working well          Past Medical History:  Past Medical History:   Diagnosis Date    Anxiety     GERD (gastroesophageal reflux disease)     Hyperlipidemia     Hypertension     Hypothyroidism     Insomnia     Observed sleep apnea     Prediabetes     Rheumatoid arthritis, unspecified     Vitamin D deficiency        Past Surgical History:  Past Surgical History:   Procedure Laterality Date    BTL      CHOLECYSTECTOMY      COLONOSCOPY N/A 8/30/2022    Procedure: COLONOSCOPY;  Surgeon: Drew Peraza MD;  Location: [facility];  Service: Endoscopy;  Laterality: N/A;    SURGICAL REMOVAL OF BONE SPUR      left heel    TONSILLECTOMY      TUBAL LIGATION  8/1995       Family History:  Family History   Problem Relation Age of Onset    Heart disease Mother     Bone cancer Mother     Cancer Mother         cervical    Hypertension Mother     Heart disease Father     Diabetes type I Father     Lung cancer Father     Diabetes Father     Hypertension Father     Heart disease Brother     No Known Problems Daughter     No Known Problems Daughter     Breast cancer Neg Hx     Ovarian cancer Neg Hx        Social History:  Social History     Socioeconomic History    Marital status:    Tobacco Use    Smoking status: Never    Smokeless tobacco: Never   Substance and Sexual Activity    Alcohol use: Not Currently     Alcohol/week: 0.0 standard drinks    Drug use: Never    Sexual  activity: Yes     Partners: Male     Birth control/protection: See Surgical Hx     Social Determinants of Health     Financial Resource Strain: Low Risk     Difficulty of Paying Living Expenses: Not hard at all   Food Insecurity: No Food Insecurity    Worried About Running Out of Food in the Last Year: Never true    Ran Out of Food in the Last Year: Never true   Transportation Needs: No Transportation Needs    Lack of Transportation (Medical): No    Lack of Transportation (Non-Medical): No   Physical Activity: Insufficiently Active    Days of Exercise per Week: 3 days    Minutes of Exercise per Session: 20 min   Stress: Stress Concern Present    Feeling of Stress : To some extent   Social Connections: Unknown    Frequency of Communication with Friends and Family: More than three times a week    Frequency of Social Gatherings with Friends and Family: Twice a week    Active Member of Clubs or Organizations: Yes    Attends Club or Organization Meetings: More than 4 times per year    Marital Status:    Housing Stability: Low Risk     Unable to Pay for Housing in the Last Year: No    Number of Places Lived in the Last Year: 1    Unstable Housing in the Last Year: No       Medications:  Current Outpatient Medications on File Prior to Visit   Medication Sig Dispense Refill    albuterol (PROVENTIL HFA) 90 mcg/actuation inhaler Inhale 2 puffs into the lungs every 6 (six) hours as needed for Wheezing. Rescue 18 g 0    aspirin (ECOTRIN) 81 MG EC tablet Take 1 tablet (81 mg total) by mouth once daily. 90 tablet 3    atorvastatin (LIPITOR) 40 MG tablet Take 1 tablet (40 mg total) by mouth every evening. 90 tablet 3    cephALEXin (KEFLEX) 500 MG capsule Take 1 capsule (500 mg total) by mouth every 12 (twelve) hours. 10 capsule 0    cyclobenzaprine (FLEXERIL) 10 MG tablet TAKE ONE TABLET EVERY EVENING 150 tablet 3    EScitalopram oxalate (LEXAPRO) 20 MG tablet Take 1 tablet (20 mg total) by mouth once daily. 90 tablet 3     flash glucose scanning reader (FREESTYLE SYDNIE 2 READER) Misc 1 Device by Misc.(Non-Drug; Combo Route) route continuous. 1 each 0    flash glucose sensor (FREESTYLE SYDNIE 2 SENSOR) Kit 1 Device by Misc.(Non-Drug; Combo Route) route every 14 (fourteen) days. 2 kit 6    folic acid (FOLVITE) 1 MG tablet Take 1 tablet (1 mg total) by mouth once daily. 30 tablet 11    furosemide (LASIX) 20 MG tablet Take 1 tablet (20 mg total) by mouth once daily. 90 tablet 3    hydrocodone-acetaminophen (HYCET) solution 7.5-325 mg/15mL Take 15 mLs by mouth 4 (four) times daily as needed for Pain. 118 mL 0    hydrOXYchloroQUINE (PLAQUENIL) 200 mg tablet Take 200 mg by mouth 2 (two) times daily.      levothyroxine (SYNTHROID) 125 MCG tablet TAKE 2 TABLETS(250 MCG) BY MOUTH DAILY 180 tablet 1    lisinopriL (PRINIVIL,ZESTRIL) 20 MG tablet TAKE 1 TABLET BY MOUTH EVERY DAY 90 tablet 3    methotrexate 2.5 MG Tab TAKE 8 TABLETS BY MOUTH EVERY 7 DAYS 102 tablet 0    omeprazole (PRILOSEC) 40 MG capsule Take 1 capsule (40 mg total) by mouth once daily. 30 capsule 6    omeprazole (PRILOSEC) 40 MG capsule Take 1 capsule (40 mg total) by mouth every morning. Open capsule and take with apple sauce 30 capsule 2    ondansetron (ZOFRAN-ODT) 8 MG TbDL Take 1 tablet (8 mg total) by mouth every 6 (six) hours as needed. 30 tablet 0    OZEMPIC 1 mg/dose (4 mg/3 mL) Inject 1 mg into the skin every 7 days.      polyethylene glycol (GLYCOLAX) 17 gram/dose powder Take 17 g by mouth once daily. for 14 days 238 g 0    predniSONE (DELTASONE) 5 MG tablet Take 2 tablets daily for 2 weeks and then one tablet daily 90 tablet 1    promethazine (PHENERGAN) 25 MG suppository Unwrap and insert 1 suppository rectally every 6 hours as needed. 15 suppository 0    semaglutide (OZEMPIC) 1 mg/dose (2 mg/1.5 mL) PnIj Inject 1 mg into the skin every 7 days. 2 pen 11    sodium chloride 0.9% SolP 100 mL with abatacept (with maltose) 250 mg SolR 1,000 mg Inject 1,000 mg into the vein  every 28 days.      triamcinolone acetonide 0.1% (KENALOG) 0.1 % ointment Apply topically 2 (two) times daily. 80 g 3    XIIDRA 5 % Dpet APPLY 1 DROP TO BOTH EYES BID       No current facility-administered medications on file prior to visit.       Allergies:  Sulfamethoxazole-trimethoprim and Sulfa (sulfonamide antibiotics)    Immunizations:  Immunization History   Administered Date(s) Administered    COVID-19, MRNA, LN-S, PF (MODERNA FULL 0.5 ML DOSE) 03/11/2021, 04/15/2021, 01/11/2022    DTaP 01/01/1973, 02/01/1973, 04/01/1973, 08/01/1975, 04/01/1978    IPV 01/01/1973, 02/01/1973, 08/01/1975, 01/05/1978    Influenza Split 11/18/2007    MMR 01/12/1978, 10/19/1995    Td (ADULT) 04/17/1989, 08/07/2004    Tdap 11/26/2019       Review of Systems:  Review of Systems   Constitutional:  Negative for activity change and unexpected weight change.   HENT:  Negative for hearing loss, rhinorrhea and trouble swallowing.    Eyes:  Negative for discharge and visual disturbance.   Respiratory:  Negative for chest tightness and wheezing.    Cardiovascular:  Negative for chest pain and palpitations.   Gastrointestinal:  Negative for blood in stool, constipation, diarrhea and vomiting.   Endocrine: Negative for polydipsia and polyuria.   Genitourinary:  Negative for difficulty urinating, dysuria, hematuria and menstrual problem.   Musculoskeletal:  Positive for arthralgias and joint swelling. Negative for neck pain.   Neurological:  Negative for weakness and headaches.   Psychiatric/Behavioral:  Negative for confusion and dysphoric mood.    All other systems reviewed and are negative.    Objective:    Vitals:  There were no vitals filed for this visit.    Physical Exam  Vitals reviewed.   Constitutional:       General: She is not in acute distress.  HENT:      Head: Normocephalic and atraumatic.   Eyes:      Pupils: Pupils are equal, round, and reactive to light.   Cardiovascular:      Rate and Rhythm: Normal rate and regular rhythm.       Heart sounds: No murmur heard.    No friction rub.   Pulmonary:      Effort: Pulmonary effort is normal.      Breath sounds: Normal breath sounds.   Abdominal:      General: Bowel sounds are normal. There is no distension.      Palpations: Abdomen is soft.      Tenderness: There is no abdominal tenderness.   Musculoskeletal:      Cervical back: Neck supple.   Skin:     General: Skin is warm and dry.      Findings: No rash.   Psychiatric:         Behavior: Behavior normal.           Layo Mccrary MD  Family Medicine

## 2023-01-31 ENCOUNTER — PATIENT MESSAGE (OUTPATIENT)
Dept: FAMILY MEDICINE | Facility: CLINIC | Age: 53
End: 2023-01-31
Payer: COMMERCIAL

## 2023-02-01 ENCOUNTER — TELEPHONE (OUTPATIENT)
Dept: BARIATRICS | Facility: CLINIC | Age: 53
End: 2023-02-01
Payer: COMMERCIAL

## 2023-02-01 NOTE — PROGRESS NOTES
Date]    To Whom it May Concern:    [Clients full name] is my client, whom I am treating for a life-limiting disability. I support their use of a service dog. My clients disability causes limitations with the following major life activities: [insert limitations here, which could be maintaining low anxiety, interacting with others, remembering to take medications, avoiding impulsive behaviors, etc ]. These affect their job performance by [insert applicable examples here, such as lowering the amount of time the employee is productive, causing difficulty with coworker interactions, causing increased absenteeism, etc]. The service dog mitigates their disability by [insert help provided here, it could be alerting or responding to their mental health state or anxiety levels, facilitating social interactions, preventing or interrupting impulsive behaviors, reminding them to take medication, etc].    Sincerely,    [Signed by provider, with typed name and license information]

## 2023-02-01 NOTE — TELEPHONE ENCOUNTER
Notified patient of arrival time to the [facility] at 0900 with expected surgery start time 1100 on 2/2/2023. Instructed patient regarding pre-op instructions including no protein shakes or sugar free clear liquids after midnight but can have a rare sip of water for comfort, showering and preop medications to hold/take per anesthesia/preop. Reminded pt to drink pre-surgery beverage or 8 oz water at 0830. Instructed patient on the s/s of dehydration and for patient to call at the first sign of dehydration. Informed patient that someone from bariatrics will call them 1 week post op to review diet/fluid intake and to ensure adequate hydration. Reminded patient not to take antibiotics for 30 days following surgery or schedule elective procedures involving anesthesia/sedation for 30 days following surgery unless checking with the bariatric clinic first. Pt verbalized understanding. Pt given office phone number for any additional questions/concerns.

## 2023-02-01 NOTE — LETTER
[facility]  [address] [facility]  [place] [address]  Phone: [phone]  Fax: [phone]               02/01/2023          To whom it May concern:     Christine Alfaro is my patient, whom I am treating for a life-limiting disability.  I support the use of a service dog.  My patient's disability causes limitations with the following major life activities:  Prolonged standing, walking, stair climbing, coping with stressful situations.  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  Service dog medicates therapy disability by offering stability when ambulating and providing emotional support.    Sincerely,          Layo Mccrary MD  [place] state medical license:  [id number]

## 2023-02-02 ENCOUNTER — ANESTHESIA (OUTPATIENT)
Dept: SURGERY | Facility: HOSPITAL | Age: 53
DRG: 621 | End: 2023-02-02
Payer: COMMERCIAL

## 2023-02-02 ENCOUNTER — HOSPITAL ENCOUNTER (INPATIENT)
Facility: HOSPITAL | Age: 53
LOS: 1 days | Discharge: HOME | DRG: 621 | End: 2023-02-03
Attending: SURGERY | Admitting: SURGERY
Payer: COMMERCIAL

## 2023-02-02 ENCOUNTER — ANESTHESIA EVENT (OUTPATIENT)
Dept: SURGERY | Facility: HOSPITAL | Age: 53
DRG: 621 | End: 2023-02-02
Payer: COMMERCIAL

## 2023-02-02 DIAGNOSIS — E66.01 MORBID OBESITY WITH BMI OF 50.0-59.9, ADULT: ICD-10-CM

## 2023-02-02 PROBLEM — E66.9 OBESITY: Status: ACTIVE | Noted: 2023-02-02

## 2023-02-02 LAB
B-HCG UR QL: NEGATIVE
CTP QC/QA: YES

## 2023-02-02 PROCEDURE — C9113 INJ PANTOPRAZOLE SODIUM, VIA: HCPCS | Performed by: STUDENT IN AN ORGANIZED HEALTH CARE EDUCATION/TRAINING PROGRAM

## 2023-02-02 PROCEDURE — 63600175 PHARM REV CODE 636 W HCPCS: Performed by: STUDENT IN AN ORGANIZED HEALTH CARE EDUCATION/TRAINING PROGRAM

## 2023-02-02 PROCEDURE — D9220A PRA ANESTHESIA: Mod: ANES,,, | Performed by: ANESTHESIOLOGY

## 2023-02-02 PROCEDURE — 37000008 HC ANESTHESIA 1ST 15 MINUTES: Performed by: SURGERY

## 2023-02-02 PROCEDURE — 25000003 PHARM REV CODE 250: Performed by: STUDENT IN AN ORGANIZED HEALTH CARE EDUCATION/TRAINING PROGRAM

## 2023-02-02 PROCEDURE — D9220A PRA ANESTHESIA: ICD-10-PCS | Mod: ANES,,, | Performed by: ANESTHESIOLOGY

## 2023-02-02 PROCEDURE — 71000015 HC POSTOP RECOV 1ST HR: Performed by: SURGERY

## 2023-02-02 PROCEDURE — 36000710: Performed by: SURGERY

## 2023-02-02 PROCEDURE — 71000016 HC POSTOP RECOV ADDL HR: Performed by: SURGERY

## 2023-02-02 PROCEDURE — 71000033 HC RECOVERY, INTIAL HOUR: Performed by: SURGERY

## 2023-02-02 PROCEDURE — 63600175 PHARM REV CODE 636 W HCPCS: Performed by: NURSE ANESTHETIST, CERTIFIED REGISTERED

## 2023-02-02 PROCEDURE — 63600175 PHARM REV CODE 636 W HCPCS: Performed by: ANESTHESIOLOGY

## 2023-02-02 PROCEDURE — 63600175 PHARM REV CODE 636 W HCPCS: Mod: TB,JG | Performed by: NURSE ANESTHETIST, CERTIFIED REGISTERED

## 2023-02-02 PROCEDURE — 88307 PR  SURG PATH,LEVEL V: ICD-10-PCS | Mod: 26,,, | Performed by: STUDENT IN AN ORGANIZED HEALTH CARE EDUCATION/TRAINING PROGRAM

## 2023-02-02 PROCEDURE — 11000001 HC ACUTE MED/SURG PRIVATE ROOM

## 2023-02-02 PROCEDURE — 25000003 PHARM REV CODE 250: Performed by: SURGERY

## 2023-02-02 PROCEDURE — 43775 LAP SLEEVE GASTRECTOMY: CPT | Mod: ,,, | Performed by: SURGERY

## 2023-02-02 PROCEDURE — 36000711: Performed by: SURGERY

## 2023-02-02 PROCEDURE — 94761 N-INVAS EAR/PLS OXIMETRY MLT: CPT

## 2023-02-02 PROCEDURE — 88307 TISSUE EXAM BY PATHOLOGIST: CPT | Mod: 26,,, | Performed by: STUDENT IN AN ORGANIZED HEALTH CARE EDUCATION/TRAINING PROGRAM

## 2023-02-02 PROCEDURE — 37000009 HC ANESTHESIA EA ADD 15 MINS: Performed by: SURGERY

## 2023-02-02 PROCEDURE — 88307 TISSUE EXAM BY PATHOLOGIST: CPT | Performed by: STUDENT IN AN ORGANIZED HEALTH CARE EDUCATION/TRAINING PROGRAM

## 2023-02-02 PROCEDURE — 63600175 PHARM REV CODE 636 W HCPCS: Performed by: SURGERY

## 2023-02-02 PROCEDURE — 25000003 PHARM REV CODE 250: Performed by: NURSE ANESTHETIST, CERTIFIED REGISTERED

## 2023-02-02 PROCEDURE — 81025 URINE PREGNANCY TEST: CPT | Performed by: SURGERY

## 2023-02-02 PROCEDURE — 27201423 OPTIME MED/SURG SUP & DEVICES STERILE SUPPLY: Performed by: SURGERY

## 2023-02-02 PROCEDURE — 43775 PR LAP, GAST RESTRICT PROC, LONGITUDINAL GASTRECTOMY: ICD-10-PCS | Mod: ,,, | Performed by: SURGERY

## 2023-02-02 PROCEDURE — C1781 MESH (IMPLANTABLE): HCPCS | Performed by: SURGERY

## 2023-02-02 PROCEDURE — D9220A PRA ANESTHESIA: Mod: CRNA,,, | Performed by: NURSE ANESTHETIST, CERTIFIED REGISTERED

## 2023-02-02 PROCEDURE — D9220A PRA ANESTHESIA: ICD-10-PCS | Mod: CRNA,,, | Performed by: NURSE ANESTHETIST, CERTIFIED REGISTERED

## 2023-02-02 DEVICE — SEAMGUARD ESCHELON 60 MM.: Type: IMPLANTABLE DEVICE | Site: STOMACH | Status: FUNCTIONAL

## 2023-02-02 RX ORDER — ONDANSETRON 2 MG/ML
INJECTION INTRAMUSCULAR; INTRAVENOUS
Status: DISCONTINUED | OUTPATIENT
Start: 2023-02-02 | End: 2023-02-02

## 2023-02-02 RX ORDER — HALOPERIDOL 5 MG/ML
INJECTION INTRAMUSCULAR
Status: COMPLETED
Start: 2023-02-02 | End: 2023-02-02

## 2023-02-02 RX ORDER — ACETAMINOPHEN 10 MG/ML
INJECTION, SOLUTION INTRAVENOUS
Status: DISCONTINUED | OUTPATIENT
Start: 2023-02-02 | End: 2023-02-02

## 2023-02-02 RX ORDER — HYDROMORPHONE HYDROCHLORIDE 1 MG/ML
0.5 INJECTION, SOLUTION INTRAMUSCULAR; INTRAVENOUS; SUBCUTANEOUS
Status: DISCONTINUED | OUTPATIENT
Start: 2023-02-02 | End: 2023-02-03 | Stop reason: HOSPADM

## 2023-02-02 RX ORDER — HEPARIN SODIUM 5000 [USP'U]/ML
5000 INJECTION, SOLUTION INTRAVENOUS; SUBCUTANEOUS ONCE
Status: COMPLETED | OUTPATIENT
Start: 2023-02-02 | End: 2023-02-02

## 2023-02-02 RX ORDER — HYDROMORPHONE HYDROCHLORIDE 1 MG/ML
0.2 INJECTION, SOLUTION INTRAMUSCULAR; INTRAVENOUS; SUBCUTANEOUS EVERY 5 MIN PRN
Status: DISCONTINUED | OUTPATIENT
Start: 2023-02-02 | End: 2023-02-02 | Stop reason: HOSPADM

## 2023-02-02 RX ORDER — ASPIRIN 81 MG/1
81 TABLET ORAL DAILY
Status: DISCONTINUED | OUTPATIENT
Start: 2023-02-03 | End: 2023-02-03 | Stop reason: HOSPADM

## 2023-02-02 RX ORDER — FENTANYL CITRATE 50 UG/ML
INJECTION, SOLUTION INTRAMUSCULAR; INTRAVENOUS
Status: DISCONTINUED | OUTPATIENT
Start: 2023-02-02 | End: 2023-02-02

## 2023-02-02 RX ORDER — LIDOCAINE HYDROCHLORIDE 10 MG/ML
1 INJECTION, SOLUTION EPIDURAL; INFILTRATION; INTRACAUDAL; PERINEURAL ONCE
Status: COMPLETED | OUTPATIENT
Start: 2023-02-02 | End: 2023-02-02

## 2023-02-02 RX ORDER — ENOXAPARIN SODIUM 100 MG/ML
60 INJECTION SUBCUTANEOUS EVERY 12 HOURS
Status: DISCONTINUED | OUTPATIENT
Start: 2023-02-02 | End: 2023-02-03 | Stop reason: HOSPADM

## 2023-02-02 RX ORDER — BUPIVACAINE HYDROCHLORIDE 5 MG/ML
INJECTION, SOLUTION EPIDURAL; INTRACAUDAL
Status: DISCONTINUED | OUTPATIENT
Start: 2023-02-02 | End: 2023-02-02 | Stop reason: HOSPADM

## 2023-02-02 RX ORDER — LIDOCAINE HYDROCHLORIDE AND EPINEPHRINE 10; 10 MG/ML; UG/ML
INJECTION, SOLUTION INFILTRATION; PERINEURAL
Status: DISCONTINUED | OUTPATIENT
Start: 2023-02-02 | End: 2023-02-02 | Stop reason: HOSPADM

## 2023-02-02 RX ORDER — ONDANSETRON 2 MG/ML
8 INJECTION INTRAMUSCULAR; INTRAVENOUS EVERY 6 HOURS PRN
Status: DISCONTINUED | OUTPATIENT
Start: 2023-02-02 | End: 2023-02-03 | Stop reason: HOSPADM

## 2023-02-02 RX ORDER — BUPROPION HYDROCHLORIDE 100 MG/1
100 TABLET, EXTENDED RELEASE ORAL 2 TIMES DAILY
Status: DISCONTINUED | OUTPATIENT
Start: 2023-02-03 | End: 2023-02-03 | Stop reason: HOSPADM

## 2023-02-02 RX ORDER — LORAZEPAM 2 MG/ML
0.25 INJECTION INTRAMUSCULAR ONCE AS NEEDED
Status: DISCONTINUED | OUTPATIENT
Start: 2023-02-02 | End: 2023-02-02 | Stop reason: HOSPADM

## 2023-02-02 RX ORDER — PROCHLORPERAZINE EDISYLATE 5 MG/ML
5 INJECTION INTRAMUSCULAR; INTRAVENOUS EVERY 6 HOURS PRN
Status: DISCONTINUED | OUTPATIENT
Start: 2023-02-02 | End: 2023-02-03 | Stop reason: HOSPADM

## 2023-02-02 RX ORDER — DEXMEDETOMIDINE HYDROCHLORIDE 100 UG/ML
INJECTION, SOLUTION INTRAVENOUS
Status: DISCONTINUED | OUTPATIENT
Start: 2023-02-02 | End: 2023-02-02

## 2023-02-02 RX ORDER — PHENYLEPHRINE HCL IN 0.9% NACL 1 MG/10 ML
SYRINGE (ML) INTRAVENOUS
Status: DISCONTINUED | OUTPATIENT
Start: 2023-02-02 | End: 2023-02-02

## 2023-02-02 RX ORDER — SODIUM CHLORIDE 9 MG/ML
INJECTION, SOLUTION INTRAVENOUS CONTINUOUS
Status: DISCONTINUED | OUTPATIENT
Start: 2023-02-02 | End: 2023-02-03 | Stop reason: HOSPADM

## 2023-02-02 RX ORDER — ACETAMINOPHEN 650 MG/20.3ML
500 LIQUID ORAL EVERY 8 HOURS
Status: DISCONTINUED | OUTPATIENT
Start: 2023-02-02 | End: 2023-02-03 | Stop reason: HOSPADM

## 2023-02-02 RX ORDER — GABAPENTIN 250 MG/5ML
300 SOLUTION ORAL 3 TIMES DAILY
Status: DISCONTINUED | OUTPATIENT
Start: 2023-02-02 | End: 2023-02-03 | Stop reason: HOSPADM

## 2023-02-02 RX ORDER — MIDAZOLAM HYDROCHLORIDE 1 MG/ML
INJECTION, SOLUTION INTRAMUSCULAR; INTRAVENOUS
Status: DISCONTINUED | OUTPATIENT
Start: 2023-02-02 | End: 2023-02-02

## 2023-02-02 RX ORDER — LIDOCAINE HYDROCHLORIDE 20 MG/ML
INJECTION, SOLUTION EPIDURAL; INFILTRATION; INTRACAUDAL; PERINEURAL
Status: DISCONTINUED | OUTPATIENT
Start: 2023-02-02 | End: 2023-02-02

## 2023-02-02 RX ORDER — LEVOTHYROXINE SODIUM 125 UG/1
250 TABLET ORAL
Status: DISCONTINUED | OUTPATIENT
Start: 2023-02-03 | End: 2023-02-03 | Stop reason: HOSPADM

## 2023-02-02 RX ORDER — PROPOFOL 10 MG/ML
VIAL (ML) INTRAVENOUS
Status: DISCONTINUED | OUTPATIENT
Start: 2023-02-02 | End: 2023-02-02

## 2023-02-02 RX ORDER — ONDANSETRON 2 MG/ML
4 INJECTION INTRAMUSCULAR; INTRAVENOUS DAILY PRN
Status: DISCONTINUED | OUTPATIENT
Start: 2023-02-02 | End: 2023-02-02 | Stop reason: HOSPADM

## 2023-02-02 RX ORDER — SODIUM CHLORIDE, SODIUM LACTATE, POTASSIUM CHLORIDE, CALCIUM CHLORIDE 600; 310; 30; 20 MG/100ML; MG/100ML; MG/100ML; MG/100ML
INJECTION, SOLUTION INTRAVENOUS CONTINUOUS
Status: DISCONTINUED | OUTPATIENT
Start: 2023-02-02 | End: 2023-02-03 | Stop reason: HOSPADM

## 2023-02-02 RX ORDER — HYDROCODONE BITARTRATE AND ACETAMINOPHEN 7.5; 325 MG/15ML; MG/15ML
15 SOLUTION ORAL EVERY 4 HOURS PRN
Status: DISCONTINUED | OUTPATIENT
Start: 2023-02-02 | End: 2023-02-03 | Stop reason: HOSPADM

## 2023-02-02 RX ORDER — DIPHENHYDRAMINE HYDROCHLORIDE 50 MG/ML
INJECTION INTRAMUSCULAR; INTRAVENOUS
Status: DISCONTINUED | OUTPATIENT
Start: 2023-02-02 | End: 2023-02-02

## 2023-02-02 RX ORDER — ROCURONIUM BROMIDE 10 MG/ML
INJECTION, SOLUTION INTRAVENOUS
Status: DISCONTINUED | OUTPATIENT
Start: 2023-02-02 | End: 2023-02-02

## 2023-02-02 RX ORDER — DEXAMETHASONE SODIUM PHOSPHATE 4 MG/ML
INJECTION, SOLUTION INTRA-ARTICULAR; INTRALESIONAL; INTRAMUSCULAR; INTRAVENOUS; SOFT TISSUE
Status: DISCONTINUED | OUTPATIENT
Start: 2023-02-02 | End: 2023-02-02

## 2023-02-02 RX ORDER — FUROSEMIDE 20 MG/1
20 TABLET ORAL EVERY MORNING
Status: DISCONTINUED | OUTPATIENT
Start: 2023-02-03 | End: 2023-02-03 | Stop reason: HOSPADM

## 2023-02-02 RX ORDER — FAMOTIDINE 10 MG/ML
20 INJECTION INTRAVENOUS ONCE
Status: COMPLETED | OUTPATIENT
Start: 2023-02-02 | End: 2023-02-02

## 2023-02-02 RX ORDER — SODIUM CHLORIDE 9 MG/ML
INJECTION, SOLUTION INTRAVENOUS CONTINUOUS
Status: DISCONTINUED | OUTPATIENT
Start: 2023-02-02 | End: 2023-02-02

## 2023-02-02 RX ORDER — MEPERIDINE HYDROCHLORIDE 50 MG/ML
12.5 INJECTION INTRAMUSCULAR; INTRAVENOUS; SUBCUTANEOUS ONCE AS NEEDED
Status: DISCONTINUED | OUTPATIENT
Start: 2023-02-02 | End: 2023-02-02 | Stop reason: HOSPADM

## 2023-02-02 RX ORDER — PANTOPRAZOLE SODIUM 40 MG/10ML
40 INJECTION, POWDER, LYOPHILIZED, FOR SOLUTION INTRAVENOUS 2 TIMES DAILY
Status: DISCONTINUED | OUTPATIENT
Start: 2023-02-02 | End: 2023-02-03 | Stop reason: HOSPADM

## 2023-02-02 RX ORDER — KETAMINE HCL IN 0.9 % NACL 50 MG/5 ML
SYRINGE (ML) INTRAVENOUS
Status: DISCONTINUED | OUTPATIENT
Start: 2023-02-02 | End: 2023-02-02

## 2023-02-02 RX ORDER — MUPIROCIN 20 MG/G
OINTMENT TOPICAL
Status: DISCONTINUED | OUTPATIENT
Start: 2023-02-02 | End: 2023-02-02

## 2023-02-02 RX ADMIN — SODIUM CHLORIDE, POTASSIUM CHLORIDE, SODIUM LACTATE AND CALCIUM CHLORIDE: 600; 310; 30; 20 INJECTION, SOLUTION INTRAVENOUS at 01:02

## 2023-02-02 RX ADMIN — HYDROMORPHONE HYDROCHLORIDE 0.2 MG: 1 INJECTION, SOLUTION INTRAMUSCULAR; INTRAVENOUS; SUBCUTANEOUS at 01:02

## 2023-02-02 RX ADMIN — Medication 40 MG: at 12:02

## 2023-02-02 RX ADMIN — SUGAMMADEX 200 MG: 100 INJECTION, SOLUTION INTRAVENOUS at 01:02

## 2023-02-02 RX ADMIN — Medication 10 MG: at 01:02

## 2023-02-02 RX ADMIN — Medication 200 MCG: at 12:02

## 2023-02-02 RX ADMIN — HYDROCODONE BITARTRATE AND ACETAMINOPHEN 15 ML: 7.5; 325 SOLUTION ORAL at 01:02

## 2023-02-02 RX ADMIN — HYDROMORPHONE HYDROCHLORIDE 0.2 MG: 1 INJECTION, SOLUTION INTRAMUSCULAR; INTRAVENOUS; SUBCUTANEOUS at 02:02

## 2023-02-02 RX ADMIN — DEXAMETHASONE SODIUM PHOSPHATE 8 MG: 4 INJECTION, SOLUTION INTRAMUSCULAR; INTRAVENOUS at 12:02

## 2023-02-02 RX ADMIN — DEXMEDETOMIDINE HYDROCHLORIDE 8 MCG: 100 INJECTION, SOLUTION INTRAVENOUS at 01:02

## 2023-02-02 RX ADMIN — HEPARIN SODIUM 5000 UNITS: 5000 INJECTION INTRAVENOUS; SUBCUTANEOUS at 09:02

## 2023-02-02 RX ADMIN — DEXTROSE 3 G: 50 INJECTION, SOLUTION INTRAVENOUS at 12:02

## 2023-02-02 RX ADMIN — DIPHENHYDRAMINE HYDROCHLORIDE 12.5 MG: 50 INJECTION INTRAMUSCULAR; INTRAVENOUS at 12:02

## 2023-02-02 RX ADMIN — Medication 100 MCG: at 12:02

## 2023-02-02 RX ADMIN — SODIUM CHLORIDE: 9 INJECTION, SOLUTION INTRAVENOUS at 09:02

## 2023-02-02 RX ADMIN — MIDAZOLAM 2 MG: 1 INJECTION INTRAMUSCULAR; INTRAVENOUS at 11:02

## 2023-02-02 RX ADMIN — ONDANSETRON 4 MG: 2 INJECTION INTRAMUSCULAR; INTRAVENOUS at 12:02

## 2023-02-02 RX ADMIN — LIDOCAINE HYDROCHLORIDE 100 MG: 20 INJECTION, SOLUTION EPIDURAL; INFILTRATION; INTRACAUDAL; PERINEURAL at 11:02

## 2023-02-02 RX ADMIN — ENOXAPARIN SODIUM 60 MG: 100 INJECTION SUBCUTANEOUS at 09:02

## 2023-02-02 RX ADMIN — FENTANYL CITRATE 100 MCG: 50 INJECTION INTRAMUSCULAR; INTRAVENOUS at 11:02

## 2023-02-02 RX ADMIN — ACETAMINOPHEN 1000 MG: 10 INJECTION INTRAVENOUS at 01:02

## 2023-02-02 RX ADMIN — FAMOTIDINE 20 MG: 10 INJECTION INTRAVENOUS at 09:02

## 2023-02-02 RX ADMIN — ROCURONIUM BROMIDE 20 MG: 10 INJECTION INTRAVENOUS at 12:02

## 2023-02-02 RX ADMIN — MUPIROCIN: 20 OINTMENT TOPICAL at 09:02

## 2023-02-02 RX ADMIN — ROCURONIUM BROMIDE 50 MG: 10 INJECTION INTRAVENOUS at 11:02

## 2023-02-02 RX ADMIN — GABAPENTIN 300 MG: 250 SOLUTION ORAL at 09:02

## 2023-02-02 RX ADMIN — HYDROCODONE BITARTRATE AND ACETAMINOPHEN 15 ML: 7.5; 325 SOLUTION ORAL at 05:02

## 2023-02-02 RX ADMIN — PROPOFOL 200 MG: 10 INJECTION, EMULSION INTRAVENOUS at 11:02

## 2023-02-02 RX ADMIN — PANTOPRAZOLE SODIUM 40 MG: 40 INJECTION, POWDER, FOR SOLUTION INTRAVENOUS at 08:02

## 2023-02-02 RX ADMIN — GABAPENTIN 300 MG: 250 SOLUTION ORAL at 02:02

## 2023-02-02 RX ADMIN — ONDANSETRON 8 MG: 2 INJECTION INTRAMUSCULAR; INTRAVENOUS at 08:02

## 2023-02-02 RX ADMIN — HYDROCODONE BITARTRATE AND ACETAMINOPHEN 15 ML: 7.5; 325 SOLUTION ORAL at 09:02

## 2023-02-02 RX ADMIN — LIDOCAINE HYDROCHLORIDE 10 MG: 10 INJECTION, SOLUTION EPIDURAL; INFILTRATION; INTRACAUDAL; PERINEURAL at 09:02

## 2023-02-02 RX ADMIN — SODIUM CHLORIDE, POTASSIUM CHLORIDE, SODIUM LACTATE AND CALCIUM CHLORIDE: 600; 310; 30; 20 INJECTION, SOLUTION INTRAVENOUS at 09:02

## 2023-02-02 NOTE — ANESTHESIA PROCEDURE NOTES
Intubation    Date/Time: 2/2/2023 12:00 PM  Performed by: Breana Salmon CRNA  Authorized by: Jacob Silva Jr., MD     Intubation:     Induction:  Intravenous    Intubated:  Postinduction    Mask Ventilation:  Easy with oral airway    Attempts:  1    Attempted By:  Other (see comments) (PATY Kern, SANTOS)    Blade:  Panda 3    Laryngeal View Grade: Grade I - full view of cords      Difficult Airway Encountered?: No      Complications:  None    Airway Device:  Oral endotracheal tube    Airway Device Size:  7.5    Style/Cuff Inflation:  Cuffed (inflated to minimal occlusive pressure)    Inflation Amount (mL):  6    Tube secured:  22    Secured at:  The lips    Placement Verified By:  Capnometry and Revisualization with laryngoscopy    Complicating Factors:  Obesity, short neck and oropharyngeal edema or fat    Findings Post-Intubation:  BS equal bilateral and atraumatic/condition of teeth unchanged

## 2023-02-02 NOTE — NURSING TRANSFER
Nursing Transfer Note      2/2/2023     Reason patient is being transferred: postop    Transfer To: 506    Transfer via stretcher    Transfer with 2 L NC to O2, iv pole/fluids    Transported by transport    Medicines sent: n/a    Any special needs or follow-up needed: site care, pain management    Chart send with patient: Yes    Notified: spouse    Patient reassessed at: 2/2 4925

## 2023-02-02 NOTE — TRANSFER OF CARE
"Anesthesia Transfer of Care Note    Patient: Christine Alfaro    Procedure(s) Performed: Procedure(s) (LRB):  GASTRECTOMY, SLEEVE, LAPAROSCOPIC with intraop EGD (N/A)    Patient location: PACU    Anesthesia Type: general    Transport from OR: Transported from OR on 6-10 L/min O2 by face mask with adequate spontaneous ventilation    Post pain: adequate analgesia    Post assessment: no apparent anesthetic complications and tolerated procedure well    Post vital signs: stable    Level of consciousness: responds to stimulation    Nausea/Vomiting: no nausea/vomiting    Complications: none    Transfer of care protocol was followed      Last vitals:   Visit Vitals  BP (!) 117/50 (BP Location: Left arm, Patient Position: Lying)   Pulse 92   Temp 36.5 °C (97.7 °F) (Temporal)   Resp 20   Ht 5' 6" (1.676 m)   Wt (!) 156.1 kg (344 lb 2.2 oz)   LMP 01/02/2023 (Approximate)   SpO2 100%   Breastfeeding No   BMI 55.55 kg/m²     "

## 2023-02-02 NOTE — OP NOTE
DATE OF PROCEDURE: 02/02/2023   SERVICE: Bariatric Surgery.   Surgeon(s) and Role:     * Titi Ribeiro Jr., MD - Primary     * Carlo Chow MD - Resident - Assisting  PREOPERATIVE DIAGNOSES: Morbid obesity with a Body mass index is 55.55 kg/m².   along with benign hypertension, GERD, obstructive sleep apnea, and hyperlipidemia .   POSTOPERATIVE DIAGNOSES:Same  PROCEDURE: Laparoscopic sleeve gastrectomy and EGD.  ANESTHESIA: General endotracheal and local.   DESCRIPTION OF PROCEDURE: The patient was taken to the Operating Room, placed under general anesthesia, prepped and draped in sterile fashion. At this time,   incision was made approximately 15 cm from xiphoid and 5 cm to the left of   midline after infiltrating with local anesthetic. Using Optiview trocar,   intraabdominal access was obtained under direct visualization without difficulty   and pneumoperitoneum was obtained. Further ports were then placed including   bilateral anterior axillary subcostal 5 mm ports and midclavicular subcostal 5   mm port on the right and a second 12 port approximately 15 cm from xiphoid just   to right of midline. These were all placed after infiltrating with local   anesthetic and under direct visualization. Once the ports were placed, the   patient was placed in steep reverse Trendelenburg. A liver retractor was   placed. The hiatus was then examined. No hernia was noted. Once this was   complete, we turned attention to the sleeve itself. An area on the greater   curve approximately 6 cm proximal from the pylorus was identified and using a   Harmonic scalpel, the gastrocolic ligament was opened, exposing the lesser sac.   We continued to take down attachments along the greater curve including the   short gastrics to the angle of His, freeing the lateral part of the stomach. A   42-Sudanese bougie was then guided by Anesthesia into the stomach and from the   laparoscopic view guided along the lesser curve. Once the  bougie was in   position along the lesser curve, we began the sleeve with a green load stapler   with SeamGuard at the area 6 cm proximal from the pylorus using the bougie as a   guide on the lesser curve and fired the green load stapler beginning the sleeve.   Further staple loads, blue with SeamGuard were fired along the bougie on the   lesser curve towards the angle of His, completely freeing the lateral part of   the stomach. Once this was complete, the stomach was removed from the incision   approximately 15 cm from xiphoid just to right of midline after it was slightly   incising the skin and stretching the fascia with a Stacie. The specimen was   removed and passed off the table with ease. At this time, an 0 Vicryl suture   was then used to close the fascial defect on a suture passer device under direct   visualization. The suture was placed in figure-of-8 fashion, however, it was   not tied down at this time, the patient was laid flat. The bougie was removed.   Attention then turned to an EGD. The scope was placed in the oropharynx,   guided down the esophagus and into the sleeve through the sleeve and into the   antrum with ease. At this time, the area was infiltrated with air and we slowly   pulled the scope back inspecting the sleeve itself. The staple line showed no   signs of bleeding or other abnormalities. The sleeve itself was in good   condition with no abnormalities, no twisting or obstruction and nice uniform   size. After inspection, we suctioned all the air from the antrum and sleeve and   removed the scope under direct visualization, turned our attention back to the   laparoscopic view. We inspected the staple line, no signs of bleeding or other   abnormalities from the staple line. The liver retractor was removed. The ports   were removed under direct visualization. The suture was then placed in the   fascia of the incision 15 cm from xiphoid just to right of midline, was tied   down closing  the fascia of this incision and the incision was irrigated   copiously. At this time, the skin of all five port sites was closed with 4-0   Monocryl suture in subcuticular fashion. Mastisol and Steri-Strips were placed.   The patient was allowed to awake from general anesthesia, transferred to bed   for transfer to Recovery.   COMPLICATIONS: None.   SPONGE COUNT: Correct.   BLOOD LOSS: 15 mL.   FLUIDS: Per Anesthesia.   BLOOD GIVEN: None.   DRAINS: None.   SPECIMENS: Stomach.   CONDITION OF THE PATIENT: Good.   I was present for the entire procedure.

## 2023-02-02 NOTE — BRIEF OP NOTE
[facility] - Surgery ([facility])  Brief Operative Note    SUMMARY     Surgery Date: 2/2/2023     Surgeon(s) and Role:     * Vincenzo Zarate Jr., MD - Primary     * Carlo Chow MD - Resident - Assisting        Pre-op Diagnosis:  Essential hypertension [I10]  ETELVINA on CPAP [G47.33, Z99.89]  Morbid obesity [E66.01]  BMI 50.0-59.9, adult [Z68.43]  Dietary counseling and surveillance [Z71.3]  Dyslipidemia [E78.5]  Glucose intolerance [E74.39]    Post-op Diagnosis:  Post-Op Diagnosis Codes:     * Essential hypertension [I10]     * ETELVINA on CPAP [G47.33, Z99.89]     * Morbid obesity [E66.01]     * BMI 50.0-59.9, adult [Z68.43]     * Dietary counseling and surveillance [Z71.3]     * Dyslipidemia [E78.5]     * Glucose intolerance [E74.39]    Procedure(s) (LRB):  GASTRECTOMY, SLEEVE, LAPAROSCOPIC with intraop EGD (N/A)    Anesthesia: General    Operative Findings: Sleeve gastrectomy performed. No apparent complications.    Estimated Blood Loss: 15mL    Estimated Blood Loss has been documented.         Specimens:   Specimen (24h ago, onward)       Start     Ordered    02/02/23 1309  Specimen to Pathology, Surgery General Surgery  Once        Comments: Pre-op Diagnosis: Essential hypertension [I10]ETELVINA on CPAP [G47.33, Z99.89]Morbid obesity [E66.01]BMI 50.0-59.9, adult [Z68.43]Dietary counseling and surveillance [Z71.3]Dyslipidemia [E78.5]Glucose intolerance [E74.39]Procedure(s):GASTRECTOMY, SLEEVE, LAPAROSCOPIC with intraop EGD Number of specimens: 1Name of specimens: 1) Stomach (permanent)     References:    Click here for ordering Quick Tip   Question Answer Comment   Procedure Type: General Surgery    Specimen Class: Routine/Screening    Which provider would you like to cc? VINCENZO ZARATE JR    Release to patient Immediate        02/02/23 1309                    [id number]

## 2023-02-03 ENCOUNTER — NURSE TRIAGE (OUTPATIENT)
Dept: ADMINISTRATIVE | Facility: CLINIC | Age: 53
End: 2023-02-03
Payer: COMMERCIAL

## 2023-02-03 VITALS
HEIGHT: 66 IN | OXYGEN SATURATION: 94 % | RESPIRATION RATE: 18 BRPM | SYSTOLIC BLOOD PRESSURE: 142 MMHG | TEMPERATURE: 98 F | DIASTOLIC BLOOD PRESSURE: 90 MMHG | HEART RATE: 98 BPM | WEIGHT: 293 LBS | BODY MASS INDEX: 47.09 KG/M2

## 2023-02-03 LAB
ANION GAP SERPL CALC-SCNC: 8 MMOL/L (ref 8–16)
BASOPHILS # BLD AUTO: 0.02 K/UL (ref 0–0.2)
BASOPHILS NFR BLD: 0.2 % (ref 0–1.9)
BUN SERPL-MCNC: 6 MG/DL (ref 6–20)
CALCIUM SERPL-MCNC: 9.3 MG/DL (ref 8.7–10.5)
CHLORIDE SERPL-SCNC: 104 MMOL/L (ref 95–110)
CO2 SERPL-SCNC: 25 MMOL/L (ref 23–29)
CREAT SERPL-MCNC: 0.7 MG/DL (ref 0.5–1.4)
DIFFERENTIAL METHOD: ABNORMAL
EOSINOPHIL # BLD AUTO: 0 K/UL (ref 0–0.5)
EOSINOPHIL NFR BLD: 0 % (ref 0–8)
ERYTHROCYTE [DISTWIDTH] IN BLOOD BY AUTOMATED COUNT: 15.3 % (ref 11.5–14.5)
EST. GFR  (NO RACE VARIABLE): >60 ML/MIN/1.73 M^2
GLUCOSE SERPL-MCNC: 127 MG/DL (ref 70–110)
HCT VFR BLD AUTO: 40.2 % (ref 37–48.5)
HGB BLD-MCNC: 12.7 G/DL (ref 12–16)
IMM GRANULOCYTES # BLD AUTO: 0.07 K/UL (ref 0–0.04)
IMM GRANULOCYTES NFR BLD AUTO: 0.5 % (ref 0–0.5)
LYMPHOCYTES # BLD AUTO: 1.5 K/UL (ref 1–4.8)
LYMPHOCYTES NFR BLD: 12 % (ref 18–48)
MAGNESIUM SERPL-MCNC: 1.9 MG/DL (ref 1.6–2.6)
MCH RBC QN AUTO: 29.1 PG (ref 27–31)
MCHC RBC AUTO-ENTMCNC: 31.6 G/DL (ref 32–36)
MCV RBC AUTO: 92 FL (ref 82–98)
MONOCYTES # BLD AUTO: 0.6 K/UL (ref 0.3–1)
MONOCYTES NFR BLD: 4.9 % (ref 4–15)
NEUTROPHILS # BLD AUTO: 10.5 K/UL (ref 1.8–7.7)
NEUTROPHILS NFR BLD: 82.4 % (ref 38–73)
NRBC BLD-RTO: 0 /100 WBC
PHOSPHATE SERPL-MCNC: 3.1 MG/DL (ref 2.7–4.5)
PLATELET # BLD AUTO: 241 K/UL (ref 150–450)
PMV BLD AUTO: 10.8 FL (ref 9.2–12.9)
POTASSIUM SERPL-SCNC: 4.6 MMOL/L (ref 3.5–5.1)
RBC # BLD AUTO: 4.37 M/UL (ref 4–5.4)
SODIUM SERPL-SCNC: 137 MMOL/L (ref 136–145)
WBC # BLD AUTO: 12.73 K/UL (ref 3.9–12.7)

## 2023-02-03 PROCEDURE — 99900035 HC TECH TIME PER 15 MIN (STAT)

## 2023-02-03 PROCEDURE — 36415 COLL VENOUS BLD VENIPUNCTURE: CPT | Performed by: STUDENT IN AN ORGANIZED HEALTH CARE EDUCATION/TRAINING PROGRAM

## 2023-02-03 PROCEDURE — 85025 COMPLETE CBC W/AUTO DIFF WBC: CPT | Performed by: STUDENT IN AN ORGANIZED HEALTH CARE EDUCATION/TRAINING PROGRAM

## 2023-02-03 PROCEDURE — 84100 ASSAY OF PHOSPHORUS: CPT | Performed by: STUDENT IN AN ORGANIZED HEALTH CARE EDUCATION/TRAINING PROGRAM

## 2023-02-03 PROCEDURE — 94761 N-INVAS EAR/PLS OXIMETRY MLT: CPT

## 2023-02-03 PROCEDURE — 25000003 PHARM REV CODE 250: Performed by: STUDENT IN AN ORGANIZED HEALTH CARE EDUCATION/TRAINING PROGRAM

## 2023-02-03 PROCEDURE — 63600175 PHARM REV CODE 636 W HCPCS: Performed by: STUDENT IN AN ORGANIZED HEALTH CARE EDUCATION/TRAINING PROGRAM

## 2023-02-03 PROCEDURE — 83735 ASSAY OF MAGNESIUM: CPT | Performed by: STUDENT IN AN ORGANIZED HEALTH CARE EDUCATION/TRAINING PROGRAM

## 2023-02-03 PROCEDURE — 25000003 PHARM REV CODE 250

## 2023-02-03 PROCEDURE — C9113 INJ PANTOPRAZOLE SODIUM, VIA: HCPCS | Performed by: STUDENT IN AN ORGANIZED HEALTH CARE EDUCATION/TRAINING PROGRAM

## 2023-02-03 PROCEDURE — 80048 BASIC METABOLIC PNL TOTAL CA: CPT | Performed by: STUDENT IN AN ORGANIZED HEALTH CARE EDUCATION/TRAINING PROGRAM

## 2023-02-03 RX ORDER — HYDROXYCHLOROQUINE SULFATE 200 MG/1
200 TABLET, FILM COATED ORAL 2 TIMES DAILY
Status: DISCONTINUED | OUTPATIENT
Start: 2023-02-03 | End: 2023-02-03 | Stop reason: HOSPADM

## 2023-02-03 RX ORDER — SCOLOPAMINE TRANSDERMAL SYSTEM 1 MG/1
1 PATCH, EXTENDED RELEASE TRANSDERMAL
Status: DISCONTINUED | OUTPATIENT
Start: 2023-02-03 | End: 2023-02-03 | Stop reason: HOSPADM

## 2023-02-03 RX ADMIN — LEVOTHYROXINE SODIUM 250 MCG: 125 TABLET ORAL at 06:02

## 2023-02-03 RX ADMIN — HYDROXYCHLOROQUINE SULFATE 200 MG: 200 TABLET ORAL at 08:02

## 2023-02-03 RX ADMIN — HYDROMORPHONE HYDROCHLORIDE 0.5 MG: 1 INJECTION, SOLUTION INTRAMUSCULAR; INTRAVENOUS; SUBCUTANEOUS at 11:02

## 2023-02-03 RX ADMIN — ONDANSETRON 8 MG: 2 INJECTION INTRAMUSCULAR; INTRAVENOUS at 05:02

## 2023-02-03 RX ADMIN — ENOXAPARIN SODIUM 60 MG: 100 INJECTION SUBCUTANEOUS at 08:02

## 2023-02-03 RX ADMIN — ACETAMINOPHEN 499.51 MG: 650 SOLUTION ORAL at 01:02

## 2023-02-03 RX ADMIN — PANTOPRAZOLE SODIUM 40 MG: 40 INJECTION, POWDER, FOR SOLUTION INTRAVENOUS at 08:02

## 2023-02-03 RX ADMIN — HYDROCODONE BITARTRATE AND ACETAMINOPHEN 15 ML: 7.5; 325 SOLUTION ORAL at 07:02

## 2023-02-03 RX ADMIN — HYDROCODONE BITARTRATE AND ACETAMINOPHEN 15 ML: 7.5; 325 SOLUTION ORAL at 04:02

## 2023-02-03 RX ADMIN — ACETAMINOPHEN 499.51 MG: 650 SOLUTION ORAL at 06:02

## 2023-02-03 RX ADMIN — ONDANSETRON 8 MG: 2 INJECTION INTRAMUSCULAR; INTRAVENOUS at 11:02

## 2023-02-03 RX ADMIN — ASPIRIN 81 MG: 81 TABLET, COATED ORAL at 08:02

## 2023-02-03 RX ADMIN — GABAPENTIN 300 MG: 250 SOLUTION ORAL at 08:02

## 2023-02-03 RX ADMIN — SCOPALAMINE 1 PATCH: 1 PATCH, EXTENDED RELEASE TRANSDERMAL at 12:02

## 2023-02-03 RX ADMIN — FUROSEMIDE 20 MG: 20 TABLET ORAL at 06:02

## 2023-02-03 RX ADMIN — HYDROCODONE BITARTRATE AND ACETAMINOPHEN 15 ML: 7.5; 325 SOLUTION ORAL at 01:02

## 2023-02-03 NOTE — ANESTHESIA PREPROCEDURE EVALUATION
02/03/2023  Christine Alfaro is a 52 y.o., female.      Pre-op Assessment    I have reviewed the Patient Summary Reports.     I have reviewed the Nursing Notes.       Review of Systems  Anesthesia Hx:  No problems with previous Anesthesia    Hematology/Oncology:  Hematology Normal   Oncology Normal     EENT/Dental:EENT/Dental Normal   Cardiovascular:   Hypertension    Pulmonary:   Shortness of breath Sleep Apnea    Renal/:  Renal/ Normal     Hepatic/GI:   GERD    Musculoskeletal:  Musculoskeletal Normal    Neurological:  Neurology Normal    Endocrine:   Hypothyroidism    Dermatological:  Skin Normal    Psych:  Psychiatric Normal           Physical Exam  General: Well nourished    Airway:  Mallampati: III / II  Mouth Opening: Normal  TM Distance: Normal  Tongue: Normal  Neck ROM: Normal ROM    Dental:  Intact        Anesthesia Plan  Type of Anesthesia, risks & benefits discussed:    Anesthesia Type: Gen ETT  Intra-op Monitoring Plan: Standard ASA Monitors  Post Op Pain Control Plan: multimodal analgesia  Induction:  IV  Airway Plan: Direct  Informed Consent: Informed consent signed with the Patient and all parties understand the risks and agree with anesthesia plan.  All questions answered. Patient consented to blood products? No  ASA Score: 3  Day of Surgery Review of History & Physical: H&P Update referred to the surgeon/provider.    Ready For Surgery From Anesthesia Perspective.     .

## 2023-02-03 NOTE — NURSING
Nurses Note -- 4 Eyes      2/2/2023   6:22 PM      Skin assessed during: Admit      [x] No Pressure Injuries Present    []Prevention Measures Documented      [] Yes- Altered Skin Integrity Present or Discovered   [] LDA Added if Not in Epic (Describe Wound)   [] New Altered Skin Integrity was Present on Admit and Documented in LDA   [] Wound Image Taken    Wound Care Consulted? No    Attending Nurse:  Adriana Delgado RN     Second RN/Staff Member:  GERA Ovalle

## 2023-02-03 NOTE — PLAN OF CARE
[facility] - Surgery  Initial Discharge Assessment       Primary Care Provider: Layo Mccrary MD    Admission Diagnosis: Essential hypertension [I10]  ETELVINA on CPAP [G47.33, Z99.89]  Morbid obesity [E66.01]  BMI 50.0-59.9, adult [Z68.43]  Dietary counseling and surveillance [Z71.3]  Dyslipidemia [E78.5]  Glucose intolerance [E74.39]  Morbid obesity with BMI of 50.0-59.9, adult [E66.01, Z68.43]  Obesity [E66.9]    Admission Date: 2/2/2023  Expected Discharge Date: 2/3/2023    Discharge Barriers Identified: None    Payor: BLUE CROSS BLUE [employer]ads / Plan: [employer] [place] / Product Type: Commercial /     Extended Emergency Contact Information  Primary Emergency Contact: Geoff Alfaro   [place]  Mobile Phone: [phone]  Relation: Spouse   needed? No    Discharge Plan A: Home with family         [facility] [place] [facility] [address]  [address] [place] [address]  Phone: [phone] Fax: [phone]      Initial Assessment (most recent)       Adult Discharge Assessment - 02/03/23 0919          Discharge Assessment    Assessment Type Discharge Planning Assessment     Confirmed/corrected address, phone number and insurance Yes     Confirmed Demographics Correct on Facesheet     Source of Information patient     Communicated JOHNNA with patient/caregiver Yes     People in Home spouse     Do you expect to return to your current living situation? Yes     Do you have help at home or someone to help you manage your care at home? Yes     Prior to hospitilization cognitive status: Alert/Oriented     Current cognitive status: Alert/Oriented     Home Accessibility wheelchair accessible     Home Layout Able to live on 1st floor     Equipment Currently Used at Home CPAP     Readmission within 30 days? No     Patient currently being followed by outpatient case management? No     Do you currently have service(s) that help you manage your care at home? No     Do you have  prescription coverage? Yes     How do you get to doctors appointments? car, drives self;family or friend will provide     Are you on dialysis? No     Do you take coumadin? No     Discharge Plan A Home with family     DME Needed Upon Discharge  none     Discharge Plan discussed with: Patient     Discharge Barriers Identified None                       SW completed discharge planning assessment with the patient at bedside. SW verified demographic information listed on the pt.'s Face sheet. Pt reports living with her spouse in a single story home, whom she plans on managing her care and providing transport upon discharge. SW is following this Pt for DC planning needs. There are no identified needs at this time.    Roula Walls, CATHERINE  Case Management   [facility]   Ext. [phone]

## 2023-02-03 NOTE — NURSING
1710: Pt arrived to unit via stretcher, accompanied by transport staff. VSS on 2L NC. A&Ox4. Abdominal incisions CDI. 4 eyes skin assessment completed. Pt oriented to room. Will continue to monitor.

## 2023-02-03 NOTE — ASSESSMENT & PLAN NOTE
Christine Alfaro is an 52 y.o. female that is s/p laparoscopic sleeve gastrectomy on 2/2/2023.    - To begin water protocol this morning  - Diet: Bariatric clear liquid  - Pain: multimodal  - OK for pills < pencil eraser, liquids, or crushed  - Nausea control w/ PRN antiemetics   - Replete lytes PRN  - Daily labs  - OOB to chair and ambulate in halls   - Encourage IS  - DVT ppx     Dispo: Home today pending control of pain and nausea, stable vitals and tolerating CLD.

## 2023-02-03 NOTE — DISCHARGE SUMMARY
[facility] - Surgery  DISCHARGE SUMMARY  General Surgery      Admit Date:  2/2/2023    Discharge Date and Time:  2/3/2023  12:00 PM    Attending Physician:  Titi Ribeiro Jr.,*     Discharge Provider:  Joanna Morley MD     Reason for Admission:  Obesity     Procedures Performed:  Procedure(s) (LRB):  GASTRECTOMY, SLEEVE, LAPAROSCOPIC with intraop EGD (N/A)    Hospital Course:  Please see the preoperative H&P and other available documentation for full details related to history prior to this admission.  Briefly, Christine Alfaro is a 52 y.o. female who was admitted following scheduled elective surgery for Obesity    Following a complete preoperative discussion of the risks and benefits of surgery with signed informed consent, the patient was taken to the operating room on 2/2/2023 and underwent the above stated procedures.  The patient tolerated surgery well and there were no complications.  Please see the operative report for full intraoperative findings and details.  Postoperatively, the patient did well and was transferred from the PACU to the floor in stable condition where they had a stable and uncomplicated hospital course.  Labs and vital signs remained stable and appropriate throughout course.  Diet was advanced as tolerated and the patient's pain was controlled on oral pain medications without problem.  Currently, the patient is doing well at 1 Day Post-Op and is stable and appropriate for discharge home at this time.      Physical Exam  Constitutional:       Appearance: She is well-developed. She is obese.   Neck:      Vascular: No JVD.      Trachea: No tracheal deviation.   Cardiovascular:      Rate and Rhythm: Normal rate and regular rhythm.   Pulmonary:      Effort: No respiratory distress.      Breath sounds: Normal breath sounds.   Abdominal:      General: There is no distension.      Palpations: Abdomen is soft. There is no mass.      Tenderness: There is no abdominal tenderness. There is no  guarding or rebound.      Comments: Abdominal port sites are c/d/I with steri strips in place  Appropriately tender to palpation   Skin:     General: Skin is warm and dry.   Neurological:      Mental Status: She is alert and oriented to person, place, and time.         Consults:  None.    Significant Diagnostic Studies:   Recent Labs   Lab 02/03/23  0320   WBC 12.73*   HGB 12.7   HCT 40.2        Recent Labs   Lab 02/03/23  0320      K 4.6      CO2 25   BUN 6   CREATININE 0.7   *   CALCIUM 9.3   MG 1.9   PHOS 3.1   No results for input(s): INR, PTT, LABHEPA, LACTATE, TROPONINI, CPK, CPKMB, MB, BNP in the last 72 hours.No results for input(s): PH, PCO2, PO2, HCO3 in the last 72 hours.      Final Diagnoses:   Principal Problem:    Obesity   Secondary Diagnoses:    Active Hospital Problems    Diagnosis  POA    *Obesity [E66.9]  Yes      Resolved Hospital Problems   No resolved problems to display.       Discharged Condition:  Good    Disposition:  Home or Self Care    Follow Up/Patient Instructions:     Medications:  Reconciled Home Medications:    Current Discharge Medication List        CONTINUE these medications which have NOT CHANGED    Details   aspirin (ECOTRIN) 81 MG EC tablet Take 1 tablet (81 mg total) by mouth once daily.  Qty: 90 tablet, Refills: 3    Associated Diagnoses: Anginal equivalent      atorvastatin (LIPITOR) 40 MG tablet Take 1 tablet (40 mg total) by mouth every evening.  Qty: 90 tablet, Refills: 3    Associated Diagnoses: Dyslipidemia      buPROPion (WELLBUTRIN SR) 100 MG TBSR 12 hr tablet Take 1 tablet (100 mg total) by mouth 2 (two) times daily.  Qty: 60 tablet, Refills: 11    Associated Diagnoses: Anxiety      cyclobenzaprine (FLEXERIL) 10 MG tablet TAKE ONE TABLET EVERY EVENING  Qty: 150 tablet, Refills: 3      folic acid (FOLVITE) 1 MG tablet Take 1 tablet (1 mg total) by mouth once daily.  Qty: 30 tablet, Refills: 11      furosemide (LASIX) 20 MG tablet Take 1  tablet (20 mg total) by mouth once daily.  Qty: 90 tablet, Refills: 3    Associated Diagnoses: Edema, unspecified type      hydrOXYchloroQUINE (PLAQUENIL) 200 mg tablet Take 200 mg by mouth 2 (two) times daily.      levothyroxine (SYNTHROID) 125 MCG tablet TAKE 2 TABLETS(250 MCG) BY MOUTH DAILY  Qty: 180 tablet, Refills: 1    Comments: This prescription was filled on 1/11/2023. Any refills authorized will be placed on file.      lisinopriL (PRINIVIL,ZESTRIL) 20 MG tablet TAKE 1 TABLET BY MOUTH EVERY DAY  Qty: 90 tablet, Refills: 3    Comments: .  Associated Diagnoses: Essential hypertension      methotrexate 2.5 MG Tab TAKE 8 TABLETS BY MOUTH EVERY 7 DAYS  Qty: 102 tablet, Refills: 0    Associated Diagnoses: Inflammatory arthritis      !! omeprazole (PRILOSEC) 40 MG capsule Take 1 capsule (40 mg total) by mouth once daily.  Qty: 30 capsule, Refills: 6    Comments: This prescription was filled on 9/26/2022. Any refills authorized will be placed on file.  Associated Diagnoses: Gastroesophageal reflux disease, unspecified whether esophagitis present      sodium chloride 0.9% SolP 100 mL with abatacept (with maltose) 250 mg SolR 1,000 mg Inject 1,000 mg into the vein every 28 days.      XIIDRA 5 % Dpet APPLY 1 DROP TO BOTH EYES BID      albuterol (PROVENTIL HFA) 90 mcg/actuation inhaler Inhale 2 puffs into the lungs every 6 (six) hours as needed for Wheezing. Rescue  Qty: 18 g, Refills: 0    Associated Diagnoses: Upper respiratory tract infection, unspecified type      cephALEXin (KEFLEX) 500 MG capsule Take 1 capsule (500 mg total) by mouth every 12 (twelve) hours.  Qty: 10 capsule, Refills: 0    Associated Diagnoses: Acute infective cystitis      hydrocodone-acetaminophen (HYCET) solution 7.5-325 mg/15mL Take 15 mLs by mouth 4 (four) times daily as needed for Pain.  Qty: 118 mL, Refills: 0    Comments: Quantity prescribed more than 7 day supply? No  Bedside  Associated Diagnoses: Morbid obesity with BMI of 50.0-59.9,  adult      !! omeprazole (PRILOSEC) 40 MG capsule Take 1 capsule (40 mg total) by mouth every morning. Open capsule and take with apple sauce  Qty: 30 capsule, Refills: 2    Comments: Bedside  Associated Diagnoses: Morbid obesity with BMI of 50.0-59.9, adult      ondansetron (ZOFRAN-ODT) 8 MG TbDL Take 1 tablet (8 mg total) by mouth every 6 (six) hours as needed.  Qty: 30 tablet, Refills: 0    Comments: Bedside  Associated Diagnoses: Morbid obesity with BMI of 50.0-59.9, adult      polyethylene glycol (GLYCOLAX) 17 gram/dose powder Take 17 g by mouth once daily. for 14 days  Qty: 238 g, Refills: 0    Comments: Bedside  Associated Diagnoses: Morbid obesity with BMI of 50.0-59.9, adult      promethazine (PHENERGAN) 25 MG suppository Unwrap and insert 1 suppository rectally every 6 hours as needed.  Qty: 15 suppository, Refills: 0    Comments: Bedside  Associated Diagnoses: Morbid obesity with BMI of 50.0-59.9, adult      semaglutide (OZEMPIC) 1 mg/dose (2 mg/1.5 mL) PnIj Inject 1 mg into the skin every 7 days.  Qty: 2 pen, Refills: 11    Associated Diagnoses: Glucose intolerance; Obesity, morbid, BMI 50 or higher       !! - Potential duplicate medications found. Please discuss with provider.        No discharge procedures on file.      Joanna Morley MD

## 2023-02-03 NOTE — PROGRESS NOTES
[facility] - [facility]  General Surgery  Progress Note    Subjective:     History of Present Illness:  No notes on file    Post-Op Info:  Procedure(s) (LRB):  GASTRECTOMY, SLEEVE, LAPAROSCOPIC with intraop EGD (N/A)   1 Day Post-Op     Interval History: NAEON. Some nausea this AM, epigastric pain, tolerable. On water protocol. Tachycardia overnight, but appears to be her baseline on review of prior vitals. Will monitor. No electrolyte disturbances this AM. Incisions CDI.     Medications:  Continuous Infusions:   sodium chloride 0.9%      lactated ringers 125 mL/hr at 02/02/23 2110     Scheduled Meds:   acetaminophen  499.5074 mg Oral Q8H    aspirin  81 mg Oral Daily    buPROPion  100 mg Oral BID    enoxaparin  60 mg Subcutaneous Q12H    furosemide  20 mg Oral QAM    gabapentin  300 mg Oral TID    levothyroxine  250 mcg Oral Before breakfast    pantoprazole  40 mg Intravenous BID     PRN Meds:hydrocodone-apap 7.5-325 MG/15 ML, HYDROmorphone, ondansetron, prochlorperazine, simethicone     Review of patient's allergies indicates:   Allergen Reactions    Sulfamethoxazole-trimethoprim     Sulfa (sulfonamide antibiotics) Rash and Swelling     Objective:     Vital Signs (Most Recent):  Temp: 97.9 °F (36.6 °C) (02/03/23 0511)  Pulse: 101 (02/03/23 0511)  Resp: 20 (02/03/23 0511)  BP: 135/74 (02/03/23 0511)  SpO2: 97 % (02/03/23 0511) Vital Signs (24h Range):  Temp:  [97.2 °F (36.2 °C)-98.9 °F (37.2 °C)] 97.9 °F (36.6 °C)  Pulse:  [] 101  Resp:  [16-20] 20  SpO2:  [91 %-100 %] 97 %  BP: (112-159)/(50-80) 135/74     Weight: (!) 156.1 kg (344 lb 2.2 oz)  Body mass index is 55.55 kg/m².    Intake/Output - Last 3 Shifts         02/01 0700 02/02 0659 02/02 0700 02/03 0659 02/03 0700 02/04 0659    P.O.  50     I.V. (mL/kg)  1200 (7.7)     Total Intake(mL/kg)  1250 (8)     Net  +1250            Urine Occurrence  4 x     Stool Occurrence  0 x     Emesis Occurrence  0 x             Physical Exam  HENT:      Head:  Normocephalic.      Nose: Nose normal.   Eyes:      General: No scleral icterus.  Cardiovascular:      Rate and Rhythm: Normal rate and regular rhythm.      Heart sounds: Normal heart sounds.   Pulmonary:      Effort: Pulmonary effort is normal. No respiratory distress.      Breath sounds: Normal breath sounds. No wheezing or rales.   Abdominal:      Palpations: Abdomen is soft.      Tenderness: There is abdominal tenderness (Epigastric and incisional tenderness).   Musculoskeletal:      Cervical back: Full passive range of motion without pain.   Skin:     General: Skin is warm and dry.   Neurological:      Mental Status: She is alert.       Significant Labs:  I have reviewed all pertinent lab results within the past 24 hours.  CBC:   Recent Labs   Lab 02/03/23  0320   WBC 12.73*   RBC 4.37   HGB 12.7   HCT 40.2      MCV 92   MCH 29.1   MCHC 31.6*     BMP:   Recent Labs   Lab 02/03/23  0320   *      K 4.6      CO2 25   BUN 6   CREATININE 0.7   CALCIUM 9.3   MG 1.9     CMP:   Recent Labs   Lab 02/03/23  0320   *   CALCIUM 9.3      K 4.6   CO2 25      BUN 6   CREATININE 0.7       Significant Diagnostics:  I have reviewed all pertinent imaging results/findings within the past 24 hours.    Assessment/Plan:     * Obesity  Christine Alfaro is an 52 y.o. female that is s/p laparoscopic sleeve gastrectomy on 2/2/2023.    - To begin water protocol this morning  - Diet: Bariatric clear liquid  - Pain: multimodal  - OK for pills < pencil eraser, liquids, or crushed  - Nausea control w/ PRN antiemetics   - Replete lytes PRN  - Daily labs  - OOB to chair and ambulate in halls   - Encourage IS  - DVT ppx     Dispo: Home today pending control of pain and nausea, stable vitals and tolerating CLD.          Cherise Avelar MD  General Surgery  [facility] - Surgery

## 2023-02-03 NOTE — PROGRESS NOTES
Pt ready for discharge. Discharge instructions and prescriptions given and explained to pt. Pt verbalizes understanding. PIV removed. No further questions from pt at this time. Pt to be discharged via wheelchair when spouse arrives. Will cont to monitor until discharge.

## 2023-02-03 NOTE — SUBJECTIVE & OBJECTIVE
Interval History: NAEON. Some nausea this AM, epigastric pain, tolerable. On water protocol. Tachycardia overnight, but appears to be her baseline on review of prior vitals. Will monitor.     Medications:  Continuous Infusions:   sodium chloride 0.9%      lactated ringers 125 mL/hr at 02/02/23 2110     Scheduled Meds:   acetaminophen  499.5074 mg Oral Q8H    aspirin  81 mg Oral Daily    buPROPion  100 mg Oral BID    enoxaparin  60 mg Subcutaneous Q12H    furosemide  20 mg Oral QAM    gabapentin  300 mg Oral TID    levothyroxine  250 mcg Oral Before breakfast    pantoprazole  40 mg Intravenous BID     PRN Meds:hydrocodone-apap 7.5-325 MG/15 ML, HYDROmorphone, ondansetron, prochlorperazine, simethicone     Review of patient's allergies indicates:   Allergen Reactions    Sulfamethoxazole-trimethoprim     Sulfa (sulfonamide antibiotics) Rash and Swelling     Objective:     Vital Signs (Most Recent):  Temp: 97.9 °F (36.6 °C) (02/03/23 0511)  Pulse: 101 (02/03/23 0511)  Resp: 20 (02/03/23 0511)  BP: 135/74 (02/03/23 0511)  SpO2: 97 % (02/03/23 0511) Vital Signs (24h Range):  Temp:  [97.2 °F (36.2 °C)-98.9 °F (37.2 °C)] 97.9 °F (36.6 °C)  Pulse:  [] 101  Resp:  [16-20] 20  SpO2:  [91 %-100 %] 97 %  BP: (112-159)/(50-80) 135/74     Weight: (!) 156.1 kg (344 lb 2.2 oz)  Body mass index is 55.55 kg/m².    Intake/Output - Last 3 Shifts         02/01 0700  02/02 0659 02/02 0700  02/03 0659 02/03 0700  02/04 0659    P.O.  50     I.V. (mL/kg)  1200 (7.7)     Total Intake(mL/kg)  1250 (8)     Net  +1250            Urine Occurrence  4 x     Stool Occurrence  0 x     Emesis Occurrence  0 x             Physical Exam  HENT:      Head: Normocephalic.      Nose: Nose normal.   Eyes:      General: No scleral icterus.  Cardiovascular:      Rate and Rhythm: Normal rate and regular rhythm.      Heart sounds: Normal heart sounds.   Pulmonary:      Effort: Pulmonary effort is normal. No respiratory distress.      Breath sounds: Normal  breath sounds. No wheezing or rales.   Abdominal:      Palpations: Abdomen is soft.      Tenderness: There is abdominal tenderness (Epigastric and incisional tenderness).   Musculoskeletal:      Cervical back: Full passive range of motion without pain.   Skin:     General: Skin is warm and dry.   Neurological:      Mental Status: She is alert.       Significant Labs:  I have reviewed all pertinent lab results within the past 24 hours.  CBC:   Recent Labs   Lab 02/03/23  0320   WBC 12.73*   RBC 4.37   HGB 12.7   HCT 40.2      MCV 92   MCH 29.1   MCHC 31.6*     BMP:   Recent Labs   Lab 02/03/23  0320   *      K 4.6      CO2 25   BUN 6   CREATININE 0.7   CALCIUM 9.3   MG 1.9     CMP:   Recent Labs   Lab 02/03/23  0320   *   CALCIUM 9.3      K 4.6   CO2 25      BUN 6   CREATININE 0.7       Significant Diagnostics:  I have reviewed all pertinent imaging results/findings within the past 24 hours.

## 2023-02-03 NOTE — ANESTHESIA POSTPROCEDURE EVALUATION
Anesthesia Post Evaluation    Patient: Christine Alfaro    Procedure(s) Performed: Procedure(s) (LRB):  GASTRECTOMY, SLEEVE, LAPAROSCOPIC with intraop EGD (N/A)    Final Anesthesia Type: general      Patient location during evaluation: PACU  Patient participation: Yes- Able to Participate  Level of consciousness: awake and alert  Post-procedure vital signs: reviewed and stable  Pain management: adequate  Airway patency: patent    PONV status at discharge: No PONV  Anesthetic complications: no      Cardiovascular status: blood pressure returned to baseline  Respiratory status: spontaneous ventilation and room air  Hydration status: euvolemic  Follow-up not needed.          Vitals Value Taken Time   /72 02/03/23 1225   Temp 36.7 °C (98 °F) 02/03/23 1225   Pulse 102 02/03/23 1225   Resp 18 02/03/23 1331   SpO2 96 % 02/03/23 1225         Event Time   Out of Recovery 13:45:00         Pain/Latisha Score: Pain Rating Prior to Med Admin: 5 (2/3/2023  1:31 PM)  Pain Rating Post Med Admin: 4 (2/3/2023 12:25 PM)  Latisha Score: 9 (2/2/2023  1:45 PM)

## 2023-02-04 NOTE — TELEPHONE ENCOUNTER
Post op pt with questions regarding if she may sleep on her side. I have paged the on call provider in this regard and will update once I receive further information. Paged x  2 and will update once I receive further information.    Reason for Disposition   Nursing judgment or information in reference    Protocols used: No Guideline [id number]

## 2023-02-06 ENCOUNTER — TELEPHONE (OUTPATIENT)
Dept: BARIATRICS | Facility: CLINIC | Age: 53
End: 2023-02-06
Payer: COMMERCIAL

## 2023-02-06 NOTE — TELEPHONE ENCOUNTER
Spoke to pt. Pt stated she only was asking if she could sleep on her side. Pt also asked if she can go back to work on Friday. She stated she will let us know how she's feeling tomorrow, but so far feeling well. Told pt we could get her a letter emailed to return to work if cleared by Dr. Ribeiro after tomorrow. Pt stated she missed last dose of vitamins yesterday. Reassured pt not to worry if she misses one time, but to try to get in her vitamins each day starting with B1 complex. Pt verbalized understanding.

## 2023-02-07 ENCOUNTER — PATIENT MESSAGE (OUTPATIENT)
Dept: BARIATRICS | Facility: CLINIC | Age: 53
End: 2023-02-07
Payer: COMMERCIAL

## 2023-02-07 ENCOUNTER — PATIENT MESSAGE (OUTPATIENT)
Dept: FAMILY MEDICINE | Facility: CLINIC | Age: 53
End: 2023-02-07
Payer: COMMERCIAL

## 2023-02-07 ENCOUNTER — NURSE TRIAGE (OUTPATIENT)
Dept: ADMINISTRATIVE | Facility: CLINIC | Age: 53
End: 2023-02-07
Payer: COMMERCIAL

## 2023-02-07 DIAGNOSIS — F41.9 ANXIETY: Primary | ICD-10-CM

## 2023-02-07 RX ORDER — BUPROPION HYDROCHLORIDE 75 MG/1
75 TABLET ORAL 2 TIMES DAILY
Qty: 30 TABLET | Refills: 0 | Status: SHIPPED | OUTPATIENT
Start: 2023-02-07 | End: 2023-03-03

## 2023-02-07 NOTE — TELEPHONE ENCOUNTER
Phoned patient in response to portal message and informed her the reason for not swallowing pills larger than the tip of a pencil eraser is due to the inflammation and edema at the internal surgery sites and we do not want anything getting stuck in that area and causing ulcerations.  Asked her to contact her prescribing physician to see if they can put on her something for the remaining 2 weeks until she can swallow the whole pills again.  She verbalized understanding and had not other issues.

## 2023-02-08 ENCOUNTER — TELEPHONE (OUTPATIENT)
Dept: BARIATRICS | Facility: CLINIC | Age: 53
End: 2023-02-08
Payer: COMMERCIAL

## 2023-02-08 LAB
FINAL PATHOLOGIC DIAGNOSIS: NORMAL
Lab: NORMAL

## 2023-02-08 NOTE — TELEPHONE ENCOUNTER
Post op 5 days. Pt stated the she fell last night (but didn't hit floor) an felt a pop. Pt c/o bruise to abdominal incision farthest to right that she noticed tonight. Denies opening and pain. Care advice to contact provider within 24 hours per protocol. Advised pt to collette bruising and callback if bruising spreads. Verbalized understanding. Encounter routed to provider.         Reason for Disposition   [1] Caller has NON-URGENT question AND [2] triager unable to answer question    Additional Information   Negative: [1] Major abdominal surgical incision AND [2] wound gaping open AND [3] visible internal organs   Negative: Sounds like a life-threatening emergency to the triager   Negative: [1] Bleeding from incision AND [2] won't stop after 10 minutes of direct pressure   Negative: [1] Bleeding (more than a few drops) from incision AND [2] blood vessel surgery (e.g., carotid endarterectomy, femoral bypass graft, kidney dialysis fistula, tracheostomy)   Negative: [1] Widespread rash AND [2] bright red, sunburn-like   Negative: Severe pain in the incision   Negative: [1] Incision gaping open AND [2] < 48 hours since wound re-opened   Negative: [1] Incision gaping open AND [2] length of opening > 2 inches (5 cm)   Negative: Patient sounds very sick or weak to the triager   Negative: Sounds like a serious complication to the triager   Negative: Fever > 100.4 F (38.0 C)   Negative: [1] Incision looks infected (spreading redness, pain) AND [2] fever > 99.5 F (37.5 C)   Negative: [1] Incision looks infected (spreading redness, pain) AND [2] large red area (> 2 in. or 5 cm)   Negative: [1] Incision looks infected (spreading redness, pain) AND [2] face wound   Negative: [1] Red streak runs from the incision AND [2] longer than 1 inch (2.5 cm)   Negative: [1] Pus or bad-smelling fluid draining from incision AND [2] no fever   Negative: [1] Post-op pain AND [2] not controlled with pain medications   Negative: Dressing soaked  with blood or body fluid (e.g., drainage)   Negative: [1] Scant bleeding (e.g., few drops) from incision AND [2] blood vessel surgery (e.g., carotid endarterectomy, femoral bypass graft, kidney dialysis fistula   Negative: [1] Raised bruise and [2] size > 2 inches (5 cm) and expanding   Negative: [1] Caller has URGENT question AND [2] triager unable to answer question   Negative: [1] INCREASING pain in incision AND [2] > 2 days (48 hours) since surgery   Negative: [1] Small red area or streak AND [2] no fever   Negative: [1] Clear or blood-tinged fluid draining from wound AND [2] no fever   Negative: [1] Incision gaping open AND [2] > 48 hours since wound re-opened AND [3] length of opening > 1/2 inch (12 mm)   Negative: [1] Incision on face gaping open after skin glue AND [2] > 48 hours since wound re-opened AND [3] length of opening > 1/4 inch (6 mm)   Negative: Suture or staple removal is overdue   Negative: [1] Suture or staple came out early AND [2] caller wants wound checked    Protocols used: Post-Op Incision Symptoms and [id number]

## 2023-02-08 NOTE — TELEPHONE ENCOUNTER
Spoke to pt. Pt denied any pain, fever, nausea. Pt able to keep food down and doing well after surgery except for bruising on abdomen. Told pt to watch the site for any worsening. Informed pt that DEISY Smith PA-C was notified and pt will be called if there are any instructions. Pt agreed and verbalized understanding.

## 2023-02-08 NOTE — TELEPHONE ENCOUNTER
Spoke with pt re: fall / slide  Pt woke up out a dead sleep & slide down. She states she did not fall but hear a pop/pull.  Pt did reach out to Bariatric team.   Pt is not on any blood thinners.   Pt does have some bruising around the incision sites but otherwise no complaints.  Pt will call back if she notices or needs anything else

## 2023-02-08 NOTE — TELEPHONE ENCOUNTER
Just wanted you to know about this. I'm calling her now to follow up. She has f/u appt with you on 2/16.

## 2023-02-09 ENCOUNTER — PATIENT MESSAGE (OUTPATIENT)
Dept: BARIATRICS | Facility: CLINIC | Age: 53
End: 2023-02-09
Payer: COMMERCIAL

## 2023-02-09 ENCOUNTER — PATIENT MESSAGE (OUTPATIENT)
Dept: FAMILY MEDICINE | Facility: CLINIC | Age: 53
End: 2023-02-09
Payer: COMMERCIAL

## 2023-02-09 NOTE — TELEPHONE ENCOUNTER
Spoke to pt. Pt stated she sent a message to her PCP and awaiting response. Explained to pt that her PCP needs to state pt is ok to return to work from his point of view. Pt verbalized understanding.

## 2023-02-10 ENCOUNTER — OFFICE VISIT (OUTPATIENT)
Dept: FAMILY MEDICINE | Facility: CLINIC | Age: 53
End: 2023-02-10
Payer: COMMERCIAL

## 2023-02-10 VITALS
WEIGHT: 293 LBS | SYSTOLIC BLOOD PRESSURE: 116 MMHG | HEART RATE: 88 BPM | BODY MASS INDEX: 47.09 KG/M2 | HEIGHT: 66 IN | DIASTOLIC BLOOD PRESSURE: 72 MMHG
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
## 2023-02-10 PROCEDURE — 3044F HG A1C LEVEL LT 7.0%: CPT | Mod: CPTII,S$GLB,, | Performed by: FAMILY MEDICINE

## 2023-02-10 PROCEDURE — 1111F DSCHRG MED/CURRENT MED MERGE: CPT | Mod: CPTII,S$GLB,, | Performed by: FAMILY MEDICINE

## 2023-02-10 PROCEDURE — 3074F PR MOST RECENT SYSTOLIC BLOOD PRESSURE < 130 MM HG: ICD-10-PCS | Mod: CPTII,S$GLB,, | Performed by: FAMILY MEDICINE

## 2023-02-10 PROCEDURE — 3078F DIAST BP <80 MM HG: CPT | Mod: CPTII,S$GLB,, | Performed by: FAMILY MEDICINE

## 2023-02-10 PROCEDURE — 3008F PR BODY MASS INDEX (BMI) DOCUMENTED: ICD-10-PCS | Mod: CPTII,S$GLB,, | Performed by: FAMILY MEDICINE

## 2023-02-10 PROCEDURE — 3078F PR MOST RECENT DIASTOLIC BLOOD PRESSURE < 80 MM HG: ICD-10-PCS | Mod: CPTII,S$GLB,, | Performed by: FAMILY MEDICINE

## 2023-02-10 PROCEDURE — 99999 PR PBB SHADOW E&M-EST. PATIENT-LVL IV: ICD-10-PCS | Mod: PBBFAC,,, | Performed by: FAMILY MEDICINE

## 2023-02-10 PROCEDURE — 99999 PR PBB SHADOW E&M-EST. PATIENT-LVL IV: CPT | Mod: PBBFAC,,, | Performed by: FAMILY MEDICINE

## 2023-02-10 PROCEDURE — 99213 PR OFFICE/OUTPT VISIT, EST, LEVL III, 20-29 MIN: ICD-10-PCS | Mod: S$GLB,,, | Performed by: FAMILY MEDICINE

## 2023-02-10 PROCEDURE — 1159F MED LIST DOCD IN RCRD: CPT | Mod: CPTII,S$GLB,, | Performed by: FAMILY MEDICINE

## 2023-02-10 PROCEDURE — 3044F PR MOST RECENT HEMOGLOBIN A1C LEVEL <7.0%: ICD-10-PCS | Mod: CPTII,S$GLB,, | Performed by: FAMILY MEDICINE

## 2023-02-10 PROCEDURE — 1159F PR MEDICATION LIST DOCUMENTED IN MEDICAL RECORD: ICD-10-PCS | Mod: CPTII,S$GLB,, | Performed by: FAMILY MEDICINE

## 2023-02-10 PROCEDURE — 3008F BODY MASS INDEX DOCD: CPT | Mod: CPTII,S$GLB,, | Performed by: FAMILY MEDICINE

## 2023-02-10 PROCEDURE — 99213 OFFICE O/P EST LOW 20 MIN: CPT | Mod: S$GLB,,, | Performed by: FAMILY MEDICINE

## 2023-02-10 PROCEDURE — 3074F SYST BP LT 130 MM HG: CPT | Mod: CPTII,S$GLB,, | Performed by: FAMILY MEDICINE

## 2023-02-10 PROCEDURE — 1111F PR DISCHARGE MEDS RECONCILED W/ CURRENT OUTPATIENT MED LIST: ICD-10-PCS | Mod: CPTII,S$GLB,, | Performed by: FAMILY MEDICINE

## 2023-02-10 NOTE — PROGRESS NOTES
THIS DOCUMENT WAS MADE IN PART WITH VOICE RECOGNITION SOFTWARE.  OCCASIONALLY THIS SOFTWARE WILL MISINTERPRET WORDS OR PHRASES.    Assessment and Plan:    1. Fall, initial encounter            PLAN    Patient cleared to return to work.  No concerns at this time.      ______________________________________________________________________  Subjective:    Chief Complaint:  Chief Complaint   Patient presents with    Letter for School/Work     Pt needs clearance to return to work        HPI:  Christine is a 52 y.o. year old       Patient here to follow-up recent fall, seeking work clearance   Had bariatric surgery last week, laparoscopically   Reports slipped out of her bed, when she hit the ground felt sharp abdominal pain.  Has since had no symptoms otherwise.  Denies any bleeding.  Does report small blood blister to right lower quadrant of abdomen.  Otherwise feels great.  Down about 14 lb so far        Anxiety   Took a vacation from [place]   Anxiety exacerbated  Interested in alternative that will not cause wt gain  Also considering service animal / [employer]SHA     Hypothyroidism   Recent thyroid studies normal   Currently on 250 mcg levothyroxine     Glucose intolerance, pre diabetes  Currently on ozempic   Recent A1c 5.7%   Reports medication compliance      SLE   Followed by Rheumatology on [place]   Taking combination of methotrexate, prednisone, Plaquenil  Awaiting approval of Orencia   reports feeling much better since having lupus diagnosed and treated     Psoriasis   No improvement in scalp symptoms with ketoconazole shampoo for suspected seborrheic dermatitis.  Hopeful that treatments for lupus will improve psoriasis symptoms     Obesity   Has gained significant weight with prednisone treatment for lupus.  Patient is interested in medicine to help with this problem  Currently on Jardiance.       Anxiety   Currently on Lexapro  Prev med : Prozac      Due for colon cancer screening, breast cancer screening       GERD   Omeprazole working well         Past Medical History:  Past Medical History:   Diagnosis Date    Anxiety     GERD (gastroesophageal reflux disease)     Hyperlipidemia     Hypertension     Hypothyroidism     Insomnia     Observed sleep apnea     Prediabetes     Rheumatoid arthritis, unspecified     Vitamin D deficiency        Past Surgical History:  Past Surgical History:   Procedure Laterality Date    BTL      CHOLECYSTECTOMY      COLONOSCOPY N/A 8/30/2022    Procedure: COLONOSCOPY;  Surgeon: Drew Peraza MD;  [facility];  Service: Endoscopy;  Laterality: N/A;    LAPAROSCOPIC SLEEVE GASTRECTOMY N/A 2/2/2023    Procedure: GASTRECTOMY, SLEEVE, LAPAROSCOPIC with intraop EGD;  Surgeon: Titi Ribeiro Jr., MD;  Location: [address];  Service: General;  Laterality: N/A;  PRE-CERT, PLEASE DO NOT SUBMIT FOR AUTH UNTIL 1/12/2023    SURGICAL REMOVAL OF BONE SPUR      left heel    TONSILLECTOMY      TUBAL LIGATION  8/1995       Family History:  Family History   Problem Relation Age of Onset    Heart disease Mother     Bone cancer Mother     Cancer Mother         cervical    Hypertension Mother     Heart disease Father     Diabetes type I Father     Lung cancer Father     Diabetes Father     Hypertension Father     Heart disease Brother     No Known Problems Daughter     No Known Problems Daughter     Breast cancer Neg Hx     Ovarian cancer Neg Hx        Social History:  Social History     Socioeconomic History    Marital status:    Tobacco Use    Smoking status: Never    Smokeless tobacco: Never   Substance and Sexual Activity    Alcohol use: Not Currently     Alcohol/week: 0.0 standard drinks    Drug use: Never    Sexual activity: Yes     Partners: Male     Birth control/protection: See Surgical Hx     Social Determinants of Health     Financial Resource Strain: Low Risk     Difficulty of Paying Living Expenses: Not hard at all   Food Insecurity: No Food Insecurity    Worried About  Running Out of Food in the Last Year: Never true    Ran Out of Food in the Last Year: Never true   Transportation Needs: No Transportation Needs    Lack of Transportation (Medical): No    Lack of Transportation (Non-Medical): No   Physical Activity: Insufficiently Active    Days of Exercise per Week: 3 days    Minutes of Exercise per Session: 10 min   Stress: No Stress Concern Present    Feeling of Stress : Only a little   Social Connections: Unknown    Frequency of Communication with Friends and Family: More than three times a week    Frequency of Social Gatherings with Friends and Family: Twice a week    Active Member of Clubs or Organizations: Yes    Attends Club or Organization Meetings: More than 4 times per year    Marital Status:    Housing Stability: Low Risk     Unable to Pay for Housing in the Last Year: No    Number of Places Lived in the Last Year: 1    Unstable Housing in the Last Year: No       Medications:  Current Outpatient Medications on File Prior to Visit   Medication Sig Dispense Refill    albuterol (PROVENTIL HFA) 90 mcg/actuation inhaler Inhale 2 puffs into the lungs every 6 (six) hours as needed for Wheezing. Rescue 18 g 0    aspirin (ECOTRIN) 81 MG EC tablet Take 1 tablet (81 mg total) by mouth once daily. 90 tablet 3    atorvastatin (LIPITOR) 40 MG tablet Take 1 tablet (40 mg total) by mouth every evening. 90 tablet 3    buPROPion (WELLBUTRIN) 75 MG tablet Take 1 tablet (75 mg total) by mouth 2 (two) times daily. 30 tablet 0    cyclobenzaprine (FLEXERIL) 10 MG tablet TAKE ONE TABLET EVERY EVENING 150 tablet 3    folic acid (FOLVITE) 1 MG tablet Take 1 tablet (1 mg total) by mouth once daily. 30 tablet 11    furosemide (LASIX) 20 MG tablet Take 1 tablet (20 mg total) by mouth once daily. (Patient taking differently: Take 20 mg by mouth every morning.) 90 tablet 3    hydrOXYchloroQUINE (PLAQUENIL) 200 mg tablet Take 200 mg by mouth 2 (two) times daily.      levothyroxine (SYNTHROID)  125 MCG tablet TAKE 2 TABLETS(250 MCG) BY MOUTH DAILY 180 tablet 1    lisinopriL (PRINIVIL,ZESTRIL) 20 MG tablet TAKE 1 TABLET BY MOUTH EVERY DAY (Patient taking differently: Take by mouth every evening. TAKE 1 TABLET BY MOUTH EVERY DAY) 90 tablet 3    methotrexate 2.5 MG Tab TAKE 8 TABLETS BY MOUTH EVERY 7 DAYS (Patient taking differently: Take by mouth every 7 days. TAKE 8 TABLETS BY MOUTH EVERY 7 DAYS (Fridays)) 102 tablet 0    omeprazole (PRILOSEC) 40 MG capsule Take 1 capsule (40 mg total) by mouth every morning. Open capsule and take with apple sauce 30 capsule 2    polyethylene glycol (GLYCOLAX) 17 gram/dose powder Take 17 g by mouth once daily. for 14 days 238 g 0    sodium chloride 0.9% SolP 100 mL with abatacept (with maltose) 250 mg SolR 1,000 mg Inject 1,000 mg into the vein every 28 days.      XIIDRA 5 % Dpet APPLY 1 DROP TO BOTH EYES BID      buPROPion (WELLBUTRIN SR) 100 MG TBSR 12 hr tablet Take 1 tablet (100 mg total) by mouth 2 (two) times daily. (Patient not taking: Reported on 2/10/2023) 60 tablet 11    cephALEXin (KEFLEX) 500 MG capsule Take 1 capsule (500 mg total) by mouth every 12 (twelve) hours. (Patient not taking: Reported on 2/10/2023) 10 capsule 0    hydrocodone-acetaminophen (HYCET) solution 7.5-325 mg/15mL Take 15 mLs by mouth 4 (four) times daily as needed for Pain. (Patient not taking: Reported on 2/10/2023) 118 mL 0    omeprazole (PRILOSEC) 40 MG capsule Take 1 capsule (40 mg total) by mouth once daily. (Patient not taking: Reported on 2/10/2023) 30 capsule 6    ondansetron (ZOFRAN-ODT) 8 MG TbDL Take 1 tablet (8 mg total) by mouth every 6 (six) hours as needed. (Patient not taking: Reported on 2/10/2023) 30 tablet 0    promethazine (PHENERGAN) 25 MG suppository Unwrap and insert 1 suppository rectally every 6 hours as needed. (Patient not taking: Reported on 2/10/2023) 15 suppository 0    [DISCONTINUED] semaglutide (OZEMPIC) 1 mg/dose (2 mg/1.5 mL) PnJovanni Inject 1 mg into the skin  "every 7 days. 2 pen 11     No current facility-administered medications on file prior to visit.       Allergies:  Sulfamethoxazole-trimethoprim and Sulfa (sulfonamide antibiotics)    Immunizations:  Immunization History   Administered Date(s) Administered    COVID-19, MRNA, LN-S, PF (MODERNA FULL 0.5 ML DOSE) 03/11/2021, 04/15/2021, 01/11/2022    DTaP 01/01/1973, 02/01/1973, 04/01/1973, 08/01/1975, 04/01/1978    IPV 01/01/1973, 02/01/1973, 08/01/1975, 01/05/1978    Influenza Split 11/18/2007    MMR 01/12/1978, 10/19/1995    Td (ADULT) 04/17/1989, 08/07/2004    Tdap 11/26/2019       Review of Systems:  Review of Systems   All other systems reviewed and are negative.    Objective:    Vitals:  Vitals:    02/10/23 1142   BP: 116/72   Pulse: 88   Weight: (!) 148.9 kg (328 lb 6 oz)   Height: 5' 6" (1.676 m)   PainSc: 0-No pain       Physical Exam  Vitals reviewed.   Constitutional:       General: She is not in acute distress.     Appearance: She is well-developed.   HENT:      Head: Normocephalic and atraumatic.   Pulmonary:      Effort: Pulmonary effort is normal. No respiratory distress.   Musculoskeletal:      Cervical back: Normal range of motion.   Skin:     Comments: Incision sites clean dry and intact.  Has small area of ecchymosis with central reduced blood blister.   Psychiatric:         Behavior: Behavior normal.         Thought Content: Thought content normal.         Judgment: Judgment normal.           Layo Mccrary MD  Family Medicine      "

## 2023-02-10 NOTE — LETTER
February 10, 2023      [facility]  [address] [place] [address]  Phone: [phone]  Fax: [phone]       Patient: Christine Alfaro   YOB: 1970  Date of Visit: 02/10/2023    To Whom It May Concern:    Arnel Alfaro  was at [facility] on 02/10/2023. The patient may return to work/school on 2/10/22. If you have any questions or concerns, or if I can be of further assistance, please do not hesitate to contact me.    Sincerely,    Layo Mccrary MD

## 2023-02-10 NOTE — PLAN OF CARE
[facility]  Discharge Final Note    Primary Care Provider: Layo Mccrary MD    Expected Discharge Date: 2/3/2023    Final Discharge Note (most recent)       Final Note - 02/10/23 1423          Final Note    Assessment Type Final Discharge Note     Anticipated Discharge Disposition Home or Self Care     What phone number can be called within the next 1-3 days to see how you are doing after discharge? --   [phone]    Hospital Resources/Appts/Education Provided Appointments scheduled and added to AVS                     Important Message from Medicare             Contact Info       Layo Mccrary MD   Specialty: Family Medicine   Relationship: PCP - General    [address]  [place] [address]   Phone: [phone]       Next Steps: Follow up on 2/3/2023          Future Appointments   Date Time Provider Department Center   2/16/2023  9:30 AM LAB, APPOINTMENT [place] [facility]   2/16/2023 10:00 AM Gricelda Smith PA-C [facility] BARIAT [facility]   2/16/2023 10:30 AM Damaris Pollock RD [facility] BARIAT [facility]   2/17/2023  2:00 PM [facility]   3/3/2023  1:20 PM Layo Mccrary MD [facility] FAM MED [place]   3/24/2023  3:30 PM Ирина Rodríguez MD [facility] RHEUM [facility]   3/30/2023  8:30 AM LAB, APPOINTMENT [place] [facility]   3/30/2023  9:00 AM Gricelda Smith PA-C [facility]   3/30/2023  9:30 AM Abby Wilde [facility]   7/21/2023 12:30 PM Idania Torrez MD [facility] CARDIO [place]     Patient discharged home to care of family on 2/3/23.      Christiane Fernandez RNCM  Case Management  [facility]  [phone]

## 2023-02-11 ENCOUNTER — PATIENT MESSAGE (OUTPATIENT)
Dept: FAMILY MEDICINE | Facility: CLINIC | Age: 53
End: 2023-02-11
Payer: COMMERCIAL

## 2023-02-14 ENCOUNTER — PATIENT MESSAGE (OUTPATIENT)
Dept: BARIATRICS | Facility: CLINIC | Age: 53
End: 2023-02-14
Payer: COMMERCIAL

## 2023-02-16 ENCOUNTER — LAB VISIT (OUTPATIENT)
Dept: LAB | Facility: HOSPITAL | Age: 53
End: 2023-02-16
Payer: COMMERCIAL

## 2023-02-16 ENCOUNTER — CLINICAL SUPPORT (OUTPATIENT)
Dept: BARIATRICS | Facility: CLINIC | Age: 53
End: 2023-02-16
Payer: COMMERCIAL

## 2023-02-16 ENCOUNTER — OFFICE VISIT (OUTPATIENT)
Dept: BARIATRICS | Facility: CLINIC | Age: 53
End: 2023-02-16
Payer: COMMERCIAL

## 2023-02-16 VITALS
SYSTOLIC BLOOD PRESSURE: 103 MMHG | DIASTOLIC BLOOD PRESSURE: 53 MMHG | BODY MASS INDEX: 47.09 KG/M2 | HEART RATE: 86 BPM | HEIGHT: 66 IN | OXYGEN SATURATION: 98 % | TEMPERATURE: 98 F | WEIGHT: 293 LBS

## 2023-02-16 DIAGNOSIS — E74.39 GLUCOSE INTOLERANCE: ICD-10-CM

## 2023-02-16 DIAGNOSIS — Z98.890 S/P GASTRIC SURGERY: ICD-10-CM

## 2023-02-16 DIAGNOSIS — Z71.3 DIETARY COUNSELING AND SURVEILLANCE: ICD-10-CM

## 2023-02-16 DIAGNOSIS — I10 ESSENTIAL HYPERTENSION: Primary | ICD-10-CM

## 2023-02-16 DIAGNOSIS — I10 ESSENTIAL HYPERTENSION: ICD-10-CM

## 2023-02-16 DIAGNOSIS — K21.9 GASTROESOPHAGEAL REFLUX DISEASE, UNSPECIFIED WHETHER ESOPHAGITIS PRESENT: ICD-10-CM

## 2023-02-16 DIAGNOSIS — E78.5 DYSLIPIDEMIA: ICD-10-CM

## 2023-02-16 DIAGNOSIS — G47.33 OSA ON CPAP: ICD-10-CM

## 2023-02-16 DIAGNOSIS — E66.01 MORBID OBESITY: ICD-10-CM

## 2023-02-16 DIAGNOSIS — R63.4 WEIGHT LOSS: ICD-10-CM

## 2023-02-16 DIAGNOSIS — Z90.3 S/P GASTRIC SLEEVE PROCEDURE: ICD-10-CM

## 2023-02-16 DIAGNOSIS — Z98.890 POST-OPERATIVE STATE: Primary | ICD-10-CM

## 2023-02-16 DIAGNOSIS — E66.01 MORBID OBESITY WITH BMI OF 50.0-59.9, ADULT: ICD-10-CM

## 2023-02-16 LAB
ALBUMIN SERPL BCP-MCNC: 3.8 G/DL (ref 3.5–5.2)
ALP SERPL-CCNC: 114 U/L (ref 55–135)
ALT SERPL W/O P-5'-P-CCNC: 41 U/L (ref 10–44)
ANION GAP SERPL CALC-SCNC: 11 MMOL/L (ref 8–16)
AST SERPL-CCNC: 25 U/L (ref 10–40)
BASOPHILS # BLD AUTO: 0.06 K/UL (ref 0–0.2)
BASOPHILS NFR BLD: 0.6 % (ref 0–1.9)
BILIRUB SERPL-MCNC: 0.5 MG/DL (ref 0.1–1)
BUN SERPL-MCNC: 11 MG/DL (ref 6–20)
CALCIUM SERPL-MCNC: 9.7 MG/DL (ref 8.7–10.5)
CHLORIDE SERPL-SCNC: 103 MMOL/L (ref 95–110)
CO2 SERPL-SCNC: 24 MMOL/L (ref 23–29)
CREAT SERPL-MCNC: 0.8 MG/DL (ref 0.5–1.4)
DIFFERENTIAL METHOD: ABNORMAL
EOSINOPHIL # BLD AUTO: 0.2 K/UL (ref 0–0.5)
EOSINOPHIL NFR BLD: 2.4 % (ref 0–8)
ERYTHROCYTE [DISTWIDTH] IN BLOOD BY AUTOMATED COUNT: 15 % (ref 11.5–14.5)
EST. GFR  (NO RACE VARIABLE): >60 ML/MIN/1.73 M^2
GLUCOSE SERPL-MCNC: 78 MG/DL (ref 70–110)
HCT VFR BLD AUTO: 44 % (ref 37–48.5)
HGB BLD-MCNC: 13.9 G/DL (ref 12–16)
IMM GRANULOCYTES # BLD AUTO: 0.04 K/UL (ref 0–0.04)
IMM GRANULOCYTES NFR BLD AUTO: 0.4 % (ref 0–0.5)
LYMPHOCYTES # BLD AUTO: 2.6 K/UL (ref 1–4.8)
LYMPHOCYTES NFR BLD: 27.4 % (ref 18–48)
MCH RBC QN AUTO: 29.1 PG (ref 27–31)
MCHC RBC AUTO-ENTMCNC: 31.6 G/DL (ref 32–36)
MCV RBC AUTO: 92 FL (ref 82–98)
MONOCYTES # BLD AUTO: 0.8 K/UL (ref 0.3–1)
MONOCYTES NFR BLD: 8.2 % (ref 4–15)
NEUTROPHILS # BLD AUTO: 5.7 K/UL (ref 1.8–7.7)
NEUTROPHILS NFR BLD: 61 % (ref 38–73)
NRBC BLD-RTO: 0 /100 WBC
PLATELET # BLD AUTO: 262 K/UL (ref 150–450)
PMV BLD AUTO: 10.8 FL (ref 9.2–12.9)
POTASSIUM SERPL-SCNC: 4.4 MMOL/L (ref 3.5–5.1)
PROT SERPL-MCNC: 7.1 G/DL (ref 6–8.4)
RBC # BLD AUTO: 4.78 M/UL (ref 4–5.4)
SODIUM SERPL-SCNC: 138 MMOL/L (ref 136–145)
WBC # BLD AUTO: 9.32 K/UL (ref 3.9–12.7)

## 2023-02-16 PROCEDURE — 1160F RVW MEDS BY RX/DR IN RCRD: CPT | Mod: CPTII,S$GLB,, | Performed by: PHYSICIAN ASSISTANT

## 2023-02-16 PROCEDURE — 99999 PR PBB SHADOW E&M-EST. PATIENT-LVL I: CPT | Mod: PBBFAC,,, | Performed by: DIETITIAN, REGISTERED

## 2023-02-16 PROCEDURE — 80053 COMPREHEN METABOLIC PANEL: CPT | Performed by: SURGERY

## 2023-02-16 PROCEDURE — 3074F SYST BP LT 130 MM HG: CPT | Mod: CPTII,S$GLB,, | Performed by: PHYSICIAN ASSISTANT

## 2023-02-16 PROCEDURE — 3008F PR BODY MASS INDEX (BMI) DOCUMENTED: ICD-10-PCS | Mod: CPTII,S$GLB,, | Performed by: PHYSICIAN ASSISTANT

## 2023-02-16 PROCEDURE — 99499 NO LOS: ICD-10-PCS | Mod: S$GLB,,, | Performed by: DIETITIAN, REGISTERED

## 2023-02-16 PROCEDURE — 99999 PR PBB SHADOW E&M-EST. PATIENT-LVL I: ICD-10-PCS | Mod: PBBFAC,,, | Performed by: DIETITIAN, REGISTERED

## 2023-02-16 PROCEDURE — 1159F MED LIST DOCD IN RCRD: CPT | Mod: CPTII,S$GLB,, | Performed by: PHYSICIAN ASSISTANT

## 2023-02-16 PROCEDURE — 99024 POSTOP FOLLOW-UP VISIT: CPT | Mod: S$GLB,,, | Performed by: PHYSICIAN ASSISTANT

## 2023-02-16 PROCEDURE — 3044F HG A1C LEVEL LT 7.0%: CPT | Mod: CPTII,S$GLB,, | Performed by: PHYSICIAN ASSISTANT

## 2023-02-16 PROCEDURE — 99999 PR PBB SHADOW E&M-EST. PATIENT-LVL V: ICD-10-PCS | Mod: PBBFAC,,, | Performed by: PHYSICIAN ASSISTANT

## 2023-02-16 PROCEDURE — 3074F PR MOST RECENT SYSTOLIC BLOOD PRESSURE < 130 MM HG: ICD-10-PCS | Mod: CPTII,S$GLB,, | Performed by: PHYSICIAN ASSISTANT

## 2023-02-16 PROCEDURE — 3078F DIAST BP <80 MM HG: CPT | Mod: CPTII,S$GLB,, | Performed by: PHYSICIAN ASSISTANT

## 2023-02-16 PROCEDURE — 99499 UNLISTED E&M SERVICE: CPT | Mod: S$GLB,,, | Performed by: DIETITIAN, REGISTERED

## 2023-02-16 PROCEDURE — 3078F PR MOST RECENT DIASTOLIC BLOOD PRESSURE < 80 MM HG: ICD-10-PCS | Mod: CPTII,S$GLB,, | Performed by: PHYSICIAN ASSISTANT

## 2023-02-16 PROCEDURE — 85025 COMPLETE CBC W/AUTO DIFF WBC: CPT | Performed by: SURGERY

## 2023-02-16 PROCEDURE — 36415 COLL VENOUS BLD VENIPUNCTURE: CPT | Performed by: SURGERY

## 2023-02-16 PROCEDURE — 3008F BODY MASS INDEX DOCD: CPT | Mod: CPTII,S$GLB,, | Performed by: PHYSICIAN ASSISTANT

## 2023-02-16 PROCEDURE — 3044F PR MOST RECENT HEMOGLOBIN A1C LEVEL <7.0%: ICD-10-PCS | Mod: CPTII,S$GLB,, | Performed by: PHYSICIAN ASSISTANT

## 2023-02-16 PROCEDURE — 99999 PR PBB SHADOW E&M-EST. PATIENT-LVL V: CPT | Mod: PBBFAC,,, | Performed by: PHYSICIAN ASSISTANT

## 2023-02-16 PROCEDURE — 1160F PR REVIEW ALL MEDS BY PRESCRIBER/CLIN PHARMACIST DOCUMENTED: ICD-10-PCS | Mod: CPTII,S$GLB,, | Performed by: PHYSICIAN ASSISTANT

## 2023-02-16 PROCEDURE — 99024 PR POST-OP FOLLOW-UP VISIT: ICD-10-PCS | Mod: S$GLB,,, | Performed by: PHYSICIAN ASSISTANT

## 2023-02-16 PROCEDURE — 1159F PR MEDICATION LIST DOCUMENTED IN MEDICAL RECORD: ICD-10-PCS | Mod: CPTII,S$GLB,, | Performed by: PHYSICIAN ASSISTANT

## 2023-02-16 NOTE — PATIENT INSTRUCTIONS
High Protein Pureed Diet    2 weeks after gastric bypass and sleeve you may be ready to add pureed food to your diet.  All food should be the consistency of baby food, or thinner.  Follow pureed diet for the next 2 weeks.    Protein - It is very important to pay attention to protein intake during this time.      Inadequate protein intake can cause:  Delayed Wound Healing  Hair Loss  Muscle Breakdown    Meal Plan - Eat 3-4 meals per day (2-4 tbsp each), with protein supplements in between to meet protein needs.  Meeting protein needs daily will help increase healing, decrease muscle loss, and increase weight loss.  Your goal is  grams of protein a day.    Protein First - Always eat the foods with the highest protein first.  Foods high in protein include milk, yogurt, cheese, egg whites, and blenderized meat, seafood, and beans.    Fluids - Keep track in your journal of how much you are drinking; you should try to drink at least 64oz of fluids every day.      Foods allowed: Portion size Protein (g)   Sugar-free clear liquids As desired 0   Skim or 1% milk ½ cup 4   Sugar free pudding, light yogurt, custard (use skim or 1% milk in preparation) 3 oz 2.5   Strained baby food meats, or home-made pureed lean meats and shrimp 1 oz 7   Beans (red, white, black, lima, barker, fat free refried, hummus) and lentils ¼ cup 4   Low-fat/fat free cheese.(cottage cheese, mozzarella string cheese, ricotta cheese, Laughing Cow, Baby Bell, cheddar, etc) ¼ cup 7-8   Scrambled eggs or Egg Beaters 1 or ¼ cup 6   Edamame or Tofu, mashed ¼ cup 5   Unflavored protein powder (add to 1 scoop to  98% fat free soups or SF pudding) 3 Tbsp 9   *PB2: peanut powder (45 calories) 2 Tbsp 5     *PB2 powdered peanut butter: 45 calories vs. 190 calories in 2 tbsp of regular peanut butter. Purchase online at [employer], or  at various [employer], Gra[employer]s, [employer], [employer] and Scrib[employer].        Bariatric Liquid/Pureed Sample Menu    3-4 small meals  plus 2-3 protein drinks per day.    8am 1 egg or ¼ cup Egg Beaters   9am 1 cup water, or decaf coffee or tea   10am Protein drink, 30g protein   11am 2 tbsp low-fat cottage cheese, and 1 tbsp pureed peaches   12pm 1 cup water, or sugar-free lemonade    1pm 2 tbsp pureed chicken, and 1 tbsp pureed carrots    2pm 1 cup water, or sugar-free lemonade   3pm Protein drink, 30g protein   5pm 1 cup water    6pm 1 cup hi-protein creamy chicken soup 14g protein (see Recipe below)   7pm 1 cup water, or sugar-free fruit punch    8pm 1 cup water     This sample menu provides approx. 80g protein and 64oz fluids.  Liquid protein supplements should contain 20-30g protein and less than 4 grams of sugar each.    Sip fluids continuously in between meals.  Drink at least ¼ cup every 15 minutes.  For fluids: ¼ cup = 2 oz = 4 tbsp       RECIPE IDEAS for Bariatric Pureed Diet:    Hi-Protein Creamy Chicken Soup: (10g protein per 1 cup serving)  Empty 1 can of 98% fat free cream of chicken soup into saucepan. Then  blend 1 scoop of unflavored protein powder with 1 can of skim milk until smooth.  Add protein milk to saucepan and heat to warm. (Note: Do NOT boil. Protein powder may clump if heated too hot).     Hi-Protein Pudding: (14g protein per ½ cup serving)  Add 2 scoops protein powder to 2 cups cold skim milk and mix well.  Stir in dry Jell-O Sugar-Free Instant Pudding mix.  Chill and Enjoy!    Tuna Mousse (12g protein per ¼ cup serving) Page 135 in book Eating Well After Weight Loss Surgery.  In a  or , combine all ingredients and pulse until smooth.  2 6-ounce cans tuna packed in water, drained  2 tbsp low-fat mayonnaise  2 tbsp fat-free sour cream  2 tbsp fat-free cream cheese, softened  ½ cup shallots, finely chopped  1 tbsp lemon juice  ¼ tsp ground pepper  ½ tsp celery seed    Chocolate Peanut Butter Mousse  (28g protein total)  6oz plain Greek yogurt  4 tbsp chocolate PB2

## 2023-02-16 NOTE — PROGRESS NOTES
NUTRITION NOTE    Referring Physician: Titi Ribeiro M.D.   Reason for MNT Referral: Follow-up 2 Weeks s/p Gastric Sleeve    PAST MEDICAL HISTORY:  Denies nausea, vomiting, constipation, and diarrhea.  Reports doing well.    Past Medical History:   Diagnosis Date    Anxiety     GERD (gastroesophageal reflux disease)     Hyperlipidemia     Hypertension     Hypothyroidism     Insomnia     Observed sleep apnea     Prediabetes     Rheumatoid arthritis, unspecified     Vitamin D deficiency        CLINICAL DATA:  52 y.o.-year-old White female.    Current Weight: 324 lbs  BMI: 52.34   Pre-op weight: 341 lbs  Ideal body weight: 144 lbs  Excess body weight: 197 lbs  Goal weight: 243 lbs  Total Weight Loss: 17 lbs    Excess Weight Loss:       LABS:  None available at time of visit    CURRENT DIET:  Bariatric Liquid Diet    Diet Recall: 90 grams of protein/day; 24-38 oz of fluids/day  SF Jello, popsicle, Gatorade Zero, sometimes Zero with PRO    Diet Includes:  Protein Supplements: Quest, Equate    EXERCISE:  Walking       VITAMINS/MINERALS:  Calcium chews 3/day  B-Complex 100 mg 1/day  Flintstones 2/day  B-12 2500 mcg every week    ASSESSMENT:  Doing well overall.  Adequate protein intake.  Inadequate fluid intake.    BARIATRIC DIET DISCUSSION:  Instructed and provided written materials on bariatric puree diet plan   Bariatric soft diet plan to start in 2 weeks as feliz  Pt may swallow tablets and pills at 2 week post op for sleeve surgery  Reinforced post-op nutrition guidelines.      PLAN/RECOMMONDATIONS:  Advance to bariatric puree diet  Maintain protein intake.  Increase fluid intake.  Begin light exercise.  Continue appropriate vitamins & minerals.  Adjust vitamins & minerals by: Decreasing B-Complex to every other day    Return to clinic in 6 weeks.    SESSION TIME: 15 minutes

## 2023-02-16 NOTE — PROGRESS NOTES
BARIATRIC POST-OPERATIVE VISIT:    HPI:  Christine Alfaro is a 52 y.o. year old female presents for 2 week post op visit following s/p sleeve.  she is doing well and tolerating the diet without difficulty.        Pt overall doing well states that she has noticed that her urine is bright yellow no dysuria, started cycle yesterday and it last for seven days denies foul odor urgency frequency lower abdominal pain or flank pain   Will get infusion with some fluids for her RA/lupus    Denies: nausea, vomiting, abdominal pain, changes in bowel movement pattern, fever, chills, dysphagia, chest pain, and shortness of breath.    Review of Systems   Constitutional:  Negative for fatigue and fever.   HENT:  Negative for tinnitus and trouble swallowing.    Eyes:  Negative for visual disturbance.   Respiratory:  Negative for cough and shortness of breath.    Cardiovascular:  Negative for chest pain, palpitations and leg swelling.   Gastrointestinal:  Negative for abdominal pain, constipation, diarrhea, nausea and vomiting.   Genitourinary:  Negative for decreased urine volume and dysuria.        Urine color change ( bright yellow)    Skin:  Negative for rash.   Neurological:  Negative for dizziness, light-headedness and headaches.   Psychiatric/Behavioral:  Negative for dysphoric mood, sleep disturbance and suicidal ideas. The patient is not nervous/anxious.      EXERCISE & VITAMINS:  See Bariatric Assessment  Adherent to vitamin regimen   MEDICATIONS/ALLERGIES:  Have been reviewed.    DIET: Liquid  Bariatric Diet.  3 protein shakes daily, ~90 grams protein.  36-42 oz fl oz SF clear beverage.  See Dietician note from today for a more detailed assessment.      Physical Exam  Vitals reviewed.   Constitutional:       Appearance: She is obese.   HENT:      Head: Normocephalic and atraumatic.   Eyes:      Extraocular Movements: Extraocular movements intact.      Conjunctiva/sclera: Conjunctivae normal.      Pupils: Pupils are  equal, round, and reactive to light.   Cardiovascular:      Rate and Rhythm: Normal rate and regular rhythm.      Pulses: Normal pulses.      Heart sounds: Normal heart sounds.   Pulmonary:      Effort: Pulmonary effort is normal. No respiratory distress.      Breath sounds: Normal breath sounds.   Abdominal:      General: Bowel sounds are normal.      Palpations: Abdomen is soft.      Tenderness: There is no abdominal tenderness.   Musculoskeletal:         General: No swelling or tenderness. Normal range of motion.      Cervical back: Normal range of motion and neck supple.   Skin:     General: Skin is dry.      Capillary Refill: Capillary refill takes less than 2 seconds.      Coloration: Skin is not jaundiced.   Neurological:      General: No focal deficit present.      Mental Status: She is alert and oriented to person, place, and time.   Psychiatric:         Mood and Affect: Mood normal.         Behavior: Behavior normal.         Thought Content: Thought content normal.         Judgment: Judgment normal.       ASSESSMENT:  - Morbid obesity s/p sleeve gastrectomy   - Co-morbidities: dyslipidemia, hypertension, and obstructive sleep apnea  - Good Weight loss, 17#'s   - No formal Exercise routine  - Good Diet  - Good Vitamin regimen    PLAN:  - Anti-Acid medication, PPI daily for 3 months  - No lifting more than 10 lbs for 6 weeks  - Miralax daily for constipation  - Emphasized the importance of regular exercise and adherence to bariatric diet to achieve maximum weight loss.  - Encouraged patient to start/continue regular exercise.  - Follow-up with dietician to advance diet.  - Continue daily vitamins and medications.  - RTC per post op schedule  - Call the office for any issues.  - Check labs today.

## 2023-02-22 ENCOUNTER — PATIENT MESSAGE (OUTPATIENT)
Dept: BARIATRICS | Facility: CLINIC | Age: 53
End: 2023-02-22
Payer: COMMERCIAL

## 2023-03-03 ENCOUNTER — OFFICE VISIT (OUTPATIENT)
Dept: FAMILY MEDICINE | Facility: CLINIC | Age: 53
End: 2023-03-03
Payer: COMMERCIAL

## 2023-03-03 VITALS
OXYGEN SATURATION: 99 % | DIASTOLIC BLOOD PRESSURE: 70 MMHG | HEART RATE: 101 BPM | SYSTOLIC BLOOD PRESSURE: 130 MMHG | BODY MASS INDEX: 48.82 KG/M2 | HEIGHT: 65 IN | WEIGHT: 293 LBS

## 2023-03-03 DIAGNOSIS — M17.12 PRIMARY OSTEOARTHRITIS OF LEFT KNEE: Primary | ICD-10-CM

## 2023-03-03 PROCEDURE — 3008F PR BODY MASS INDEX (BMI) DOCUMENTED: ICD-10-PCS | Mod: CPTII,S$GLB,, | Performed by: FAMILY MEDICINE

## 2023-03-03 PROCEDURE — 3078F DIAST BP <80 MM HG: CPT | Mod: CPTII,S$GLB,, | Performed by: FAMILY MEDICINE

## 2023-03-03 PROCEDURE — 1111F DSCHRG MED/CURRENT MED MERGE: CPT | Mod: CPTII,S$GLB,, | Performed by: FAMILY MEDICINE

## 2023-03-03 PROCEDURE — 20610 DRAIN/INJ JOINT/BURSA W/O US: CPT | Mod: LT,S$GLB,, | Performed by: FAMILY MEDICINE

## 2023-03-03 PROCEDURE — 99213 PR OFFICE/OUTPT VISIT, EST, LEVL III, 20-29 MIN: ICD-10-PCS | Mod: 25,S$GLB,, | Performed by: FAMILY MEDICINE

## 2023-03-03 PROCEDURE — 3044F PR MOST RECENT HEMOGLOBIN A1C LEVEL <7.0%: ICD-10-PCS | Mod: CPTII,S$GLB,, | Performed by: FAMILY MEDICINE

## 2023-03-03 PROCEDURE — 99213 OFFICE O/P EST LOW 20 MIN: CPT | Mod: 25,S$GLB,, | Performed by: FAMILY MEDICINE

## 2023-03-03 PROCEDURE — 3044F HG A1C LEVEL LT 7.0%: CPT | Mod: CPTII,S$GLB,, | Performed by: FAMILY MEDICINE

## 2023-03-03 PROCEDURE — 1111F PR DISCHARGE MEDS RECONCILED W/ CURRENT OUTPATIENT MED LIST: ICD-10-PCS | Mod: CPTII,S$GLB,, | Performed by: FAMILY MEDICINE

## 2023-03-03 PROCEDURE — 99999 PR PBB SHADOW E&M-EST. PATIENT-LVL IV: ICD-10-PCS | Mod: PBBFAC,,, | Performed by: FAMILY MEDICINE

## 2023-03-03 PROCEDURE — 3078F PR MOST RECENT DIASTOLIC BLOOD PRESSURE < 80 MM HG: ICD-10-PCS | Mod: CPTII,S$GLB,, | Performed by: FAMILY MEDICINE

## 2023-03-03 PROCEDURE — 3075F PR MOST RECENT SYSTOLIC BLOOD PRESS GE 130-139MM HG: ICD-10-PCS | Mod: CPTII,S$GLB,, | Performed by: FAMILY MEDICINE

## 2023-03-03 PROCEDURE — 99999 PR PBB SHADOW E&M-EST. PATIENT-LVL IV: CPT | Mod: PBBFAC,,, | Performed by: FAMILY MEDICINE

## 2023-03-03 PROCEDURE — 3008F BODY MASS INDEX DOCD: CPT | Mod: CPTII,S$GLB,, | Performed by: FAMILY MEDICINE

## 2023-03-03 PROCEDURE — 3075F SYST BP GE 130 - 139MM HG: CPT | Mod: CPTII,S$GLB,, | Performed by: FAMILY MEDICINE

## 2023-03-03 PROCEDURE — 20610 LARGE JOINT ASPIRATION/INJECTION: L KNEE: ICD-10-PCS | Mod: LT,S$GLB,, | Performed by: FAMILY MEDICINE

## 2023-03-03 PROCEDURE — 1159F MED LIST DOCD IN RCRD: CPT | Mod: CPTII,S$GLB,, | Performed by: FAMILY MEDICINE

## 2023-03-03 PROCEDURE — 1159F PR MEDICATION LIST DOCUMENTED IN MEDICAL RECORD: ICD-10-PCS | Mod: CPTII,S$GLB,, | Performed by: FAMILY MEDICINE

## 2023-03-03 RX ORDER — TRIAMCINOLONE ACETONIDE 40 MG/ML
40 INJECTION, SUSPENSION INTRA-ARTICULAR; INTRAMUSCULAR
Status: COMPLETED | OUTPATIENT
Start: 2023-03-03 | End: 2023-03-03

## 2023-03-03 RX ADMIN — TRIAMCINOLONE ACETONIDE 40 MG: 40 INJECTION, SUSPENSION INTRA-ARTICULAR; INTRAMUSCULAR at 11:03

## 2023-03-03 NOTE — PROCEDURES
Large Joint Aspiration/Injection: L knee    Date/Time: 3/3/2023 11:40 AM  Performed by: Layo Mccrary MD  Authorized by: Layo Mccrary MD     Indications:  Arthritis and pain  Site marked: the procedure site was marked    Timeout: prior to procedure the correct patient, procedure, and site was verified      Local anesthesia used?: Yes    Local anesthetic:  Lidocaine 2% without epinephrine  Anesthetic total (ml):  2    Ultrasonic Guidance for needle placement?: No    Approach:  Medial  Location:  Knee  Site:  L knee  Medications:  40 mg triamcinolone acetonide 40 mg/mL  Patient tolerance:  Patient tolerated the procedure well with no immediate complications

## 2023-03-03 NOTE — PROGRESS NOTES
THIS DOCUMENT WAS MADE IN PART WITH VOICE RECOGNITION SOFTWARE.  OCCASIONALLY THIS SOFTWARE WILL MISINTERPRET WORDS OR PHRASES.    Assessment and Plan:    1. Primary osteoarthritis of left knee  triamcinolone acetonide injection 40 mg    Large Joint Aspiration/Injection: L knee          PLAN    Injection given.  Recommended topical Voltaren, Tylenol as needed.  Continue Kinesio taping   If no improvement in 1 week refer to physical therapy.  Low threshold for physical medicine and rehab appointment      ______________________________________________________________________  Subjective:    Chief Complaint:  Chief Complaint   Patient presents with    Annual Exam     Annual check up and wants to talk about her knee        HPI:  Christine is a 52 y.o. year old         52-year-old female complains of left knee pain  Duration several days   No inciting event   Reports pain starts along the posterior lateral aspect and wraps around to the front of her knee   Worse with walking.  No symptoms with riding bike   Denies any swelling or redness   Does have history of rheumatoid arthritis    Past Medical History:  Past Medical History:   Diagnosis Date    Anxiety     GERD (gastroesophageal reflux disease)     Hyperlipidemia     Hypertension     Hypothyroidism     Insomnia     Observed sleep apnea     Prediabetes     Rheumatoid arthritis, unspecified     Vitamin D deficiency        Past Surgical History:  Past Surgical History:   Procedure Laterality Date    BTL      CHOLECYSTECTOMY      COLONOSCOPY N/A 8/30/2022    Procedure: COLONOSCOPY;  Surgeon: Drew Peraza MD;  [facility]: [facility];  Service: Endoscopy;  Laterality: N/A;    LAPAROSCOPIC SLEEVE GASTRECTOMY N/A 2/2/2023    Procedure: GASTRECTOMY, SLEEVE, LAPAROSCOPIC with intraop EGD;  Surgeon: Titi Ribeiro Jr., MD;  Location: [address];  Service: General;  Laterality: N/A;  PRE-CERT, PLEASE DO NOT SUBMIT FOR AUTH UNTIL 1/12/2023    SURGICAL REMOVAL OF BONE  SPUR      left heel    TONSILLECTOMY      TUBAL LIGATION  8/1995       Family History:  Family History   Problem Relation Age of Onset    Heart disease Mother     Bone cancer Mother     Cancer Mother         cervical    Hypertension Mother     Heart disease Father     Diabetes type I Father     Lung cancer Father     Diabetes Father     Hypertension Father     Heart disease Brother     No Known Problems Daughter     No Known Problems Daughter     Breast cancer Neg Hx     Ovarian cancer Neg Hx        Social History:  Social History     Socioeconomic History    Marital status:    Tobacco Use    Smoking status: Never    Smokeless tobacco: Never   Substance and Sexual Activity    Alcohol use: Not Currently     Alcohol/week: 0.0 standard drinks    Drug use: Never    Sexual activity: Yes     Partners: Male     Birth control/protection: See Surgical Hx     Social Determinants of Health     Financial Resource Strain: Low Risk     Difficulty of Paying Living Expenses: Not hard at all   Food Insecurity: No Food Insecurity    Worried About Running Out of Food in the Last Year: Never true    Ran Out of Food in the Last Year: Never true   Transportation Needs: No Transportation Needs    Lack of Transportation (Medical): No    Lack of Transportation (Non-Medical): No   Physical Activity: Insufficiently Active    Days of Exercise per Week: 3 days    Minutes of Exercise per Session: 20 min   Stress: No Stress Concern Present    Feeling of Stress : Only a little   Social Connections: Unknown    Frequency of Communication with Friends and Family: More than three times a week    Frequency of Social Gatherings with Friends and Family: Three times a week    Active Member of Clubs or Organizations: Yes    Attends Club or Organization Meetings: More than 4 times per year    Marital Status:    Housing Stability: Low Risk     Unable to Pay for Housing in the Last Year: No    Number of Places Lived in the Last Year: 1     Unstable Housing in the Last Year: No       Medications:  Current Outpatient Medications on File Prior to Visit   Medication Sig Dispense Refill    albuterol (PROVENTIL HFA) 90 mcg/actuation inhaler Inhale 2 puffs into the lungs every 6 (six) hours as needed for Wheezing. Rescue 18 g 0    aspirin (ECOTRIN) 81 MG EC tablet Take 1 tablet (81 mg total) by mouth once daily. 90 tablet 3    atorvastatin (LIPITOR) 40 MG tablet Take 1 tablet (40 mg total) by mouth every evening. 90 tablet 3    buPROPion (WELLBUTRIN SR) 100 MG TBSR 12 hr tablet Take 1 tablet (100 mg total) by mouth 2 (two) times daily. 60 tablet 11    cyclobenzaprine (FLEXERIL) 10 MG tablet TAKE ONE TABLET EVERY EVENING 150 tablet 3    folic acid (FOLVITE) 1 MG tablet Take 1 tablet (1 mg total) by mouth once daily. 30 tablet 11    furosemide (LASIX) 20 MG tablet Take 1 tablet (20 mg total) by mouth once daily. (Patient taking differently: Take 20 mg by mouth every morning.) 90 tablet 3    hydrOXYchloroQUINE (PLAQUENIL) 200 mg tablet Take 200 mg by mouth 2 (two) times daily.      levothyroxine (SYNTHROID) 125 MCG tablet TAKE 2 TABLETS(250 MCG) BY MOUTH DAILY 180 tablet 1    lisinopriL (PRINIVIL,ZESTRIL) 20 MG tablet TAKE 1 TABLET BY MOUTH EVERY DAY (Patient taking differently: Take by mouth every evening. TAKE 1 TABLET BY MOUTH EVERY DAY) 90 tablet 3    methotrexate 2.5 MG Tab TAKE 8 TABLETS BY MOUTH EVERY 7 DAYS (Patient taking differently: Take by mouth every 7 days. TAKE 8 TABLETS BY MOUTH EVERY 7 DAYS (Fridays)) 102 tablet 0    omeprazole (PRILOSEC) 40 MG capsule Take 1 capsule (40 mg total) by mouth once daily. 30 capsule 6    omeprazole (PRILOSEC) 40 MG capsule Take 1 capsule (40 mg total) by mouth every morning. Open capsule and take with apple sauce 30 capsule 2    sodium chloride 0.9% SolP 100 mL with abatacept (with maltose) 250 mg SolR 1,000 mg Inject 1,000 mg into the vein every 28 days.      XIIDRA 5 % Dpet APPLY 1 DROP TO BOTH EYES BID       buPROPion (WELLBUTRIN) 75 MG tablet Take 1 tablet (75 mg total) by mouth 2 (two) times daily. 30 tablet 0    cephALEXin (KEFLEX) 500 MG capsule Take 1 capsule (500 mg total) by mouth every 12 (twelve) hours. (Patient not taking: Reported on 3/3/2023) 10 capsule 0    hydrocodone-acetaminophen (HYCET) solution 7.5-325 mg/15mL Take 15 mLs by mouth 4 (four) times daily as needed for Pain. (Patient not taking: Reported on 3/3/2023) 118 mL 0    ondansetron (ZOFRAN-ODT) 8 MG TbDL Take 1 tablet (8 mg total) by mouth every 6 (six) hours as needed. (Patient not taking: Reported on 3/3/2023) 30 tablet 0    promethazine (PHENERGAN) 25 MG suppository Unwrap and insert 1 suppository rectally every 6 hours as needed. (Patient not taking: Reported on 3/3/2023) 15 suppository 0     No current facility-administered medications on file prior to visit.       Allergies:  Sulfamethoxazole-trimethoprim and Sulfa (sulfonamide antibiotics)    Immunizations:  Immunization History   Administered Date(s) Administered    COVID-19, MRNA, LN-S, PF (MODERNA FULL 0.5 ML DOSE) 03/11/2021, 04/15/2021, 01/11/2022    DTaP 01/01/1973, 02/01/1973, 04/01/1973, 08/01/1975, 04/01/1978    IPV 01/01/1973, 02/01/1973, 08/01/1975, 01/05/1978    Influenza Split 11/18/2007    MMR 01/12/1978, 10/19/1995    Td (ADULT) 04/17/1989, 08/07/2004    Tdap 11/26/2019       Review of Systems:  Review of Systems   Constitutional:  Positive for activity change. Negative for unexpected weight change.   HENT:  Negative for hearing loss, rhinorrhea and trouble swallowing.    Eyes:  Negative for discharge and visual disturbance.   Respiratory:  Negative for chest tightness and wheezing.    Cardiovascular:  Negative for chest pain and palpitations.   Gastrointestinal:  Negative for blood in stool, constipation, diarrhea and vomiting.   Endocrine: Negative for polydipsia and polyuria.   Genitourinary:  Negative for difficulty urinating, dysuria, hematuria and menstrual problem.  "  Musculoskeletal:  Positive for arthralgias and joint swelling. Negative for neck pain.   Neurological:  Negative for weakness and headaches.   Psychiatric/Behavioral:  Negative for confusion and dysphoric mood.      Objective:    Vitals:  Vitals:    03/03/23 1145   BP: 130/70   Pulse: 101   SpO2: 99%   Weight: (!) 142.1 kg (313 lb 4.4 oz)   Height: 5' 5" (1.651 m)   PainSc:   4   PainLoc: Knee       Physical Exam  Vitals reviewed.   Constitutional:       General: She is not in acute distress.     Appearance: She is well-developed.   HENT:      Head: Normocephalic and atraumatic.   Pulmonary:      Effort: Pulmonary effort is normal. No respiratory distress.   Musculoskeletal:      Cervical back: Normal range of motion.      Comments: Left knee-full active range of motion.  Crepitus throughout range of motion.  Tender to palpation along the lateral joint line.  No laxity appreciated   Psychiatric:         Behavior: Behavior normal.         Thought Content: Thought content normal.         Judgment: Judgment normal.           Layo Mccrary MD  Family Medicine      "

## 2023-03-08 ENCOUNTER — PATIENT MESSAGE (OUTPATIENT)
Dept: BARIATRICS | Facility: CLINIC | Age: 53
End: 2023-03-08
Payer: COMMERCIAL

## 2023-03-14 ENCOUNTER — PATIENT MESSAGE (OUTPATIENT)
Dept: BARIATRICS | Facility: CLINIC | Age: 53
End: 2023-03-14
Payer: COMMERCIAL

## 2023-03-24 ENCOUNTER — OFFICE VISIT (OUTPATIENT)
Dept: RHEUMATOLOGY | Facility: CLINIC | Age: 53
End: 2023-03-24
Payer: COMMERCIAL

## 2023-03-24 VITALS
BODY MASS INDEX: 48.82 KG/M2 | HEIGHT: 65 IN | DIASTOLIC BLOOD PRESSURE: 73 MMHG | WEIGHT: 293 LBS | HEART RATE: 92 BPM | SYSTOLIC BLOOD PRESSURE: 105 MMHG

## 2023-03-24 DIAGNOSIS — M06.9 RHEUMATOID ARTHRITIS FLARE: ICD-10-CM

## 2023-03-24 DIAGNOSIS — L40.50 PSORIATIC ARTHRITIS: ICD-10-CM

## 2023-03-24 DIAGNOSIS — M06.9 RHEUMATOID ARTHRITIS FLARE: Primary | ICD-10-CM

## 2023-03-24 DIAGNOSIS — R21 RASH: Primary | ICD-10-CM

## 2023-03-24 DIAGNOSIS — M32.9 SYSTEMIC LUPUS ERYTHEMATOSUS, UNSPECIFIED SLE TYPE, UNSPECIFIED ORGAN INVOLVEMENT STATUS: ICD-10-CM

## 2023-03-24 PROCEDURE — 99999 PR PBB SHADOW E&M-EST. PATIENT-LVL IV: ICD-10-PCS | Mod: PBBFAC,,, | Performed by: INTERNAL MEDICINE

## 2023-03-24 PROCEDURE — 1159F PR MEDICATION LIST DOCUMENTED IN MEDICAL RECORD: ICD-10-PCS | Mod: CPTII,S$GLB,, | Performed by: INTERNAL MEDICINE

## 2023-03-24 PROCEDURE — 3078F PR MOST RECENT DIASTOLIC BLOOD PRESSURE < 80 MM HG: ICD-10-PCS | Mod: CPTII,S$GLB,, | Performed by: INTERNAL MEDICINE

## 2023-03-24 PROCEDURE — 99214 PR OFFICE/OUTPT VISIT, EST, LEVL IV, 30-39 MIN: ICD-10-PCS | Mod: S$GLB,,, | Performed by: INTERNAL MEDICINE

## 2023-03-24 PROCEDURE — 3074F SYST BP LT 130 MM HG: CPT | Mod: CPTII,S$GLB,, | Performed by: INTERNAL MEDICINE

## 2023-03-24 PROCEDURE — 99999 PR PBB SHADOW E&M-EST. PATIENT-LVL IV: CPT | Mod: PBBFAC,,, | Performed by: INTERNAL MEDICINE

## 2023-03-24 PROCEDURE — 3078F DIAST BP <80 MM HG: CPT | Mod: CPTII,S$GLB,, | Performed by: INTERNAL MEDICINE

## 2023-03-24 PROCEDURE — 3044F HG A1C LEVEL LT 7.0%: CPT | Mod: CPTII,S$GLB,, | Performed by: INTERNAL MEDICINE

## 2023-03-24 PROCEDURE — 3008F PR BODY MASS INDEX (BMI) DOCUMENTED: ICD-10-PCS | Mod: CPTII,S$GLB,, | Performed by: INTERNAL MEDICINE

## 2023-03-24 PROCEDURE — 1159F MED LIST DOCD IN RCRD: CPT | Mod: CPTII,S$GLB,, | Performed by: INTERNAL MEDICINE

## 2023-03-24 PROCEDURE — 99214 OFFICE O/P EST MOD 30 MIN: CPT | Mod: S$GLB,,, | Performed by: INTERNAL MEDICINE

## 2023-03-24 PROCEDURE — 3074F PR MOST RECENT SYSTOLIC BLOOD PRESSURE < 130 MM HG: ICD-10-PCS | Mod: CPTII,S$GLB,, | Performed by: INTERNAL MEDICINE

## 2023-03-24 PROCEDURE — 3044F PR MOST RECENT HEMOGLOBIN A1C LEVEL <7.0%: ICD-10-PCS | Mod: CPTII,S$GLB,, | Performed by: INTERNAL MEDICINE

## 2023-03-24 PROCEDURE — 3008F BODY MASS INDEX DOCD: CPT | Mod: CPTII,S$GLB,, | Performed by: INTERNAL MEDICINE

## 2023-03-24 RX ORDER — METHOTREXATE 2.5 MG/1
20 TABLET ORAL
Qty: 105 TABLET | Refills: 0 | Status: SHIPPED | OUTPATIENT
Start: 2023-03-24 | End: 2023-06-23 | Stop reason: SDUPTHER

## 2023-03-24 NOTE — PROGRESS NOTES
Subjective:       Patient ID: Christine Alfaro is a 50 y.o. female.    Chief Complaint: Disease Management    HPI 50 year old with PMH of Hashimotos disease, psoriasis,HTN, HL, ETELVINA , GERD, mitral valve regurgitation, left ankle spur removal  here for evaluation. She has been on cpap for many years.  She does not wake up well rested. She has been tired for years, but worse over the last 8 months. Last year, she started to have joint pain. She is having pain in toes, fingers, left knee, elbows, right jaw. Pain level is as high as 7/10, aching and non radiating. Reports swelling in left ankle and feet. Reports swelling in feet and lower legs for a year. In morning, she reports her hands are swollen. She has pain in mid to lower back since she was 18. She gets pustules on occasion in her hands for a year. She is getting mouth ulcer where she has dental bridge.  She clenches her teeth at night. Denies malar rash. She has dry eyes and dry mouth for last year. She takes ibuprofen 800mg po BID prn for last year to help her joint pain. It helps her take edge off.  She has had psoriasis for 3 years.  Reports dry cough for last year. Reports photosensitivity for 3 years. Denies raynauds. Denies history of thrombosis. She has had 2 miscarriages. She reports she had one miscarriage at 6 months due to blood incompatibility.  She had another miscarriage at 2.5 months. She thinks her thyroid might not have been well controlled. Over the last year and half, she has had chest pain with coughing and sometimes it can radiate to back.    She was able to have 2 healthy pregnancies.      Family hx: uncle: RA  Mother:psoriasis       Interval history: She is taking plaquenil 200mg po BID.She has taken Orencia. She is taking MTX 8 pills once a week.  She is off steroids. Pain level is 7/10. She is having pain in lower back, ankles and feet.  Reports her feet swell as well as her hands and ankles.  She recently had flare involve right knee  "and left elbow/wrist.  She has not been taking tramadol 50mg in morning. She takes flexeril 10mg po qhs.   Denies photosensitivity. Denies oral ulcers. She thinks her mouth is dry.     Past Medical History:   Diagnosis Date    Anxiety     GERD (gastroesophageal reflux disease)     Hyperlipidemia     Hypertension     Hypothyroidism     Insomnia     Observed sleep apnea     Vitamin D deficiency        Review of Systems   Constitutional: Positive for fatigue. Negative for activity change, appetite change, chills, diaphoresis, fever and unexpected weight change.   HENT: Negative for congestion, ear discharge, ear pain, facial swelling, mouth sores, sinus pressure, sneezing, sore throat, tinnitus and trouble swallowing.    Eyes: Negative for photophobia, pain, discharge, redness, itching and visual disturbance.   Respiratory: Negative for apnea, cough, chest tightness, shortness of breath, wheezing and stridor.    Cardiovascular: Negative for chest pain and leg swelling.   Gastrointestinal: Negative for abdominal distention, abdominal pain, anal bleeding, blood in stool, constipation, diarrhea and nausea.   Endocrine: Negative for cold intolerance and heat intolerance.   Genitourinary: Negative for difficulty urinating, dysuria and genital sores.   Musculoskeletal: Positive for arthralgias, back pain, gait problem and joint swelling. Negative for neck pain and neck stiffness.   Skin: Positive for rash. Negative for color change, pallor and wound.   Neurological: Negative for dizziness, seizures, light-headedness, numbness and headaches.   Hematological: Negative for adenopathy. Does not bruise/bleed easily.   Psychiatric/Behavioral: Negative for sleep disturbance. The patient is not nervous/anxious.            Objective:   BP (!) 150/85   Pulse 84   Ht 5' 6" (1.676 m)   Wt (!) 154.4 kg (340 lb 6.2 oz)   BMI 54.94 kg/m²      Physical Exam   Constitutional: She is oriented to person, place, and time.   HENT:   Head: " Normocephalic and atraumatic.   Right Ear: External ear normal.   Left Ear: External ear normal.   Nose: Nose normal.   Mouth/Throat: Oropharynx is clear and moist. No oropharyngeal exudate.   Eyes: Conjunctivae and EOM are normal. Pupils are equal, round, and reactive to light. Right eye exhibits no discharge. Left eye exhibits no discharge. No scleral icterus.   Neck: No JVD present. No thyromegaly present.   Cardiovascular: Normal rate, regular rhythm, normal heart sounds and intact distal pulses.  Exam reveals no gallop and no friction rub.    No murmur heard.  Pulmonary/Chest: Effort normal and breath sounds normal. No respiratory distress. She has no wheezes. She has no rales. She exhibits no tenderness.   Abdominal: Soft. Bowel sounds are normal. She exhibits no distension and no mass. There is no abdominal tenderness. There is no rebound and no guarding.   Lymphadenopathy:     She has no cervical adenopathy.   Neurological: She is alert and oriented to person, place, and time. She displays normal reflexes. No cranial nerve deficit. She exhibits normal muscle tone. Gait normal. Coordination normal.   Skin: Skin is dry. Rash noted. No erythema. No pallor.     Rash in forearms   Psychiatric: Affect and judgment normal.   Musculoskeletal: Tenderness present. No deformity or edema.          No data to display     Assessment:      52  year old with PMH of Hashimotos disease  ,HTN, HL, ETELVINA , GERD, mitral valve regurgitation, left ankle spur removal  here for evaluation of joint pain.  Given +thu, dsdna,arthritis, photosensitivity,  Pleurisy, and fatigue, this is concerning for SLE. Her main issue is inflammatory arthritis.  GIVEN THAT SHE HAS SLE, cannot do anti- tnf.  She was flaring so switched from Orencia shots to  Orencia infusions with improvement.    She is off steroids.   Can consider switching her from orencia to benlysta. Or can consider doing imuran.           Plan:       Problem List Items Addressed  This Visit       None          -continue  Orencia infusions  Continue plaquenil 200mg po BID (discussed eye exam)  Continue flexeril 10mg po qhs   -continue MTX  8  pills once a week   Continue folic acid 1 mg po qday  Labs     #obesity: encourage weight loss

## 2023-03-24 NOTE — PROGRESS NOTES
Subjective:       Patient ID: Christine Alfaro is a 50 y.o. female.    Chief Complaint: Disease Management    HPI 50 year old with PMH of Hashimotos disease, psoriasis,HTN, HL, ETELVINA , GERD, mitral valve regurgitation, left ankle spur removal  here for evaluation. She has been on cpap for many years.  She does not wake up well rested. She has been tired for years, but worse over the last 8 months. Last year, she started to have joint pain. She is having pain in toes, fingers, left knee, elbows, right jaw. Pain level is as high as 7/10, aching and non radiating. Reports swelling in left ankle and feet. Reports swelling in feet and lower legs for a year. In morning, she reports her hands are swollen. She has pain in mid to lower back since she was 18. She gets pustules on occasion in her hands for a year. She is getting mouth ulcer where she has dental bridge.  She clenches her teeth at night. Denies malar rash. She has dry eyes and dry mouth for last year. She takes ibuprofen 800mg po BID prn for last year to help her joint pain. It helps her take edge off.  She has had psoriasis for 3 years.  Reports dry cough for last year. Reports photosensitivity for 3 years. Denies raynauds. Denies history of thrombosis. She has had 2 miscarriages. She reports she had one miscarriage at 6 months due to blood incompatibility.  She had another miscarriage at 2.5 months. She thinks her thyroid might not have been well controlled. Over the last year and half, she has had chest pain with coughing and sometimes it can radiate to back.    She was able to have 2 healthy pregnancies.      Family hx: uncle: RA  Mother:psoriasis       Interval history: She had gastric sleeve on FEB 2.. She is taking plaquenil 200mg po BID.She is on Orencia infusions.. She is taking MTX 8 pills once a week.  She started having psoriasis patches in her elbows.   Pain level is 7/10. She is having pain in lower back, ankles and feet.  Reports her feet swell  "as well as her hands and ankles.    Past Medical History:   Diagnosis Date    Anxiety     GERD (gastroesophageal reflux disease)     Hyperlipidemia     Hypertension     Hypothyroidism     Insomnia     Observed sleep apnea     Vitamin D deficiency        Review of Systems   Constitutional: Positive for fatigue. Negative for activity change, appetite change, chills, diaphoresis, fever and unexpected weight change.   HENT: Negative for congestion, ear discharge, ear pain, facial swelling, mouth sores, sinus pressure, sneezing, sore throat, tinnitus and trouble swallowing.    Eyes: Negative for photophobia, pain, discharge, redness, itching and visual disturbance.   Respiratory: Negative for apnea, cough, chest tightness, shortness of breath, wheezing and stridor.    Cardiovascular: Negative for chest pain and leg swelling.   Gastrointestinal: Negative for abdominal distention, abdominal pain, anal bleeding, blood in stool, constipation, diarrhea and nausea.   Endocrine: Negative for cold intolerance and heat intolerance.   Genitourinary: Negative for difficulty urinating, dysuria and genital sores.   Musculoskeletal: Positive for arthralgias, back pain, gait problem and joint swelling. Negative for neck pain and neck stiffness.   Skin: Positive for rash. Negative for color change, pallor and wound.   Neurological: Negative for dizziness, seizures, light-headedness, numbness and headaches.   Hematological: Negative for adenopathy. Does not bruise/bleed easily.   Psychiatric/Behavioral: Negative for sleep disturbance. The patient is not nervous/anxious.            Objective:   BP (!) 150/85   Pulse 84   Ht 5' 6" (1.676 m)   Wt (!) 154.4 kg (340 lb 6.2 oz)   BMI 54.94 kg/m²      Physical Exam   Constitutional: She is oriented to person, place, and time.   HENT:   Head: Normocephalic and atraumatic.   Right Ear: External ear normal.   Left Ear: External ear normal.   Nose: Nose normal.   Mouth/Throat: Oropharynx is " clear and moist. No oropharyngeal exudate.   Eyes: Conjunctivae and EOM are normal. Pupils are equal, round, and reactive to light. Right eye exhibits no discharge. Left eye exhibits no discharge. No scleral icterus.   Neck: No JVD present. No thyromegaly present.   Cardiovascular: Normal rate, regular rhythm, normal heart sounds and intact distal pulses.  Exam reveals no gallop and no friction rub.    No murmur heard.  Pulmonary/Chest: Effort normal and breath sounds normal. No respiratory distress. She has no wheezes. She has no rales. She exhibits no tenderness.   Abdominal: Soft. Bowel sounds are normal. She exhibits no distension and no mass. There is no abdominal tenderness. There is no rebound and no guarding.   Lymphadenopathy:     She has no cervical adenopathy.   Neurological: She is alert and oriented to person, place, and time. She displays normal reflexes. No cranial nerve deficit. She exhibits normal muscle tone. Gait normal. Coordination normal.   Skin: Skin is dry. Rash noted. No erythema. No pallor.     Rash in forearms   Psychiatric: Affect and judgment normal.   Musculoskeletal: Tenderness present. No deformity or edema.          No data to display     Assessment:      51 year old with PMH of Hashimotos disease  ,HTN, HL, ETELVINA ,  gastric sleeve, GERD, mitral valve regurgitation, left ankle spur removal  here for follow up of SLE.Given +thu, dsdna,arthritis, photosensitivity,  Pleurisy, and fatigue, this is concerning for SLE. Her main issue is inflammatory arthritis.  GIVEN THAT SHE HAS SLE, cannot do anti- tnf.  She had initial improvement with Orencia infusions and MTX 20mg once a week but is flaring.  She has new psoriasis patches in elbows.  Clinical picture consistent with pso A so will switch her from Orencia infusions to Cosentyx.          Plan:       Problem List Items Addressed This Visit       None          -switch from  Orencia infusions to Cosentyx 150 mg at weeks 0, 1, 2, 3, and 4  followed by 150 mg every 4 weeks   (Risks  discussed with patient and not limited to cell count abnormalities, malignancy, allergic  reaction to medication, and increased risk of infection. Patient agrees with starting medication. )    Continue plaquenil 200mg po BID (last eye exam in Nov 2023-clear per patient).  Continue flexeril 10mg po qhs   -continue MTX  8  pills once a week   Continue folic acid 1 mg po qday  Labs  before next visit    #obesity: encourage weight loss       45 * minutes of total time spent on the encounter, which includes face to face time and non-face to face time preparing to see the patient (eg, review of tests), Obtaining and/or reviewing separately obtained history, Documenting clinical information in the electronic or other health record, Independently interpreting results (not separately reported) and communicating results to the patient/family/caregiver, or Care coordination (not separately reported).

## 2023-03-29 NOTE — PROGRESS NOTES
NUTRITION NOTE  Referring Physician: Titi Ribeiro M.D.   Reason for MNT Referral: Follow-up 8 Weeks s/p Gastric Sleeve    PAST MEDICAL HISTORY:  Denies nausea, vomiting, constipation, and diarrhea.  Reports doing well.    Past Medical History:   Diagnosis Date    Anxiety     GERD (gastroesophageal reflux disease)     Hyperlipidemia     Hypertension     Hypothyroidism     Insomnia     Observed sleep apnea     Prediabetes     Rheumatoid arthritis, unspecified     Vitamin D deficiency        CLINICAL DATA:  52 y.o.-year-old White female.      Current Weight: 301 lbs  BMI: There is no height or weight on file to calculate BMI.   Pre-op weight: 341 lbs  Ideal body weight: 144 lbs  Excess body weight: 197 lbs  Total Weight Loss: 40 lbs    Excess Weight Loss: 20%      LABS:  No recent    CURRENT DIET:  Bariatric Soft Diet    Diet Recall: 64-80 grams of protein/day; 64 oz of fluids/day  B: Protein waffle (shake & powder), tea  L: Baked chicken, grilled fish, tuna salad, with vegetables, chopped salad, pickles  D: Protein shake    Diet Includes: Premier Protein  Meal Pattern: 2 meal(s) + 0 snack(s) + 1 protein supplement(s)  Adequate protein supplement intake.  Inadequate dairy intake. Occasional yogurt  Adequate vegetable intake. Summer squash, mustard greens  Inadequate fruit intake. Occasional  - apples, grapes  Starchy CHO: Some keto bread, sweet potatoes  Beverages Water, water with Crystal Light    EXERCISE:  Stationary bike every day 30 mins  Yoga 3 x week     Restrictions to Exercise: None.    VITAMINS/MINERALS:  Bariatric multi with iron 1 x day  Calcium w/D chews 3 x day  B-1 every other day  B-12 1/week    ASSESSMENT:  Doing well overall.  Adequate protein intake.  Adequate fluid intake.  Advancing diet appropriately.  Exercising.  Adequate vitamins & minerals.    BARIATRIC DIET DISCUSSION:  Instructed and provided written materials on bariatric regular diet plan.  Reinforced post-op nutrition  guidelines.    PLAN / RECOMMENDATIONS:  Advance to bariatric regular diet.  Maintain protein intake.  Maintain fluid intake.  Continue light exercise.  Increase appropriate vitamins & minerals.      Return to clinic in 4 months.    SESSION TIME: 15 minutes

## 2023-03-30 ENCOUNTER — APPOINTMENT (OUTPATIENT)
Dept: LAB | Facility: HOSPITAL | Age: 53
End: 2023-03-30
Attending: INTERNAL MEDICINE
Payer: COMMERCIAL

## 2023-03-30 ENCOUNTER — OFFICE VISIT (OUTPATIENT)
Dept: BARIATRICS | Facility: CLINIC | Age: 53
End: 2023-03-30
Payer: COMMERCIAL

## 2023-03-30 ENCOUNTER — CLINICAL SUPPORT (OUTPATIENT)
Dept: BARIATRICS | Facility: CLINIC | Age: 53
End: 2023-03-30
Payer: COMMERCIAL

## 2023-03-30 VITALS
SYSTOLIC BLOOD PRESSURE: 130 MMHG | BODY MASS INDEX: 48.82 KG/M2 | HEART RATE: 82 BPM | HEIGHT: 65 IN | DIASTOLIC BLOOD PRESSURE: 71 MMHG | WEIGHT: 293 LBS | OXYGEN SATURATION: 100 % | TEMPERATURE: 98 F

## 2023-03-30 DIAGNOSIS — K21.9 GASTROESOPHAGEAL REFLUX DISEASE, UNSPECIFIED WHETHER ESOPHAGITIS PRESENT: ICD-10-CM

## 2023-03-30 DIAGNOSIS — E66.01 MORBID OBESITY WITH BMI OF 50.0-59.9, ADULT: ICD-10-CM

## 2023-03-30 DIAGNOSIS — Z71.3 DIETARY COUNSELING AND SURVEILLANCE: ICD-10-CM

## 2023-03-30 DIAGNOSIS — Z98.890 POST-OPERATIVE STATE: Primary | ICD-10-CM

## 2023-03-30 DIAGNOSIS — R63.4 WEIGHT LOSS: ICD-10-CM

## 2023-03-30 DIAGNOSIS — Z98.890 S/P GASTRIC SURGERY: ICD-10-CM

## 2023-03-30 DIAGNOSIS — G47.33 OSA ON CPAP: ICD-10-CM

## 2023-03-30 DIAGNOSIS — E78.5 DYSLIPIDEMIA: ICD-10-CM

## 2023-03-30 DIAGNOSIS — I10 ESSENTIAL HYPERTENSION: ICD-10-CM

## 2023-03-30 DIAGNOSIS — I10 ESSENTIAL HYPERTENSION: Primary | ICD-10-CM

## 2023-03-30 PROCEDURE — 1160F PR REVIEW ALL MEDS BY PRESCRIBER/CLIN PHARMACIST DOCUMENTED: ICD-10-PCS | Mod: CPTII,S$GLB,, | Performed by: PHYSICIAN ASSISTANT

## 2023-03-30 PROCEDURE — 99499 UNLISTED E&M SERVICE: CPT | Mod: S$GLB,,, | Performed by: DIETITIAN, REGISTERED

## 2023-03-30 PROCEDURE — 3075F PR MOST RECENT SYSTOLIC BLOOD PRESS GE 130-139MM HG: ICD-10-PCS | Mod: CPTII,S$GLB,, | Performed by: PHYSICIAN ASSISTANT

## 2023-03-30 PROCEDURE — 3044F HG A1C LEVEL LT 7.0%: CPT | Mod: CPTII,S$GLB,, | Performed by: PHYSICIAN ASSISTANT

## 2023-03-30 PROCEDURE — 99024 PR POST-OP FOLLOW-UP VISIT: ICD-10-PCS | Mod: S$GLB,,, | Performed by: PHYSICIAN ASSISTANT

## 2023-03-30 PROCEDURE — 1159F PR MEDICATION LIST DOCUMENTED IN MEDICAL RECORD: ICD-10-PCS | Mod: CPTII,S$GLB,, | Performed by: PHYSICIAN ASSISTANT

## 2023-03-30 PROCEDURE — 99999 PR PBB SHADOW E&M-EST. PATIENT-LVL IV: ICD-10-PCS | Mod: PBBFAC,,, | Performed by: PHYSICIAN ASSISTANT

## 2023-03-30 PROCEDURE — 3078F PR MOST RECENT DIASTOLIC BLOOD PRESSURE < 80 MM HG: ICD-10-PCS | Mod: CPTII,S$GLB,, | Performed by: PHYSICIAN ASSISTANT

## 2023-03-30 PROCEDURE — 1160F RVW MEDS BY RX/DR IN RCRD: CPT | Mod: CPTII,S$GLB,, | Performed by: PHYSICIAN ASSISTANT

## 2023-03-30 PROCEDURE — 99999 PR PBB SHADOW E&M-EST. PATIENT-LVL I: CPT | Mod: PBBFAC,,, | Performed by: DIETITIAN, REGISTERED

## 2023-03-30 PROCEDURE — 3075F SYST BP GE 130 - 139MM HG: CPT | Mod: CPTII,S$GLB,, | Performed by: PHYSICIAN ASSISTANT

## 2023-03-30 PROCEDURE — 99999 PR PBB SHADOW E&M-EST. PATIENT-LVL I: ICD-10-PCS | Mod: PBBFAC,,, | Performed by: DIETITIAN, REGISTERED

## 2023-03-30 PROCEDURE — 3008F BODY MASS INDEX DOCD: CPT | Mod: CPTII,S$GLB,, | Performed by: PHYSICIAN ASSISTANT

## 2023-03-30 PROCEDURE — 99499 NO LOS: ICD-10-PCS | Mod: S$GLB,,, | Performed by: DIETITIAN, REGISTERED

## 2023-03-30 PROCEDURE — 99999 PR PBB SHADOW E&M-EST. PATIENT-LVL IV: CPT | Mod: PBBFAC,,, | Performed by: PHYSICIAN ASSISTANT

## 2023-03-30 PROCEDURE — 99024 POSTOP FOLLOW-UP VISIT: CPT | Mod: S$GLB,,, | Performed by: PHYSICIAN ASSISTANT

## 2023-03-30 PROCEDURE — 3078F DIAST BP <80 MM HG: CPT | Mod: CPTII,S$GLB,, | Performed by: PHYSICIAN ASSISTANT

## 2023-03-30 PROCEDURE — 3044F PR MOST RECENT HEMOGLOBIN A1C LEVEL <7.0%: ICD-10-PCS | Mod: CPTII,S$GLB,, | Performed by: PHYSICIAN ASSISTANT

## 2023-03-30 PROCEDURE — 3008F PR BODY MASS INDEX (BMI) DOCUMENTED: ICD-10-PCS | Mod: CPTII,S$GLB,, | Performed by: PHYSICIAN ASSISTANT

## 2023-03-30 PROCEDURE — 1159F MED LIST DOCD IN RCRD: CPT | Mod: CPTII,S$GLB,, | Performed by: PHYSICIAN ASSISTANT

## 2023-03-30 NOTE — PATIENT INSTRUCTIONS
Meal Ideas for Regular Bariatric Diet  *Recipes and products available at [email or web address].[email or web address]      Breakfast: (15-20g protein)    - Egg white omelet: 2 egg whites or ½ cup Egg Beaters. (Optional proteins: cheese, shrimp, black beans, chicken, sliced turkey) (Optional veggies: tomatoes, salsa, spinach, mushrooms, onions, green peppers, or small slice avocado)     - Egg and sausage: 1 egg or ¼ cup Egg Beaters (any variety), with 1 quita or 2 links of Turkey sausage or Veggie breakfast sausage ([employer] or SeaMicro)    - Crust-less breakfast quiche: To make a glass pie dish, mix 4oz part skim Ricotta, 1 cup skim milk, and 2 eggs as your base. Add protein: shredded cheese, sliced lean ham or turkey, turkey betancourt/sausage. Add veggies: tomato, onion, green onion, mushroom, green pepper, spinach, etc.    - Yogurt parfait: Mix 1 - 6oz container Dannon Light N Fit vanilla yogurt, with ¼ cup crushed unsalted nuts    - Cottage cheese and fruit: ½ cup part-skim cottage cheese or ricotta cheese topped with fresh fruit or sugar free preserves     - Salina Panchal's Vanilla Egg custard* (add 2 Tbsp instant coffee granules to make Cappuccino Custard*)    - Hi-Protein café latte (skim milk, decaf coffee, 1 scoop protein powder). Optional to add Sugar free syrup or extract flavoring.    - Breakfast Lox: spread fat free cream cheese on slices of smoked salmon. Serve over scrambled or egg over easy (sauteed with nonstick cookspray) OR on a cucumber slice    - Eggwhich: Scramble or cook 1 large egg over easy using nonstick cookspray. Place between 2 slices of [place] betancourt and low fat cheese.     Lunch: (20-30g protein)    - ½ cup Black bean soup (Homemade or Progresso), with ¼ cup shredded low-fat cheese. Top with chopped tomato or fresh salsa.     - Lean deli turkey breast and low-fat sliced cheese, mustard or light huerta to moisten, rolled up together, or wrapped in a Marvel lettuce leaf    - Chicken salad made from dinner  leftovers, moisten with low-fat salad dressing or light huerta. Also try leftover salmon, shrimp, tuna or boiled eggs. Serve ½ cup over dark green salad    - Fat-free canned refried beans, topped with ¼ cup shredded low-fat cheese. Top with chopped tomato or fresh salsa.     - Greek salad: Top mixed greens with 1-2oz grilled chicken, tomatoes, red onions, 2-3 kalamata olives, and sprinkle lightly with feta cheese. Spritz with Balsamic vinegar to taste.     - Crust-less lunch quiche: To make a glass pie dish, mix 4oz part skim Ricotta, 1 cup skim milk, and 2 eggs as your base. Add protein: shredded cheese, sliced lean ham or turkey, shrimp, chicken. Add veggies: tomato, onion, green onion, mushroom, green pepper, spinach, artichoke, broccoli, etc.    - Pizza bake: spread a  meka john mushroom with tomato sauce, low-fat shredded mozzarella and turkey pepperoni or [place] betancourt. Add any veggies. Roast for 10-15 minutes, until cheese melted.     - Cucumber crab bites: Spread ¼ cup crab dip (lump crabmeat + light cream cheese and green onions) over sliced cucumber.     - Chicken with light spinach and artichoke dip*: Puree in : 6oz cooked and drained spinach, 2 cloves garlic, 1 can cannelloni beans, ½ cup chopped green onions, 1 can drained artichoke hearts (not marinated in oil), lemon juice and basil. Mix in 2oz chopped up chicken.    Supper: (20-30g protein)    - Serve grilled fish over dark green salad tossed with low-fat dressing, served with grilled asparagus hernandez     - Rotisserie chicken salad: served with sliced strawberries, walnuts, fat-free feta cheese crumbles and 1 tbsp Dos Own Light Raspberry Paradox Vinaigrette    - Shrimp cocktail: Dip cold boiled shrimp in homemade low-sugar cocktail sauce (1/2 cup Ramonita One Carb ketchup, 2 tbsp horseradish, 1/4 tsp hot sauce, 1 tsp Worcestershire sauce, 1 tbsp freshly-squeezed lemon juice). Serve with dark green salad, walnuts, and crumbled blue  cheese drizzled with olive oil and Balsamic vinegar    - Tuna Melt: Spread tuna salad onto 2 thick slices of tomato. Top with low-fat cheese and broil until cheese is melted. May also be made with chicken salad of shrimp salad. [place] with different types of cheeses.    - Chicken or beef fajitas (no tortilla, rice, beans, chips). Top meat and veggies w/ fresh salsa, fat free sour cream.     - Homemade low-fat Chili using extra lean ground beef or ground turkey. Top with shredded cheese and salsa as desired. May add dollop fat-free sour cream if desired    - Chicken parmesan: Top chicken breast w/ low sugar marinara sauce, mozzarella cheese and bake until chicken reaches 165*.  Serve w/ spaghetti SQUASH or [place]wandan cut green beans    - Dinner Omelet with shrimp or chicken and onion, green peppers and chives.    - No noodle lasagna: Use sliced zucchini or eggplant in place of noodles.  Layer with part skim ricotta cheese and low sugar meat sauce (use very lean ground beef or ground turkey).    - Mexican chicken bake: Bake chunks of chicken breast or thigh with taco seasoning, Pace brand enchilada sauce, green onions and low-fat cheese. Serve with ¼ cup black beans or fat free refried beans topped with chopped tomatoes or salsa.    - Yuki frozen meatballs, simmered in Classico Marinara sauce. Different flavors of salsa or spaghetti sauce create different dishes! Sprinkle with parmesan cheese. Serve with grilled or steamed veggies, or a dark green salad.    - Simmer boneless skinless chicken thigh chunks in Classico Marinara sauce or roasted salsa until tender with chopped onion, bell pepper, garlic, mushrooms, spinach, etc.     - Hamburger or veggie burger, without the bun, dressed the way you like. Served with grilled or steamed veggies.    - Eggplant parmesan: Bake slices of eggplant at 350 degrees for 15 minutes. Layer tomato sauce, sliced eggplant and low-fat mozzarella cheese in a baking dish and cover with  foil. Bake 30-40 more minutes or until bubbly. Uncover and bake at 400 degrees for about 15 more minutes, or until top is slightly crisp.    - Fish tacos: grilled/baked white fish, wrapped in Marvel lettuce leaf, topped with salsa, shredded low-fat cheese, and light coleslaw.    - Chicken dipti: Sprinkle chicken w/ 1 tsp of hidden [place] ranch dip mix. Then grill chicken and top with black beans, salsa and 1 tsp fat free sour cream.     - Cauliflower pizza crust: Use cauliflower as crust (see recipe on pinterest, no flour!). Top w/ low fat cheese, turkey pepperoni and veggies and bake again    - chicken or turkey crust pizza: use ground chicken or turkey instead of cauliflower, spread in [place] and bake at 350 for about 20-30 minutes(may want to add garlic, black pepper, oregano and other herbs to ground meat mixture).  Remove and top w/ low fat cheese, turkey pepperoni and veggies and bake again for another 10 minutes or until cheese is browned.     Snacks: (100-200 calories; >5g protein)    - 1 low-fat cheese stick with 8 cherry tomatoes or 1 serving fresh fruit  - 4 thin slices fat-free turkey breast and 1 slice low-fat cheese  - 4 thin slices fat-free honey ham with wedge of melon  - 6-8 edamame pods (equivalent to about 1/4 cup edamame without pods).   - 1/4 cup unsalted nuts with ½ cup fruit  - 6-oz container Dannon Light n Fit vanilla yogurt, topped with 1oz unsalted nuts         - apple, celery or baby carrots spread with 2 Tbsp PB2  - apple slices with 1 oz slice low-fat cheese  - Apple slices dipped in 2 Tbsp of PB2  - celery, cucumber, bell pepper or baby carrots dipped in ¼ cup hummus bean spread or light spinach and artichoke dip (*recipe in lunch section)  - celery, cucumber, baby carrots dipped in high protein greek yogurt (Mix 16 oz plain greek yogurt + 1 packet of hidden [place] ranch dip mix)  - Rei Links Beef Steak - 14g protein! (similar to beef jerky)  - 2 wedges Laughing Cow - Light Herb  & Garlic Cheese with sliced cucumber or green bell pepper  - 1/2 cup low-fat cottage cheese with ¼ cup fruit or ¼ cup salsa  - RTD Protein drinks: Atkins, Low Carb Slim Fast, EAS light, Muscle Milk Light, etc.  - Homemade Protein drinks: GNC Soy95, Isopure, Nectar, UNJURY, Whey Gourmet, etc. Mix 1 scoop powder with 8oz skim/1% milk or light soymilk.  - Protein bars: Atkins, EAS, Pure Protein, Think Thin, Detour, etc. Must have 0-4 grams sugar - Read the label.    Takeout Options: No more than twice/week  Deli - Salads (no pasta or rice), meats, cheeses. Roasted chicken. Lox (salmon)    Mexican - Platters which don't include tortillas, chips, or rice. Go easy on the beans. Example: Fajitas without the tortillas. Ask the  not to bring chips to the table if they are too tempting.    Greek - Meat or fish and vegetable, but no bread or rice. Including hummus, baba ganoush, etc, is OK. Most sit-down Greek restaurants can provide you with cucumber slices for dipping instead of madeline bread.    Fast Food (Avoid as much as possible) - Salads (no croutons and limit salad dressing to 2 tbsp), grilled chicken sandwich without the bun and ask for no huerta. Kathleens low fat chili or Taco Bell pintos and cheese.    BBQ - The meats are fine if you ask for sauces on the side, but most of the traditional side dishes are loaded with carbs. Suresh slaw, baked beans and BBQ sauce are typically made with sugar.    Chinese - Nothing deep-fried, no rice or noodles. Many Chinese sauces have starch and sugar in them, so you'll have to use your judgement. If you find that these sauces trigger cravings, or cause Dumping, you can ask for the sauce to be made without sugar or just use soy sauce.    How to taper off of Omeprazole: ( start 12 week)   - Take 1 Tablet every other day for 2 weeks.  If you do not experience any heartburn, indigestion, nausea symptoms, the following week, take 1 tablet every 3 days.  Again if you remain without the  above symptoms, you may completely discontinue the medication.  If symptoms return at any point, please restart the medication.

## 2023-04-01 ENCOUNTER — PATIENT MESSAGE (OUTPATIENT)
Dept: FAMILY MEDICINE | Facility: CLINIC | Age: 53
End: 2023-04-01
Payer: COMMERCIAL

## 2023-04-01 ENCOUNTER — PATIENT MESSAGE (OUTPATIENT)
Dept: RHEUMATOLOGY | Facility: CLINIC | Age: 53
End: 2023-04-01
Payer: COMMERCIAL

## 2023-04-01 DIAGNOSIS — E78.5 DYSLIPIDEMIA: ICD-10-CM

## 2023-04-03 DIAGNOSIS — M25.50 POLYARTHRALGIA: ICD-10-CM

## 2023-04-03 DIAGNOSIS — M06.9 RHEUMATOID ARTHRITIS FLARE: ICD-10-CM

## 2023-04-03 DIAGNOSIS — L40.50 PSORIATIC ARTHRITIS: ICD-10-CM

## 2023-04-03 DIAGNOSIS — M32.9 SYSTEMIC LUPUS ERYTHEMATOSUS, UNSPECIFIED SLE TYPE, UNSPECIFIED ORGAN INVOLVEMENT STATUS: ICD-10-CM

## 2023-04-03 DIAGNOSIS — M19.90 INFLAMMATORY ARTHRITIS: Primary | ICD-10-CM

## 2023-04-03 RX ORDER — HYDROXYCHLOROQUINE SULFATE 200 MG/1
200 TABLET, FILM COATED ORAL 2 TIMES DAILY
OUTPATIENT
Start: 2023-04-03

## 2023-04-03 RX ORDER — HYDROXYCHLOROQUINE SULFATE 200 MG/1
200 TABLET, FILM COATED ORAL 2 TIMES DAILY
Qty: 180 TABLET | Refills: 4 | Status: SHIPPED | OUTPATIENT
Start: 2023-04-03 | End: 2023-07-02

## 2023-04-03 RX ORDER — ATORVASTATIN CALCIUM 40 MG/1
40 TABLET, FILM COATED ORAL NIGHTLY
Qty: 90 TABLET | Refills: 3 | Status: SHIPPED | OUTPATIENT
Start: 2023-04-03

## 2023-04-03 NOTE — TELEPHONE ENCOUNTER
No new care gaps identified.  [facility] Embedded Care Gaps. Reference number: [id number]. 4/03/2023   9:38:58 AM GURMEETT

## 2023-04-05 ENCOUNTER — PATIENT MESSAGE (OUTPATIENT)
Dept: BARIATRICS | Facility: CLINIC | Age: 53
End: 2023-04-05
Payer: COMMERCIAL

## 2023-04-11 ENCOUNTER — PATIENT MESSAGE (OUTPATIENT)
Dept: BARIATRICS | Facility: CLINIC | Age: 53
End: 2023-04-11
Payer: COMMERCIAL

## 2023-04-14 ENCOUNTER — PATIENT MESSAGE (OUTPATIENT)
Dept: RHEUMATOLOGY | Facility: CLINIC | Age: 53
End: 2023-04-14
Payer: COMMERCIAL

## 2023-04-20 DIAGNOSIS — L40.50 PSORIATIC ARTHRITIS: Primary | ICD-10-CM

## 2023-04-20 RX ORDER — SECUKINUMAB 150 MG/ML
150 INJECTION SUBCUTANEOUS
Qty: 2 ML | Refills: 11 | Status: SHIPPED | OUTPATIENT
Start: 2023-04-20 | End: 2023-04-20 | Stop reason: SDUPTHER

## 2023-04-20 RX ORDER — SECUKINUMAB 150 MG/ML
INJECTION SUBCUTANEOUS
Qty: 5 ML | Refills: 11 | Status: SHIPPED | OUTPATIENT
Start: 2023-04-20 | End: 2023-04-20 | Stop reason: SDUPTHER

## 2023-04-20 RX ORDER — SECUKINUMAB 150 MG/ML
INJECTION SUBCUTANEOUS
Qty: 5 ML | Refills: 11 | Status: SHIPPED | OUTPATIENT
Start: 2023-04-20 | End: 2023-05-01 | Stop reason: SDUPTHER

## 2023-04-20 RX ORDER — SECUKINUMAB 150 MG/ML
150 INJECTION SUBCUTANEOUS
Qty: 2 ML | Refills: 11 | Status: SHIPPED | OUTPATIENT
Start: 2023-04-20 | End: 2023-05-01 | Stop reason: SDUPTHER

## 2023-04-24 ENCOUNTER — LAB VISIT (OUTPATIENT)
Dept: LAB | Facility: HOSPITAL | Age: 53
End: 2023-04-24
Attending: INTERNAL MEDICINE
Payer: COMMERCIAL

## 2023-04-24 DIAGNOSIS — L40.50 PSORIATIC ARTHRITIS: ICD-10-CM

## 2023-04-24 LAB
CREAT UR-MCNC: 179 MG/DL (ref 15–325)
PROT UR-MCNC: 7 MG/DL (ref 0–15)
PROT/CREAT UR: 0.04 MG/G{CREAT} (ref 0–0.2)

## 2023-04-24 PROCEDURE — 82570 ASSAY OF URINE CREATININE: CPT | Performed by: INTERNAL MEDICINE

## 2023-04-24 PROCEDURE — 81001 URINALYSIS AUTO W/SCOPE: CPT | Performed by: INTERNAL MEDICINE

## 2023-04-25 LAB
BACTERIA #/AREA URNS AUTO: NORMAL /HPF
BILIRUB UR QL STRIP: NEGATIVE
CLARITY UR REFRACT.AUTO: CLEAR
COLOR UR AUTO: YELLOW
GLUCOSE UR QL STRIP: NEGATIVE
HGB UR QL STRIP: NEGATIVE
HYALINE CASTS UR QL AUTO: 0 /LPF
KETONES UR QL STRIP: NEGATIVE
LEUKOCYTE ESTERASE UR QL STRIP: ABNORMAL
MICROSCOPIC COMMENT: NORMAL
NITRITE UR QL STRIP: NEGATIVE
PH UR STRIP: 6 [PH] (ref 5–8)
PROT UR QL STRIP: ABNORMAL
RBC #/AREA URNS AUTO: 1 /HPF (ref 0–4)
SP GR UR STRIP: 1.03 (ref 1–1.03)
SQUAMOUS #/AREA URNS AUTO: 9 /HPF
URN SPEC COLLECT METH UR: ABNORMAL
WBC #/AREA URNS AUTO: 5 /HPF (ref 0–5)

## 2023-04-29 DIAGNOSIS — I10 ESSENTIAL HYPERTENSION: Chronic | ICD-10-CM

## 2023-04-30 NOTE — TELEPHONE ENCOUNTER
No care due was identified.  [facility] Embedded Care Due Messages. Reference number: [id number].   4/29/2023 9:31:44 PM CDT

## 2023-05-01 ENCOUNTER — PATIENT MESSAGE (OUTPATIENT)
Dept: RHEUMATOLOGY | Facility: CLINIC | Age: 53
End: 2023-05-01
Payer: COMMERCIAL

## 2023-05-01 DIAGNOSIS — L40.50 PSORIATIC ARTHRITIS: ICD-10-CM

## 2023-05-01 RX ORDER — SECUKINUMAB 150 MG/ML
150 INJECTION SUBCUTANEOUS
Qty: 2 ML | Refills: 11 | Status: ACTIVE | OUTPATIENT
Start: 2023-05-01

## 2023-05-01 RX ORDER — SECUKINUMAB 150 MG/ML
INJECTION SUBCUTANEOUS
Qty: 5 ML | Refills: 0 | Status: ACTIVE | OUTPATIENT
Start: 2023-05-01 | End: 2023-09-08

## 2023-05-01 NOTE — PROGRESS NOTES
"CC: Ms. Christine Alfaro arrives today for management of Pre-diabetes and review of chronic medical conditions, as listed in the visit diagnosis section of this encounter.     HPI: Ms. Christine Alfaro was diagnosed with Pre-diabetes in . She was diagnosed based on A1c of 5.8%. Initial treatment consisted of metformin. Later tried Jardiance. + FH of DM in father and several paternal relatives. Type 1 in maternal aunt.     Last seen in endocrine by Dr. Durbin in 2021 for hypothyroidism, for which she was discharged back to PCP.     She has been on Ozempic for 6 weeks.  will make 2nd dose of Ozempic 1 mg.     Today she is requesting personal CGMS.    BG readings are checked 1x/day. Reports the following:  Fastin-110  Bedtime:     Hypoglycemia: No    Missing Insulin/PO medication doses: No    Exercise: Yes - rides stationary bike every day. Plans to start weight lifting.     Dietary Habits: Eats 3 meals/day. Breakfast is a protein shake. Lunch is biggest meal - eats out at work. May snack on dark chocolate/PB snack.    Last DM education appointment: Following with nutritionist dietician through with [facility].     She follows with Rheum for lupus (Dr. Rodríguez).       CURRENT DIABETIC MEDS: Ozempic 1 mg weekly  Glucometer type:     Previous DM treatments:  Metformin  Jardiance        REVIEW OF SYSTEMS  Constitutional: no c/o weakness. + 8# weight loss since starting Ozempic. + fatigue  Eyes: denies visual disturbances.  Cardiac: no palpitations or chest pain.  Respiratory: no cough or dyspnea.  GI: no c/o abdominal pain. Denies h/o pancreatitis. + nausea the day after taking dose. + GERD. Taking omeprazole over the counter.   Skin: no lesions or rashes.  Neuro: no numbness, tingling, or parasthesias.  Endocrine: denies polyphagia, polydipsia, polyuria      Personally reviewed Past Medical, Surgical, Social History.    Vital Signs  /78   Pulse 104   Ht 5' 5" (1.651 m)   Wt " There are two separate messages regarding the patient (Harvey Nephew). Closing this one. Resolved. No further action is needed at this time; thank you! (!) 154.9 kg (341 lb 6.1 oz)   BMI 56.81 kg/m²     Personally reviewed the below labs:    Hemoglobin A1C   Date Value Ref Range Status   02/18/2022 5.7 (H) 4.0 - 5.6 % Final     Comment:     ADA Screening Guidelines:  5.7-6.4%  Consistent with prediabetes  >or=6.5%  Consistent with diabetes    High levels of fetal hemoglobin interfere with the HbA1C  assay. Heterozygous hemoglobin variants (HbS, HgC, etc)do  not significantly interfere with this assay.   However, presence of multiple variants may affect accuracy.     07/23/2021 5.8 (H) 4.0 - 5.6 % Final     Comment:     ADA Screening Guidelines:  5.7-6.4%  Consistent with prediabetes  >or=6.5%  Consistent with diabetes    High levels of fetal hemoglobin interfere with the HbA1C  assay. Heterozygous hemoglobin variants (HbS, HgC, etc)do  not significantly interfere with this assay.   However, presence of multiple variants may affect accuracy.     06/04/2021 5.7 (H) 4.0 - 5.6 % Final     Comment:     ADA Screening Guidelines:  5.7-6.4%  Consistent with prediabetes  >or=6.5%  Consistent with diabetes    High levels of fetal hemoglobin interfere with the HbA1C  assay. Heterozygous hemoglobin variants (HbS, HgC, etc)do  not significantly interfere with this assay.   However, presence of multiple variants may affect accuracy.         Chemistry        Component Value Date/Time     04/04/2022 0719    K 3.9 04/04/2022 0719     04/04/2022 0719    CO2 27 04/04/2022 0719    BUN 10 04/04/2022 0719    CREATININE 0.8 04/04/2022 0719    GLU 86 04/04/2022 0719        Component Value Date/Time    CALCIUM 9.2 04/04/2022 0719    ALKPHOS 130 04/04/2022 0719    AST 18 04/04/2022 0719    ALT 29 04/04/2022 0719    BILITOT 0.3 04/04/2022 0719    ESTGFRAFRICA >60.0 04/04/2022 0719    EGFRNONAA >60.0 04/04/2022 0719          Lab Results   Component Value Date    CHOL 156 06/04/2021    CHOL 131 11/25/2020    CHOL 176 11/26/2019     Lab Results   Component Value Date    HDL 46  06/04/2021    HDL 45 11/25/2020    HDL 51 11/26/2019     Lab Results   Component Value Date    LDLCALC 87.0 06/04/2021    LDLCALC 65.8 11/25/2020    LDLCALC 52.4 (L) 11/26/2019     Lab Results   Component Value Date    TRIG 115 06/04/2021    TRIG 101 11/25/2020    TRIG 363 (H) 11/26/2019     Lab Results   Component Value Date    CHOLHDL 29.5 06/04/2021    CHOLHDL 34.4 11/25/2020    CHOLHDL 29.0 11/26/2019       No results found for: MICALBCREAT  Lab Results   Component Value Date    TSH 0.805 02/18/2022       CrCl cannot be calculated (Patient's most recent lab result is older than the maximum 7 days allowed.).    Vit D, 25-Hydroxy   Date Value Ref Range Status   07/23/2021 41 30 - 96 ng/mL Final     Comment:     Vitamin D deficiency.........<10 ng/mL                              Vitamin D insufficiency......10-29 ng/mL       Vitamin D sufficiency........> or equal to 30 ng/mL  Vitamin D toxicity............>100 ng/mL           PHYSICAL EXAMINATION  Constitutional: Appears well, no distress  Neck: Supple, trachea midline.  Respiratory: CTA, even and unlabored.  Cardiovascular: RRR, no murmurs, no carotid bruits.   GI: hypoactive bowel sounds, no hernia noted.  Skin: warm and dry; no visible wounds  Neuro: oriented to person, place, time  Feet: appropriate footwear.    Assessment/Plan  1. Prediabetes  -- Glucoses sound to be stable. A1c within pre-DM range. I explained that without a formal Type 2 DM diagnosis, her insurance may not pay for CGM. I discussed Freestyle Yoseph 2 cash pay method with her and she'd like to proceed.   -- continue Ozempic  -- Freestyle Yoseph 2 CGM sent to her pharmacy. Explained use and scanning frequency.    2. Hypothyroidism, unspecified type  -- stable  -- continue current levothyroxine dose   3. Gastroesophageal reflux disease, unspecified whether esophagitis present  omeprazole (PRILOSEC) 40 MG capsule  -- advised pt to notify me if this worsens   4. Morbid obesity with BMI of  50.0-59.9, adult  -- increases insulin resistance  -- Ozempic may promote weight loss       FOLLOW UP  Follow up in about 4 months (around 8/22/2022).   Patient instructed to bring BG logs to each follow up   Patient encouraged to call for any BG/medication issues, concerns, or questions.    Orders Placed This Encounter   Procedures    Hemoglobin A1C

## 2023-05-02 RX ORDER — LISINOPRIL 20 MG/1
TABLET ORAL
Qty: 90 TABLET | Refills: 3 | Status: SHIPPED | OUTPATIENT
Start: 2023-05-02 | End: 2023-10-27 | Stop reason: DRUGHIGH

## 2023-05-03 ENCOUNTER — PATIENT MESSAGE (OUTPATIENT)
Dept: BARIATRICS | Facility: CLINIC | Age: 53
End: 2023-05-03
Payer: COMMERCIAL

## 2023-05-06 ENCOUNTER — PATIENT MESSAGE (OUTPATIENT)
Dept: RHEUMATOLOGY | Facility: CLINIC | Age: 53
End: 2023-05-06
Payer: COMMERCIAL

## 2023-05-06 PROBLEM — L40.50 PSORIATIC ARTHRITIS: Status: ACTIVE | Noted: 2023-05-06

## 2023-05-09 ENCOUNTER — PATIENT MESSAGE (OUTPATIENT)
Dept: PHARMACY | Facility: CLINIC | Age: 53
End: 2023-05-09
Payer: COMMERCIAL

## 2023-05-09 ENCOUNTER — PATIENT MESSAGE (OUTPATIENT)
Dept: RHEUMATOLOGY | Facility: CLINIC | Age: 53
End: 2023-05-09
Payer: COMMERCIAL

## 2023-05-09 ENCOUNTER — PATIENT MESSAGE (OUTPATIENT)
Dept: BARIATRICS | Facility: CLINIC | Age: 53
End: 2023-05-09
Payer: COMMERCIAL

## 2023-05-09 ENCOUNTER — SPECIALTY PHARMACY (OUTPATIENT)
Dept: PHARMACY | Facility: CLINIC | Age: 53
End: 2023-05-09
Payer: COMMERCIAL

## 2023-05-09 DIAGNOSIS — M32.9 SYSTEMIC LUPUS ERYTHEMATOSUS, UNSPECIFIED SLE TYPE, UNSPECIFIED ORGAN INVOLVEMENT STATUS: ICD-10-CM

## 2023-05-09 DIAGNOSIS — L40.50 PSORIATIC ARTHRITIS: Primary | ICD-10-CM

## 2023-05-09 RX ORDER — METHYLPREDNISOLONE SOD SUCC 125 MG
100 VIAL (EA) INJECTION ONCE
Status: CANCELLED | OUTPATIENT
Start: 2023-05-09

## 2023-05-09 RX ORDER — TRAMADOL HYDROCHLORIDE 50 MG/1
50 TABLET ORAL EVERY 8 HOURS PRN
Qty: 90 TABLET | Refills: 5 | Status: SHIPPED | OUTPATIENT
Start: 2023-05-09 | End: 2023-06-08

## 2023-05-09 NOTE — TELEPHONE ENCOUNTER
Provider confirmed pt will get Orencia infusion + steroid 5/12/23 and should start SQ Cosentyx 3 weeks later. Pt agreed to [facility] f/u call 2 weeks after her infusion apt.

## 2023-05-09 NOTE — TELEPHONE ENCOUNTER
Spoke with pt- Cosentyx is approved.  Pt is changing from Orencia infusions.  She has an infusion apt scheduled for 5/12/23.  Clarifying with provider if pt should cancel Orenica infusion apt so that she can start Cosentyx ASAP.  Pt aware of [facility] f/u once provider clarifies.

## 2023-05-19 ENCOUNTER — OFFICE VISIT (OUTPATIENT)
Dept: DERMATOLOGY | Facility: CLINIC | Age: 53
End: 2023-05-19
Payer: COMMERCIAL

## 2023-05-19 VITALS — BODY MASS INDEX: 48.59 KG/M2 | WEIGHT: 292 LBS

## 2023-05-19 DIAGNOSIS — L81.4 LENTIGINES: ICD-10-CM

## 2023-05-19 DIAGNOSIS — R21 RASH: ICD-10-CM

## 2023-05-19 DIAGNOSIS — L40.9 PSORIASIS: Primary | ICD-10-CM

## 2023-05-19 DIAGNOSIS — L21.9 SEBORRHEIC DERMATITIS: ICD-10-CM

## 2023-05-19 DIAGNOSIS — L57.3 POIKILODERMA CIVATTE'S: ICD-10-CM

## 2023-05-19 PROCEDURE — 1160F PR REVIEW ALL MEDS BY PRESCRIBER/CLIN PHARMACIST DOCUMENTED: ICD-10-PCS | Mod: CPTII,S$GLB,, | Performed by: DERMATOLOGY

## 2023-05-19 PROCEDURE — 3008F PR BODY MASS INDEX (BMI) DOCUMENTED: ICD-10-PCS | Mod: CPTII,S$GLB,, | Performed by: DERMATOLOGY

## 2023-05-19 PROCEDURE — 99214 OFFICE O/P EST MOD 30 MIN: CPT | Mod: S$GLB,,, | Performed by: DERMATOLOGY

## 2023-05-19 PROCEDURE — 99999 PR PBB SHADOW E&M-EST. PATIENT-LVL IV: ICD-10-PCS | Mod: PBBFAC,,, | Performed by: DERMATOLOGY

## 2023-05-19 PROCEDURE — 99214 PR OFFICE/OUTPT VISIT, EST, LEVL IV, 30-39 MIN: ICD-10-PCS | Mod: S$GLB,,, | Performed by: DERMATOLOGY

## 2023-05-19 PROCEDURE — 3044F HG A1C LEVEL LT 7.0%: CPT | Mod: CPTII,S$GLB,, | Performed by: DERMATOLOGY

## 2023-05-19 PROCEDURE — 3044F PR MOST RECENT HEMOGLOBIN A1C LEVEL <7.0%: ICD-10-PCS | Mod: CPTII,S$GLB,, | Performed by: DERMATOLOGY

## 2023-05-19 PROCEDURE — 4010F ACE/ARB THERAPY RXD/TAKEN: CPT | Mod: CPTII,S$GLB,, | Performed by: DERMATOLOGY

## 2023-05-19 PROCEDURE — 4010F PR ACE/ARB THEARPY RXD/TAKEN: ICD-10-PCS | Mod: CPTII,S$GLB,, | Performed by: DERMATOLOGY

## 2023-05-19 PROCEDURE — 1159F PR MEDICATION LIST DOCUMENTED IN MEDICAL RECORD: ICD-10-PCS | Mod: CPTII,S$GLB,, | Performed by: DERMATOLOGY

## 2023-05-19 PROCEDURE — 1160F RVW MEDS BY RX/DR IN RCRD: CPT | Mod: CPTII,S$GLB,, | Performed by: DERMATOLOGY

## 2023-05-19 PROCEDURE — 3008F BODY MASS INDEX DOCD: CPT | Mod: CPTII,S$GLB,, | Performed by: DERMATOLOGY

## 2023-05-19 PROCEDURE — 99999 PR PBB SHADOW E&M-EST. PATIENT-LVL IV: CPT | Mod: PBBFAC,,, | Performed by: DERMATOLOGY

## 2023-05-19 PROCEDURE — 1159F MED LIST DOCD IN RCRD: CPT | Mod: CPTII,S$GLB,, | Performed by: DERMATOLOGY

## 2023-05-19 RX ORDER — BETAMETHASONE DIPROPIONATE 0.5 MG/G
CREAM TOPICAL
Qty: 45 G | Refills: 3 | Status: SHIPPED | OUTPATIENT
Start: 2023-05-19

## 2023-05-19 RX ORDER — ALCLOMETASONE DIPROPIONATE 0.5 MG/G
CREAM TOPICAL
Qty: 60 G | Refills: 3 | Status: SHIPPED | OUTPATIENT
Start: 2023-05-19

## 2023-05-19 NOTE — PROGRESS NOTES
Subjective:      Patient ID:  Christine Alfaro is a 52 y.o. female who presents for   Chief Complaint   Patient presents with    Skin Check     UBSE     Hx 3/24:   51 year old with PMH of Hashimotos disease  ,HTN, HL, ETELVINA ,  gastric sleeve, GERD, mitral valve regurgitation, left ankle spur removal  here for follow up of SLE.Given +thu, dsdna,arthritis, photosensitivity,  Pleurisy, and fatigue, this is concerning for SLE. Her main issue is inflammatory arthritis.  GIVEN THAT SHE HAS SLE, cannot do anti- tnf.  She had initial improvement with Orencia infusions and MTX 20mg once a week but is flaring.  She has new psoriasis patches in elbows.  Clinical picture consistent with pso A so will switch her from Orencia infusions to Cosentyx.      Hx as above, she has plaques on her elbows for over a year no rx.  Knees, hands clear. The MTX did not help the plaques.  Also scale in eyebrows and mild scale scalp off and on none now.      Review of Systems   Constitutional:  Negative for fever, chills, weight loss, weight gain, fatigue and malaise.   Skin:  Negative for daily sunscreen use, activity-related sunscreen use and wears hat.   Hematologic/Lymphatic: Bruises/bleeds easily.     Objective:   Physical Exam   Constitutional: She appears well-developed and well-nourished.   Neurological: She is alert and oriented to person, place, and time.   Psychiatric: She has a normal mood and affect.   Skin:   Areas Examined (abnormalities noted in diagram):   Scalp / Hair Palpated and Inspected  Head / Face Inspection Performed  Neck Inspection Performed  Chest / Axilla Inspection Performed  RUE Inspected  LUE Inspection Performed  Nails and Digits Inspection Performed               Diagram Legend     Erythematous scaling macule/papule c/w actinic keratosis       Vascular papule c/w angioma      Pigmented verrucoid papule/plaque c/w seborrheic keratosis      Yellow umbilicated papule c/w sebaceous hyperplasia      Irregularly  "shaped tan macule c/w lentigo     1-2 mm smooth white papules consistent with Milia      Movable subcutaneous cyst with punctum c/w epidermal inclusion cyst      Subcutaneous movable cyst c/w pilar cyst      Firm pink to brown papule c/w dermatofibroma      Pedunculated fleshy papule(s) c/w skin tag(s)      Evenly pigmented macule c/w junctional nevus     Mildly variegated pigmented, slightly irregular-bordered macule c/w mildly atypical nevus      Flesh colored to evenly pigmented papule c/w intradermal nevus       Pink pearly papule/plaque c/w basal cell carcinoma      Erythematous hyperkeratotic cursted plaque c/w SCC      Surgical scar with no sign of skin cancer recurrence      Open and closed comedones      Inflammatory papules and pustules      Verrucoid papule consistent consistent with wart     Erythematous eczematous patches and plaques     Dystrophic onycholytic nail with subungual debris c/w onychomycosis     Umbilicated papule    Erythematous-base heme-crusted tan verrucoid plaque consistent with inflamed seborrheic keratosis     Erythematous Silvery Scaling Plaque c/w Psoriasis     See annotation      Assessment / Plan:        Psoriasis  -     betamethasone dipropionate 0.05 % cream; Use bid  Dispense: 45 g; Refill: 3 for elbows  She should improve on Cosentyx    Rash  -     Ambulatory referral/consult to Dermatology    Seborrheic dermatitis scalp/face  -     alclometasone (ACLOVATE) 0.05 % cream; AAA face bid prn redness or scaling or itching  Dispense: 60 g; Refill: 3    Vanicream zinc shampoo    Poikiloderma Israelattjessica's  sunscreen    Lentigines  The "ABCD" rules to observe pigmented lesions were reviewed.    Use vit C (a roche posay) serum hs, sunscreen am             Follow up in about 6 months (around 11/19/2023).  "

## 2023-05-23 ENCOUNTER — TELEPHONE (OUTPATIENT)
Dept: BARIATRICS | Facility: CLINIC | Age: 53
End: 2023-05-23
Payer: COMMERCIAL

## 2023-05-23 DIAGNOSIS — E55.9 VITAMIN D DEFICIENCY: Primary | ICD-10-CM

## 2023-05-23 RX ORDER — ERGOCALCIFEROL 1.25 MG/1
50000 CAPSULE ORAL
Qty: 12 CAPSULE | Refills: 0 | Status: CANCELLED | OUTPATIENT
Start: 2023-05-23

## 2023-05-26 NOTE — TELEPHONE ENCOUNTER
Specialty Pharmacy - Initial Clinical Assessment    Specialty Medication Orders Linked to Encounter      Flowsheet Row Most Recent Value   Medication #1 secukinumab (COSENTYX PEN) 150 mg/mL PnIj (Order#[id number], Rx#[id number])   Medication #2 secukinumab (COSENTYX PEN) 150 mg/mL PnIj (Order#[id number], Rx#[id number])          Patient Diagnosis   L40.50 - Psoriatic arthritis    Subjective    Christine Alfaro is a 52 y.o. female, who is followed by the specialty pharmacy service for management and education.    Recent Encounters       Date Type Provider Description    05/09/2023 Specialty Pharmacy Jamee Blake PharmD Initial Clinical Assessment    05/09/2023 Specialty Pharmacy Jamee Blake PharmD Referral Authorization    11/16/2022 Specialty Pharmacy Peggy Bo Refill Coordination    10/17/2022 Specialty Pharmacy Yakelin Malin Refill Coordination; Referral Authorization    09/19/2022 Specialty Pharmacy Valarie Braxton Refill Coordination            Current Outpatient Medications   Medication Sig    albuterol (PROVENTIL HFA) 90 mcg/actuation inhaler Inhale 2 puffs into the lungs every 6 (six) hours as needed for Wheezing. Rescue    alclometasone (ACLOVATE) 0.05 % cream AAA face bid prn redness or scaling or itching    aspirin (ECOTRIN) 81 MG EC tablet Take 1 tablet (81 mg total) by mouth once daily.    atorvastatin (LIPITOR) 40 MG tablet Take 1 tablet (40 mg total) by mouth every evening.    betamethasone dipropionate 0.05 % cream Use bid    buPROPion (WELLBUTRIN SR) 100 MG TBSR 12 hr tablet Take 1 tablet (100 mg total) by mouth 2 (two) times daily.    cyclobenzaprine (FLEXERIL) 10 MG tablet TAKE ONE TABLET EVERY EVENING    folic acid (FOLVITE) 1 MG tablet Take 1 tablet (1 mg total) by mouth once daily.    furosemide (LASIX) 20 MG tablet Take 1 tablet (20 mg total) by mouth once daily. (Patient taking differently: Take 20 mg by mouth every morning.)    hydrOXYchloroQUINE (PLAQUENIL) 200 mg tablet Take 200 mg  by mouth 2 (two) times daily.    hydrOXYchloroQUINE (PLAQUENIL) 200 mg tablet Take 1 tablet (200 mg total) by mouth 2 (two) times daily.    levothyroxine (SYNTHROID) 125 MCG tablet TAKE 2 TABLETS(250 MCG) BY MOUTH DAILY    lisinopriL (PRINIVIL,ZESTRIL) 20 MG tablet TAKE 1 TABLET BY MOUTH EVERY DAY    methotrexate 2.5 MG Tab Take 8 tablets (20 mg total) by mouth every 7 days. TAKE 8 TABLETS BY MOUTH EVERY 7 DAYS (Fridays)    omeprazole (PRILOSEC) 40 MG capsule Take 1 capsule (40 mg total) by mouth once daily.    omeprazole (PRILOSEC) 40 MG capsule Take 1 capsule (40 mg total) by mouth every morning. Open capsule and take with apple sauce    secukinumab (COSENTYX PEN) 150 mg/mL PnIj Inject 150 mg into the skin every 28 days.    secukinumab (COSENTYX PEN) 150 mg/mL PnIj Inject 150 mg (1 pen) into the skin at weeks 0, 1, 2, 3, and 4 followed by 150 mg (1 pen) every 4 weeks    sodium chloride 0.9% SolP 100 mL with abatacept (with maltose) 250 mg SolR 1,000 mg Inject 1,000 mg into the vein every 28 days.    traMADoL (ULTRAM) 50 mg tablet Take 1 tablet (50 mg total) by mouth every 8 (eight) hours as needed for Pain.    XIIDRA 5 % Dpet APPLY 1 DROP TO BOTH EYES BID   Last reviewed on 5/26/2023 10:11 AM by Jamee Garibay, PharmD    Review of patient's allergies indicates:   Allergen Reactions    Sulfamethoxazole-trimethoprim     Sulfa (sulfonamide antibiotics) Rash and Swelling   Last reviewed on  5/26/2023 10:11 AM by Jamee Gairbay    Drug Interactions    Drug interactions evaluated: yes  Clinically relevant drug interactions identified: yes   Interactions list: DDI- MTX + Aspirin/Omeprazole- may increase the serum concentration of MTX        Drug management plan: Confirmed appropriate Aspirin dose of 81 mg.  DDI MTX + PPI is less significant with MTX antirheumatic doses.  Labs reviewed for safety.  Counseling pt to monitor for symptoms of MTX toxicity.    Provided the patient with educational material regarding drug  interactions: not applicable         Adverse Effects    Color Change: Pos  Pruritus: Pos  Rash: Pos  Arthralgias: Pos  Joint swelling: Pos       Assessment Questions - Documented Responses      Flowsheet Row Most Recent Value   Assessment    Medication Reconciliation completed for patient Yes   During the past 4 weeks, has patient missed any activities due to condition or medication? Missed Work   Number of Days missed 2   During the past 4 weeks, did patient have any of the following urgent care visits? None   Goals of Therapy Status Discussed (new start)   Status of the patients ability to self-administer: Is Able   All education points have been covered with patient? Yes, supplemental printed education provided   Welcome packet contents reviewed and discussed with patient? Yes   Assesment completed? Yes   Plan Therapy being initiated   Do you need to open a clinical intervention (i-vent)? No   Do you want to schedule first shipment? Yes   Medication #1 Assessment Info    Patient status New medication, New to [facility]   Is this medication appropriate for the patient? Yes   Is this medication effective? Not yet started          Refill Questions - Documented Responses      Flowsheet Row Most Recent Value   Patient Availability and HIPAA Verification    Does patient want to proceed with activity? Yes   HIPAA/medical authority confirmed? Yes   Relationship to patient of person spoken to? Self   Refill Screening Questions    When does the patient need to receive the medication? 06/02/23   Refill Delivery Questions    How will the patient receive the medication? MEDRx   When does the patient need to receive the medication? 06/02/23   Shipping Address Home   Address in [facility] confirmed and updated if neccessary? Yes   Expected Copay ($) 0   Is the patient able to afford the medication copay? Yes   Payment Method zero copay   Days supply of Refill 28   Supplies needed? No supplies needed   Refill activity  "completed? Yes   Refill activity plan Refill scheduled   Shipment/Pickup Date: 05/30/23            Objective    She has a past medical history of Anxiety, GERD (gastroesophageal reflux disease), Hyperlipidemia, Hypertension, Hypothyroidism, Insomnia, Observed sleep apnea, Prediabetes, Rheumatoid arthritis, unspecified, and Vitamin D deficiency.    Tried/failed medications: Orencia    BP Readings from Last 4 Encounters:   05/12/23 (!) 131/91   03/30/23 130/71   03/24/23 105/73   03/17/23 123/80     Ht Readings from Last 4 Encounters:   03/30/23 5' 5" (1.651 m)   03/24/23 5' 5" (1.651 m)   03/03/23 5' 5" (1.651 m)   02/16/23 5' 6" (1.676 m)     Wt Readings from Last 4 Encounters:   05/19/23 132.5 kg (292 lb)   05/12/23 132.6 kg (292 lb 5.3 oz)   03/30/23 (!) 136.8 kg (301 lb 9.4 oz)   03/24/23 (!) 140 kg (308 lb 10.3 oz)       The goals of prescribed drug therapy management include:  Supporting patient to meet the prescriber's medical treatment objectives  Improving or maintaining quality of life  Maintaining optimal therapy adherence  Minimizing and managing side effects      Goals of Therapy Status: Discussed (new start)    Assessment/Plan  Patient plans to start therapy on 06/02/23      Indication, dosage, appropriateness, effectiveness, safety and convenience of her specialty medication(s) were reviewed today.     Patient Education   Patient received education on the following:   Expectations and possible outcomes of therapy  Proper use, timely administration, and missed dose management  Duration of therapy  Side effects, including prevention, minimization, and management  Contraindications and safety precautions  New or changed medications, including prescribe and over the counter medications and supplements  Reviews recommended vaccinations, as appropriate  Storage, safe handling, and disposal      Tasks added this encounter   2/26/2024 - Clinical Assessment (1 year recurrence)   Tasks due within next 3 months "   No tasks due.     Jamee Blake, PharmD  [facility]  [address]  [facility] [address]  Phone: [phone]  Fax: [phone]

## 2023-06-07 ENCOUNTER — PATIENT MESSAGE (OUTPATIENT)
Dept: BARIATRICS | Facility: CLINIC | Age: 53
End: 2023-06-07
Payer: COMMERCIAL

## 2023-06-13 ENCOUNTER — PATIENT MESSAGE (OUTPATIENT)
Dept: BARIATRICS | Facility: CLINIC | Age: 53
End: 2023-06-13
Payer: COMMERCIAL

## 2023-06-23 ENCOUNTER — SPECIALTY PHARMACY (OUTPATIENT)
Dept: PHARMACY | Facility: CLINIC | Age: 53
End: 2023-06-23
Payer: COMMERCIAL

## 2023-06-23 DIAGNOSIS — L40.50 PSORIATIC ARTHRITIS: ICD-10-CM

## 2023-06-23 NOTE — TELEPHONE ENCOUNTER
Specialty Pharmacy - Refill Coordination    Specialty Medication Orders Linked to Encounter      Flowsheet Row Most Recent Value   Medication #1 secukinumab (COSENTYX PEN) 150 mg/mL PnIj (Order#[id number], Rx#[id number])     Refill for Cosentyx loading dose #5 completed.  Pt expressed understanding of dosing instructions.  Will inject week 5 on 6/30/23 and then next dose due 4 weeks later.      Refill Questions - Documented Responses      Flowsheet Row Most Recent Value   Refill Screening Questions    Changes to allergies? No   Changes to medications? No   New conditions since last clinic visit? No   Unplanned office visit, urgent care, ED, or hospital admission in the last 4 weeks? No   How does patient/caregiver feel medication is working? Too soon to tell   Financial problems or insurance changes? No   How many doses of your specialty medications were missed in the last 4 weeks? 0   Would patient like to speak to a pharmacist? No   When does the patient need to receive the medication? 06/30/23   Refill Delivery Questions    How will the patient receive the medication? MEDRx   When does the patient need to receive the medication? 06/30/23   Shipping Address Home   Address in [facility] confirmed and updated if neccessary? Yes   Expected Copay ($) 0   Is the patient able to afford the medication copay? Yes   Payment Method zero copay   Days supply of Refill 28   Supplies needed? No supplies needed   Refill activity completed? Yes   Refill activity plan Refill scheduled   Shipment/Pickup Date: 06/28/23            Current Outpatient Medications   Medication Sig    albuterol (PROVENTIL HFA) 90 mcg/actuation inhaler Inhale 2 puffs into the lungs every 6 (six) hours as needed for Wheezing. Rescue    alclometasone (ACLOVATE) 0.05 % cream AAA face bid prn redness or scaling or itching    aspirin (ECOTRIN) 81 MG EC tablet Take 1 tablet (81 mg total) by mouth once daily.    atorvastatin (LIPITOR) 40 MG tablet Take 1  tablet (40 mg total) by mouth every evening.    betamethasone dipropionate 0.05 % cream Use bid    buPROPion (WELLBUTRIN SR) 100 MG TBSR 12 hr tablet Take 1 tablet (100 mg total) by mouth 2 (two) times daily.    cyclobenzaprine (FLEXERIL) 10 MG tablet TAKE ONE TABLET EVERY EVENING    folic acid (FOLVITE) 1 MG tablet Take 1 tablet (1 mg total) by mouth once daily.    furosemide (LASIX) 20 MG tablet Take 1 tablet (20 mg total) by mouth once daily. (Patient taking differently: Take 20 mg by mouth every morning.)    hydrOXYchloroQUINE (PLAQUENIL) 200 mg tablet Take 200 mg by mouth 2 (two) times daily.    hydrOXYchloroQUINE (PLAQUENIL) 200 mg tablet Take 1 tablet (200 mg total) by mouth 2 (two) times daily.    levothyroxine (SYNTHROID) 125 MCG tablet TAKE 2 TABLETS(250 MCG) BY MOUTH DAILY    lisinopriL (PRINIVIL,ZESTRIL) 20 MG tablet TAKE 1 TABLET BY MOUTH EVERY DAY    methotrexate 2.5 MG Tab Take 8 tablets (20 mg total) by mouth every 7 days. TAKE 8 TABLETS BY MOUTH EVERY 7 DAYS (Fridays)    omeprazole (PRILOSEC) 40 MG capsule Take 1 capsule (40 mg total) by mouth once daily.    omeprazole (PRILOSEC) 40 MG capsule Take 1 capsule (40 mg total) by mouth every morning. Open capsule and take with apple sauce    secukinumab (COSENTYX PEN) 150 mg/mL PnIj Inject 150 mg into the skin every 28 days.    secukinumab (COSENTYX PEN) 150 mg/mL PnIj Inject 150 mg (1 pen) into the skin at weeks 0, 1, 2, 3, and 4 followed by 150 mg (1 pen) every 4 weeks    sodium chloride 0.9% SolP 100 mL with abatacept (with maltose) 250 mg SolR 1,000 mg Inject 1,000 mg into the vein every 28 days.    XIIDRA 5 % Dpet APPLY 1 DROP TO BOTH EYES BID   Last reviewed on 5/26/2023 10:11 AM by Jamee Garibay, PharmD    Review of patient's allergies indicates:   Allergen Reactions    Sulfamethoxazole-trimethoprim     Sulfa (sulfonamide antibiotics) Rash and Swelling    Last reviewed on  5/26/2023 10:11 AM by Jamee Garibay      Tasks added this encounter   No  tasks added.   Tasks due within next 3 months   No tasks due.     Jamee Blake, PharmD  [facility]  [address]  [facility] [address]  Phone: [phone]  Fax: [phone]

## 2023-06-26 RX ORDER — METHOTREXATE 2.5 MG/1
20 TABLET ORAL
Qty: 105 TABLET | Refills: 0 | Status: SHIPPED | OUTPATIENT
Start: 2023-06-26 | End: 2023-10-07 | Stop reason: SDUPTHER

## 2023-07-10 ENCOUNTER — LAB VISIT (OUTPATIENT)
Dept: LAB | Facility: HOSPITAL | Age: 53
End: 2023-07-10
Attending: INTERNAL MEDICINE
Payer: COMMERCIAL

## 2023-07-10 DIAGNOSIS — M32.9 SYSTEMIC LUPUS ERYTHEMATOSUS, UNSPECIFIED SLE TYPE, UNSPECIFIED ORGAN INVOLVEMENT STATUS: ICD-10-CM

## 2023-07-10 LAB
CREAT UR-MCNC: 168 MG/DL (ref 15–325)
PROT UR-MCNC: 9 MG/DL (ref 0–15)
PROT/CREAT UR: 0.05 MG/G{CREAT} (ref 0–0.2)

## 2023-07-10 PROCEDURE — 81001 URINALYSIS AUTO W/SCOPE: CPT | Performed by: INTERNAL MEDICINE

## 2023-07-10 PROCEDURE — 82570 ASSAY OF URINE CREATININE: CPT | Performed by: INTERNAL MEDICINE

## 2023-07-11 DIAGNOSIS — L40.50 PSORIATIC ARTHRITIS: Primary | ICD-10-CM

## 2023-07-11 LAB
BACTERIA #/AREA URNS AUTO: ABNORMAL /HPF
BILIRUB UR QL STRIP: NEGATIVE
CLARITY UR REFRACT.AUTO: ABNORMAL
COLOR UR AUTO: YELLOW
GLUCOSE UR QL STRIP: NEGATIVE
HGB UR QL STRIP: ABNORMAL
KETONES UR QL STRIP: NEGATIVE
LEUKOCYTE ESTERASE UR QL STRIP: NEGATIVE
MICROSCOPIC COMMENT: ABNORMAL
NITRITE UR QL STRIP: NEGATIVE
PH UR STRIP: 5 [PH] (ref 5–8)
PROT UR QL STRIP: NEGATIVE
RBC #/AREA URNS AUTO: 11 /HPF (ref 0–4)
SP GR UR STRIP: 1.02 (ref 1–1.03)
SQUAMOUS #/AREA URNS AUTO: 7 /HPF
URN SPEC COLLECT METH UR: ABNORMAL
WBC #/AREA URNS AUTO: 3 /HPF (ref 0–5)

## 2023-07-19 ENCOUNTER — SPECIALTY PHARMACY (OUTPATIENT)
Dept: PHARMACY | Facility: CLINIC | Age: 53
End: 2023-07-19
Payer: COMMERCIAL

## 2023-07-19 NOTE — TELEPHONE ENCOUNTER
Specialty Pharmacy - Refill Coordination    Specialty Medication Orders Linked to Encounter      Flowsheet Row Most Recent Value   Medication #1 secukinumab (COSENTYX PEN) 150 mg/mL PnIj (Order#[id number], Rx#[id number])            Refill Questions - Documented Responses      Flowsheet Row Most Recent Value   Patient Availability and HIPAA Verification    Does patient want to proceed with activity? Yes   HIPAA/medical authority confirmed? Yes   Relationship to patient of person spoken to? Self   Refill Screening Questions    Changes to allergies? No   Changes to medications? No   New conditions since last clinic visit? No   Unplanned office visit, urgent care, ED, or hospital admission in the last 4 weeks? No   How does patient/caregiver feel medication is working? Good   Financial problems or insurance changes? No   How many doses of your specialty medications were missed in the last 4 weeks? 0   Would patient like to speak to a pharmacist? No   When does the patient need to receive the medication? 07/27/23   Refill Delivery Questions    How will the patient receive the medication? MEDRx   When does the patient need to receive the medication? 07/27/23   Shipping Address Home   Address in [facility] confirmed and updated if neccessary? Yes   Expected Copay ($) 0   Is the patient able to afford the medication copay? Yes   Payment Method zero copay   Days supply of Refill 28   Supplies needed? No supplies needed   Refill activity completed? Yes   Refill activity plan Refill scheduled   Shipment/Pickup Date: 07/25/23            Current Outpatient Medications   Medication Sig    albuterol (PROVENTIL HFA) 90 mcg/actuation inhaler Inhale 2 puffs into the lungs every 6 (six) hours as needed for Wheezing. Rescue    alclometasone (ACLOVATE) 0.05 % cream AAA face bid prn redness or scaling or itching    aspirin (ECOTRIN) 81 MG EC tablet Take 1 tablet (81 mg total) by mouth once daily.    atorvastatin (LIPITOR) 40 MG  tablet Take 1 tablet (40 mg total) by mouth every evening.    betamethasone dipropionate 0.05 % cream Use bid    buPROPion (WELLBUTRIN SR) 100 MG TBSR 12 hr tablet Take 1 tablet (100 mg total) by mouth 2 (two) times daily.    cyclobenzaprine (FLEXERIL) 10 MG tablet TAKE ONE TABLET EVERY EVENING    folic acid (FOLVITE) 1 MG tablet Take 1 tablet (1 mg total) by mouth once daily.    furosemide (LASIX) 20 MG tablet Take 1 tablet (20 mg total) by mouth once daily. (Patient taking differently: Take 20 mg by mouth every morning.)    hydrOXYchloroQUINE (PLAQUENIL) 200 mg tablet Take 200 mg by mouth 2 (two) times daily.    levothyroxine (SYNTHROID) 125 MCG tablet TAKE 2 TABLETS(250 MCG) BY MOUTH DAILY    lisinopriL (PRINIVIL,ZESTRIL) 20 MG tablet TAKE 1 TABLET BY MOUTH EVERY DAY    methotrexate 2.5 MG Tab Take 8 tablets (20 mg total) by mouth every 7 days. TAKE 8 TABLETS BY MOUTH EVERY 7 DAYS (Fridays)    omeprazole (PRILOSEC) 40 MG capsule Take 1 capsule (40 mg total) by mouth once daily.    omeprazole (PRILOSEC) 40 MG capsule Take 1 capsule (40 mg total) by mouth every morning. Open capsule and take with apple sauce    secukinumab (COSENTYX PEN) 150 mg/mL PnIj Inject 150 mg into the skin every 28 days.    secukinumab (COSENTYX PEN) 150 mg/mL PnIj Inject 150 mg (1 pen) into the skin at weeks 0, 1, 2, 3, and 4 followed by 150 mg (1 pen) every 4 weeks    sodium chloride 0.9% SolP 100 mL with abatacept (with maltose) 250 mg SolR 1,000 mg Inject 1,000 mg into the vein every 28 days.    XIIDRA 5 % Dpet APPLY 1 DROP TO BOTH EYES BID   Last reviewed on 5/26/2023 10:11 AM by Jamee Garibay, PharmD    Review of patient's allergies indicates:   Allergen Reactions    Sulfamethoxazole-trimethoprim     Sulfa (sulfonamide antibiotics) Rash and Swelling    Last reviewed on  5/26/2023 10:11 AM by Jamee Garibay      Tasks added this encounter   No tasks added.   Tasks due within next 3 months   7/19/2023 - Refill Coordination Outreach (1 time  occurrence)     [facility]  [address]  [facility] [address]  Phone: [phone]  Fax: [phone]

## 2023-08-02 ENCOUNTER — PATIENT MESSAGE (OUTPATIENT)
Dept: BARIATRICS | Facility: CLINIC | Age: 53
End: 2023-08-02
Payer: COMMERCIAL

## 2023-08-03 ENCOUNTER — OFFICE VISIT (OUTPATIENT)
Dept: BARIATRICS | Facility: CLINIC | Age: 53
End: 2023-08-03
Payer: COMMERCIAL

## 2023-08-03 ENCOUNTER — CLINICAL SUPPORT (OUTPATIENT)
Dept: BARIATRICS | Facility: CLINIC | Age: 53
End: 2023-08-03
Payer: COMMERCIAL

## 2023-08-03 ENCOUNTER — LAB VISIT (OUTPATIENT)
Dept: LAB | Facility: HOSPITAL | Age: 53
End: 2023-08-03
Payer: COMMERCIAL

## 2023-08-03 VITALS
BODY MASS INDEX: 45.31 KG/M2 | OXYGEN SATURATION: 95 % | SYSTOLIC BLOOD PRESSURE: 102 MMHG | DIASTOLIC BLOOD PRESSURE: 50 MMHG | WEIGHT: 272.25 LBS | HEART RATE: 88 BPM

## 2023-08-03 VITALS — WEIGHT: 272.25 LBS | BODY MASS INDEX: 45.31 KG/M2

## 2023-08-03 DIAGNOSIS — Z98.890 S/P GASTRIC SURGERY: ICD-10-CM

## 2023-08-03 DIAGNOSIS — G47.33 OSA ON CPAP: ICD-10-CM

## 2023-08-03 DIAGNOSIS — Z71.3 DIETARY COUNSELING AND SURVEILLANCE: ICD-10-CM

## 2023-08-03 DIAGNOSIS — M32.9 SYSTEMIC LUPUS ERYTHEMATOSUS, UNSPECIFIED SLE TYPE, UNSPECIFIED ORGAN INVOLVEMENT STATUS: ICD-10-CM

## 2023-08-03 DIAGNOSIS — R63.4 WEIGHT LOSS: Primary | ICD-10-CM

## 2023-08-03 DIAGNOSIS — L40.50 PSORIATIC ARTHRITIS: ICD-10-CM

## 2023-08-03 DIAGNOSIS — E66.01 MORBID OBESITY WITH BMI OF 45.0-49.9, ADULT: ICD-10-CM

## 2023-08-03 DIAGNOSIS — I10 ESSENTIAL HYPERTENSION: Primary | ICD-10-CM

## 2023-08-03 DIAGNOSIS — E78.5 DYSLIPIDEMIA: ICD-10-CM

## 2023-08-03 DIAGNOSIS — M19.90 INFLAMMATORY ARTHRITIS: ICD-10-CM

## 2023-08-03 DIAGNOSIS — M25.50 POLYARTHRALGIA: ICD-10-CM

## 2023-08-03 DIAGNOSIS — I10 ESSENTIAL HYPERTENSION: ICD-10-CM

## 2023-08-03 DIAGNOSIS — M06.9 RHEUMATOID ARTHRITIS FLARE: ICD-10-CM

## 2023-08-03 DIAGNOSIS — Z98.84 S/P BARIATRIC SURGERY: ICD-10-CM

## 2023-08-03 LAB
25(OH)D3+25(OH)D2 SERPL-MCNC: 41 NG/ML (ref 30–96)
ALBUMIN SERPL BCP-MCNC: 3.9 G/DL (ref 3.5–5.2)
ALP SERPL-CCNC: 106 U/L (ref 55–135)
ALT SERPL W/O P-5'-P-CCNC: 26 U/L (ref 10–44)
ANION GAP SERPL CALC-SCNC: 9 MMOL/L (ref 8–16)
AST SERPL-CCNC: 21 U/L (ref 10–40)
BASOPHILS # BLD AUTO: 0.06 K/UL (ref 0–0.2)
BASOPHILS NFR BLD: 0.9 % (ref 0–1.9)
BILIRUB SERPL-MCNC: 0.6 MG/DL (ref 0.1–1)
BUN SERPL-MCNC: 10 MG/DL (ref 6–20)
CALCIUM SERPL-MCNC: 9.9 MG/DL (ref 8.7–10.5)
CHLORIDE SERPL-SCNC: 103 MMOL/L (ref 95–110)
CHOLEST SERPL-MCNC: 161 MG/DL (ref 120–199)
CHOLEST/HDLC SERPL: 3 {RATIO} (ref 2–5)
CO2 SERPL-SCNC: 28 MMOL/L (ref 23–29)
CREAT SERPL-MCNC: 0.8 MG/DL (ref 0.5–1.4)
DIFFERENTIAL METHOD: NORMAL
EOSINOPHIL # BLD AUTO: 0.2 K/UL (ref 0–0.5)
EOSINOPHIL NFR BLD: 2.3 % (ref 0–8)
ERYTHROCYTE [DISTWIDTH] IN BLOOD BY AUTOMATED COUNT: 14.2 % (ref 11.5–14.5)
EST. GFR  (NO RACE VARIABLE): >60 ML/MIN/1.73 M^2
GLUCOSE SERPL-MCNC: 81 MG/DL (ref 70–110)
HCT VFR BLD AUTO: 45.2 % (ref 37–48.5)
HDLC SERPL-MCNC: 53 MG/DL (ref 40–75)
HDLC SERPL: 32.9 % (ref 20–50)
HGB BLD-MCNC: 14.6 G/DL (ref 12–16)
IMM GRANULOCYTES # BLD AUTO: 0.02 K/UL (ref 0–0.04)
IMM GRANULOCYTES NFR BLD AUTO: 0.3 % (ref 0–0.5)
IRON SERPL-MCNC: 75 UG/DL (ref 30–160)
LDLC SERPL CALC-MCNC: 92.4 MG/DL (ref 63–159)
LYMPHOCYTES # BLD AUTO: 2.6 K/UL (ref 1–4.8)
LYMPHOCYTES NFR BLD: 40.2 % (ref 18–48)
MCH RBC QN AUTO: 29.9 PG (ref 27–31)
MCHC RBC AUTO-ENTMCNC: 32.3 G/DL (ref 32–36)
MCV RBC AUTO: 93 FL (ref 82–98)
MONOCYTES # BLD AUTO: 0.4 K/UL (ref 0.3–1)
MONOCYTES NFR BLD: 6.3 % (ref 4–15)
NEUTROPHILS # BLD AUTO: 3.3 K/UL (ref 1.8–7.7)
NEUTROPHILS NFR BLD: 50 % (ref 38–73)
NONHDLC SERPL-MCNC: 108 MG/DL
NRBC BLD-RTO: 0 /100 WBC
PLATELET # BLD AUTO: 220 K/UL (ref 150–450)
PMV BLD AUTO: 10.3 FL (ref 9.2–12.9)
POTASSIUM SERPL-SCNC: 4.4 MMOL/L (ref 3.5–5.1)
PROT SERPL-MCNC: 7.2 G/DL (ref 6–8.4)
RBC # BLD AUTO: 4.88 M/UL (ref 4–5.4)
SATURATED IRON: 19 % (ref 20–50)
SODIUM SERPL-SCNC: 140 MMOL/L (ref 136–145)
TOTAL IRON BINDING CAPACITY: 398 UG/DL (ref 250–450)
TRANSFERRIN SERPL-MCNC: 269 MG/DL (ref 200–375)
TRIGL SERPL-MCNC: 78 MG/DL (ref 30–150)
VIT B12 SERPL-MCNC: 1128 PG/ML (ref 210–950)
WBC # BLD AUTO: 6.52 K/UL (ref 3.9–12.7)

## 2023-08-03 PROCEDURE — 99213 OFFICE O/P EST LOW 20 MIN: CPT | Mod: S$GLB,,, | Performed by: PHYSICIAN ASSISTANT

## 2023-08-03 PROCEDURE — 99499 NO LOS: ICD-10-PCS | Mod: S$GLB,,, | Performed by: DIETITIAN, REGISTERED

## 2023-08-03 PROCEDURE — 3074F SYST BP LT 130 MM HG: CPT | Mod: CPTII,S$GLB,, | Performed by: PHYSICIAN ASSISTANT

## 2023-08-03 PROCEDURE — 99213 PR OFFICE/OUTPT VISIT, EST, LEVL III, 20-29 MIN: ICD-10-PCS | Mod: S$GLB,,, | Performed by: PHYSICIAN ASSISTANT

## 2023-08-03 PROCEDURE — 84466 ASSAY OF TRANSFERRIN: CPT | Performed by: PHYSICIAN ASSISTANT

## 2023-08-03 PROCEDURE — 80053 COMPREHEN METABOLIC PANEL: CPT | Performed by: PHYSICIAN ASSISTANT

## 2023-08-03 PROCEDURE — 3044F PR MOST RECENT HEMOGLOBIN A1C LEVEL <7.0%: ICD-10-PCS | Mod: CPTII,S$GLB,, | Performed by: PHYSICIAN ASSISTANT

## 2023-08-03 PROCEDURE — 3008F PR BODY MASS INDEX (BMI) DOCUMENTED: ICD-10-PCS | Mod: CPTII,S$GLB,, | Performed by: PHYSICIAN ASSISTANT

## 2023-08-03 PROCEDURE — 82306 VITAMIN D 25 HYDROXY: CPT | Performed by: PHYSICIAN ASSISTANT

## 2023-08-03 PROCEDURE — 1159F MED LIST DOCD IN RCRD: CPT | Mod: CPTII,S$GLB,, | Performed by: PHYSICIAN ASSISTANT

## 2023-08-03 PROCEDURE — 3008F BODY MASS INDEX DOCD: CPT | Mod: CPTII,S$GLB,, | Performed by: PHYSICIAN ASSISTANT

## 2023-08-03 PROCEDURE — 36415 COLL VENOUS BLD VENIPUNCTURE: CPT | Performed by: PHYSICIAN ASSISTANT

## 2023-08-03 PROCEDURE — 3044F HG A1C LEVEL LT 7.0%: CPT | Mod: CPTII,S$GLB,, | Performed by: PHYSICIAN ASSISTANT

## 2023-08-03 PROCEDURE — 4010F ACE/ARB THERAPY RXD/TAKEN: CPT | Mod: CPTII,S$GLB,, | Performed by: PHYSICIAN ASSISTANT

## 2023-08-03 PROCEDURE — 3078F DIAST BP <80 MM HG: CPT | Mod: CPTII,S$GLB,, | Performed by: PHYSICIAN ASSISTANT

## 2023-08-03 PROCEDURE — 99499 UNLISTED E&M SERVICE: CPT | Mod: S$GLB,,, | Performed by: DIETITIAN, REGISTERED

## 2023-08-03 PROCEDURE — 99999 PR PBB SHADOW E&M-EST. PATIENT-LVL I: ICD-10-PCS | Mod: PBBFAC,,, | Performed by: DIETITIAN, REGISTERED

## 2023-08-03 PROCEDURE — 86225 DNA ANTIBODY NATIVE: CPT | Performed by: INTERNAL MEDICINE

## 2023-08-03 PROCEDURE — 99999 PR PBB SHADOW E&M-EST. PATIENT-LVL V: CPT | Mod: PBBFAC,,, | Performed by: PHYSICIAN ASSISTANT

## 2023-08-03 PROCEDURE — 80061 LIPID PANEL: CPT | Performed by: PHYSICIAN ASSISTANT

## 2023-08-03 PROCEDURE — 99999 PR PBB SHADOW E&M-EST. PATIENT-LVL I: CPT | Mod: PBBFAC,,, | Performed by: DIETITIAN, REGISTERED

## 2023-08-03 PROCEDURE — 84425 ASSAY OF VITAMIN B-1: CPT | Performed by: PHYSICIAN ASSISTANT

## 2023-08-03 PROCEDURE — 1160F RVW MEDS BY RX/DR IN RCRD: CPT | Mod: CPTII,S$GLB,, | Performed by: PHYSICIAN ASSISTANT

## 2023-08-03 PROCEDURE — 85025 COMPLETE CBC W/AUTO DIFF WBC: CPT | Performed by: PHYSICIAN ASSISTANT

## 2023-08-03 PROCEDURE — 4010F PR ACE/ARB THEARPY RXD/TAKEN: ICD-10-PCS | Mod: CPTII,S$GLB,, | Performed by: PHYSICIAN ASSISTANT

## 2023-08-03 PROCEDURE — 1160F PR REVIEW ALL MEDS BY PRESCRIBER/CLIN PHARMACIST DOCUMENTED: ICD-10-PCS | Mod: CPTII,S$GLB,, | Performed by: PHYSICIAN ASSISTANT

## 2023-08-03 PROCEDURE — 99999 PR PBB SHADOW E&M-EST. PATIENT-LVL V: ICD-10-PCS | Mod: PBBFAC,,, | Performed by: PHYSICIAN ASSISTANT

## 2023-08-03 PROCEDURE — 3074F PR MOST RECENT SYSTOLIC BLOOD PRESSURE < 130 MM HG: ICD-10-PCS | Mod: CPTII,S$GLB,, | Performed by: PHYSICIAN ASSISTANT

## 2023-08-03 PROCEDURE — 86225 DNA ANTIBODY NATIVE: CPT | Mod: 59 | Performed by: PHYSICIAN ASSISTANT

## 2023-08-03 PROCEDURE — 82607 VITAMIN B-12: CPT | Performed by: PHYSICIAN ASSISTANT

## 2023-08-03 PROCEDURE — 1159F PR MEDICATION LIST DOCUMENTED IN MEDICAL RECORD: ICD-10-PCS | Mod: CPTII,S$GLB,, | Performed by: PHYSICIAN ASSISTANT

## 2023-08-03 PROCEDURE — 3078F PR MOST RECENT DIASTOLIC BLOOD PRESSURE < 80 MM HG: ICD-10-PCS | Mod: CPTII,S$GLB,, | Performed by: PHYSICIAN ASSISTANT

## 2023-08-03 NOTE — PROGRESS NOTES
BARIATRIC POST-OPERATIVE VISIT:    HPI:  Christine Alfaro is a 52 y.o. year old female presents for 6 month post op visit following s/p sleeve.  she is doing well and tolerating the diet without difficulty.      Pt states that she is doing well   Have some concerns about her HTN medications ( orthostatic like symptoms) has cardiology appt in October     Denies: nausea, vomiting, abdominal pain, changes in bowel movement pattern, fever, chills, dysphagia, chest pain, and shortness of breath.    Review of Systems   Constitutional:  Negative for fatigue and fever.   HENT:  Negative for tinnitus and trouble swallowing.    Eyes:  Negative for visual disturbance.   Respiratory:  Negative for cough and shortness of breath.    Cardiovascular:  Negative for chest pain, palpitations and leg swelling.   Gastrointestinal:  Negative for abdominal pain, constipation, diarrhea, nausea and vomiting.   Genitourinary:  Negative for decreased urine volume and dysuria.   Skin:  Negative for rash.   Neurological:  Negative for dizziness, light-headedness and headaches.   Psychiatric/Behavioral:  Negative for dysphoric mood, sleep disturbance and suicidal ideas. The patient is not nervous/anxious.        EXERCISE & VITAMINS:  See Bariatric Assessment  Adherent to vitamin regimen   MEDICATIONS/ALLERGIES:  Have been reviewed.    DIET: Bariatric Diet. ~90 grams protein.  36-42 oz fl oz SF clear beverage.  See Dietician note from today for a more detailed assessment.    4-6  Am: protein waffle   Protein shake and protein water   Eats another waffle as snacl   Lunch grilled chicken grilled shrimp   Dinner shake   Physical Exam  Vitals reviewed.   Constitutional:       Appearance: She is obese.   HENT:      Head: Normocephalic and atraumatic.   Eyes:      Extraocular Movements: Extraocular movements intact.      Conjunctiva/sclera: Conjunctivae normal.      Pupils: Pupils are equal, round, and reactive to light.   Cardiovascular:      Rate  and Rhythm: Normal rate and regular rhythm.      Pulses: Normal pulses.      Heart sounds: Normal heart sounds.   Pulmonary:      Effort: Pulmonary effort is normal. No respiratory distress.      Breath sounds: Normal breath sounds.   Abdominal:      General: Bowel sounds are normal.      Palpations: Abdomen is soft.      Tenderness: There is no abdominal tenderness.   Musculoskeletal:         General: No swelling or tenderness. Normal range of motion.      Cervical back: Normal range of motion and neck supple.   Skin:     General: Skin is dry.      Capillary Refill: Capillary refill takes less than 2 seconds.      Coloration: Skin is not jaundiced.   Neurological:      General: No focal deficit present.      Mental Status: She is alert and oriented to person, place, and time.   Psychiatric:         Mood and Affect: Mood normal.         Behavior: Behavior normal.         Thought Content: Thought content normal.         Judgment: Judgment normal.         ASSESSMENT:  - Morbid obesity s/p sleeve gastrectomy   - Co-morbidities: dyslipidemia, hypertension, and obstructive sleep apnea  - Good Weight loss, 59#'s   - No formal Exercise routine  - Good Diet  - Good Vitamin regimen    PLAN:  - messaged cardiology to update on BP   - Miralax daily for constipation  - Emphasized the importance of regular exercise and adherence to bariatric diet to achieve maximum weight loss.  - Encouraged patient to start/continue regular exercise.  - Follow-up with dietician to advance diet.  - Continue daily vitamins and medications.  - RTC per post op schedule  - Call the office for any issues.  - Check labs today.

## 2023-08-03 NOTE — PATIENT INSTRUCTIONS
"Snacks: (100-200 calories; >5g protein)    - 1 low-fat cheese stick with 8 cherry tomatoes or 1 serving fresh fruit  - 4 thin slices fat-free turkey breast and 1 slice low-fat cheese  - 4 thin slices fat-free honey ham with wedge of melon  - 2 slices of turkey betancourt  - Boiled eggs (can buy at [employer] already boiled w/ shell removed)  - for convenience,  [place] read, snack, go (deli meat and cheese rolls)  - P3 packets (Protein packs w/ cheese, nuts, lean deli meat)  - MHP Fit and Lean Protein Pudding (find at [employer] - per 1 cup serving = 100 calories, 15 g protein, 0 g sugar)  - 6-8 edamame pods (equivalent to about 1/4 cup edamame without pods).   - 1/4 cup unsalted nuts with ½ cup fruit  - 6-oz container Dannon Light n Fit vanilla yogurt, topped with 1oz unsalted nuts         - apple, celery or baby carrots spread with 2 Tbsp PB2  - apple slices with 1 oz slice low-fat cheese  - Apple slices dipped in 2 Tbsp of PB2  - 2 Tbsp PB2 mixed in light or greek yogurt or sugar-free pudding  - celery, cucumber, bell pepper or baby carrots dipped in ¼ cup hummus bean spread   - celery, cucumber, baby carrots dipped in high protein greek yogurt (Mix 16 oz plain greek yogurt + 1 packet of hidden valley ranch dip mix)  - Rei Links Beef Steak - 14g protein! (similar to beef jerky but very lean)  - 2 wedges Laughing Cow - Light Herb & Garlic Cheese with sliced cucumber or green bell pepper  - 1/2 cup low-fat cottage cheese with ¼ cup fruit or ¼ cup salsa  - 1/2 cup low fat cottage cheese with 10-15 cherry tomatoes  - 8 oz glass of FAIRLIFE fat free milk (13 g protein)  - 8 oz glass of FAIRLIFE fat free milk + 1 packet of sugar-free hot cocoa  - Add Atkins advantage Cafe Caramel shake to decaf coffee. Serve hot or blend with ice for "frappaccino" like drink  - RTD Protein drinks: Atkins, Low Carb Slim Fast, EAS light, Muscle Milk Light, etc.  - Homemade Protein drinks: GNC Soy95, Isopure, Nectar, UNJURY, Whey " Gourmet, etc. Mix 1 scoop powder with 8oz skim/1% milk or light soymilk.  - Protein bars: Atkins, EAS, Pure Protein,  Quest, Think Thin, Detour, etc. Must have 0-4 grams sugar - Read the label.    ** Be CREATIVE. You can always snack on bites of grilled chicken or tuna salad made with low fat huerta, if needed!

## 2023-08-03 NOTE — PROGRESS NOTES
NUTRITION NOTE    Referring Physician: Titi Ribeiro M.D.  Reason for MNT Referral: Follow-up 6 months s/p Gastric Sleeve    Denies nausea, vomiting, constipation, and diarrhea.  Reports doing well. Says she went to the beach with family the past week which was a challenge. She got no sugar added ice cream and ate 1/2 scoop one night and 1/2 scoop the other. She did get pasta at one meal but reports bloating and discomfort. C/o hair thinning and loose skin on upper arms.    Past Medical History:   Diagnosis Date    Anxiety     GERD (gastroesophageal reflux disease)     Hyperlipidemia     Hypertension     Hypothyroidism     Insomnia     Observed sleep apnea     Prediabetes     Rheumatoid arthritis, unspecified     Vitamin D deficiency        CLINICAL DATA:  52 y.o. female. 5 ft. 5 in.    Current Weight: 272 lbs  BMI: 45.3  Total Weight Loss: 69 lbs  Excess Weight Loss: 34%    Pre sx wt: 341 lbs  EBW: 200 lbs    LABS:  Reviewed.    CURRENT DIET:  Bariatric Diet.  Diet Recall: 800-1000 greg/day;  grams of protein/day; 48 oz of fluids/day. Uses Baritastic    - walk dogs and drinking a bottle of water  B: boiled egg  Sn: boiled egg  L: dinner leftovers (2-3oz ground meat with tbsp sour cream, sliced ina/gonzalez with salt and pepper)  Sn: 3 brazil nuts  Sn: turkey/swiss roll up  D: Premier shake    Diet includes:  Meal Pattern: 2-3 meal(s) + 1-2 snack(s) + 1-2 protein supplement(s)  Adequate protein supplement intake.  Adequate dairy intake.  Adequate vegetable intake. Tolerates raw vegetables and lettuce.  Adequate fruit intake.  Starchy CHO: avoids/limited    EXERCISE:  Stationary bike 30 min morning and 30 min evening    Restrictions to Exercise: None.    VITAMINS / MINERALS:  Multivitamins  B-Complex  Calcium Citrate + Vitamin D: every other day  Vitamin B12: Sublingual. Weekly 2500mcg    ASSESSMENT:  Doing well overall.  Weight loss on track at 34%.  Adequate calorie intake.  Adequate protein intake.  Adequate  fluid intake.  Following diet appropriately.  Exercising.  Adequate vitamins & minerals.    BARIATRIC DIET DISCUSSION:  Instructed and provided written materials on bariatric diet plan.  Reinforced post-op nutrition guidelines.    PLAN / RECOMMENDATIONS:  Continue excellent diet plan.  Maintain protein intake.  Maintain fluid intake.  Continue exercise.  Continue appropriate vitamins & minerals.    Return to clinic in 6 months.    SESSION TIME: 30 minutes

## 2023-08-07 LAB
DNA TITER: NORMAL
DSDNA AB SER-ACNC: POSITIVE [IU]/ML

## 2023-08-09 LAB — VIT B1 BLD-MCNC: 71 UG/L (ref 38–122)

## 2023-08-14 ENCOUNTER — TELEPHONE (OUTPATIENT)
Dept: FAMILY MEDICINE | Facility: CLINIC | Age: 53
End: 2023-08-14
Payer: COMMERCIAL

## 2023-08-14 ENCOUNTER — PATIENT MESSAGE (OUTPATIENT)
Dept: FAMILY MEDICINE | Facility: CLINIC | Age: 53
End: 2023-08-14
Payer: COMMERCIAL

## 2023-08-14 NOTE — TELEPHONE ENCOUNTER
Scheduled pt with martinez on thursday said she would cancel her appointment if she felt better     KS

## 2023-08-15 ENCOUNTER — SPECIALTY PHARMACY (OUTPATIENT)
Dept: PHARMACY | Facility: CLINIC | Age: 53
End: 2023-08-15
Payer: COMMERCIAL

## 2023-08-15 ENCOUNTER — OFFICE VISIT (OUTPATIENT)
Dept: FAMILY MEDICINE | Facility: CLINIC | Age: 53
End: 2023-08-15
Payer: COMMERCIAL

## 2023-08-15 DIAGNOSIS — R50.9 FEVER, UNSPECIFIED FEVER CAUSE: Primary | ICD-10-CM

## 2023-08-15 PROCEDURE — 99214 PR OFFICE/OUTPT VISIT, EST, LEVL IV, 30-39 MIN: ICD-10-PCS | Mod: 95,,, | Performed by: FAMILY MEDICINE

## 2023-08-15 PROCEDURE — 4010F ACE/ARB THERAPY RXD/TAKEN: CPT | Mod: CPTII,95,, | Performed by: FAMILY MEDICINE

## 2023-08-15 PROCEDURE — 4010F PR ACE/ARB THEARPY RXD/TAKEN: ICD-10-PCS | Mod: CPTII,95,, | Performed by: FAMILY MEDICINE

## 2023-08-15 PROCEDURE — 3044F PR MOST RECENT HEMOGLOBIN A1C LEVEL <7.0%: ICD-10-PCS | Mod: CPTII,95,, | Performed by: FAMILY MEDICINE

## 2023-08-15 PROCEDURE — 99214 OFFICE O/P EST MOD 30 MIN: CPT | Mod: 95,,, | Performed by: FAMILY MEDICINE

## 2023-08-15 PROCEDURE — 3044F HG A1C LEVEL LT 7.0%: CPT | Mod: CPTII,95,, | Performed by: FAMILY MEDICINE

## 2023-08-15 NOTE — PROGRESS NOTES
THIS DOCUMENT WAS MADE IN PART WITH VOICE RECOGNITION SOFTWARE.  OCCASIONALLY THIS SOFTWARE WILL MISINTERPRET WORDS OR PHRASES.    Assessment and Plan:    1. Fever, unspecified fever cause  POCT Influenza A/B Molecular    POCT COVID-19 Rapid Screening          PLAN      Symptoms concerning for COVID versus influenza  Order drive up testing, treat pending results  In the meantime recommended Tylenol alternated with Motrin.  Will need to take omeprazole twice daily given history of gastric bypass surgery to prevent any gastritis or peptic ulcer disease while taking anti-inflammatories.    ______________________________________________________________________  Subjective:    Chief Complaint:  Fever      HPI:  Christine is a 52 y.o. year old     The patient location is: [place]    Visit type: Audiovisual    Face to Face time with patient: 15 min  20 minutes of total time spent on the encounter, which includes face to face time and non-face to face time preparing to see the patient (eg, review of tests), Obtaining and/or reviewing separately obtained history, Documenting clinical information in the electronic or other health record, Independently interpreting results (not separately reported) and communicating results to the patient/family/caregiver, or Care coordination (not separately reported).     Each patient to whom he or she provides medical services by telemedicine is:  (1) informed of the relationship between the physician and patient and the respective role of any other health care provider with respect to management of the patient; and (2) notified that he or she may decline to receive medical services by telemedicine and may withdraw from such care at any time.    Notes:       Fever  Started 2 days ago   Controlled with NSAID / Tylenol   Tmax : 101.5   Assoc with PND, Congestion, facial rash, sore throat, body aches           Answers submitted by the patient for this visit:  Sore Throat Questionnaire (Submitted on  8/15/2023)  Chief Complaint: Sore throat  Chronicity: new  Onset: yesterday  Progression since onset: gradually worsening  Pain worse on: right  Fever: 100 - 100.9 F  Fever duration: 1 to 2 days  Pain - numeric: 6/10  hoarse voice: Yes  plugged ear sensation: No  swollen glands: Yes  strep: No  mono: No  Treatments tried: NSAIDs, acetaminophen, cool liquids  Improvement on treatment: mild  Pain severity: moderate      Past Medical History:  Past Medical History:   Diagnosis Date    Anxiety     GERD (gastroesophageal reflux disease)     Hyperlipidemia     Hypertension     Hypothyroidism     Insomnia     Observed sleep apnea     Prediabetes     Rheumatoid arthritis, unspecified     Vitamin D deficiency        Past Surgical History:  Past Surgical History:   Procedure Laterality Date    BTL      CHOLECYSTECTOMY      COLONOSCOPY N/A 8/30/2022    Procedure: COLONOSCOPY;  Surgeon: Drew Peraza MD;  Location: [facility];  Service: Endoscopy;  Laterality: N/A;    LAPAROSCOPIC SLEEVE GASTRECTOMY N/A 2/2/2023    Procedure: GASTRECTOMY, SLEEVE, LAPAROSCOPIC with intraop EGD;  Surgeon: Titi Ribeiro Jr., MD;  Location: [address];  Service: General;  Laterality: N/A;  PRE-CERT, PLEASE DO NOT SUBMIT FOR AUTH UNTIL 1/12/2023    SURGICAL REMOVAL OF BONE SPUR      left heel    TONSILLECTOMY      TUBAL LIGATION  8/1995       Family History:  Family History   Problem Relation Age of Onset    Heart disease Mother     Bone cancer Mother     Cancer Mother         cervical    Hypertension Mother     Heart disease Father     Diabetes type I Father     Lung cancer Father     Diabetes Father     Hypertension Father     Heart disease Brother     No Known Problems Daughter     No Known Problems Daughter     Breast cancer Neg Hx     Ovarian cancer Neg Hx        Social History:  Social History     Socioeconomic History    Marital status:    Tobacco Use    Smoking status: Never     Passive exposure: Never    Smokeless  tobacco: Never   Substance and Sexual Activity    Alcohol use: Not Currently     Alcohol/week: 0.0 standard drinks of alcohol    Drug use: Never    Sexual activity: Yes     Partners: Male     Birth control/protection: See Surgical Hx     Social Determinants of Health     Financial Resource Strain: Low Risk  (8/15/2023)    Overall Financial Resource Strain (CARDIA)     Difficulty of Paying Living Expenses: Not very hard   Food Insecurity: No Food Insecurity (8/15/2023)    Hunger Vital Sign     Worried About Running Out of Food in the Last Year: Never true     Ran Out of Food in the Last Year: Never true   Transportation Needs: No Transportation Needs (8/15/2023)    PRAPARE - Transportation     Lack of Transportation (Medical): No     Lack of Transportation (Non-Medical): No   Physical Activity: Insufficiently Active (8/15/2023)    Exercise Vital Sign     Days of Exercise per Week: 4 days     Minutes of Exercise per Session: 30 min   Stress: Stress Concern Present (8/15/2023)    [employer] [place] [employer] - Occupational Stress Questionnaire     Feeling of Stress : To some extent   Social Connections: Unknown (8/15/2023)    Social Connection and Isolation Panel [NHANES]     Frequency of Communication with Friends and Family: More than three times a week     Frequency of Social Gatherings with Friends and Family: Three times a week     Active Member of Clubs or Organizations: Yes     Attends Club or Organization Meetings: More than 4 times per year     Marital Status:    Housing Stability: Low Risk  (8/15/2023)    Housing Stability Vital Sign     Unable to Pay for Housing in the Last Year: No     Number of Places Lived in the Last Year: 1     Unstable Housing in the Last Year: No       Medications:  Current Outpatient Medications on File Prior to Visit   Medication Sig Dispense Refill    albuterol (PROVENTIL HFA) 90 mcg/actuation inhaler Inhale 2 puffs into the lungs every 6 (six) hours as needed  for Wheezing. Rescue 18 g 0    alclometasone (ACLOVATE) 0.05 % cream AAA face bid prn redness or scaling or itching 60 g 3    aspirin (ECOTRIN) 81 MG EC tablet Take 1 tablet (81 mg total) by mouth once daily. 90 tablet 3    atorvastatin (LIPITOR) 40 MG tablet Take 1 tablet (40 mg total) by mouth every evening. 90 tablet 3    betamethasone dipropionate 0.05 % cream Use bid 45 g 3    buPROPion (WELLBUTRIN SR) 100 MG TBSR 12 hr tablet Take 1 tablet (100 mg total) by mouth 2 (two) times daily. 60 tablet 11    cyclobenzaprine (FLEXERIL) 10 MG tablet TAKE ONE TABLET EVERY EVENING 150 tablet 3    folic acid (FOLVITE) 1 MG tablet Take 1 tablet (1 mg total) by mouth once daily. 30 tablet 11    furosemide (LASIX) 20 MG tablet Take 1 tablet (20 mg total) by mouth once daily. (Patient taking differently: Take 20 mg by mouth every morning.) 90 tablet 3    hydrOXYchloroQUINE (PLAQUENIL) 200 mg tablet Take 200 mg by mouth 2 (two) times daily.      levothyroxine (SYNTHROID) 125 MCG tablet TAKE 2 TABLETS(250 MCG) BY MOUTH DAILY 180 tablet 1    lisinopriL (PRINIVIL,ZESTRIL) 20 MG tablet TAKE 1 TABLET BY MOUTH EVERY DAY 90 tablet 3    methotrexate 2.5 MG Tab Take 8 tablets (20 mg total) by mouth every 7 days. TAKE 8 TABLETS BY MOUTH EVERY 7 DAYS (Fridays) 105 tablet 0    omeprazole (PRILOSEC) 40 MG capsule Take 1 capsule (40 mg total) by mouth once daily. 30 capsule 6    omeprazole (PRILOSEC) 40 MG capsule Take 1 capsule (40 mg total) by mouth every morning. Open capsule and take with apple sauce 30 capsule 2    secukinumab (COSENTYX PEN) 150 mg/mL PnIj Inject 150 mg into the skin every 28 days. 2 mL 11    secukinumab (COSENTYX PEN) 150 mg/mL PnIj Inject 150 mg (1 pen) into the skin at weeks 0, 1, 2, 3, and 4 followed by 150 mg (1 pen) every 4 weeks 5 mL 0    sodium chloride 0.9% SolP 100 mL with abatacept (with maltose) 250 mg SolR 1,000 mg Inject 1,000 mg into the vein every 28 days.      XIIDRA 5 % Dpet APPLY 1 DROP TO BOTH EYES  BID       No current facility-administered medications on file prior to visit.       Allergies:  Sulfamethoxazole-trimethoprim and Sulfa (sulfonamide antibiotics)    Immunizations:  Immunization History   Administered Date(s) Administered    COVID-19, MRNA, LN-S, PF (MODERNA FULL 0.5 ML DOSE) 03/11/2021, 04/15/2021, 01/11/2022    DTaP 01/01/1973, 02/01/1973, 04/01/1973, 08/01/1975, 04/01/1978    IPV 01/01/1973, 02/01/1973, 08/01/1975, 01/05/1978    Influenza Split 11/18/2007    MMR 01/12/1978, 10/19/1995    Td (ADULT) 04/17/1989, 08/07/2004    Tdap 11/26/2019       Review of Systems:  Review of Systems   HENT:  Positive for congestion and sore throat. Negative for drooling, ear discharge, ear pain and trouble swallowing.    Respiratory:  Positive for cough. Negative for shortness of breath and stridor.    Gastrointestinal:  Positive for vomiting. Negative for abdominal pain and diarrhea.   Musculoskeletal:  Positive for neck pain.   Neurological:  Positive for headaches.   All other systems reviewed and are negative.      Objective:    Vitals:  There were no vitals filed for this visit.    Physical Exam  Vitals reviewed.   Constitutional:       General: She is not in acute distress.     Appearance: She is well-developed.   HENT:      Head: Normocephalic and atraumatic.   Pulmonary:      Effort: Pulmonary effort is normal. No respiratory distress.   Musculoskeletal:      Cervical back: Normal range of motion.   Psychiatric:         Behavior: Behavior normal.         Thought Content: Thought content normal.         Judgment: Judgment normal.             Layo Mccrary MD  Family Medicine

## 2023-08-15 NOTE — TELEPHONE ENCOUNTER
Specialty Pharmacy - Refill Coordination    Specialty Medication Orders Linked to Encounter      Flowsheet Row Most Recent Value   Medication #1 secukinumab (COSENTYX PEN) 150 mg/mL PnIj (Order#[id number], Rx#[id number])            Refill Questions - Documented Responses      Flowsheet Row Most Recent Value   Patient Availability and HIPAA Verification    Does patient want to proceed with activity? Yes   HIPAA/medical authority confirmed? Yes   Relationship to patient of person spoken to? Self   Refill Screening Questions    Changes to allergies? No   Changes to medications? No   New conditions since last clinic visit? No   Unplanned office visit, urgent care, ED, or hospital admission in the last 4 weeks? No   How does patient/caregiver feel medication is working? Good   Financial problems or insurance changes? No   How many doses of your specialty medications were missed in the last 4 weeks? 0   Would patient like to speak to a pharmacist? No   When does the patient need to receive the medication? 08/25/23   Refill Delivery Questions    How will the patient receive the medication? [facility]   When does the patient need to receive the medication? 08/25/23   Shipping Address Home   Address in [facility] confirmed and updated if neccessary? Yes   Expected Copay ($) 0   Is the patient able to afford the medication copay? Yes   Payment Method zero copay   Days supply of Refill 28   Supplies needed? No supplies needed   Refill activity completed? Yes   Refill activity plan Refill scheduled   Shipment/Pickup Date: 08/23/23            Current Outpatient Medications   Medication Sig    albuterol (PROVENTIL HFA) 90 mcg/actuation inhaler Inhale 2 puffs into the lungs every 6 (six) hours as needed for Wheezing. Rescue    alclometasone (ACLOVATE) 0.05 % cream AAA face bid prn redness or scaling or itching    aspirin (ECOTRIN) 81 MG EC tablet Take 1 tablet (81 mg total) by mouth once daily.    atorvastatin (LIPITOR) 40 MG  tablet Take 1 tablet (40 mg total) by mouth every evening.    betamethasone dipropionate 0.05 % cream Use bid    buPROPion (WELLBUTRIN SR) 100 MG TBSR 12 hr tablet Take 1 tablet (100 mg total) by mouth 2 (two) times daily.    cyclobenzaprine (FLEXERIL) 10 MG tablet TAKE ONE TABLET EVERY EVENING    folic acid (FOLVITE) 1 MG tablet Take 1 tablet (1 mg total) by mouth once daily.    furosemide (LASIX) 20 MG tablet Take 1 tablet (20 mg total) by mouth once daily. (Patient taking differently: Take 20 mg by mouth every morning.)    hydrOXYchloroQUINE (PLAQUENIL) 200 mg tablet Take 200 mg by mouth 2 (two) times daily.    levothyroxine (SYNTHROID) 125 MCG tablet TAKE 2 TABLETS(250 MCG) BY MOUTH DAILY    lisinopriL (PRINIVIL,ZESTRIL) 20 MG tablet TAKE 1 TABLET BY MOUTH EVERY DAY    methotrexate 2.5 MG Tab Take 8 tablets (20 mg total) by mouth every 7 days. TAKE 8 TABLETS BY MOUTH EVERY 7 DAYS (Fridays)    omeprazole (PRILOSEC) 40 MG capsule Take 1 capsule (40 mg total) by mouth once daily.    omeprazole (PRILOSEC) 40 MG capsule Take 1 capsule (40 mg total) by mouth every morning. Open capsule and take with apple sauce    secukinumab (COSENTYX PEN) 150 mg/mL PnIj Inject 150 mg into the skin every 28 days.    secukinumab (COSENTYX PEN) 150 mg/mL PnIj Inject 150 mg (1 pen) into the skin at weeks 0, 1, 2, 3, and 4 followed by 150 mg (1 pen) every 4 weeks    sodium chloride 0.9% SolP 100 mL with abatacept (with maltose) 250 mg SolR 1,000 mg Inject 1,000 mg into the vein every 28 days.    XIIDRA 5 % Dpet APPLY 1 DROP TO BOTH EYES BID   Last reviewed on 8/3/2023 11:33 AM by Gricelda Smith PA-C    Review of patient's allergies indicates:   Allergen Reactions    Sulfamethoxazole-trimethoprim     Sulfa (sulfonamide antibiotics) Rash and Swelling    Last reviewed on  8/3/2023 11:33 AM by Gricelda Smith      Tasks added this encounter   No tasks added.   Tasks due within next 3 months   No tasks due.     Peggy Lieberman tomy -  Specialty Pharmacy  [facility] [facility] [address]  Phone: [phone]  Fax: [phone]

## 2023-08-16 ENCOUNTER — OFFICE VISIT (OUTPATIENT)
Dept: URGENT CARE | Facility: CLINIC | Age: 53
End: 2023-08-16
Payer: COMMERCIAL

## 2023-08-16 VITALS
TEMPERATURE: 99 F | DIASTOLIC BLOOD PRESSURE: 85 MMHG | SYSTOLIC BLOOD PRESSURE: 136 MMHG | RESPIRATION RATE: 12 BRPM | OXYGEN SATURATION: 97 % | HEART RATE: 96 BPM

## 2023-08-16 DIAGNOSIS — R09.81 SINUS CONGESTION: ICD-10-CM

## 2023-08-16 DIAGNOSIS — R50.9 FEVER, UNSPECIFIED FEVER CAUSE: ICD-10-CM

## 2023-08-16 DIAGNOSIS — U07.1 COVID-19 VIRUS DETECTED: ICD-10-CM

## 2023-08-16 DIAGNOSIS — U07.1 COVID: Primary | ICD-10-CM

## 2023-08-16 LAB
CTP QC/QA: YES
CTP QC/QA: YES
POC MOLECULAR INFLUENZA A AGN: NEGATIVE
POC MOLECULAR INFLUENZA B AGN: NEGATIVE
SARS-COV-2 AG RESP QL IA.RAPID: POSITIVE

## 2023-08-16 PROCEDURE — 87502 INFLUENZA DNA AMP PROBE: CPT | Mod: QW,S$GLB,, | Performed by: PHYSICIAN ASSISTANT

## 2023-08-16 PROCEDURE — 99214 OFFICE O/P EST MOD 30 MIN: CPT | Mod: S$GLB,,, | Performed by: PHYSICIAN ASSISTANT

## 2023-08-16 PROCEDURE — 87811 SARS CORONAVIRUS 2 ANTIGEN POCT, MANUAL READ: ICD-10-PCS | Mod: QW,S$GLB,, | Performed by: PHYSICIAN ASSISTANT

## 2023-08-16 PROCEDURE — 99214 PR OFFICE/OUTPT VISIT, EST, LEVL IV, 30-39 MIN: ICD-10-PCS | Mod: S$GLB,,, | Performed by: PHYSICIAN ASSISTANT

## 2023-08-16 PROCEDURE — 87502 POCT INFLUENZA A/B MOLECULAR: ICD-10-PCS | Mod: QW,S$GLB,, | Performed by: PHYSICIAN ASSISTANT

## 2023-08-16 PROCEDURE — 87811 SARS-COV-2 COVID19 W/OPTIC: CPT | Mod: QW,S$GLB,, | Performed by: PHYSICIAN ASSISTANT

## 2023-08-16 RX ORDER — PROMETHAZINE HYDROCHLORIDE AND DEXTROMETHORPHAN HYDROBROMIDE 6.25; 15 MG/5ML; MG/5ML
5 SYRUP ORAL EVERY 4 HOURS PRN
Qty: 240 ML | Refills: 0 | Status: SHIPPED | OUTPATIENT
Start: 2023-08-16 | End: 2023-08-26

## 2023-08-16 RX ORDER — IPRATROPIUM BROMIDE 21 UG/1
2 SPRAY, METERED NASAL 3 TIMES DAILY
Qty: 30 ML | Refills: 0 | Status: SHIPPED | OUTPATIENT
Start: 2023-08-16

## 2023-08-16 NOTE — PROGRESS NOTES
Subjective:      Patient ID: Christine Alfaro is a 52 y.o. female.    Vitals:  oral temperature is 98.8 °F (37.1 °C). Her blood pressure is 136/85 and her pulse is 96. Her respiration is 12 and oxygen saturation is 97%.     Chief Complaint: Fever and Headache    Pt states that on Sunday symptoms of tickling in the throat and on Monday she was feeling really bad with very bad headache, pt had been running fever 101.5    Fever   This is a new problem. The current episode started in the past 7 days. The problem has been unchanged. The maximum temperature noted was 101 to 101.9 F. Associated symptoms include coughing, headaches and a sore throat. Pertinent negatives include no abdominal pain, chest pain, congestion, diarrhea, ear pain, nausea, rash, vomiting or wheezing. She has tried acetaminophen for the symptoms. The treatment provided mild relief.   Headache   This is a new problem. The current episode started in the past 7 days. The problem has been gradually improving. The pain does not radiate. The pain quality is not similar to prior headaches. The quality of the pain is described as throbbing. The pain is at a severity of 0/10. The patient is experiencing no pain. Associated symptoms include coughing, a fever, sinus pressure and a sore throat. Pertinent negatives include no abdominal pain, back pain, blurred vision, dizziness, ear pain, eye pain, eye redness, loss of balance, nausea, neck pain, photophobia, seizures or vomiting. Nothing aggravates the symptoms. The treatment provided significant relief.       Constitution: Positive for chills and fever. Negative for sweating and fatigue.   HENT:  Positive for postnasal drip, sinus pressure and sore throat. Negative for ear pain, drooling, congestion, trouble swallowing and voice change.    Neck: Negative for neck pain, neck stiffness, painful lymph nodes and neck swelling.   Cardiovascular:  Negative for chest pain, leg swelling, palpitations, sob on  exertion and passing out.   Eyes:  Negative for eye pain, eye redness, photophobia, double vision, blurred vision and eyelid swelling.   Respiratory:  Positive for cough. Negative for chest tightness, sputum production, bloody sputum, shortness of breath, stridor and wheezing.    Gastrointestinal:  Negative for abdominal pain, abdominal bloating, nausea, vomiting, constipation, diarrhea and heartburn.   Musculoskeletal:  Negative for joint pain, joint swelling, abnormal ROM of joint, back pain, muscle cramps and muscle ache.   Skin:  Negative for rash and hives.   Allergic/Immunologic: Negative for seasonal allergies, food allergies, hives, itching and sneezing.   Neurological:  Positive for headaches. Negative for dizziness, light-headedness, passing out, loss of balance, altered mental status, loss of consciousness and seizures.   Hematologic/Lymphatic: Negative for swollen lymph nodes.   Psychiatric/Behavioral:  Negative for altered mental status and nervous/anxious. The patient is not nervous/anxious.       Objective:     Physical Exam   Constitutional: She is oriented to person, place, and time. She appears well-developed. She is cooperative.  Non-toxic appearance. She does not appear ill. No distress.   HENT:   Head: Normocephalic and atraumatic.   Ears:   Right Ear: Hearing, tympanic membrane, external ear and ear canal normal.   Left Ear: Hearing, tympanic membrane, external ear and ear canal normal.   Nose: Mucosal edema and rhinorrhea present. No nasal deformity. No epistaxis. Right sinus exhibits no maxillary sinus tenderness and no frontal sinus tenderness. Left sinus exhibits no maxillary sinus tenderness and no frontal sinus tenderness.   Mouth/Throat: Uvula is midline and mucous membranes are normal. No trismus in the jaw. Normal dentition. No uvula swelling. Posterior oropharyngeal erythema and cobblestoning present. No oropharyngeal exudate, posterior oropharyngeal edema or tonsillar abscesses. No  tonsillar exudate.   Eyes: Conjunctivae and lids are normal. No scleral icterus.   Neck: Trachea normal and phonation normal. Neck supple. No edema present. No erythema present. No neck rigidity present.   Cardiovascular: Normal rate, regular rhythm, normal heart sounds and normal pulses.   Pulmonary/Chest: Effort normal and breath sounds normal. No accessory muscle usage or stridor. No respiratory distress. She has no decreased breath sounds. She has no wheezes. She has no rhonchi. She has no rales.   Abdominal: Normal appearance.   Musculoskeletal: Normal range of motion.         General: No deformity. Normal range of motion.   Lymphadenopathy:     She has no cervical adenopathy.   Neurological: She is alert and oriented to person, place, and time. She exhibits normal muscle tone. Coordination normal.   Skin: Skin is warm, dry, intact, not diaphoretic, not pale and no rash. Capillary refill takes less than 2 seconds.   Psychiatric: Her speech is normal and behavior is normal. Judgment and thought content normal.   Nursing note and vitals reviewed.    Results for orders placed or performed in visit on 08/16/23   SARS Coronavirus 2 Antigen, POCT Manual Read   Result Value Ref Range    SARS Coronavirus 2 Antigen Positive (A) Negative     Acceptable Yes    POCT Influenza A/B MOLECULAR   Result Value Ref Range    POC Molecular Influenza A Ag Negative Negative, Not Reported    POC Molecular Influenza B Ag Negative Negative, Not Reported     Acceptable Yes        Assessment:     1. COVID    2. Fever, unspecified fever cause    3. Sinus congestion        Plan:       COVID    Fever, unspecified fever cause  -     SARS Coronavirus 2 Antigen, POCT Manual Read  -     POCT Influenza A/B MOLECULAR    Sinus congestion    Other orders  -     promethazine-dextromethorphan (PROMETHAZINE-DM) 6.25-15 mg/5 mL Syrp; Take 5 mLs by mouth every 4 (four) hours as needed (cough).  Dispense: 240 mL; Refill: 0  -      ipratropium (ATROVENT) 21 mcg (0.03 %) nasal spray; 2 sprays by Each Nostril route 3 (three) times daily.  Dispense: 30 mL; Refill: 0      Patient Instructions   Stay home/quarantine until symptoms are resolved.  ER if worsening symptoms- call before entry.  IF NOT IMPROVING, FOLLOW UP WITH VIRTUAL ONLINE VISIT WITH A PROVIDER 24/7- FOR MORE INFORMATION OR TO DOWNLOAD THE BRIANNE, VISIT [email or web address]    FOR 24/7 NURSE ADVICE, CALL [phone]  FOR COVID 19 RELATED QUESTIONS, CALL THE [facility] covid hotline: [phone]    Virtual visit with provider - [facility] ANYWHERE CARE:  [email or web address]    [place] FOR UP TO DATE INFORMATION:  Text or dial 211, test keyword [id number] OR DIAL 211    HELPFUL EXTERNAL RESOURCES:  OFFICE OF PUBLIC HEALTH: [place]   Http://ldh.[place].gov/  5-135-127-8193    [facility]ENTER FOR DISEASE CONTROL  Https://www.cdc.gov/    WORLD HEALTH ORGANIZATION (WHO)  Https://www.who.int/

## 2023-08-16 NOTE — LETTER
August 16, 2023      [facility] - [place] [address], [address]  [facility] [address]  Phone: [phone]  Fax: [phone]       Patient: Christine Alfaro   YOB: 1970  Date of Visit: 08/16/2023    To Whom It May Concern:    Arnel Alfaro  was at [facility] on 08/16/2023. She may return to work/school on 8/19/23 with no restrictions. If you have any questions or concerns, or if I can be of further assistance, please do not hesitate to contact me.    Sincerely,     RAMA Flores

## 2023-08-16 NOTE — PATIENT INSTRUCTIONS
Stay home/quarantine until symptoms are resolved.  ER if worsening symptoms- call before entry.  IF NOT IMPROVING, FOLLOW UP WITH VIRTUAL ONLINE VISIT WITH A PROVIDER 24/7- FOR MORE INFORMATION OR TO DOWNLOAD THE BRIANNE, VISIT [email or web address]    FOR 24/7 NURSE ADVICE, CALL [phone]  FOR COVID 19 RELATED QUESTIONS, CALL THE [facility] covid hotline: [phone]    Virtual visit with provider - [facility] ANYWHERE CARE:  [email or web address]    [place] FOR UP TO DATE INFORMATION:  Text or dial 211, test keyword LACOVID [id number] OR DIAL 211    HELPFUL EXTERNAL RESOURCES:  OFFICE OF PUBLIC HEALTH: [place]   Http://ldh.[place].gov/  1-933-527-4074    [facility] FOR DIS[facility]ASE CONTROL  Https://www.cdc.gov/    WORLD HEALTH ORGANIZATION (WHO)  Https://www.who.int/

## 2023-08-30 RX ORDER — CYCLOBENZAPRINE HCL 10 MG
TABLET ORAL
Qty: 150 TABLET | Refills: 3 | Status: SHIPPED | OUTPATIENT
Start: 2023-08-30

## 2023-08-31 ENCOUNTER — PATIENT MESSAGE (OUTPATIENT)
Dept: FAMILY MEDICINE | Facility: CLINIC | Age: 53
End: 2023-08-31
Payer: COMMERCIAL

## 2023-08-31 DIAGNOSIS — Z79.899 DRUG THERAPY: Primary | ICD-10-CM

## 2023-08-31 NOTE — TELEPHONE ENCOUNTER
Please review and advise. Pt has A1c of 5.6 in Jan, not  diabetic. Pt had TSH of 11.879 on 10/18/22.

## 2023-09-05 ENCOUNTER — LAB VISIT (OUTPATIENT)
Dept: LAB | Facility: HOSPITAL | Age: 53
End: 2023-09-05
Attending: FAMILY MEDICINE
Payer: COMMERCIAL

## 2023-09-05 DIAGNOSIS — Z79.899 DRUG THERAPY: ICD-10-CM

## 2023-09-05 LAB
ESTIMATED AVG GLUCOSE: 97 MG/DL (ref 68–131)
HBA1C MFR BLD: 5 % (ref 4–5.6)
T4 FREE SERPL-MCNC: 0.78 NG/DL (ref 0.71–1.51)
TSH SERPL DL<=0.005 MIU/L-ACNC: 22.55 UIU/ML (ref 0.4–4)

## 2023-09-05 PROCEDURE — 36415 COLL VENOUS BLD VENIPUNCTURE: CPT | Mod: PO | Performed by: FAMILY MEDICINE

## 2023-09-05 PROCEDURE — 84443 ASSAY THYROID STIM HORMONE: CPT | Performed by: FAMILY MEDICINE

## 2023-09-05 PROCEDURE — 84439 ASSAY OF FREE THYROXINE: CPT | Performed by: FAMILY MEDICINE

## 2023-09-05 PROCEDURE — 83036 HEMOGLOBIN GLYCOSYLATED A1C: CPT | Performed by: FAMILY MEDICINE

## 2023-09-06 ENCOUNTER — PATIENT MESSAGE (OUTPATIENT)
Dept: BARIATRICS | Facility: CLINIC | Age: 53
End: 2023-09-06
Payer: COMMERCIAL

## 2023-09-06 DIAGNOSIS — E03.9 HYPOTHYROIDISM, UNSPECIFIED TYPE: Primary | ICD-10-CM

## 2023-09-06 RX ORDER — LEVOTHYROXINE SODIUM 175 UG/1
175 TABLET ORAL
Qty: 30 TABLET | Refills: 11 | Status: SHIPPED | OUTPATIENT
Start: 2023-09-06 | End: 2023-09-08

## 2023-09-08 ENCOUNTER — OFFICE VISIT (OUTPATIENT)
Dept: FAMILY MEDICINE | Facility: CLINIC | Age: 53
End: 2023-09-08
Payer: COMMERCIAL

## 2023-09-08 VITALS
OXYGEN SATURATION: 99 % | RESPIRATION RATE: 18 BRPM | SYSTOLIC BLOOD PRESSURE: 134 MMHG | DIASTOLIC BLOOD PRESSURE: 80 MMHG | HEIGHT: 65 IN | HEART RATE: 84 BPM | WEIGHT: 270.94 LBS | BODY MASS INDEX: 45.14 KG/M2

## 2023-09-08 DIAGNOSIS — I10 ESSENTIAL HYPERTENSION: ICD-10-CM

## 2023-09-08 DIAGNOSIS — E03.9 HYPOTHYROIDISM, UNSPECIFIED TYPE: ICD-10-CM

## 2023-09-08 DIAGNOSIS — G47.33 OSA ON CPAP: ICD-10-CM

## 2023-09-08 DIAGNOSIS — Z79.899 DRUG-INDUCED IMMUNODEFICIENCY: ICD-10-CM

## 2023-09-08 DIAGNOSIS — Z23 IMMUNIZATION DUE: ICD-10-CM

## 2023-09-08 DIAGNOSIS — L40.50 PSORIATIC ARTHRITIS: ICD-10-CM

## 2023-09-08 DIAGNOSIS — D84.821 DRUG-INDUCED IMMUNODEFICIENCY: ICD-10-CM

## 2023-09-08 DIAGNOSIS — G47.00 INSOMNIA, UNSPECIFIED TYPE: ICD-10-CM

## 2023-09-08 DIAGNOSIS — M32.9 SLE WITH NORMAL KIDNEYS: ICD-10-CM

## 2023-09-08 DIAGNOSIS — F41.9 ANXIETY: ICD-10-CM

## 2023-09-08 DIAGNOSIS — M06.9 RHEUMATOID ARTHRITIS FLARE: ICD-10-CM

## 2023-09-08 DIAGNOSIS — Z12.39 ENCOUNTER FOR SCREENING FOR MALIGNANT NEOPLASM OF BREAST, UNSPECIFIED SCREENING MODALITY: ICD-10-CM

## 2023-09-08 DIAGNOSIS — E55.9 VITAMIN D DEFICIENCY: ICD-10-CM

## 2023-09-08 DIAGNOSIS — E78.5 DYSLIPIDEMIA: ICD-10-CM

## 2023-09-08 DIAGNOSIS — E74.39 GLUCOSE INTOLERANCE: ICD-10-CM

## 2023-09-08 DIAGNOSIS — E66.01 OBESITY, MORBID, BMI 50 OR HIGHER: ICD-10-CM

## 2023-09-08 PROCEDURE — 99999 PR PBB SHADOW E&M-EST. PATIENT-LVL IV: ICD-10-PCS | Mod: PBBFAC,,, | Performed by: FAMILY MEDICINE

## 2023-09-08 PROCEDURE — 99214 PR OFFICE/OUTPT VISIT, EST, LEVL IV, 30-39 MIN: ICD-10-PCS | Mod: S$GLB,,, | Performed by: FAMILY MEDICINE

## 2023-09-08 PROCEDURE — 4010F ACE/ARB THERAPY RXD/TAKEN: CPT | Mod: CPTII,S$GLB,, | Performed by: FAMILY MEDICINE

## 2023-09-08 PROCEDURE — 3044F PR MOST RECENT HEMOGLOBIN A1C LEVEL <7.0%: ICD-10-PCS | Mod: CPTII,S$GLB,, | Performed by: FAMILY MEDICINE

## 2023-09-08 PROCEDURE — 1159F MED LIST DOCD IN RCRD: CPT | Mod: CPTII,S$GLB,, | Performed by: FAMILY MEDICINE

## 2023-09-08 PROCEDURE — 99999 PR PBB SHADOW E&M-EST. PATIENT-LVL IV: CPT | Mod: PBBFAC,,, | Performed by: FAMILY MEDICINE

## 2023-09-08 PROCEDURE — 3008F PR BODY MASS INDEX (BMI) DOCUMENTED: ICD-10-PCS | Mod: CPTII,S$GLB,, | Performed by: FAMILY MEDICINE

## 2023-09-08 PROCEDURE — 1159F PR MEDICATION LIST DOCUMENTED IN MEDICAL RECORD: ICD-10-PCS | Mod: CPTII,S$GLB,, | Performed by: FAMILY MEDICINE

## 2023-09-08 PROCEDURE — 3075F PR MOST RECENT SYSTOLIC BLOOD PRESS GE 130-139MM HG: ICD-10-PCS | Mod: CPTII,S$GLB,, | Performed by: FAMILY MEDICINE

## 2023-09-08 PROCEDURE — 3075F SYST BP GE 130 - 139MM HG: CPT | Mod: CPTII,S$GLB,, | Performed by: FAMILY MEDICINE

## 2023-09-08 PROCEDURE — 3079F PR MOST RECENT DIASTOLIC BLOOD PRESSURE 80-89 MM HG: ICD-10-PCS | Mod: CPTII,S$GLB,, | Performed by: FAMILY MEDICINE

## 2023-09-08 PROCEDURE — 3044F HG A1C LEVEL LT 7.0%: CPT | Mod: CPTII,S$GLB,, | Performed by: FAMILY MEDICINE

## 2023-09-08 PROCEDURE — 3079F DIAST BP 80-89 MM HG: CPT | Mod: CPTII,S$GLB,, | Performed by: FAMILY MEDICINE

## 2023-09-08 PROCEDURE — 3008F BODY MASS INDEX DOCD: CPT | Mod: CPTII,S$GLB,, | Performed by: FAMILY MEDICINE

## 2023-09-08 PROCEDURE — 4010F PR ACE/ARB THEARPY RXD/TAKEN: ICD-10-PCS | Mod: CPTII,S$GLB,, | Performed by: FAMILY MEDICINE

## 2023-09-08 PROCEDURE — 99214 OFFICE O/P EST MOD 30 MIN: CPT | Mod: S$GLB,,, | Performed by: FAMILY MEDICINE

## 2023-09-08 RX ORDER — LEVOTHYROXINE SODIUM 125 UG/1
250 TABLET ORAL
Qty: 180 TABLET | Refills: 3 | Status: SHIPPED | OUTPATIENT
Start: 2023-09-08 | End: 2023-10-24

## 2023-09-08 NOTE — PROGRESS NOTES
THIS DOCUMENT WAS MADE IN PART WITH VOICE RECOGNITION SOFTWARE.  OCCASIONALLY THIS SOFTWARE WILL MISINTERPRET WORDS OR PHRASES.    Assessment and Plan:    1. Encounter for screening for malignant neoplasm of breast, unspecified screening modality  Mammo Digital Screening Bilat w/ [facility]      2. Immunization due        3. Hypothyroidism, unspecified type  levothyroxine (SYNTHROID) 125 MCG tablet    TSH      4. Dyslipidemia        5. Essential hypertension        6. Vitamin D deficiency        7. Obesity, morbid, BMI 50 or higher        8. Glucose intolerance        9. ETELVINA on CPAP        10. Insomnia, unspecified type        11. Anxiety        12. SLE with normal kidneys        13. Rheumatoid arthritis flare        14. Psoriatic arthritis        15. Drug-induced immunodeficiency          Screening mammogram for breast cancer screening     Counseled on immunizations     Recheck thyroid in 6 weeks      ______________________________________________________________________  Subjective:    Chief Complaint:  Chief Complaint   Patient presents with    Follow-up     6 m.o f/u         HPI:  Christine is a 52 y.o. year old     Thyroid   TSH 22 on recent check   Patient admits to not taking the full dose of her 250 mcg daily during this time.    Would like to go back on original dose of 250 mcg consistently for 6 weeks then recheck    Bariatric Surgery f/u   Max wt : 341   Current : 270     Fatigue   Patient is status post COVID several weeks ago, low thyroid hormone as well   Symptoms present for about that same duration as the COVID      Anxiety   Rx-Wellbutrin 100 mg SR b.i.d.  Interested in alternative that will not cause wt gain  + Service animal / VARSHA    Dyslipidemia  Rx-aspirin 81 mg, atorvastatin 40 mg     Hypothyroidism   Rx-levothyroxine 250 mcg     Glucose intolerance, pre diabetes  Currently on ozempic   Recent A1c 5.0%   Reports medication compliance      SLE   Rheum : MD Marcos   Followed by Rheumatology on [facility]   Taking combination of Cosentyx, prednisone, Plaquenil, Methotrexate   Reports feeling much better since having lupus diagnosed and treated     Psoriasis   Rx : Aclovate + Betamethasone   [place]     GERD   Omeprazole working well  Had dysphagia with meat since gastric bypass                 Past Medical History:  Past Medical History:   Diagnosis Date    Anxiety     GERD (gastroesophageal reflux disease)     Hyperlipidemia     Hypertension     Hypothyroidism     Insomnia     Observed sleep apnea     Prediabetes     Rheumatoid arthritis, unspecified     Vitamin D deficiency        Past Surgical History:  Past Surgical History:   Procedure Laterality Date    BTL      CHOLECYSTECTOMY      COLONOSCOPY N/A 8/30/2022    Procedure: COLONOSCOPY;  Surgeon: Drew Peraza MD;  Location: [facility];  Service: Endoscopy;  Laterality: N/A;    LAPAROSCOPIC SLEEVE GASTRECTOMY N/A 2/2/2023    Procedure: GASTRECTOMY, SLEEVE, LAPAROSCOPIC with intraop EGD;  Surgeon: Titi Ribeiro Jr., MD;  Location: [facility] OR [address] [place] [address];  Service: General;  Laterality: N/A;  PRE-CERT, PLEASE DO NOT SUBMIT FOR AUTH UNTIL 1/12/2023    SURGICAL REMOVAL OF BONE SPUR      left heel    TONSILLECTOMY      TUBAL LIGATION  8/1995       Family History:  Family History   Problem Relation Age of Onset    Heart disease Mother     Bone cancer Mother     Cancer Mother         cervical    Hypertension Mother     Heart disease Father     Diabetes type I Father     Lung cancer Father     Diabetes Father     Hypertension Father     Heart disease Brother     No Known Problems Daughter     No Known Problems Daughter     Breast cancer Neg Hx     Ovarian cancer Neg Hx        Social History:  Social History     Socioeconomic History    Marital status:    Tobacco Use    Smoking status: Never     Passive exposure: Never    Smokeless tobacco: Never   Substance and Sexual Activity    Alcohol use: Not Currently     Alcohol/week: 0.0 standard  drinks of alcohol    Drug use: Never    Sexual activity: Yes     Partners: Male     Birth control/protection: See Surgical Hx     Social Determinants of Health     Financial Resource Strain: Low Risk  (9/8/2023)    Overall Financial Resource Strain (CARDIA)     Difficulty of Paying Living Expenses: Not hard at all   Food Insecurity: No Food Insecurity (9/8/2023)    Hunger Vital Sign     Worried About Running Out of Food in the Last Year: Never true     Ran Out of Food in the Last Year: Never true   Transportation Needs: No Transportation Needs (9/8/2023)    PRAPARE - Transportation     Lack of Transportation (Medical): No     Lack of Transportation (Non-Medical): No   Physical Activity: Insufficiently Active (9/8/2023)    Exercise Vital Sign     Days of Exercise per Week: 4 days     Minutes of Exercise per Session: 30 min   Stress: Stress Concern Present (9/8/2023)    [place] [employer] - Occupational Stress Questionnaire     Feeling of Stress : To some extent   Social Connections: Unknown (9/8/2023)    Social Connection and Isolation Panel [NHANES]     Frequency of Communication with Friends and Family: More than three times a week     Frequency of Social Gatherings with Friends and Family: More than three times a week     Active Member of Clubs or Organizations: Yes     Attends Club or Organization Meetings: More than 4 times per year     Marital Status:    Housing Stability: Low Risk  (9/8/2023)    Housing Stability Vital Sign     Unable to Pay for Housing in the Last Year: No     Number of Places Lived in the Last Year: 1     Unstable Housing in the Last Year: No       Medications:  Current Outpatient Medications on File Prior to Visit   Medication Sig Dispense Refill    albuterol (PROVENTIL HFA) 90 mcg/actuation inhaler Inhale 2 puffs into the lungs every 6 (six) hours as needed for Wheezing. Rescue 18 g 0    alclometasone (ACLOVATE) 0.05 % cream AAA face bid prn redness or scaling  or itching 60 g 3    aspirin (ECOTRIN) 81 MG EC tablet Take 1 tablet (81 mg total) by mouth once daily. 90 tablet 3    atorvastatin (LIPITOR) 40 MG tablet Take 1 tablet (40 mg total) by mouth every evening. 90 tablet 3    betamethasone dipropionate 0.05 % cream Use bid 45 g 3    buPROPion (WELLBUTRIN SR) 100 MG TBSR 12 hr tablet Take 1 tablet (100 mg total) by mouth 2 (two) times daily. 60 tablet 11    cyclobenzaprine (FLEXERIL) 10 MG tablet TAKE ONE TABLET EVERY EVENING 150 tablet 3    folic acid (FOLVITE) 1 MG tablet Take 1 tablet (1 mg total) by mouth once daily. 30 tablet 11    furosemide (LASIX) 20 MG tablet Take 1 tablet (20 mg total) by mouth once daily. (Patient taking differently: Take 20 mg by mouth every morning.) 90 tablet 3    hydrOXYchloroQUINE (PLAQUENIL) 200 mg tablet Take 200 mg by mouth 2 (two) times daily.      ipratropium (ATROVENT) 21 mcg (0.03 %) nasal spray 2 sprays by Each Nostril route 3 (three) times daily. 30 mL 0    lisinopriL (PRINIVIL,ZESTRIL) 20 MG tablet TAKE 1 TABLET BY MOUTH EVERY DAY 90 tablet 3    methotrexate 2.5 MG Tab Take 8 tablets (20 mg total) by mouth every 7 days. TAKE 8 TABLETS BY MOUTH EVERY 7 DAYS (Fridays) 105 tablet 0    omeprazole (PRILOSEC) 40 MG capsule Take 1 capsule (40 mg total) by mouth every morning. Open capsule and take with apple sauce 30 capsule 2    secukinumab (COSENTYX PEN) 150 mg/mL PnIj Inject 150 mg into the skin every 28 days. 2 mL 11    sodium chloride 0.9% SolP 100 mL with abatacept (with maltose) 250 mg SolR 1,000 mg Inject 1,000 mg into the vein every 28 days.      XIIDRA 5 % Dpet APPLY 1 DROP TO BOTH EYES BID      [DISCONTINUED] levothyroxine (SYNTHROID, LEVOTHROID) 175 MCG tablet Take 1 tablet (175 mcg total) by mouth before breakfast. 30 tablet 11    [DISCONTINUED] omeprazole (PRILOSEC) 40 MG capsule Take 1 capsule (40 mg total) by mouth once daily. 30 capsule 6    [DISCONTINUED] secukinumab (COSENTYX PEN) 150 mg/mL PnIj Inject 150 mg (1 pen)  "into the skin at weeks 0, 1, 2, 3, and 4 followed by 150 mg (1 pen) every 4 weeks 5 mL 0     No current facility-administered medications on file prior to visit.       Allergies:  Sulfamethoxazole-trimethoprim and Sulfa (sulfonamide antibiotics)    Immunizations:  Immunization History   Administered Date(s) Administered    COVID-19, MRNA, LN-S, PF (MODERNA FULL 0.5 ML DOSE) 03/11/2021, 04/15/2021, 01/11/2022    DTaP 01/01/1973, 02/01/1973, 04/01/1973, 08/01/1975, 04/01/1978    IPV 01/01/1973, 02/01/1973, 08/01/1975, 01/05/1978    Influenza Split 11/18/2007    MMR 01/12/1978, 10/19/1995    Td (ADULT) 04/17/1989, 08/07/2004    Tdap 11/26/2019       Review of Systems:  Review of Systems   All other systems reviewed and are negative.      Objective:    Vitals:  Vitals:    09/08/23 1311   BP: 134/80   Pulse: 84   Resp: 18   SpO2: 99%   Weight: 122.9 kg (270 lb 15.1 oz)   Height: 5' 5" (1.651 m)   PainSc: 0-No pain       Physical Exam  Vitals reviewed.   Constitutional:       General: She is not in acute distress.  HENT:      Head: Normocephalic and atraumatic.   Eyes:      Pupils: Pupils are equal, round, and reactive to light.   Cardiovascular:      Rate and Rhythm: Normal rate and regular rhythm.      Heart sounds: No murmur heard.     No friction rub.   Pulmonary:      Effort: Pulmonary effort is normal.      Breath sounds: Normal breath sounds.   Abdominal:      General: Bowel sounds are normal. There is no distension.      Palpations: Abdomen is soft.      Tenderness: There is no abdominal tenderness.   Musculoskeletal:      Cervical back: Neck supple.   Skin:     General: Skin is warm and dry.      Findings: No rash.   Psychiatric:         Behavior: Behavior normal.             Layo Mccrary MD  Family Medicine      "

## 2023-09-12 ENCOUNTER — PATIENT MESSAGE (OUTPATIENT)
Dept: BARIATRICS | Facility: CLINIC | Age: 53
End: 2023-09-12
Payer: COMMERCIAL

## 2023-10-04 ENCOUNTER — PATIENT MESSAGE (OUTPATIENT)
Dept: BARIATRICS | Facility: CLINIC | Age: 53
End: 2023-10-04
Payer: COMMERCIAL

## 2023-10-07 DIAGNOSIS — L40.50 PSORIATIC ARTHRITIS: ICD-10-CM

## 2023-10-09 DIAGNOSIS — L40.50 PSORIATIC ARTHRITIS: ICD-10-CM

## 2023-10-10 ENCOUNTER — PATIENT MESSAGE (OUTPATIENT)
Dept: BARIATRICS | Facility: CLINIC | Age: 53
End: 2023-10-10
Payer: COMMERCIAL

## 2023-10-10 RX ORDER — METHOTREXATE 2.5 MG/1
20 TABLET ORAL
Qty: 105 TABLET | Refills: 0 | OUTPATIENT
Start: 2023-10-10

## 2023-10-10 RX ORDER — METHOTREXATE 2.5 MG/1
20 TABLET ORAL
Qty: 105 TABLET | Refills: 0 | Status: SHIPPED | OUTPATIENT
Start: 2023-10-10 | End: 2024-01-05 | Stop reason: SDUPTHER

## 2023-10-20 ENCOUNTER — LAB VISIT (OUTPATIENT)
Dept: LAB | Facility: HOSPITAL | Age: 53
End: 2023-10-20
Attending: FAMILY MEDICINE
Payer: COMMERCIAL

## 2023-10-20 DIAGNOSIS — E03.9 HYPOTHYROIDISM, UNSPECIFIED TYPE: ICD-10-CM

## 2023-10-20 PROCEDURE — 36415 COLL VENOUS BLD VENIPUNCTURE: CPT | Mod: PO | Performed by: FAMILY MEDICINE

## 2023-10-20 PROCEDURE — 84439 ASSAY OF FREE THYROXINE: CPT | Performed by: FAMILY MEDICINE

## 2023-10-20 PROCEDURE — 84443 ASSAY THYROID STIM HORMONE: CPT | Performed by: FAMILY MEDICINE

## 2023-10-21 LAB
T4 FREE SERPL-MCNC: 1.34 NG/DL (ref 0.71–1.51)
TSH SERPL DL<=0.005 MIU/L-ACNC: 0.09 UIU/ML (ref 0.4–4)

## 2023-10-24 ENCOUNTER — PATIENT MESSAGE (OUTPATIENT)
Dept: FAMILY MEDICINE | Facility: CLINIC | Age: 53
End: 2023-10-24
Payer: COMMERCIAL

## 2023-10-24 DIAGNOSIS — E03.9 HYPOTHYROIDISM, UNSPECIFIED TYPE: Primary | ICD-10-CM

## 2023-10-24 RX ORDER — LEVOTHYROXINE SODIUM 200 UG/1
200 TABLET ORAL
Qty: 30 TABLET | Refills: 11 | Status: SHIPPED | OUTPATIENT
Start: 2023-10-24 | End: 2024-10-23

## 2023-10-27 ENCOUNTER — OFFICE VISIT (OUTPATIENT)
Dept: CARDIOLOGY | Facility: CLINIC | Age: 53
End: 2023-10-27
Payer: COMMERCIAL

## 2023-10-27 VITALS
DIASTOLIC BLOOD PRESSURE: 80 MMHG | HEIGHT: 65 IN | SYSTOLIC BLOOD PRESSURE: 118 MMHG | BODY MASS INDEX: 43.71 KG/M2 | HEART RATE: 86 BPM | WEIGHT: 262.38 LBS

## 2023-10-27 DIAGNOSIS — G47.33 OSA ON CPAP: Chronic | ICD-10-CM

## 2023-10-27 DIAGNOSIS — R00.2 PALPITATIONS: ICD-10-CM

## 2023-10-27 DIAGNOSIS — I10 ESSENTIAL HYPERTENSION: Primary | Chronic | ICD-10-CM

## 2023-10-27 DIAGNOSIS — I34.0 NONRHEUMATIC MITRAL VALVE REGURGITATION: Chronic | ICD-10-CM

## 2023-10-27 PROCEDURE — 3044F HG A1C LEVEL LT 7.0%: CPT | Mod: CPTII,S$GLB,, | Performed by: INTERNAL MEDICINE

## 2023-10-27 PROCEDURE — 3044F PR MOST RECENT HEMOGLOBIN A1C LEVEL <7.0%: ICD-10-PCS | Mod: CPTII,S$GLB,, | Performed by: INTERNAL MEDICINE

## 2023-10-27 PROCEDURE — 3079F DIAST BP 80-89 MM HG: CPT | Mod: CPTII,S$GLB,, | Performed by: INTERNAL MEDICINE

## 2023-10-27 PROCEDURE — 3079F PR MOST RECENT DIASTOLIC BLOOD PRESSURE 80-89 MM HG: ICD-10-PCS | Mod: CPTII,S$GLB,, | Performed by: INTERNAL MEDICINE

## 2023-10-27 PROCEDURE — 1159F PR MEDICATION LIST DOCUMENTED IN MEDICAL RECORD: ICD-10-PCS | Mod: CPTII,S$GLB,, | Performed by: INTERNAL MEDICINE

## 2023-10-27 PROCEDURE — 4010F ACE/ARB THERAPY RXD/TAKEN: CPT | Mod: CPTII,S$GLB,, | Performed by: INTERNAL MEDICINE

## 2023-10-27 PROCEDURE — 3074F PR MOST RECENT SYSTOLIC BLOOD PRESSURE < 130 MM HG: ICD-10-PCS | Mod: CPTII,S$GLB,, | Performed by: INTERNAL MEDICINE

## 2023-10-27 PROCEDURE — 99214 PR OFFICE/OUTPT VISIT, EST, LEVL IV, 30-39 MIN: ICD-10-PCS | Mod: S$GLB,,, | Performed by: INTERNAL MEDICINE

## 2023-10-27 PROCEDURE — 3008F BODY MASS INDEX DOCD: CPT | Mod: CPTII,S$GLB,, | Performed by: INTERNAL MEDICINE

## 2023-10-27 PROCEDURE — 99214 OFFICE O/P EST MOD 30 MIN: CPT | Mod: S$GLB,,, | Performed by: INTERNAL MEDICINE

## 2023-10-27 PROCEDURE — 99999 PR PBB SHADOW E&M-EST. PATIENT-LVL III: CPT | Mod: PBBFAC,,, | Performed by: INTERNAL MEDICINE

## 2023-10-27 PROCEDURE — 1159F MED LIST DOCD IN RCRD: CPT | Mod: CPTII,S$GLB,, | Performed by: INTERNAL MEDICINE

## 2023-10-27 PROCEDURE — 4010F PR ACE/ARB THEARPY RXD/TAKEN: ICD-10-PCS | Mod: CPTII,S$GLB,, | Performed by: INTERNAL MEDICINE

## 2023-10-27 PROCEDURE — 3008F PR BODY MASS INDEX (BMI) DOCUMENTED: ICD-10-PCS | Mod: CPTII,S$GLB,, | Performed by: INTERNAL MEDICINE

## 2023-10-27 PROCEDURE — 3074F SYST BP LT 130 MM HG: CPT | Mod: CPTII,S$GLB,, | Performed by: INTERNAL MEDICINE

## 2023-10-27 PROCEDURE — 99999 PR PBB SHADOW E&M-EST. PATIENT-LVL III: ICD-10-PCS | Mod: PBBFAC,,, | Performed by: INTERNAL MEDICINE

## 2023-10-27 RX ORDER — LISINOPRIL 5 MG/1
5 TABLET ORAL 2 TIMES DAILY
Qty: 180 TABLET | Refills: 3 | Status: SHIPPED | OUTPATIENT
Start: 2023-10-27 | End: 2024-10-26

## 2023-10-27 NOTE — PROGRESS NOTES
Subjective:    Patient ID:  Christine Alfaro is a 52 y.o. female who presents for Hypertension        HPI  RECENT LABS NOTED LDL 92 UP FROM 64, FREE T4 OK, HAD GASTRIC SLEEVE 2/2023, DID WELL, LOST 80 LBS SO FAR, TAKING 1/2 LISINOPRIL, THYROID MEDS CHANGED, DECREASED, SEE ROS    Past Medical History:   Diagnosis Date    Anxiety     GERD (gastroesophageal reflux disease)     Hyperlipidemia     Hypertension     Hypothyroidism     Insomnia     Observed sleep apnea     Prediabetes     Rheumatoid arthritis, unspecified     Vitamin D deficiency      Past Surgical History:   Procedure Laterality Date    BTL      CHOLECYSTECTOMY      COLONOSCOPY N/A 8/30/2022    Procedure: COLONOSCOPY;  Surgeon: Drew Peraza MD;  [facility];  Service: Endoscopy;  Laterality: N/A;    LAPAROSCOPIC SLEEVE GASTRECTOMY N/A 2/2/2023    Procedure: GASTRECTOMY, SLEEVE, LAPAROSCOPIC with intraop EGD;  Surgeon: Titi Ribeiro Jr., MD;  Location: [facility] OR [address] [place] [address];  Service: General;  Laterality: N/A;  PRE-CERT, PLEASE DO NOT SUBMIT FOR AUTH UNTIL 1/12/2023    SURGICAL REMOVAL OF BONE SPUR      left heel    TONSILLECTOMY      TUBAL LIGATION  8/1995     Family History   Problem Relation Age of Onset    Heart disease Mother     Bone cancer Mother     Cancer Mother         cervical    Hypertension Mother     Heart disease Father     Diabetes type I Father     Lung cancer Father     Diabetes Father     Hypertension Father     Heart disease Brother     No Known Problems Daughter     No Known Problems Daughter     Breast cancer Neg Hx     Ovarian cancer Neg Hx      Social History     Socioeconomic History    Marital status:    Tobacco Use    Smoking status: Never     Passive exposure: Never    Smokeless tobacco: Never   Substance and Sexual Activity    Alcohol use: Not Currently     Alcohol/week: 0.0 standard drinks of alcohol    Drug use: Never    Sexual activity: Yes     Partners: Male     Birth control/protection: See  Surgical Hx     Social Determinants of Health     Financial Resource Strain: Low Risk  (10/24/2023)    Overall Financial Resource Strain (CARDIA)     Difficulty of Paying Living Expenses: Not hard at all   Food Insecurity: No Food Insecurity (10/24/2023)    Hunger Vital Sign     Worried About Running Out of Food in the Last Year: Never true     Ran Out of Food in the Last Year: Never true   Transportation Needs: No Transportation Needs (10/24/2023)    PRAPARE - Transportation     Lack of Transportation (Medical): No     Lack of Transportation (Non-Medical): No   Physical Activity: Insufficiently Active (10/24/2023)    Exercise Vital Sign     Days of Exercise per Week: 3 days     Minutes of Exercise per Session: 20 min   Stress: Stress Concern Present (10/24/2023)    [place] [employer] - Occupational Stress Questionnaire     Feeling of Stress : To some extent   Social Connections: Unknown (10/24/2023)    Social Connection and Isolation Panel [NHANES]     Frequency of Communication with Friends and Family: More than three times a week     Frequency of Social Gatherings with Friends and Family: Three times a week     Active Member of Clubs or Organizations: Yes     Attends Club or Organization Meetings: 1 to 4 times per year     Marital Status:    Housing Stability: Low Risk  (10/24/2023)    Housing Stability Vital Sign     Unable to Pay for Housing in the Last Year: No     Number of Places Lived in the Last Year: 1     Unstable Housing in the Last Year: No       Review of patient's allergies indicates:   Allergen Reactions    Sulfamethoxazole-trimethoprim     Sulfa (sulfonamide antibiotics) Rash and Swelling       Current Outpatient Medications:     alclometasone (ACLOVATE) 0.05 % cream, AAA face bid prn redness or scaling or itching, Disp: 60 g, Rfl: 3    aspirin (ECOTRIN) 81 MG EC tablet, Take 1 tablet (81 mg total) by mouth once daily., Disp: 90 tablet, Rfl: 3    atorvastatin (LIPITOR)  40 MG tablet, Take 1 tablet (40 mg total) by mouth every evening., Disp: 90 tablet, Rfl: 3    betamethasone dipropionate 0.05 % cream, Use bid, Disp: 45 g, Rfl: 3    buPROPion (WELLBUTRIN SR) 100 MG TBSR 12 hr tablet, Take 1 tablet (100 mg total) by mouth 2 (two) times daily., Disp: 60 tablet, Rfl: 11    cyclobenzaprine (FLEXERIL) 10 MG tablet, TAKE ONE TABLET EVERY EVENING, Disp: 150 tablet, Rfl: 3    folic acid (FOLVITE) 1 MG tablet, Take 1 tablet (1 mg total) by mouth once daily., Disp: 30 tablet, Rfl: 11    furosemide (LASIX) 20 MG tablet, Take 1 tablet (20 mg total) by mouth once daily., Disp: 90 tablet, Rfl: 0    hydrOXYchloroQUINE (PLAQUENIL) 200 mg tablet, Take 200 mg by mouth 2 (two) times daily., Disp: , Rfl:     ipratropium (ATROVENT) 21 mcg (0.03 %) nasal spray, 2 sprays by Each Nostril route 3 (three) times daily., Disp: 30 mL, Rfl: 0    levothyroxine (SYNTHROID) 200 MCG tablet, Take 1 tablet (200 mcg total) by mouth before breakfast., Disp: 30 tablet, Rfl: 11    methotrexate 2.5 MG Tab, Take 8 tablets (20 mg total) by mouth every 7 days. TAKE 8 TABLETS BY MOUTH EVERY 7 DAYS (Fridays), Disp: 105 tablet, Rfl: 0    omeprazole (PRILOSEC) 40 MG capsule, Take 1 capsule (40 mg total) by mouth every morning. Open capsule and take with apple sauce, Disp: 30 capsule, Rfl: 2    secukinumab (COSENTYX PEN) 150 mg/mL PnIj, Inject 150 mg into the skin every 28 days., Disp: 2 mL, Rfl: 11    sodium chloride 0.9% SolP 100 mL with abatacept (with maltose) 250 mg SolR 1,000 mg, Inject 1,000 mg into the vein every 28 days., Disp: , Rfl:     XIIDRA 5 % Dpet, APPLY 1 DROP TO BOTH EYES BID, Disp: , Rfl:     albuterol (PROVENTIL HFA) 90 mcg/actuation inhaler, Inhale 2 puffs into the lungs every 6 (six) hours as needed for Wheezing. Rescue, Disp: 18 g, Rfl: 0    lisinopriL (PRINIVIL,ZESTRIL) 5 MG tablet, Take 1 tablet (5 mg total) by mouth 2 (two) times a day., Disp: 180 tablet, Rfl: 3    Review of Systems   Constitutional:  "Positive for weight loss. Negative for chills, diaphoresis, fever, malaise/fatigue and night sweats.   HENT:  Negative for congestion and nosebleeds.    Eyes:  Negative for blurred vision and visual disturbance.   Cardiovascular:  Negative for chest pain (OCC CRAMP), claudication, cyanosis, dyspnea on exertion (MILD), irregular heartbeat, leg swelling, near-syncope, orthopnea, palpitations (WITH THYROID,), paroxysmal nocturnal dyspnea and syncope.   Respiratory:  Negative for cough, hemoptysis, shortness of breath and wheezing.    Endocrine: Negative.    Hematologic/Lymphatic: Negative for adenopathy. Does not bruise/bleed easily.   Skin:  Negative for itching and rash.   Musculoskeletal:  Positive for arthritis and back pain (CHRONIC). Negative for falls.   Gastrointestinal:  Negative for abdominal pain, change in bowel habit, hematemesis, jaundice, melena and nausea.   Genitourinary:  Negative for dysuria and flank pain.   Neurological:  Positive for tremors. Negative for brief paralysis, focal weakness, headaches, light-headedness, loss of balance, numbness and weakness.   Psychiatric/Behavioral:  Negative for altered mental status, depression and memory loss.    Allergic/Immunologic: Negative for environmental allergies and hives.        Objective:      Vitals:    10/27/23 1243   BP: 118/80   Pulse: 86   Weight: 119 kg (262 lb 5.6 oz)   Height: 5' 5" (1.651 m)   PainSc:   1   PainLoc: Chest     Body mass index is 43.66 kg/m².    Physical Exam  Constitutional:       Appearance: She is well-developed. She is obese.   HENT:      Head: Normocephalic and atraumatic.   Eyes:      Extraocular Movements: Extraocular movements intact.      Conjunctiva/sclera: Conjunctivae normal.   Neck:      Vascular: Normal carotid pulses. No JVD.   Cardiovascular:      Rate and Rhythm: Normal rate and regular rhythm. No extrasystoles are present.     Pulses:           Carotid pulses are 2+ on the right side and 2+ on the left side.    "    Radial pulses are 2+ on the right side and 2+ on the left side.        Posterior tibial pulses are 2+ on the right side and 2+ on the left side.      Heart sounds: Murmur heard.      Systolic murmur is present with a grade of 1/6.      No gallop. No S4 sounds.   Pulmonary:      Breath sounds: No rales.   Abdominal:      Palpations: Abdomen is soft. There is no hepatomegaly.      Tenderness: There is no abdominal tenderness.   Musculoskeletal:      Cervical back: Neck supple.      Right lower leg: No edema.      Left lower leg: No edema.   Skin:     General: Skin is warm and dry.      Capillary Refill: Capillary refill takes less than 2 seconds.   Neurological:      General: No focal deficit present.      Mental Status: She is alert and oriented to person, place, and time.      Cranial Nerves: Cranial nerves 2-12 are intact.   Psychiatric:         Mood and Affect: Mood normal.         Speech: Speech normal.         Behavior: Behavior normal.               ..    Chemistry        Component Value Date/Time     08/03/2023 0832    K 4.4 08/03/2023 0832     08/03/2023 0832    CO2 28 08/03/2023 0832    BUN 10 08/03/2023 0832    CREATININE 0.8 08/03/2023 0832    GLU 81 08/03/2023 0832        Component Value Date/Time    CALCIUM 9.9 08/03/2023 0832    ALKPHOS 106 08/03/2023 0832    AST 21 08/03/2023 0832    ALT 26 08/03/2023 0832    BILITOT 0.6 08/03/2023 0832    ESTGFRAFRICA >60.0 07/25/2022 0719    EGFRNONAA >60.0 07/25/2022 0719            ..  Lab Results   Component Value Date    CHOL 161 08/03/2023    CHOL 126 03/30/2023    CHOL 136 10/18/2022     Lab Results   Component Value Date    HDL 53 08/03/2023    HDL 49 03/30/2023    HDL 42 10/18/2022     Lab Results   Component Value Date    LDLCALC 92.4 08/03/2023    LDLCALC 64.4 03/30/2023    LDLCALC 66.2 10/18/2022     Lab Results   Component Value Date    TRIG 78 08/03/2023    TRIG 63 03/30/2023    TRIG 139 10/18/2022     Lab Results   Component Value Date     CHOLHDL 32.9 08/03/2023    CHOLHDL 38.9 03/30/2023    CHOLHDL 30.9 10/18/2022     ..  Lab Results   Component Value Date    WBC 6.52 08/03/2023    HGB 14.6 08/03/2023    HCT 45.2 08/03/2023    MCV 93 08/03/2023     08/03/2023       Test(s) Reviewed  I have reviewed the following in detail:  [] Stress test   [] Angiography   [] Echocardiogram   [x] Labs   [] Other:       Assessment:         ICD-10-CM ICD-9-CM   1. Essential hypertension  I10 401.9   2. Palpitations  R00.2 785.1   3. Nonrheumatic mitral valve regurgitation  I34.0 424.0   4. ETELVINA on CPAP  G47.33 327.23   5. Body mass index (BMI) 40.0-44.9, adult  Z68.41 V85.41     Problem List Items Addressed This Visit          Cardiac/Vascular    Essential hypertension - Primary    Nonrheumatic mitral valve regurgitation    Palpitations    Relevant Orders    Holter monitor - 72 hour       Other    ETELVINA on CPAP    Body mass index (BMI) 40.0-44.9, adult        Plan:     DECREASE LISINOPRIL TO 5 BID, AND WATCH MIGHT NEED ONLY ONE DOSE AT NIGHT BASED ON BLOOD PRESSURE SPECIALLY WITH WEIGHT LOSS SINCE HER GASTRIC SLEEVE AND CONTINUE TO LOSE WEIGHT, 72 HOUR HOLTER ASSESS ARRHYTHMIA,ALL OTHER CV CLINICALLY STABLE, NO ANGINA, NO HF, NO TIA, ASSESS CLINICAL ARRHYTHMIA,CONTINUE CURRENT MEDS, EDUCATION, DIET, EXERCISE , CONTINUE WITH WEIGHT LOSS RETURN TO CLINIC IN 8-9 MONTHS      Essential hypertension  Comments:  ADJUST MEDS    Palpitations  Comments:  COULD BE RELATED TO EXCESS THYROID, ASSESS  Orders:  -     Holter monitor - 72 hour; Future    Nonrheumatic mitral valve regurgitation    ETELVINA on CPAP    Body mass index (BMI) 40.0-44.9, adult  Comments:  IMPROVING    Other orders  -     lisinopriL (PRINIVIL,ZESTRIL) 5 MG tablet; Take 1 tablet (5 mg total) by mouth 2 (two) times a day.  Dispense: 180 tablet; Refill: 3    RTC Low level/low impact aerobic exercise 5x's/wk. Heart healthy diet and risk factor modification.    See labs and med orders.    Aerobic exercise  5x's/wk. Heart healthy diet and risk factor modification.    See labs and med orders.

## 2023-10-30 ENCOUNTER — CLINICAL SUPPORT (OUTPATIENT)
Dept: CARDIOLOGY | Facility: HOSPITAL | Age: 53
End: 2023-10-30
Attending: INTERNAL MEDICINE
Payer: COMMERCIAL

## 2023-10-30 ENCOUNTER — PATIENT MESSAGE (OUTPATIENT)
Dept: FAMILY MEDICINE | Facility: CLINIC | Age: 53
End: 2023-10-30
Payer: COMMERCIAL

## 2023-10-30 DIAGNOSIS — R00.2 PALPITATIONS: ICD-10-CM

## 2023-10-30 DIAGNOSIS — R63.2 INCREASED APPETITE: Primary | ICD-10-CM

## 2023-10-30 PROCEDURE — 93243 EXT ECG>48HR<7D SCAN A/R: CPT | Mod: PO

## 2023-10-30 PROCEDURE — 93244 EXT ECG>48HR<7D REV&INTERPJ: CPT | Mod: ,,, | Performed by: INTERNAL MEDICINE

## 2023-10-30 PROCEDURE — 93244 HOLTER MONITOR - 72 HOUR: ICD-10-PCS | Mod: ,,, | Performed by: INTERNAL MEDICINE

## 2023-10-31 RX ORDER — PHENTERMINE HYDROCHLORIDE 37.5 MG/1
37.5 TABLET ORAL
Qty: 30 TABLET | Refills: 0 | Status: SHIPPED | OUTPATIENT
Start: 2023-10-31 | End: 2023-11-30

## 2023-10-31 NOTE — TELEPHONE ENCOUNTER
Please advise re: recent lab results  Pt asking for an appetite suppression medication  S/P sleeve

## 2023-11-06 LAB
OHS CV EVENT MONITOR DAY: 0
OHS CV HOLTER LENGTH DECIMAL HOURS: 72
OHS CV HOLTER LENGTH HOURS: 72
OHS CV HOLTER LENGTH MINUTES: 0
OHS CV HOLTER SINUS AVERAGE HR: 89
OHS CV HOLTER SINUS MAX HR: 153
OHS CV HOLTER SINUS MIN HR: 57

## 2023-11-08 ENCOUNTER — PATIENT MESSAGE (OUTPATIENT)
Dept: ADMINISTRATIVE | Facility: OTHER | Age: 53
End: 2023-11-08
Payer: COMMERCIAL

## 2023-11-14 ENCOUNTER — PATIENT MESSAGE (OUTPATIENT)
Dept: BARIATRICS | Facility: CLINIC | Age: 53
End: 2023-11-14
Payer: COMMERCIAL

## 2023-11-15 ENCOUNTER — TELEPHONE (OUTPATIENT)
Dept: PHARMACY | Facility: CLINIC | Age: 53
End: 2023-11-15
Payer: COMMERCIAL

## 2023-11-15 ENCOUNTER — TELEPHONE (OUTPATIENT)
Dept: CARDIOLOGY | Facility: CLINIC | Age: 53
End: 2023-11-15
Payer: COMMERCIAL

## 2023-11-15 NOTE — TELEPHONE ENCOUNTER
----- Message from Ace Robb sent at 11/15/2023 12:06 PM CST -----  Contact: Self  Type:  Patient Returning Call    Who Called:  PT  Who Left Message for Patient:  Neena  Does the patient know what this is regarding?:  Yes  Best Call Back Number:  [phone]  Additional Information:

## 2023-11-15 NOTE — TELEPHONE ENCOUNTER
Spoke to pt and informed her of Holter results and recommendation: she is going to think about the beta blocker and call or message the clinic back

## 2023-11-16 ENCOUNTER — PATIENT MESSAGE (OUTPATIENT)
Dept: CARDIOLOGY | Facility: CLINIC | Age: 53
End: 2023-11-16
Payer: COMMERCIAL

## 2023-11-20 RX ORDER — LEVOTHYROXINE SODIUM 125 UG/1
TABLET ORAL
Qty: 180 TABLET | Refills: 1 | OUTPATIENT
Start: 2023-11-20

## 2023-11-20 NOTE — TELEPHONE ENCOUNTER
No care due was identified.  [facility] Embedded Care Due Messages. Reference number: [id number].   11/20/2023 8:10:20 AM CST

## 2023-11-20 NOTE — TELEPHONE ENCOUNTER
Refill Decision Note   Christine Alfaro  is requesting a refill authorization.  Brief Assessment and Rationale for Refill:  Quick Discontinue     Medication Therapy Plan:       Medication Reconciliation Completed: No   Comments:     No Care Gaps recommended.     Note composed:11:37 AM 11/20/2023

## 2023-12-03 ENCOUNTER — PATIENT MESSAGE (OUTPATIENT)
Dept: ADMINISTRATIVE | Facility: OTHER | Age: 53
End: 2023-12-03
Payer: COMMERCIAL

## 2023-12-12 ENCOUNTER — PATIENT MESSAGE (OUTPATIENT)
Dept: BARIATRICS | Facility: CLINIC | Age: 53
End: 2023-12-12
Payer: COMMERCIAL

## 2023-12-13 ENCOUNTER — PATIENT MESSAGE (OUTPATIENT)
Dept: BARIATRICS | Facility: CLINIC | Age: 53
End: 2023-12-13
Payer: COMMERCIAL

## 2023-12-24 ENCOUNTER — PATIENT MESSAGE (OUTPATIENT)
Dept: DERMATOLOGY | Facility: CLINIC | Age: 53
End: 2023-12-24
Payer: COMMERCIAL

## 2023-12-26 RX ORDER — ECONAZOLE NITRATE 10 MG/G
CREAM TOPICAL
Qty: 30 G | Refills: 2 | Status: SHIPPED | OUTPATIENT
Start: 2023-12-26

## 2023-12-27 ENCOUNTER — TELEPHONE (OUTPATIENT)
Dept: BARIATRICS | Facility: CLINIC | Age: 53
End: 2023-12-27
Payer: COMMERCIAL

## 2023-12-27 NOTE — TELEPHONE ENCOUNTER
Left message on patient's voice mail stating we can schedule an appt with the practitioner on the same date she has with the dietician on 1/30.  I stated I will also send her a My Chart message.  If that date is not acceptable she can either message me or call us at the office number given and I would be happy to help her.         ----- Message from Sheela Weinstein sent at 12/27/2023 12:41 PM CST -----  Contact: [phone]  Christine Alfaro calling regarding Patient Advice (message) Pt asking for a call back to discuss if she was suppose to have a 1yr f/u she states she thought it was schedule but do not see it in the portal asking to speak with the nurse

## 2023-12-29 ENCOUNTER — PATIENT MESSAGE (OUTPATIENT)
Dept: ADMINISTRATIVE | Facility: OTHER | Age: 53
End: 2023-12-29
Payer: COMMERCIAL

## 2024-01-03 ENCOUNTER — TELEPHONE (OUTPATIENT)
Dept: FAMILY MEDICINE | Facility: CLINIC | Age: 54
End: 2024-01-03
Payer: COMMERCIAL

## 2024-01-04 ENCOUNTER — PATIENT MESSAGE (OUTPATIENT)
Dept: FAMILY MEDICINE | Facility: CLINIC | Age: 54
End: 2024-01-04
Payer: COMMERCIAL

## 2024-01-04 NOTE — TELEPHONE ENCOUNTER
----- Message from Day Barrios sent at 1/3/2024  1:11 PM CST -----  Contact: alicia  Type: Needs Medical Advice  Who Called:  alicia (Dental office)  Best Call Back Number: [phone]  Additional Information: Alicia is calling needs medical clearance pt has appt 1/12 for volume.Please call back and advise.

## 2024-01-05 DIAGNOSIS — R60.9 EDEMA, UNSPECIFIED TYPE: ICD-10-CM

## 2024-01-05 DIAGNOSIS — L40.50 PSORIATIC ARTHRITIS: ICD-10-CM

## 2024-01-06 ENCOUNTER — PATIENT MESSAGE (OUTPATIENT)
Dept: RHEUMATOLOGY | Facility: CLINIC | Age: 54
End: 2024-01-06
Payer: COMMERCIAL

## 2024-01-06 DIAGNOSIS — L40.50 PSORIATIC ARTHRITIS: Primary | ICD-10-CM

## 2024-01-06 RX ORDER — FOLIC ACID 1 MG/1
1 TABLET ORAL DAILY
Qty: 30 TABLET | Refills: 11 | Status: SHIPPED | OUTPATIENT
Start: 2024-01-06 | End: 2025-01-05

## 2024-01-06 RX ORDER — METHOTREXATE 2.5 MG/1
20 TABLET ORAL
Qty: 105 TABLET | Refills: 0 | Status: SHIPPED | OUTPATIENT
Start: 2024-01-06

## 2024-01-08 RX ORDER — FUROSEMIDE 20 MG/1
20 TABLET ORAL DAILY
Qty: 90 TABLET | Refills: 1 | Status: SHIPPED | OUTPATIENT
Start: 2024-01-08

## 2024-01-08 NOTE — TELEPHONE ENCOUNTER
Fill out release and fax to dentist office, if I need a signature on it, let me know in put it on my desk to sign

## 2024-01-09 ENCOUNTER — PATIENT MESSAGE (OUTPATIENT)
Dept: BARIATRICS | Facility: CLINIC | Age: 54
End: 2024-01-09
Payer: COMMERCIAL

## 2024-01-10 ENCOUNTER — LAB VISIT (OUTPATIENT)
Dept: LAB | Facility: HOSPITAL | Age: 54
End: 2024-01-10
Payer: COMMERCIAL

## 2024-01-10 DIAGNOSIS — L40.50 PSORIATIC ARTHRITIS: ICD-10-CM

## 2024-01-10 LAB
ALBUMIN SERPL BCP-MCNC: 3.5 G/DL (ref 3.5–5.2)
ALP SERPL-CCNC: 82 U/L (ref 55–135)
ALT SERPL W/O P-5'-P-CCNC: 23 U/L (ref 10–44)
ANION GAP SERPL CALC-SCNC: 8 MMOL/L (ref 8–16)
AST SERPL-CCNC: 17 U/L (ref 10–40)
BASOPHILS # BLD AUTO: 0.07 K/UL (ref 0–0.2)
BASOPHILS NFR BLD: 1 % (ref 0–1.9)
BILIRUB SERPL-MCNC: 0.3 MG/DL (ref 0.1–1)
BUN SERPL-MCNC: 11 MG/DL (ref 6–20)
C3 SERPL-MCNC: 134 MG/DL (ref 50–180)
C4 SERPL-MCNC: 27 MG/DL (ref 11–44)
CALCIUM SERPL-MCNC: 9.2 MG/DL (ref 8.7–10.5)
CHLORIDE SERPL-SCNC: 104 MMOL/L (ref 95–110)
CO2 SERPL-SCNC: 27 MMOL/L (ref 23–29)
CREAT SERPL-MCNC: 0.7 MG/DL (ref 0.5–1.4)
CRP SERPL-MCNC: 2.8 MG/L (ref 0–8.2)
DIFFERENTIAL METHOD BLD: ABNORMAL
EOSINOPHIL # BLD AUTO: 0.2 K/UL (ref 0–0.5)
EOSINOPHIL NFR BLD: 2.8 % (ref 0–8)
ERYTHROCYTE [DISTWIDTH] IN BLOOD BY AUTOMATED COUNT: 14.2 % (ref 11.5–14.5)
ERYTHROCYTE [SEDIMENTATION RATE] IN BLOOD BY WESTERGREN METHOD: 10 MM/HR (ref 0–20)
EST. GFR  (NO RACE VARIABLE): >60 ML/MIN/1.73 M^2
GLUCOSE SERPL-MCNC: 76 MG/DL (ref 70–110)
HBV CORE AB SERPL QL IA: NORMAL
HBV SURFACE AG SERPL QL IA: NORMAL
HCT VFR BLD AUTO: 44.8 % (ref 37–48.5)
HGB BLD-MCNC: 14.3 G/DL (ref 12–16)
IMM GRANULOCYTES # BLD AUTO: 0.02 K/UL (ref 0–0.04)
IMM GRANULOCYTES NFR BLD AUTO: 0.3 % (ref 0–0.5)
LYMPHOCYTES # BLD AUTO: 2.7 K/UL (ref 1–4.8)
LYMPHOCYTES NFR BLD: 39.1 % (ref 18–48)
MCH RBC QN AUTO: 30.4 PG (ref 27–31)
MCHC RBC AUTO-ENTMCNC: 31.9 G/DL (ref 32–36)
MCV RBC AUTO: 95 FL (ref 82–98)
MONOCYTES # BLD AUTO: 0.6 K/UL (ref 0.3–1)
MONOCYTES NFR BLD: 9.5 % (ref 4–15)
NEUTROPHILS # BLD AUTO: 3.2 K/UL (ref 1.8–7.7)
NEUTROPHILS NFR BLD: 47.3 % (ref 38–73)
NRBC BLD-RTO: 0 /100 WBC
PLATELET # BLD AUTO: 243 K/UL (ref 150–450)
PMV BLD AUTO: 10.7 FL (ref 9.2–12.9)
POTASSIUM SERPL-SCNC: 4.2 MMOL/L (ref 3.5–5.1)
PROT SERPL-MCNC: 6.6 G/DL (ref 6–8.4)
RBC # BLD AUTO: 4.71 M/UL (ref 4–5.4)
SODIUM SERPL-SCNC: 139 MMOL/L (ref 136–145)
WBC # BLD AUTO: 6.77 K/UL (ref 3.9–12.7)

## 2024-01-10 PROCEDURE — 87340 HEPATITIS B SURFACE AG IA: CPT | Performed by: INTERNAL MEDICINE

## 2024-01-10 PROCEDURE — 85025 COMPLETE CBC W/AUTO DIFF WBC: CPT | Performed by: INTERNAL MEDICINE

## 2024-01-10 PROCEDURE — 80053 COMPREHEN METABOLIC PANEL: CPT | Performed by: INTERNAL MEDICINE

## 2024-01-10 PROCEDURE — 86160 COMPLEMENT ANTIGEN: CPT | Performed by: INTERNAL MEDICINE

## 2024-01-10 PROCEDURE — 86704 HEP B CORE ANTIBODY TOTAL: CPT | Performed by: INTERNAL MEDICINE

## 2024-01-10 PROCEDURE — 86160 COMPLEMENT ANTIGEN: CPT | Mod: 59 | Performed by: INTERNAL MEDICINE

## 2024-01-10 PROCEDURE — 86225 DNA ANTIBODY NATIVE: CPT | Mod: 59 | Performed by: INTERNAL MEDICINE

## 2024-01-10 PROCEDURE — 85651 RBC SED RATE NONAUTOMATED: CPT | Mod: PO | Performed by: INTERNAL MEDICINE

## 2024-01-10 PROCEDURE — 86140 C-REACTIVE PROTEIN: CPT | Performed by: INTERNAL MEDICINE

## 2024-01-10 PROCEDURE — 86225 DNA ANTIBODY NATIVE: CPT | Performed by: INTERNAL MEDICINE

## 2024-01-10 PROCEDURE — 36415 COLL VENOUS BLD VENIPUNCTURE: CPT | Mod: PO | Performed by: INTERNAL MEDICINE

## 2024-01-11 ENCOUNTER — TELEPHONE (OUTPATIENT)
Dept: FAMILY MEDICINE | Facility: CLINIC | Age: 54
End: 2024-01-11
Payer: COMMERCIAL

## 2024-01-11 ENCOUNTER — PATIENT MESSAGE (OUTPATIENT)
Dept: FAMILY MEDICINE | Facility: CLINIC | Age: 54
End: 2024-01-11
Payer: COMMERCIAL

## 2024-01-11 DIAGNOSIS — F40.232 PHOBIA OF DENTAL PROCEDURE: Primary | ICD-10-CM

## 2024-01-11 RX ORDER — DIAZEPAM 10 MG/1
10 TABLET ORAL ONCE
Qty: 1 TABLET | Refills: 0 | Status: SHIPPED | OUTPATIENT
Start: 2024-01-11 | End: 2024-01-11

## 2024-01-11 NOTE — TELEPHONE ENCOUNTER
----- Message from Crarie Del Cid sent at 1/10/2024  2:35 PM CST -----  Contact: Christine / [phone]  .Type: Needs Medical Advice  Who Called:  pt  Pharmacy name and phone #:    [facility] - [place], [place] - [address]  [address] [facility]  [place] [address]  Phone: [phone] Fax: [phone]    Best Call Back Number: Christine / [phone]  Additional Information: Dentist is not comfortable scheduling dental procedure due to all the meds pt is on. Record release request was sent to Dr office to be sign and faxed over for auth and/or if this is something that can be Rx - valium 10 mg for 1 time dose. Please call back to advise.

## 2024-01-11 NOTE — TELEPHONE ENCOUNTER
----- Message from Adore Soares sent at 1/11/2024 10:53 AM CST -----  Regarding: medical release  Contact: pt  Type:  Needs Medical Advice    Who Called: pt  Would the patient rather a call back or a response via [facility]? Call back  Best Call Back Number: [phone]    Additional Information: sts she would like to speak to a nurse

## 2024-01-12 LAB
DNA TITER: NORMAL
DSDNA AB SER-ACNC: POSITIVE [IU]/ML

## 2024-01-23 ENCOUNTER — PATIENT MESSAGE (OUTPATIENT)
Dept: RHEUMATOLOGY | Facility: CLINIC | Age: 54
End: 2024-01-23
Payer: COMMERCIAL

## 2024-01-28 ENCOUNTER — PATIENT MESSAGE (OUTPATIENT)
Dept: ADMINISTRATIVE | Facility: OTHER | Age: 54
End: 2024-01-28
Payer: COMMERCIAL

## 2024-01-30 ENCOUNTER — LAB VISIT (OUTPATIENT)
Dept: LAB | Facility: HOSPITAL | Age: 54
End: 2024-01-30
Payer: COMMERCIAL

## 2024-01-30 ENCOUNTER — CLINICAL SUPPORT (OUTPATIENT)
Dept: BARIATRICS | Facility: CLINIC | Age: 54
End: 2024-01-30
Payer: COMMERCIAL

## 2024-01-30 ENCOUNTER — OFFICE VISIT (OUTPATIENT)
Dept: BARIATRICS | Facility: CLINIC | Age: 54
End: 2024-01-30
Payer: COMMERCIAL

## 2024-01-30 VITALS — WEIGHT: 252.44 LBS | BODY MASS INDEX: 42.01 KG/M2

## 2024-01-30 VITALS
BODY MASS INDEX: 40.5 KG/M2 | HEIGHT: 66 IN | WEIGHT: 252 LBS | SYSTOLIC BLOOD PRESSURE: 110 MMHG | HEART RATE: 65 BPM | OXYGEN SATURATION: 100 % | DIASTOLIC BLOOD PRESSURE: 72 MMHG

## 2024-01-30 DIAGNOSIS — R63.4 WEIGHT LOSS: ICD-10-CM

## 2024-01-30 DIAGNOSIS — Z71.3 DIETARY COUNSELING AND SURVEILLANCE: ICD-10-CM

## 2024-01-30 DIAGNOSIS — G47.33 OSA ON CPAP: ICD-10-CM

## 2024-01-30 DIAGNOSIS — Z98.84 S/P BARIATRIC SURGERY: ICD-10-CM

## 2024-01-30 DIAGNOSIS — Z98.890 S/P GASTRIC SURGERY: ICD-10-CM

## 2024-01-30 DIAGNOSIS — E78.5 DYSLIPIDEMIA: ICD-10-CM

## 2024-01-30 DIAGNOSIS — I10 ESSENTIAL HYPERTENSION: ICD-10-CM

## 2024-01-30 DIAGNOSIS — K21.9 GASTROESOPHAGEAL REFLUX DISEASE, UNSPECIFIED WHETHER ESOPHAGITIS PRESENT: ICD-10-CM

## 2024-01-30 DIAGNOSIS — R63.4 WEIGHT LOSS: Primary | ICD-10-CM

## 2024-01-30 DIAGNOSIS — E66.01 MORBID OBESITY WITH BMI OF 40.0-44.9, ADULT: ICD-10-CM

## 2024-01-30 DIAGNOSIS — I10 ESSENTIAL HYPERTENSION: Primary | ICD-10-CM

## 2024-01-30 DIAGNOSIS — Z98.890 POST-OPERATIVE STATE: ICD-10-CM

## 2024-01-30 LAB
25(OH)D3+25(OH)D2 SERPL-MCNC: 39 NG/ML (ref 30–96)
ALBUMIN SERPL BCP-MCNC: 3.8 G/DL (ref 3.5–5.2)
ALP SERPL-CCNC: 92 U/L (ref 55–135)
ALT SERPL W/O P-5'-P-CCNC: 27 U/L (ref 10–44)
ANION GAP SERPL CALC-SCNC: 5 MMOL/L (ref 8–16)
AST SERPL-CCNC: 17 U/L (ref 10–40)
BASOPHILS # BLD AUTO: 0.07 K/UL (ref 0–0.2)
BASOPHILS NFR BLD: 1 % (ref 0–1.9)
BILIRUB SERPL-MCNC: 0.5 MG/DL (ref 0.1–1)
BUN SERPL-MCNC: 13 MG/DL (ref 6–20)
CALCIUM SERPL-MCNC: 10.1 MG/DL (ref 8.7–10.5)
CHLORIDE SERPL-SCNC: 104 MMOL/L (ref 95–110)
CHOLEST SERPL-MCNC: 142 MG/DL (ref 120–199)
CHOLEST/HDLC SERPL: 2.1 {RATIO} (ref 2–5)
CO2 SERPL-SCNC: 31 MMOL/L (ref 23–29)
CREAT SERPL-MCNC: 0.8 MG/DL (ref 0.5–1.4)
DIFFERENTIAL METHOD BLD: NORMAL
EOSINOPHIL # BLD AUTO: 0.2 K/UL (ref 0–0.5)
EOSINOPHIL NFR BLD: 2.8 % (ref 0–8)
ERYTHROCYTE [DISTWIDTH] IN BLOOD BY AUTOMATED COUNT: 13.6 % (ref 11.5–14.5)
EST. GFR  (NO RACE VARIABLE): >60 ML/MIN/1.73 M^2
GLUCOSE SERPL-MCNC: 85 MG/DL (ref 70–110)
HCT VFR BLD AUTO: 44.6 % (ref 37–48.5)
HDLC SERPL-MCNC: 67 MG/DL (ref 40–75)
HDLC SERPL: 47.2 % (ref 20–50)
HGB BLD-MCNC: 14.7 G/DL (ref 12–16)
IMM GRANULOCYTES # BLD AUTO: 0.02 K/UL (ref 0–0.04)
IMM GRANULOCYTES NFR BLD AUTO: 0.3 % (ref 0–0.5)
IRON SERPL-MCNC: 60 UG/DL (ref 30–160)
LDLC SERPL CALC-MCNC: 66.8 MG/DL (ref 63–159)
LYMPHOCYTES # BLD AUTO: 2.3 K/UL (ref 1–4.8)
LYMPHOCYTES NFR BLD: 33.6 % (ref 18–48)
MCH RBC QN AUTO: 29.8 PG (ref 27–31)
MCHC RBC AUTO-ENTMCNC: 33 G/DL (ref 32–36)
MCV RBC AUTO: 91 FL (ref 82–98)
MONOCYTES # BLD AUTO: 0.6 K/UL (ref 0.3–1)
MONOCYTES NFR BLD: 8.9 % (ref 4–15)
NEUTROPHILS # BLD AUTO: 3.6 K/UL (ref 1.8–7.7)
NEUTROPHILS NFR BLD: 53.4 % (ref 38–73)
NONHDLC SERPL-MCNC: 75 MG/DL
NRBC BLD-RTO: 0 /100 WBC
PLATELET # BLD AUTO: 216 K/UL (ref 150–450)
PMV BLD AUTO: 9.8 FL (ref 9.2–12.9)
POTASSIUM SERPL-SCNC: 4.8 MMOL/L (ref 3.5–5.1)
PROT SERPL-MCNC: 7 G/DL (ref 6–8.4)
RBC # BLD AUTO: 4.93 M/UL (ref 4–5.4)
SATURATED IRON: 16 % (ref 20–50)
SODIUM SERPL-SCNC: 140 MMOL/L (ref 136–145)
TOTAL IRON BINDING CAPACITY: 382 UG/DL (ref 250–450)
TRANSFERRIN SERPL-MCNC: 258 MG/DL (ref 200–375)
TRIGL SERPL-MCNC: 41 MG/DL (ref 30–150)
VIT B12 SERPL-MCNC: 1523 PG/ML (ref 210–950)
WBC # BLD AUTO: 6.72 K/UL (ref 3.9–12.7)

## 2024-01-30 PROCEDURE — 80061 LIPID PANEL: CPT | Performed by: PHYSICIAN ASSISTANT

## 2024-01-30 PROCEDURE — 1160F RVW MEDS BY RX/DR IN RCRD: CPT | Mod: CPTII,S$GLB,, | Performed by: PHYSICIAN ASSISTANT

## 2024-01-30 PROCEDURE — 3074F SYST BP LT 130 MM HG: CPT | Mod: CPTII,S$GLB,, | Performed by: PHYSICIAN ASSISTANT

## 2024-01-30 PROCEDURE — 99999 PR PBB SHADOW E&M-EST. PATIENT-LVL V: CPT | Mod: PBBFAC,,, | Performed by: PHYSICIAN ASSISTANT

## 2024-01-30 PROCEDURE — 83540 ASSAY OF IRON: CPT | Performed by: PHYSICIAN ASSISTANT

## 2024-01-30 PROCEDURE — 99499 UNLISTED E&M SERVICE: CPT | Mod: S$GLB,,, | Performed by: DIETITIAN, REGISTERED

## 2024-01-30 PROCEDURE — 80053 COMPREHEN METABOLIC PANEL: CPT | Performed by: PHYSICIAN ASSISTANT

## 2024-01-30 PROCEDURE — 85025 COMPLETE CBC W/AUTO DIFF WBC: CPT | Performed by: PHYSICIAN ASSISTANT

## 2024-01-30 PROCEDURE — 99999 PR PBB SHADOW E&M-EST. PATIENT-LVL I: CPT | Mod: PBBFAC,,, | Performed by: DIETITIAN, REGISTERED

## 2024-01-30 PROCEDURE — 1159F MED LIST DOCD IN RCRD: CPT | Mod: CPTII,S$GLB,, | Performed by: PHYSICIAN ASSISTANT

## 2024-01-30 PROCEDURE — 82607 VITAMIN B-12: CPT | Performed by: PHYSICIAN ASSISTANT

## 2024-01-30 PROCEDURE — 82306 VITAMIN D 25 HYDROXY: CPT | Performed by: PHYSICIAN ASSISTANT

## 2024-01-30 PROCEDURE — 3008F BODY MASS INDEX DOCD: CPT | Mod: CPTII,S$GLB,, | Performed by: PHYSICIAN ASSISTANT

## 2024-01-30 PROCEDURE — 84425 ASSAY OF VITAMIN B-1: CPT | Performed by: PHYSICIAN ASSISTANT

## 2024-01-30 PROCEDURE — 3078F DIAST BP <80 MM HG: CPT | Mod: CPTII,S$GLB,, | Performed by: PHYSICIAN ASSISTANT

## 2024-01-30 PROCEDURE — 99213 OFFICE O/P EST LOW 20 MIN: CPT | Mod: S$GLB,,, | Performed by: PHYSICIAN ASSISTANT

## 2024-01-30 NOTE — PROGRESS NOTES
NUTRITION NOTE    Referring Physician: Titi Ribeiro M.D.  Reason for MNT Referral: Follow-up 1 years s/p Gastric Sleeve    Denies nausea, vomiting, constipation, and diarrhea.  Reports doing well.    Past Medical History:   Diagnosis Date    Anxiety     GERD (gastroesophageal reflux disease)     Hyperlipidemia     Hypertension     Hypothyroidism     Insomnia     Observed sleep apnea     Prediabetes     Rheumatoid arthritis, unspecified     Vitamin D deficiency      CLINICAL DATA:  53 y.o. female. 5 ft. 5 in.    Current Weight: 252 lbs  BMI: 42  Total Weight Loss: 89 lbs  Excess Weight Loss: 45%    Pre op wt: 341 lbs  EBW: 200 lbs    LABS:  Reviewed.    CURRENT DIET:  Bariatric Diet.  Diet Recall: 85 grams of protein/day; 48+ oz of fluids/day    - Isaac prot powder coffee flavor + water  - Rei Links cheese and salami 15g  L dining out with coworkers: grilled shrimp or steak, and then a bite or 2 of salad/tomato/cucumber, maybe a crouton, no salad dressing. Orders water/CL and will take a few sips  - sipping on water/CL  D: pork roast 3oz, with 6 carrot coins  - 1/2 Premier shake made into ice cream using [employer] Cream[employer]    Diet includes:  Meal Pattern: 3 meal(s) + 0-1 snack(s) + 1-2 protein supplement(s)  Adequate protein supplement intake.  Adequate dairy intake.  Adequate vegetable intake. Tolerates raw vegetables and lettuce.  Adequate fruit intake.  Starchy CHO: avoids/limits  Other: sometimes adds sf dark chocolate chips to [employer] Cream[employer] ice cream prot shake to bring to birthday parties    EXERCISE:  Riding stationary bike 30 min almost daily  Has gym membership and goes 2 days/week - weight machines circuit    Restrictions to Exercise: None.    VITAMINS / MINERALS:  Multivitamins with Iron  B-Complex  Calcium Citrate + Vitamin D  Vitamin B12: Sublingual. 2500mcg weekly    ASSESSMENT:  Doing well overall.  Weight loss at 45% EWL.  Adequate calorie intake.  Adequate protein intake.  Adequate fluid  intake.  Following diet appropriately.  Exercising.  Adequate vitamins & minerals.    BARIATRIC DIET DISCUSSION:  Instructed and provided written materials on bariatric diet plan.  Reinforced post-op nutrition guidelines.    PLAN / RECOMMENDATIONS:  Continue excellent diet plan.  Maintain protein intake.  Maintain fluid intake.  Continue exercise.  Continue appropriate vitamins & minerals.    Return to clinic in 1 years.    SESSION TIME: 30 minutes

## 2024-01-30 NOTE — PROGRESS NOTES
BARIATRIC POST-OPERATIVE VISIT:    HPI:  Christine Alfaro is a 53 y.o. year old female presents for 12 month post op visit following s/p sleeve.  she is doing well and tolerating the diet without difficulty.    Pt states that she is doing well   Will most likely start steroids for her lupus as her titers are higher   Stays on for about 6 weeks, traditionally steroids make her hungry.       Denies: nausea, vomiting, abdominal pain, changes in bowel movement pattern, fever, chills, dysphagia, chest pain, and shortness of breath.    Review of Systems   Constitutional:  Negative for fatigue and fever.   HENT:  Negative for tinnitus and trouble swallowing.    Eyes:  Negative for visual disturbance.   Respiratory:  Negative for cough and shortness of breath.    Cardiovascular:  Negative for chest pain, palpitations and leg swelling.   Gastrointestinal:  Negative for abdominal pain, constipation, diarrhea, nausea and vomiting.   Genitourinary:  Negative for decreased urine volume and dysuria.   Skin:  Negative for rash.   Neurological:  Negative for dizziness, light-headedness and headaches.   Psychiatric/Behavioral:  Negative for dysphoric mood, sleep disturbance and suicidal ideas. The patient is not nervous/anxious.        EXERCISE & VITAMINS:  See Bariatric Assessment  Adherent to vitamin regimen   Stationary bike x 3 30 minutes   Weight training   MEDICATIONS/ALLERGIES:  Have been reviewed.    DIET: Bariatric Diet. ~90 grams protein.  36-42 oz fl oz SF clear beverage.  See Dietician note from today for a more detailed assessment.    3 meals 2 snacks    Physical Exam  Vitals reviewed.   Constitutional:       Appearance: She is obese.   HENT:      Head: Normocephalic and atraumatic.   Eyes:      Extraocular Movements: Extraocular movements intact.      Conjunctiva/sclera: Conjunctivae normal.      Pupils: Pupils are equal, round, and reactive to light.   Cardiovascular:      Rate and Rhythm: Normal rate and regular  rhythm.      Pulses: Normal pulses.      Heart sounds: Normal heart sounds.   Pulmonary:      Effort: Pulmonary effort is normal. No respiratory distress.      Breath sounds: Normal breath sounds.   Abdominal:      General: Bowel sounds are normal.      Palpations: Abdomen is soft.      Tenderness: There is no abdominal tenderness.   Musculoskeletal:         General: No swelling or tenderness. Normal range of motion.      Cervical back: Normal range of motion and neck supple.   Skin:     General: Skin is dry.      Capillary Refill: Capillary refill takes less than 2 seconds.      Coloration: Skin is not jaundiced.   Neurological:      General: No focal deficit present.      Mental Status: She is alert and oriented to person, place, and time.   Psychiatric:         Mood and Affect: Mood normal.         Behavior: Behavior normal.         Thought Content: Thought content normal.         Judgment: Judgment normal.         ASSESSMENT:  - Morbid obesity s/p sleeve gastrectomy   - Co-morbidities: dyslipidemia, hypertension, and obstructive sleep apnea  - Good Weight loss, 89#'s 45%EW  - Good Exercise routine  - Good Diet  - Good Vitamin regimen    PLAN:  - Miralax daily for constipation  - Emphasized the importance of regular exercise and adherence to bariatric diet to achieve maximum weight loss.  - Encouraged patient to start/continue regular exercise.  - Follow-up with dietician to advance diet.  - Continue daily vitamins and medications.  - RTC per post op schedule  - Call the office for any issues.  - Check labs today.

## 2024-02-01 ENCOUNTER — OFFICE VISIT (OUTPATIENT)
Dept: RHEUMATOLOGY | Facility: CLINIC | Age: 54
End: 2024-02-01
Payer: COMMERCIAL

## 2024-02-01 ENCOUNTER — LAB VISIT (OUTPATIENT)
Dept: LAB | Facility: HOSPITAL | Age: 54
End: 2024-02-01
Attending: INTERNAL MEDICINE
Payer: COMMERCIAL

## 2024-02-01 ENCOUNTER — PATIENT MESSAGE (OUTPATIENT)
Dept: RHEUMATOLOGY | Facility: CLINIC | Age: 54
End: 2024-02-01

## 2024-02-01 VITALS
HEART RATE: 84 BPM | WEIGHT: 250 LBS | SYSTOLIC BLOOD PRESSURE: 101 MMHG | HEIGHT: 65 IN | DIASTOLIC BLOOD PRESSURE: 59 MMHG | BODY MASS INDEX: 41.65 KG/M2

## 2024-02-01 DIAGNOSIS — L40.50 PSORIATIC ARTHRITIS: Primary | ICD-10-CM

## 2024-02-01 DIAGNOSIS — L40.50 PSORIATIC ARTHRITIS: ICD-10-CM

## 2024-02-01 DIAGNOSIS — Z79.899 DRUG-INDUCED IMMUNODEFICIENCY: ICD-10-CM

## 2024-02-01 DIAGNOSIS — E66.01 OBESITY, MORBID, BMI 50 OR HIGHER: ICD-10-CM

## 2024-02-01 DIAGNOSIS — D84.821 DRUG-INDUCED IMMUNODEFICIENCY: ICD-10-CM

## 2024-02-01 LAB — URATE SERPL-MCNC: 3.5 MG/DL (ref 2.4–5.7)

## 2024-02-01 PROCEDURE — 84550 ASSAY OF BLOOD/URIC ACID: CPT | Mod: 91 | Performed by: INTERNAL MEDICINE

## 2024-02-01 PROCEDURE — 3078F DIAST BP <80 MM HG: CPT | Mod: CPTII,S$GLB,, | Performed by: INTERNAL MEDICINE

## 2024-02-01 PROCEDURE — 1159F MED LIST DOCD IN RCRD: CPT | Mod: CPTII,S$GLB,, | Performed by: INTERNAL MEDICINE

## 2024-02-01 PROCEDURE — 99999 PR PBB SHADOW E&M-EST. PATIENT-LVL II: CPT | Mod: PBBFAC,,, | Performed by: INTERNAL MEDICINE

## 2024-02-01 PROCEDURE — 3008F BODY MASS INDEX DOCD: CPT | Mod: CPTII,S$GLB,, | Performed by: INTERNAL MEDICINE

## 2024-02-01 PROCEDURE — 36415 COLL VENOUS BLD VENIPUNCTURE: CPT | Performed by: INTERNAL MEDICINE

## 2024-02-01 PROCEDURE — 99215 OFFICE O/P EST HI 40 MIN: CPT | Mod: S$GLB,,, | Performed by: INTERNAL MEDICINE

## 2024-02-01 PROCEDURE — 3074F SYST BP LT 130 MM HG: CPT | Mod: CPTII,S$GLB,, | Performed by: INTERNAL MEDICINE

## 2024-02-01 PROCEDURE — 1160F RVW MEDS BY RX/DR IN RCRD: CPT | Mod: CPTII,S$GLB,, | Performed by: INTERNAL MEDICINE

## 2024-02-01 RX ORDER — GABAPENTIN 300 MG/1
300 CAPSULE ORAL 3 TIMES DAILY
Qty: 90 CAPSULE | Refills: 11 | Status: SHIPPED | OUTPATIENT
Start: 2024-02-01

## 2024-02-01 RX ORDER — METHOTREXATE 25 MG/.5ML
25 INJECTION, SOLUTION SUBCUTANEOUS
Qty: 4 EACH | Refills: 0 | Status: ACTIVE | OUTPATIENT
Start: 2024-02-01 | End: 2024-04-24 | Stop reason: SDUPTHER

## 2024-02-01 NOTE — PROGRESS NOTES
Subjective:       Patient ID: Christine Alfaro is a 50 y.o. female.    Chief Complaint: Disease Management    HPI 50 year old with PMH of Hashimotos disease, psoriasis,HTN, HL, ETELVINA , GERD, mitral valve regurgitation, left ankle spur removal  here for evaluation. She has been on cpap for many years.  She does not wake up well rested. She has been tired for years, but worse over the last 8 months. Last year, she started to have joint pain. She is having pain in toes, fingers, left knee, elbows, right jaw. Pain level is as high as 7/10, aching and non radiating. Reports swelling in left ankle and feet. Reports swelling in feet and lower legs for a year. In morning, she reports her hands are swollen. She has pain in mid to lower back since she was 18. She gets pustules on occasion in her hands for a year. She is getting mouth ulcer where she has dental bridge.  She clenches her teeth at night. Denies malar rash. She has dry eyes and dry mouth for last year. She takes ibuprofen 800mg po BID prn for last year to help her joint pain. It helps her take edge off.  She has had psoriasis for 3 years.  Reports dry cough for last year. Reports photosensitivity for 3 years. Denies raynauds. Denies history of thrombosis. She has had 2 miscarriages. She reports she had one miscarriage at 6 months due to blood incompatibility.  She had another miscarriage at 2.5 months. She thinks her thyroid might not have been well controlled. Over the last year and half, she has had chest pain with coughing and sometimes it can radiate to back.    She was able to have 2 healthy pregnancies.      Family hx: uncle: RA  Mother:psoriasis       Interval history: She had gastric sleeve on FEB 2 2023.She is taking plaquenil 200mg po BID.She has been on Cosentyx.   She is taking MTX 8 pills once a week.She has lesions of psoriasis in scalp and eyebrows. She is 4/10. She is having pain in right foot .  Pain is worse at night.   Denies joint  "swelling.  Past Medical History:   Diagnosis Date    Anxiety     GERD (gastroesophageal reflux disease)     Hyperlipidemia     Hypertension     Hypothyroidism     Insomnia     Observed sleep apnea     Vitamin D deficiency        Review of Systems   Constitutional: Positive for fatigue. Negative for activity change, appetite change, chills, diaphoresis, fever and unexpected weight change.   HENT: Negative for congestion, ear discharge, ear pain, facial swelling, mouth sores, sinus pressure, sneezing, sore throat, tinnitus and trouble swallowing.    Eyes: Negative for photophobia, pain, discharge, redness, itching and visual disturbance.   Respiratory: Negative for apnea, cough, chest tightness, shortness of breath, wheezing and stridor.    Cardiovascular: Negative for chest pain and leg swelling.   Gastrointestinal: Negative for abdominal distention, abdominal pain, anal bleeding, blood in stool, constipation, diarrhea and nausea.   Endocrine: Negative for cold intolerance and heat intolerance.   Genitourinary: Negative for difficulty urinating, dysuria and genital sores.   Musculoskeletal: Positive for arthralgias, back pain, gait problem and joint swelling. Negative for neck pain and neck stiffness.   Skin: Positive for rash. Negative for color change, pallor and wound.   Neurological: Negative for dizziness, seizures, light-headedness, numbness and headaches.   Hematological: Negative for adenopathy. Does not bruise/bleed easily.   Psychiatric/Behavioral: Negative for sleep disturbance. The patient is not nervous/anxious.            Objective:   BP (!) 150/85   Pulse 84   Ht 5' 6" (1.676 m)   Wt (!) 154.4 kg (340 lb 6.2 oz)   BMI 54.94 kg/m²      Physical Exam   Constitutional: She is oriented to person, place, and time.   HENT:   Head: Normocephalic and atraumatic.   Right Ear: External ear normal.   Left Ear: External ear normal.   Nose: Nose normal.   Mouth/Throat: Oropharynx is clear and moist. No " oropharyngeal exudate.   Eyes: Conjunctivae and EOM are normal. Pupils are equal, round, and reactive to light. Right eye exhibits no discharge. Left eye exhibits no discharge. No scleral icterus.   Neck: No JVD present. No thyromegaly present.   Cardiovascular: Normal rate, regular rhythm, normal heart sounds and intact distal pulses.  Exam reveals no gallop and no friction rub.    No murmur heard.  Pulmonary/Chest: Effort normal and breath sounds normal. No respiratory distress. She has no wheezes. She has no rales. She exhibits no tenderness.   Abdominal: Soft. Bowel sounds are normal. She exhibits no distension and no mass. There is no abdominal tenderness. There is no rebound and no guarding.   Lymphadenopathy:     She has no cervical adenopathy.   Neurological: She is alert and oriented to person, place, and time. She displays normal reflexes. No cranial nerve deficit. She exhibits normal muscle tone. Gait normal. Coordination normal.   Skin: Skin is dry. Rash noted. No erythema. No pallor.     Rash in forearms   Psychiatric: Affect and judgment normal.   Musculoskeletal: Tenderness present. No deformity or edema.          No data to display     Assessment:     53 year old with PMH of Hashimotos disease  ,HTN, HL, ETELVINA ,  gastric sleeve, GERD, mitral valve regurgitation, left ankle spur removal  here for follow up of SLE.Given +thu, dsdna,arthritis, photosensitivity,  Pleurisy, and fatigue, this is concerning for SLE. Her main issue is inflammatory arthritis.  GIVEN THAT SHE HAS SLE, cannot do anti- tnf.  She had initial improvement with Orencia infusions and MTX 20mg once a week but is flaring.  Will increase MTX to 25 mg once a week.          Plan:       Problem List Items Addressed This Visit       None          -continue Cosentyx 150 mg every 4 weeks   -change from MTX 20mg once a week to 25 mg IM once a week  Continue plaquenil 200mg po BID (last eye exam in Nov 2023-clear per patient).  Continue flexeril  10mg po qhs   -labs in 4 weeks  Continue folic acid 1 mg po qday  start  #obesity: encourage weight loss       45 * minutes of total time spent on the encounter, which includes face to face time and non-face to face time preparing to see the patient (eg, review of tests), Obtaining and/or reviewing separately obtained history, Documenting clinical information in the electronic or other health record, Independently interpreting results (not separately reported) and communicating results to the patient/family/caregiver, or Care coordination (not separately reported).

## 2024-02-02 ENCOUNTER — PATIENT MESSAGE (OUTPATIENT)
Dept: FAMILY MEDICINE | Facility: CLINIC | Age: 54
End: 2024-02-02
Payer: COMMERCIAL

## 2024-02-02 DIAGNOSIS — E87.5 HYPERKALEMIA: Primary | ICD-10-CM

## 2024-02-02 LAB — VIT B1 BLD-MCNC: 92 UG/L (ref 38–122)

## 2024-02-05 DIAGNOSIS — L40.50 PSORIATIC ARTHRITIS: Primary | ICD-10-CM

## 2024-02-06 PROBLEM — E66.01 OBESITY, MORBID, BMI 50 OR HIGHER: Status: ACTIVE | Noted: 2023-02-02

## 2024-02-13 DIAGNOSIS — F41.9 ANXIETY: ICD-10-CM

## 2024-02-13 NOTE — TELEPHONE ENCOUNTER
No care due was identified.  [facility] Embedded Care Due Messages. Reference number: [id number].   2/13/2024 8:01:44 AM CST

## 2024-02-14 ENCOUNTER — PATIENT MESSAGE (OUTPATIENT)
Dept: BARIATRICS | Facility: CLINIC | Age: 54
End: 2024-02-14
Payer: COMMERCIAL

## 2024-02-15 RX ORDER — BUPROPION HYDROCHLORIDE 100 MG/1
100 TABLET, EXTENDED RELEASE ORAL 2 TIMES DAILY
Qty: 180 TABLET | Refills: 0 | Status: SHIPPED | OUTPATIENT
Start: 2024-02-15 | End: 2024-05-22

## 2024-02-15 NOTE — TELEPHONE ENCOUNTER
Refill Routing Note   Medication(s) are not appropriate for processing by [facility] for the following reason(s):        No active prescription written by provider    [facility] action(s):  Defer               Appointments  past 12m or future 3m with PCP    Date Provider   Last Visit   9/8/2023 Layo Mccrary MD   Next Visit   Visit date not found Layo Mccrary MD   ED visits in past 90 days: 0        Note composed:7:11 PM 02/14/2024

## 2024-02-20 ENCOUNTER — PATIENT MESSAGE (OUTPATIENT)
Dept: BARIATRICS | Facility: CLINIC | Age: 54
End: 2024-02-20
Payer: COMMERCIAL

## 2024-02-26 ENCOUNTER — PATIENT MESSAGE (OUTPATIENT)
Dept: FAMILY MEDICINE | Facility: CLINIC | Age: 54
End: 2024-02-26
Payer: COMMERCIAL

## 2024-02-27 ENCOUNTER — PATIENT MESSAGE (OUTPATIENT)
Dept: BARIATRICS | Facility: CLINIC | Age: 54
End: 2024-02-27
Payer: COMMERCIAL

## 2024-02-29 ENCOUNTER — PATIENT MESSAGE (OUTPATIENT)
Dept: BARIATRICS | Facility: CLINIC | Age: 54
End: 2024-02-29
Payer: COMMERCIAL

## 2024-03-06 ENCOUNTER — PATIENT MESSAGE (OUTPATIENT)
Dept: BARIATRICS | Facility: CLINIC | Age: 54
End: 2024-03-06
Payer: COMMERCIAL

## 2024-03-11 ENCOUNTER — LAB VISIT (OUTPATIENT)
Dept: LAB | Facility: HOSPITAL | Age: 54
End: 2024-03-11
Attending: INTERNAL MEDICINE
Payer: COMMERCIAL

## 2024-03-11 DIAGNOSIS — L40.50 PSORIATIC ARTHRITIS: ICD-10-CM

## 2024-03-11 LAB
ALBUMIN SERPL BCP-MCNC: 3.8 G/DL (ref 3.5–5.2)
ALP SERPL-CCNC: 90 U/L (ref 55–135)
ALT SERPL W/O P-5'-P-CCNC: 38 U/L (ref 10–44)
ANION GAP SERPL CALC-SCNC: 6 MMOL/L (ref 8–16)
AST SERPL-CCNC: 26 U/L (ref 10–40)
BASOPHILS # BLD AUTO: 0.06 K/UL (ref 0–0.2)
BASOPHILS NFR BLD: 0.9 % (ref 0–1.9)
BILIRUB SERPL-MCNC: 0.5 MG/DL (ref 0.1–1)
BUN SERPL-MCNC: 14 MG/DL (ref 6–20)
CALCIUM SERPL-MCNC: 9.9 MG/DL (ref 8.7–10.5)
CHLORIDE SERPL-SCNC: 102 MMOL/L (ref 95–110)
CO2 SERPL-SCNC: 30 MMOL/L (ref 23–29)
CREAT SERPL-MCNC: 0.7 MG/DL (ref 0.5–1.4)
CRP SERPL-MCNC: 1.5 MG/L (ref 0–8.2)
DIFFERENTIAL METHOD BLD: ABNORMAL
EOSINOPHIL # BLD AUTO: 0.2 K/UL (ref 0–0.5)
EOSINOPHIL NFR BLD: 3 % (ref 0–8)
ERYTHROCYTE [DISTWIDTH] IN BLOOD BY AUTOMATED COUNT: 13.1 % (ref 11.5–14.5)
ERYTHROCYTE [SEDIMENTATION RATE] IN BLOOD BY WESTERGREN METHOD: 8 MM/HR (ref 0–20)
EST. GFR  (NO RACE VARIABLE): >60 ML/MIN/1.73 M^2
GLUCOSE SERPL-MCNC: 77 MG/DL (ref 70–110)
HCT VFR BLD AUTO: 47.2 % (ref 37–48.5)
HGB BLD-MCNC: 14.9 G/DL (ref 12–16)
IMM GRANULOCYTES # BLD AUTO: 0.02 K/UL (ref 0–0.04)
IMM GRANULOCYTES NFR BLD AUTO: 0.3 % (ref 0–0.5)
LYMPHOCYTES # BLD AUTO: 3.5 K/UL (ref 1–4.8)
LYMPHOCYTES NFR BLD: 49.7 % (ref 18–48)
MCH RBC QN AUTO: 29.9 PG (ref 27–31)
MCHC RBC AUTO-ENTMCNC: 31.6 G/DL (ref 32–36)
MCV RBC AUTO: 95 FL (ref 82–98)
MONOCYTES # BLD AUTO: 0.6 K/UL (ref 0.3–1)
MONOCYTES NFR BLD: 8.4 % (ref 4–15)
NEUTROPHILS # BLD AUTO: 2.7 K/UL (ref 1.8–7.7)
NEUTROPHILS NFR BLD: 37.7 % (ref 38–73)
NRBC BLD-RTO: 0 /100 WBC
PLATELET # BLD AUTO: 204 K/UL (ref 150–450)
PMV BLD AUTO: 10.9 FL (ref 9.2–12.9)
POTASSIUM SERPL-SCNC: 4.8 MMOL/L (ref 3.5–5.1)
PROT SERPL-MCNC: 6.9 G/DL (ref 6–8.4)
RBC # BLD AUTO: 4.98 M/UL (ref 4–5.4)
SODIUM SERPL-SCNC: 138 MMOL/L (ref 136–145)
WBC # BLD AUTO: 7.02 K/UL (ref 3.9–12.7)

## 2024-03-11 PROCEDURE — 85025 COMPLETE CBC W/AUTO DIFF WBC: CPT | Performed by: INTERNAL MEDICINE

## 2024-03-11 PROCEDURE — 80053 COMPREHEN METABOLIC PANEL: CPT | Performed by: INTERNAL MEDICINE

## 2024-03-11 PROCEDURE — 85651 RBC SED RATE NONAUTOMATED: CPT | Mod: PO | Performed by: INTERNAL MEDICINE

## 2024-03-11 PROCEDURE — 86140 C-REACTIVE PROTEIN: CPT | Performed by: INTERNAL MEDICINE

## 2024-03-11 PROCEDURE — 36415 COLL VENOUS BLD VENIPUNCTURE: CPT | Mod: PO | Performed by: INTERNAL MEDICINE

## 2024-03-15 DIAGNOSIS — I10 ESSENTIAL HYPERTENSION: Chronic | ICD-10-CM

## 2024-03-15 RX ORDER — LISINOPRIL 20 MG/1
TABLET ORAL
Qty: 90 TABLET | Refills: 3 | OUTPATIENT
Start: 2024-03-15

## 2024-03-15 NOTE — TELEPHONE ENCOUNTER
Refill Decision Note   Christine Alfaro  is requesting a refill authorization.    Brief Assessment and Rationale for Refill:   Quick Discontinue       Medication Therapy Plan:   Lisinopril 20 mg discontinued on 10/27/2023 by Idania Torrez MD, current dose is 5 mg twice daily      Comments:     Note composed:12:29 PM 03/15/2024

## 2024-03-15 NOTE — TELEPHONE ENCOUNTER
No care due was identified.  [facility] Embedded Care Due Messages. Reference number: [id number].   3/15/2024 8:07:59 AM CDT

## 2024-03-27 ENCOUNTER — PATIENT MESSAGE (OUTPATIENT)
Dept: RHEUMATOLOGY | Facility: CLINIC | Age: 54
End: 2024-03-27
Payer: COMMERCIAL

## 2024-04-01 ENCOUNTER — HOSPITAL ENCOUNTER (OUTPATIENT)
Dept: RADIOLOGY | Facility: HOSPITAL | Age: 54
Discharge: HOME | End: 2024-04-01
Attending: ORTHOPAEDIC SURGERY
Payer: COMMERCIAL

## 2024-04-01 ENCOUNTER — OFFICE VISIT (OUTPATIENT)
Dept: ORTHOPEDICS | Facility: CLINIC | Age: 54
End: 2024-04-01
Payer: COMMERCIAL

## 2024-04-01 VITALS — WEIGHT: 250 LBS | BODY MASS INDEX: 41.65 KG/M2 | HEIGHT: 65 IN

## 2024-04-01 DIAGNOSIS — M25.572 CHRONIC PAIN OF LEFT ANKLE: Primary | ICD-10-CM

## 2024-04-01 DIAGNOSIS — G89.29 CHRONIC PAIN OF LEFT ANKLE: Primary | ICD-10-CM

## 2024-04-01 DIAGNOSIS — M79.672 LEFT FOOT PAIN: ICD-10-CM

## 2024-04-01 DIAGNOSIS — M25.572 LEFT ANKLE PAIN: Primary | ICD-10-CM

## 2024-04-01 DIAGNOSIS — M25.572 LEFT ANKLE PAIN: ICD-10-CM

## 2024-04-01 PROCEDURE — 99203 OFFICE O/P NEW LOW 30 MIN: CPT | Mod: 25,S$GLB,, | Performed by: ORTHOPAEDIC SURGERY

## 2024-04-01 PROCEDURE — 73610 X-RAY EXAM OF ANKLE: CPT | Mod: TC,PO,LT

## 2024-04-01 PROCEDURE — 4010F ACE/ARB THERAPY RXD/TAKEN: CPT | Mod: CPTII,S$GLB,, | Performed by: ORTHOPAEDIC SURGERY

## 2024-04-01 PROCEDURE — 1159F MED LIST DOCD IN RCRD: CPT | Mod: CPTII,S$GLB,, | Performed by: ORTHOPAEDIC SURGERY

## 2024-04-01 PROCEDURE — 97760 ORTHOTIC MGMT&TRAING 1ST ENC: CPT | Mod: S$GLB,,, | Performed by: ORTHOPAEDIC SURGERY

## 2024-04-01 PROCEDURE — 73630 X-RAY EXAM OF FOOT: CPT | Mod: 26,LT,, | Performed by: RADIOLOGY

## 2024-04-01 PROCEDURE — 3008F BODY MASS INDEX DOCD: CPT | Mod: CPTII,S$GLB,, | Performed by: ORTHOPAEDIC SURGERY

## 2024-04-01 PROCEDURE — 73610 X-RAY EXAM OF ANKLE: CPT | Mod: 26,LT,, | Performed by: RADIOLOGY

## 2024-04-01 PROCEDURE — 73630 X-RAY EXAM OF FOOT: CPT | Mod: TC,PO,LT

## 2024-04-01 PROCEDURE — 99999 PR PBB SHADOW E&M-EST. PATIENT-LVL III: CPT | Mod: PBBFAC,,, | Performed by: ORTHOPAEDIC SURGERY

## 2024-04-01 NOTE — PROGRESS NOTES
53 years old pain in left ankle for 2 weeks time no trauma throbbing pain 7 on the pain scale worse with using it better with rest.  Points to the peroneal tendon as location for pain     Exam shows well-healed scar from previous surgery about 7 years ago about the Achilles tendon, tender at the peroneal tendons, she is able to taylor the ankle no signs infection good dorsally and plantar flexion of the ankle no instability detected skin is intact     X-rays show chronic changes at the calcaneus of the Achilles tendon insertion consistent with a history of previous surgery    Assessment:  Left ankle pain x2 weeks peroneal tendinitis     Plan:  Boot, activity modifications, symptomatic care, follow-up in a few weeks time for rechecked    Patient seen as a consult from Dr. Layo lópez, communication via epic    We performed a custom orthotic/brace fitting, adjusting and training with the patient. The patient demonstrated understanding and proper care. This was performed for 15 minutes.

## 2024-04-02 ENCOUNTER — PATIENT MESSAGE (OUTPATIENT)
Dept: PAIN MEDICINE | Facility: CLINIC | Age: 54
End: 2024-04-02
Payer: COMMERCIAL

## 2024-04-02 ENCOUNTER — OFFICE VISIT (OUTPATIENT)
Dept: PAIN MEDICINE | Facility: CLINIC | Age: 54
End: 2024-04-02
Payer: COMMERCIAL

## 2024-04-02 VITALS
BODY MASS INDEX: 40.23 KG/M2 | SYSTOLIC BLOOD PRESSURE: 145 MMHG | WEIGHT: 241.75 LBS | DIASTOLIC BLOOD PRESSURE: 90 MMHG | HEART RATE: 91 BPM

## 2024-04-02 DIAGNOSIS — M54.16 LUMBAR RADICULOPATHY: Primary | ICD-10-CM

## 2024-04-02 PROCEDURE — 1159F MED LIST DOCD IN RCRD: CPT | Mod: CPTII,S$GLB,, | Performed by: PHYSICIAN ASSISTANT

## 2024-04-02 PROCEDURE — 3008F BODY MASS INDEX DOCD: CPT | Mod: CPTII,S$GLB,, | Performed by: PHYSICIAN ASSISTANT

## 2024-04-02 PROCEDURE — 99999 PR PBB SHADOW E&M-EST. PATIENT-LVL IV: CPT | Mod: PBBFAC,,, | Performed by: PHYSICIAN ASSISTANT

## 2024-04-02 PROCEDURE — 1160F RVW MEDS BY RX/DR IN RCRD: CPT | Mod: CPTII,S$GLB,, | Performed by: PHYSICIAN ASSISTANT

## 2024-04-02 PROCEDURE — 3077F SYST BP >= 140 MM HG: CPT | Mod: CPTII,S$GLB,, | Performed by: PHYSICIAN ASSISTANT

## 2024-04-02 PROCEDURE — 4010F ACE/ARB THERAPY RXD/TAKEN: CPT | Mod: CPTII,S$GLB,, | Performed by: PHYSICIAN ASSISTANT

## 2024-04-02 PROCEDURE — 99203 OFFICE O/P NEW LOW 30 MIN: CPT | Mod: S$GLB,,, | Performed by: PHYSICIAN ASSISTANT

## 2024-04-02 PROCEDURE — 3080F DIAST BP >= 90 MM HG: CPT | Mod: CPTII,S$GLB,, | Performed by: PHYSICIAN ASSISTANT

## 2024-04-03 ENCOUNTER — CLINICAL SUPPORT (OUTPATIENT)
Dept: REHABILITATION | Facility: HOSPITAL | Age: 54
End: 2024-04-03
Payer: COMMERCIAL

## 2024-04-03 ENCOUNTER — PATIENT MESSAGE (OUTPATIENT)
Dept: BARIATRICS | Facility: CLINIC | Age: 54
End: 2024-04-03
Payer: COMMERCIAL

## 2024-04-03 DIAGNOSIS — M54.16 LUMBAR RADICULOPATHY: ICD-10-CM

## 2024-04-03 DIAGNOSIS — R68.89 DECREASED FUNCTIONAL ACTIVITY TOLERANCE: ICD-10-CM

## 2024-04-03 DIAGNOSIS — R29.898 DECREASED STRENGTH OF LOWER EXTREMITY: Primary | ICD-10-CM

## 2024-04-03 PROCEDURE — 97161 PT EVAL LOW COMPLEX 20 MIN: CPT | Mod: PN

## 2024-04-03 PROCEDURE — 97110 THERAPEUTIC EXERCISES: CPT | Mod: PN

## 2024-04-03 PROCEDURE — 97112 NEUROMUSCULAR REEDUCATION: CPT | Mod: PN

## 2024-04-03 NOTE — PLAN OF CARE
[facility]   Physical Therapy Initial Evaluation      Name: Christine PEARSON [facility] Number: [id number]    Therapy Diagnosis:   Encounter Diagnoses   Name Primary?    Lumbar radiculopathy     Decreased strength of lower extremity Yes    Decreased functional activity tolerance         Physician: Rebekah Smith PA-C    Physician Orders: PT Eval and Treat  Medical Diagnosis from Referral: M54.16 (ICD-10-CM) - Lumbar radiculopathy   Evaluation Date: 4/3/2024  Authorization Period Expiration: 4/2/25  Plan of Care Expiration: 6/3/24  Progress Note Due: 5/3/24  Date of Surgery: N/A  Visit # / Visits authorized: 1 / 1 Eval  FOTO: 1 / 3    Precautions: Anxiety, Essential HTN, Obesity, Hypothyroidism, RA, Lupus     Time In: 7:07 AM (pt arrived late)  Time Out: 8:00 AM  Total Billable Time: 53 minutes    Subjective     Date of onset: 2 months ago    History of current condition - Christine reports: that she thinks there is an inflamed nerve around L5 in her back. This started about 2 months ago without trauma or incident. She has subsequently developed two stress fractures in the left foot and is wearing a boot. No imaging has been performed up to this point. Had weight loss surgery about 1 year ago. She is having numbness and tingling that goes all the way to the foot on the left side. The pain is all left sided with stabbing pains in the left back and buttocks. She did recently start working out again which included squats and riding a bike but does not recall anything that made her back hurt. No falls. She has had therapy for her knee and ankle before but not the back. She has been using ice and a heating pad at night. She does take gabapentin for nerve pain. Only taking Tylenol as needed. Walking and standing does make it feel a little better but sitting is definitely the worse. No prior surgery to the back or hips. She reports having Lupus and High Blood Pressure, both are managed via  medication.    Falls: none    Imaging: none    Prior Therapy: yes for her knee and ankle  Social History: lives with their spouse  Occupation:  - sitting all day, likes to read  Prior Level of Function: independent with ADL's  Current Level of Function: sitting for about 15-20 then gets painful, ascending/descending steps is painful    Pain:  Current 7/10, worst 8/10, best 3/10   Location: left back  Description: stabbing and numbness  Aggravating Factors: Sitting, Lifting, and Getting out of bed/chair  Easing Factors: ice, rest, and walking    Patients goals: improve mobility, decrease pain     Medical History:   Past Medical History:   Diagnosis Date    Anxiety     GERD (gastroesophageal reflux disease)     Hyperlipidemia     Hypertension     Hypothyroidism     Insomnia     Observed sleep apnea     Prediabetes     Rheumatoid arthritis, unspecified     Vitamin D deficiency        Surgical History:   Christine Alfaro  has a past surgical history that includes Cholecystectomy; Tonsillectomy; Surgical removal of bone spur; BTL; Tubal ligation (8/1995); Colonoscopy (N/A, 8/30/2022); and Laparoscopic sleeve gastrectomy (N/A, 2/2/2023).    Medications:   Christine has a current medication list which includes the following prescription(s): albuterol, alclometasone, aspirin, atorvastatin, betamethasone dipropionate, bupropion, cyclobenzaprine, diazepam, econazole nitrate, folic acid, furosemide, gabapentin, hydroxychloroquine, ipratropium, levothyroxine, lisinopril, rasuvo (pf), omeprazole, cosentyx pen, sodium chloride 0.9% SolP 100 mL with abatacept (with maltose) 250 mg SolR 1,000 mg, and xiidra.    Allergies:   Review of patient's allergies indicates:   Allergen Reactions    Sulfamethoxazole-trimethoprim     Sulfa (sulfonamide antibiotics) Rash and Swelling      Objective      Observation/Posture: FAIR - slightly rounded shoulders with a forward head. Very pleasant WF with no obvious signs of  distress    Gait: slightly antalgic in nature with walking boot on left foot/ankle. No AD used. Decreased jessenia and step length observed.    SLS Test: 3-5s on the right, NT on the left due to boot/stress fractures     Squat Mechanics: FAIR - mini squat performed with weight shift off left foot    Lumbar Range of Motion:     Degrees Pain   Flexion 25%     + (pain and tingling on L)        Extension 25%    -        Left Side Bending Distal lateral thigh -         Right Side Bending Distal lateral thigh -         Left rotation    WFL -         Right Rotation    WFL -               Lower Extremity Strength  Right LE   Left LE     Knee extension: 5/5 Knee extension: 5/5   Knee flexion: 5/5 Knee flexion: 4/5*   Hip flexion: 4+/5 Hip flexion: 4/5* in L back/buttocks   Hip extension:  4+/5 Hip extension: 4/5*   Hip abduction: 4/5 Hip abduction: 4/5   Hip adduction: 4/5 Hip adduction 4/5   Ankle dorsiflexion: 5/5 Ankle dorsiflexion: NT due to boot   Ankle plantarflexion: 5/5 Ankle plantarflexion: NT due to boot      Pain = *     Special Tests:  -Repeated Flexion: - (painful)  -Repeated Ext: + (felt better)     Neuro Dynamic Testing:               Sciatic nerve:                                       SLR:    R = -                                      L = -    Joint Mobility: WFL with pain present - especially with lumbar flexion     Palpation: mild to moderate TTP along the left buttocks and lumbar paraspinals     Sensation: intact B to light touch     Flexibility:               Hamstring Test: R = 65 degrees ; L = 65 degrees (increased pain and tightness)     Intake Outcome Measure for FOTO Lumbar Spine Survey    Therapist reviewed FOTO scores for Christine Alfaro on 4/3/2024.   FOTO report - see Media section or FOTO account episode details.    Intake Score: 28%     Treatment     Total Treatment time (time-based codes) separate from Evaluation: 16 minutes     Christine received the treatments listed below:       Therapeutic Exercises to develop strength, endurance, ROM, and flexibility for 8 minutes including:    Prone Press Ups x10 reps  Supine Tibial Nerve Glide x10 reps on L  Standing Lumbar Extensions x10 reps      Neuromuscular Re-education activities to improve: Posture for 8 minutes. The following activities were included:    Supine Hip Bridge x10 reps      Possible for Next Session: Mechanical Lumbar Traction, hip extensions, clamshells    Patient Education and Home Exercises     Education provided:   - Home Exercise Program Administration and Review  - Post Exercise Soreness  - Maintaining a pain free range of motion with all activities  - Anatomy/Physiology of the Lumbar Spine and the surrounding musculature.    Written Home Exercises Provided: yes. Exercises were reviewed and Christine was able to demonstrate them prior to the end of the session.  Christine demonstrated good  understanding of the education provided. See EMR under Patient Instructions for exercises provided during therapy sessions.    Assessment     Christine is a 53 y.o. female referred to outpatient Physical Therapy with a medical diagnosis of Lumbar radiculopathy. Patient presents with increased lumbar spine pain, especially with flexion, positive radicular symptoms that run down the left leg, decreased LE strength, decreased lumbar spine range of motion, and decreased functional activity tolerance. Extension based exercises did reduce symptoms. Mechanical traction may assist with pain relief and tissue extensibility at the lumbar spine.    Patient prognosis is Good.   Patient will benefit from skilled outpatient Physical Therapy to address the deficits stated above and in the chart below, provide patient /family education, and to maximize patientt's level of independence.     Plan of care discussed with patient: Yes  Patient's spiritual, cultural and educational needs considered and patient is agreeable to the plan of care and goals as stated  below:     Anticipated Barriers for therapy: work schedule    Medical Necessity is demonstrated by the following  History  Co-morbidities and personal factors that may impact the plan of care [] LOW: no personal factors / co-morbidities  [] MODERATE: 1-2 personal factors / co-morbidities  [x] HIGH: 3+ personal factors / co-morbidities    Moderate / High Support Documentation:   Co-morbidities affecting plan of care: Anxiety, Essential HTN, Obesity, Hypothyroidism, RA, Lupus    Personal Factors:   no deficits     Examination  Body Structures and Functions, activity limitations and participation restrictions that may impact the plan of care [x] LOW: addressing 1-2 elements  [] MODERATE: 3+ elements  [] HIGH: 4+ elements (please support below)    Moderate / High Support Documentation: N/A     Clinical Presentation [x] LOW: stable  [] MODERATE: Evolving  [] HIGH: Unstable     Decision Making/ Complexity Score: low       Goals:  Short Term Goals: 4 weeks   - Patient will demonstrate improved lumbar spine range of motion, especially into flexion, by at least 25% with minimal increase in symptoms for improved ability to perform ADL's.  - Patient will be able to ascend/descend at least 1 step with minimal to no increase in symptoms for improved ability to perform household and community based activities.  - Patient will be able to sit for at least 30 minutes with minimal to no increase in symptoms for improved performance of work related duties.  - Patient will demonstrate improved LE strength, especially into hip flexion on the Left by at least 1/2 grade via MMT for increased ability to perform functional tasks.    Long Term Goals: 8 weeks   - Patient will demonstrate improved lumbar spine range of motion, especially into extension, by at least 25% with minimal increase in symptoms for improved ability to perform ADL's.  - Patient will be able to ascend/descend at least 3 steps with minimal to no increase in symptoms for  improved ability to perform household and community based activities.  - Patient will be able to sit for at least 1 hour with minimal to no increase in symptoms for improved performance of work related duties.  - Patient will demonstrate improved LE strength, especially into hip extension on the Left by at least 1/2 grade via MMT for increased ability to perform functional tasks.  - Patient will demonstrate improved FOTO score that is greater than or equal to the predicted value for increased ability to perform ADL's.  - Patient will demonstrate independence with Home Exercise Program for continued improvements outside the clinical setting.    Plan     Plan of care Certification: 4/3/2024 to 6/3/24.    Outpatient Physical Therapy 2 times weekly for 8 weeks to include the following interventions: Aquatic Therapy, Cervical/Lumbar Traction, Electrical Stimulation IFC/TENS/PREMOD, Gait Training, Manual Therapy, Moist Heat/ Ice, Neuromuscular Re-ed, Patient Education, Self Care, Therapeutic Activities, Therapeutic Exercise, Ultrasound, and Dry Needling (by a certified therapist).     This patient CAN be treated by a PTA.    Mary German, PT, DPT, Cert. DN    Physician's Signature: _________________________________________ Date: ________________

## 2024-04-04 NOTE — PROGRESS NOTES
[facility] New Patient Evaluation      Referred by: self    PCP:   Layo Mccrary MD    CC:   Chief Complaint   Patient presents with    Low-back Pain    Hip Pain    Leg Pain          HPI:   Christine Alfaro is a 53 y.o. year old female patient who has a past medical history of Anxiety, GERD (gastroesophageal reflux disease), Hyperlipidemia, Hypertension, Hypothyroidism, Insomnia, Observed sleep apnea, Prediabetes, Rheumatoid arthritis, unspecified, and Vitamin D deficiency. She presents in self referral for lower back and left leg pain.  Whe underwent weight loss surgery in 2023 losing about 110 pounds.  She started to exercise more with biking and walking, but does sit a lot for work as a .  Pain started without focal injury.  She feels pain in the left lower back to the sacral region, posterior left thigh, knee, calf.  Pain increases with sitting and improves with sitting on a tennis ball.  She has tried flexeril, gabapentin.    Denies bowel/ bladder incontinence.    Past and current medications:  Antineuropathics: gabapentin  NSAIDs:  Antidepressants: wellbutrin  Muscle relaxers: flexeril  Opioids:  Antiplatelets/Anticoagulants:  ASA  - tries to avoid steroid because she has had signfiicnat local swelling after injections in the past.     Physical Therapy/ Chiropractic care:  none    Pain Intervention History:  none    Past Spine Surgical History:  none        History:    Current Outpatient Medications:     alclometasone (ACLOVATE) 0.05 % cream, AAA face bid prn redness or scaling or itching, Disp: 60 g, Rfl: 3    atorvastatin (LIPITOR) 40 MG tablet, Take 1 tablet (40 mg total) by mouth every evening., Disp: 90 tablet, Rfl: 3    betamethasone dipropionate 0.05 % cream, Use bid, Disp: 45 g, Rfl: 3    buPROPion (WELLBUTRIN SR) 100 MG TBSR 12 hr tablet, Take 1 tablet (100 mg total) by mouth 2 (two) times daily., Disp: 180 tablet, Rfl: 0    cyclobenzaprine (FLEXERIL) 10 MG tablet, TAKE  ONE TABLET EVERY EVENING, Disp: 150 tablet, Rfl: 3    econazole nitrate 1 % cream, Use bid, Disp: 30 g, Rfl: 2    folic acid (FOLVITE) 1 MG tablet, Take 1 tablet (1 mg total) by mouth once daily., Disp: 30 tablet, Rfl: 11    furosemide (LASIX) 20 MG tablet, Take 1 tablet (20 mg total) by mouth once daily., Disp: 90 tablet, Rfl: 1    gabapentin (NEURONTIN) 300 MG capsule, Take 1 capsule (300 mg total) by mouth 3 (three) times daily., Disp: 90 capsule, Rfl: 11    hydrOXYchloroQUINE (PLAQUENIL) 200 mg tablet, Take 200 mg by mouth 2 (two) times daily., Disp: , Rfl:     ipratropium (ATROVENT) 21 mcg (0.03 %) nasal spray, 2 sprays by Each Nostril route 3 (three) times daily., Disp: 30 mL, Rfl: 0    levothyroxine (SYNTHROID) 200 MCG tablet, Take 1 tablet (200 mcg total) by mouth before breakfast., Disp: 30 tablet, Rfl: 11    lisinopriL (PRINIVIL,ZESTRIL) 5 MG tablet, Take 1 tablet (5 mg total) by mouth 2 (two) times a day., Disp: 180 tablet, Rfl: 3    methotrexate, PF, (RASUVO, PF,) 25 mg/0.5 mL AtIn, Inject 0.5 mLs (25 mg total) into the skin every 7 days., Disp: 4 each, Rfl: 0    omeprazole (PRILOSEC) 40 MG capsule, Take 1 capsule (40 mg total) by mouth every morning. Open capsule and take with apple sauce, Disp: 30 capsule, Rfl: 2    secukinumab (COSENTYX PEN) 150 mg/mL PnIj, Inject 150 mg into the skin every 28 days., Disp: 2 mL, Rfl: 11    sodium chloride 0.9% SolP 100 mL with abatacept (with maltose) 250 mg SolR 1,000 mg, Inject 1,000 mg into the vein every 28 days., Disp: , Rfl:     XIIDRA 5 % Dpet, APPLY 1 DROP TO BOTH EYES BID, Disp: , Rfl:     albuterol (PROVENTIL HFA) 90 mcg/actuation inhaler, Inhale 2 puffs into the lungs every 6 (six) hours as needed for Wheezing. Rescue, Disp: 18 g, Rfl: 0    aspirin (ECOTRIN) 81 MG EC tablet, Take 1 tablet (81 mg total) by mouth once daily., Disp: 90 tablet, Rfl: 3    diazePAM (VALIUM) 10 MG Tab, Take 1 tablet (10 mg total) by mouth once. for 1 dose, Disp: 1 tablet, Rfl:  0    Past Medical History:   Diagnosis Date    Anxiety     GERD (gastroesophageal reflux disease)     Hyperlipidemia     Hypertension     Hypothyroidism     Insomnia     Observed sleep apnea     Prediabetes     Rheumatoid arthritis, unspecified     Vitamin D deficiency        Past Surgical History:   Procedure Laterality Date    BTL      CHOLECYSTECTOMY      COLONOSCOPY N/A 8/30/2022    Procedure: COLONOSCOPY;  Surgeon: Drew Peraza MD;  Location: [facility];  Service: Endoscopy;  Laterality: N/A;    LAPAROSCOPIC SLEEVE GASTRECTOMY N/A 2/2/2023    Procedure: GASTRECTOMY, SLEEVE, LAPAROSCOPIC with intraop EGD;  Surgeon: Titi Ribeiro Jr., MD;  Location: [address];  Service: General;  Laterality: N/A;  PRE-CERT, PLEASE DO NOT SUBMIT FOR AUTH UNTIL 1/12/2023    SURGICAL REMOVAL OF BONE SPUR      left heel    TONSILLECTOMY      TUBAL LIGATION  8/1995       Family History   Problem Relation Age of Onset    Heart disease Mother     Bone cancer Mother     Cancer Mother         cervical    Hypertension Mother     Heart disease Father     Diabetes type I Father     Lung cancer Father     Diabetes Father     Hypertension Father     Heart disease Brother     No Known Problems Daughter     No Known Problems Daughter     Breast cancer Neg Hx     Ovarian cancer Neg Hx        Social History     Socioeconomic History    Marital status:    Tobacco Use    Smoking status: Never     Passive exposure: Never    Smokeless tobacco: Never   Substance and Sexual Activity    Alcohol use: Not Currently     Alcohol/week: 0.0 standard drinks of alcohol    Drug use: Never    Sexual activity: Yes     Partners: Male     Birth control/protection: See Surgical Hx     Social Determinants of Health     Financial Resource Strain: Low Risk  (1/29/2024)    Overall Financial Resource Strain (CARDIA)     Difficulty of Paying Living Expenses: Not hard at all   Food Insecurity: No Food Insecurity (1/29/2024)    Hunger Vital Sign      Worried About Running Out of Food in the Last Year: Never true     Ran Out of Food in the Last Year: Never true   Transportation Needs: No Transportation Needs (1/29/2024)    PRAPARE - Transportation     Lack of Transportation (Medical): No     Lack of Transportation (Non-Medical): No   Physical Activity: Insufficiently Active (1/29/2024)    Exercise Vital Sign     Days of Exercise per Week: 3 days     Minutes of Exercise per Session: 30 min   Stress: No Stress Concern Present (1/29/2024)    [employer] - Occupational Stress Questionnaire     Feeling of Stress : Only a little   Social Connections: Unknown (1/29/2024)    Social Connection and Isolation Panel [NHANES]     Frequency of Communication with Friends and Family: More than three times a week     Frequency of Social Gatherings with Friends and Family: Three times a week     Active Member of Clubs or Organizations: Yes     Attends Club or Organization Meetings: More than 4 times per year     Marital Status:    Housing Stability: Low Risk  (1/29/2024)    Housing Stability Vital Sign     Unable to Pay for Housing in the Last Year: No     Number of Places Lived in the Last Year: 1     Unstable Housing in the Last Year: No       Review of patient's allergies indicates:   Allergen Reactions    Sulfamethoxazole-trimethoprim     Sulfa (sulfonamide antibiotics) Rash and Swelling       Labs:  Lab Results   Component Value Date    HGBA1C 5.0 09/05/2023       Lab Results   Component Value Date    WBC 7.02 03/11/2024    HGB 14.9 03/11/2024    HCT 47.2 03/11/2024    MCV 95 03/11/2024     03/11/2024           Review of Systems:  Low back pain  .eft leg pain..  Balance of review of systems is negative.    Physical Exam:  Vitals:    04/02/24 1036   BP: (!) 145/90   Pulse: 91   Weight: 109.6 kg (241 lb 11.8 oz)   PainSc:   4   PainLoc: Buttocks     Body mass index is 40.23 kg/m².    Pain disability index:      4/2/2024    10:37 AM    Last 3 PDI Scores   Pain Disability Index (PDI) 39       Gen: NAD  Psych: mood appropriate for given condition  HEENT: eyes anicteric   CV: RRR, 2+ radial pulse  Respiratory: non-labored, no signs of respiratory distress  Abd: non-distended  Skin: warm, dry and intact.  Gait: Able to heel walk, toe walk. No antalgic gait.     Coordination:   Tandem walking coordination: normal    Cervical spine: ROM is full in flexion, extension and lateral rotation without increased pain.  Spurling's maneuver causes no neck pain to either side.  Myofascial exam: No Tenderness to palpation across cervical paraspinous region bilaterally.    Lumbar spine:  Lumbar spine: ROM is full with flexion extension and oblique extension with no increased pain.   Pain with axial facet loading on the left.  Amadeo's test causes no increased pain on either side.    Supine straight leg raise is positive on the left.  Internal and external rotation of the hip causes no increased pain on either side.  Myofascial exam: No tenderness to palpation across lumbar paraspinous muscles. No tenderness to palpation over the bilateral greater trochanters and bilateral SI joint    Sensory:  Intact and symmetrical to light touch in C4-T1 dermatomes bilaterally. Intact and symmetrical to light touch in L1-S1 dermatomes bilaterally.    Motor:    Right Left   C4 Shoulder Abduction  5  5   C5 Elbow Flexion    5  5   C6 Wrist Extension  5  5   C7 Elbow Extension   5  5   C8/T1 Hand Intrinsics   5  5        Right Left   L2/3 Iliacus Hip flexion  5  5   L3/4 Qudratus Femoris Knee Extension  5  5   L4/5 Tib Anterior Ankle Dorsiflexion   5  5   L5/S1 Extensor Hallicus Longus Great toe extension  5  5   S1/S2 Gastroc/Soleus Plantar Flexion  5  5      Right Left   Triceps DTR 0+ 0+   Biceps DTR 0+ 0+   Brachioradialis DTR 0+ 0+   Patellar DTR 0+ 0+   Achilles DTR 0+ 0+   Kramer Absent  Absent   Clonus Absent Absent   Babinski Absent Absent        Imaging:  none      Assessment:   Christine Alfaro is a 53 y.o. year old female patient who has a past medical history of Anxiety, GERD (gastroesophageal reflux disease), Hyperlipidemia, Hypertension, Hypothyroidism, Insomnia, Observed sleep apnea, Prediabetes, Rheumatoid arthritis, unspecified, and Vitamin D deficiency. She presents in self referral for lower back and left leg pain.  Left L5, S1 radiculoapthy.      Plan:  - discussed medications, exercise, PT, obtaining MRI to consider EH as options to consider at this point.  - she will try PT and follow up in 4 weeks.  - we discussed oral steroids since she cannot tolerate nsaids after weight loss surgery; she would like to avoid considering response in past to injectables  - completed [facility] temporary handicap form    Problem List Items Addressed This Visit    None  Visit Diagnoses       Lumbar radiculopathy    -  Primary    Relevant Orders    Ambulatory referral/consult to Physical/Occupational Therapy            Follow Up: RTC in 4 weeks to monitor progress or sooner if needed    : Reviewed and consistent with medication use as prescribed.      Rebekah Smith PA-C  [facility]

## 2024-04-05 ENCOUNTER — TELEPHONE (OUTPATIENT)
Dept: PAIN MEDICINE | Facility: CLINIC | Age: 54
End: 2024-04-05
Payer: COMMERCIAL

## 2024-04-05 NOTE — PROGRESS NOTES
[facility]   Physical Therapy Treatment Note      Name: Christine Byod [facility]  Clinic Number: [id number]    Therapy Diagnosis:   Encounter Diagnoses   Name Primary?    Decreased strength of lower extremity Yes    Decreased functional activity tolerance      Physician: Rebkeah Smith PA-C    Visit Date: 4/8/2024    Physician Orders: PT Eval and Treat  Medical Diagnosis from Referral: M54.16 (ICD-10-CM) - Lumbar radiculopathy   Evaluation Date: 4/3/2024  Authorization Period Expiration: 12/31/24  Plan of Care Expiration: 6/3/24  Progress Note Due: 5/3/24  Date of Surgery: N/A  Visit # / Visits authorized: 1 / 20 (1 / 1 Eval)  FOTO: 1 / 3     Precautions: Anxiety, Essential HTN, Obesity, Hypothyroidism, RA, Lupus      Time In: 4:25 PM  Time Out: 5:18 AM  Total Billable Time: 53 minutes    PTA Visit #: 0/5     Subjective     Patient reports: that she is doing okay this afternoon. Feels like the exercises have been great for her.     She was compliant with home exercise program.  Response to previous treatment: no adverse reactions  Functional change: in progress - first follow up appointment    Pain: 3/10  Location: left back  and buttocks      Objective      Objective Measures updated at progress report unless specified.     Treatment     Christine received the treatments listed below:      Therapeutic Exercises to develop strength, endurance, ROM, and flexibility for 28 minutes including:    NuStep x5 min (Level 5)  Prone Press Ups 2x10 reps  Supine Tibial Nerve Glide 2x10 reps on L  Supine Hamstring Stretch 10x10s L + strap  Standing Lumbar Extensions 2x10 reps  Supine Hip Extensions 2x10 reps B     Neuromuscular Re-education activities to improve: Posture for 25 minutes. The following activities were included:     Supine Hip Bridge 2x10 reps  Supine Straight Leg Raise 2x10 reps B + TA   Side-Lying Clamshells 2x10 reps B + RTB  Standing Hip Abduction 2x10 reps B  Standing Hip Extension 2x10  reps B     Possible for Next Session: Mechanical Lumbar Traction      supervised modalities after being cleared for contradictions: Mechanical Traction:  Christine received intermittent mechanical traction to the lumbar spine at a force of 100 pounds for a total of 10 minutes. Hold time of 30 seconds and rest time for 10 seconds. Patient tolerated treatment well without any adverse effects.      Patient Education and Home Exercises       Education provided:   - Home Exercise Program Review  - Post Exercise Soreness  - Maintaining a pain free range of motion with all activities  - Anatomy/Physiology of the Lumbar Spine and the surrounding musculature.    Written Home Exercises Provided: Patient instructed to cont prior HEP. Exercises were reviewed and Christine was able to demonstrate them prior to the end of the session.  Christine demonstrated good  understanding of the education provided. See Electronic Medical Record under Patient Instructions for exercises provided during therapy sessions    Assessment     Emphasis continues to be placed on lumbar extension while preventing exacerbation of symptoms. Verbal cues for core activation to assist with greater lumbar stability. Mechanical lumbar traction was introduced today to assist with tissue extensibility and joint mobility. No adverse reactions observed. Encouraged patient to continue with Home Exercise Program.    Christine Is progressing well towards her goals.   Patient prognosis is Good.     Patient will continue to benefit from skilled outpatient physical therapy to address the deficits listed in the problem list box on initial evaluation, provide pt/family education and to maximize pt's level of independence in the home and community environment.     Patient's spiritual, cultural and educational needs considered and pt agreeable to plan of care and goals.     Anticipated barriers to physical therapy: work schedule    Goals:   Short Term Goals: 4 weeks (progressing, not  met)  - Patient will demonstrate improved lumbar spine range of motion, especially into flexion, by at least 25% with minimal increase in symptoms for improved ability to perform ADL's.  - Patient will be able to ascend/descend at least 1 step with minimal to no increase in symptoms for improved ability to perform household and community based activities.  - Patient will be able to sit for at least 30 minutes with minimal to no increase in symptoms for improved performance of work related duties.  - Patient will demonstrate improved LE strength, especially into hip flexion on the Left by at least 1/2 grade via MMT for increased ability to perform functional tasks.     Long Term Goals: 8 weeks (progressing, not met)  - Patient will demonstrate improved lumbar spine range of motion, especially into extension, by at least 25% with minimal increase in symptoms for improved ability to perform ADL's.  - Patient will be able to ascend/descend at least 3 steps with minimal to no increase in symptoms for improved ability to perform household and community based activities.  - Patient will be able to sit for at least 1 hour with minimal to no increase in symptoms for improved performance of work related duties.  - Patient will demonstrate improved LE strength, especially into hip extension on the Left by at least 1/2 grade via MMT for increased ability to perform functional tasks.  - Patient will demonstrate improved FOTO score that is greater than or equal to the predicted value for increased ability to perform ADL's.  - Patient will demonstrate independence with Home Exercise Program for continued improvements outside the clinical setting.    Plan     Continue with established POC for improved functional mobility overall.    *A portion of this treatment session is provided with the assistance of a skilled rehbailitation technician under the supervision of a licensed physical therapist.    Mary German, PT, DPT, Cert. DN

## 2024-04-05 NOTE — TELEPHONE ENCOUNTER
----- Message from Sahara Rankin sent at 4/5/2024 12:08 PM CDT -----  Contact: Self  Type: Needs Medical Advice    Who Called:  Patient  What is this regarding?:  She is wanting the office to leave a paper at the  for her to be able to be  the form today.  Best Call Back Number:  [phone]  Additional Information:  Please call the patient back at the phone number listed above to advise. Thank you!

## 2024-04-06 ENCOUNTER — NURSE TRIAGE (OUTPATIENT)
Dept: ADMINISTRATIVE | Facility: CLINIC | Age: 54
End: 2024-04-06
Payer: COMMERCIAL

## 2024-04-06 NOTE — TELEPHONE ENCOUNTER
Pt calling with c/o pain shooting from back and into hip and all the way down to the knee. Pt also has a stress fracture on toe and is in pain and was requesting more than the gabapentin and told that we dont reach out to MD for pain meds over the weekend and triaged and care advice to got to the ED for eval. Pt said ok and told to call back once back home if pain is uncontrolled or questions or concerns                Reason for Disposition   Numbness in groin or rectal area (i.e., loss of sensation)    Additional Information   Negative: Sounds like a life-threatening emergency to the triager   Negative: Looks like a broken bone or dislocated joint (e.g., crooked or deformed)   Back pain radiating (shooting) into hip   Negative: Passed out (i.e., lost consciousness, collapsed and was not responding)   Negative: Shock suspected (e.g., cold/pale/clammy skin, too weak to stand, low BP, rapid pulse)   Negative: Sounds like a life-threatening emergency to the triager   Negative: [1] Loss of bladder or bowel control (urine or bowel incontinence; wetting self, leaking stool) AND [2] new-onset   Negative: [1] Unable to urinate (or only a few drops) > 4 hours AND [2] bladder feels very full (e.g., palpable bladder or strong urge to urinate)   Negative: [1] SEVERE back pain (e.g., excruciating) AND [2] sudden onset AND [3] age > 60 years    Protocols used: Hip Pain-A-AH, Back Pain-A-AH

## 2024-04-08 ENCOUNTER — TELEPHONE (OUTPATIENT)
Dept: PAIN MEDICINE | Facility: CLINIC | Age: 54
End: 2024-04-08
Payer: COMMERCIAL

## 2024-04-08 ENCOUNTER — CLINICAL SUPPORT (OUTPATIENT)
Dept: REHABILITATION | Facility: HOSPITAL | Age: 54
End: 2024-04-08
Payer: COMMERCIAL

## 2024-04-08 DIAGNOSIS — R68.89 DECREASED FUNCTIONAL ACTIVITY TOLERANCE: ICD-10-CM

## 2024-04-08 DIAGNOSIS — R29.898 DECREASED STRENGTH OF LOWER EXTREMITY: Primary | ICD-10-CM

## 2024-04-08 PROCEDURE — 97110 THERAPEUTIC EXERCISES: CPT | Mod: PN

## 2024-04-08 PROCEDURE — 97112 NEUROMUSCULAR REEDUCATION: CPT | Mod: PN

## 2024-04-08 PROCEDURE — 97012 MECHANICAL TRACTION THERAPY: CPT | Mod: PN

## 2024-04-08 NOTE — TELEPHONE ENCOUNTER
I spoke with pt and she states she was having a lot of pain. She eventually took some Motrin and she is currently doing fine

## 2024-04-08 NOTE — TELEPHONE ENCOUNTER
----- Message from Candace Kumar MA sent at 4/8/2024  8:37 AM CDT -----  Contact: pt  Missed your call   [phone]

## 2024-04-09 ENCOUNTER — PATIENT MESSAGE (OUTPATIENT)
Dept: BARIATRICS | Facility: CLINIC | Age: 54
End: 2024-04-09
Payer: COMMERCIAL

## 2024-04-10 ENCOUNTER — LAB VISIT (OUTPATIENT)
Dept: LAB | Facility: HOSPITAL | Age: 54
End: 2024-04-10
Attending: INTERNAL MEDICINE
Payer: COMMERCIAL

## 2024-04-10 ENCOUNTER — PATIENT MESSAGE (OUTPATIENT)
Dept: RHEUMATOLOGY | Facility: CLINIC | Age: 54
End: 2024-04-10
Payer: COMMERCIAL

## 2024-04-10 DIAGNOSIS — L40.50 PSORIATIC ARTHRITIS: ICD-10-CM

## 2024-04-10 LAB
ALBUMIN SERPL BCP-MCNC: 3.6 G/DL (ref 3.5–5.2)
ALP SERPL-CCNC: 83 U/L (ref 55–135)
ALT SERPL W/O P-5'-P-CCNC: 28 U/L (ref 10–44)
ANION GAP SERPL CALC-SCNC: 7 MMOL/L (ref 8–16)
AST SERPL-CCNC: 18 U/L (ref 10–40)
BASOPHILS # BLD AUTO: 0.04 K/UL (ref 0–0.2)
BASOPHILS NFR BLD: 0.6 % (ref 0–1.9)
BILIRUB SERPL-MCNC: 0.3 MG/DL (ref 0.1–1)
BUN SERPL-MCNC: 13 MG/DL (ref 6–20)
CALCIUM SERPL-MCNC: 9.5 MG/DL (ref 8.7–10.5)
CHLORIDE SERPL-SCNC: 105 MMOL/L (ref 95–110)
CO2 SERPL-SCNC: 30 MMOL/L (ref 23–29)
CREAT SERPL-MCNC: 0.6 MG/DL (ref 0.5–1.4)
CRP SERPL-MCNC: 3.3 MG/L (ref 0–8.2)
DIFFERENTIAL METHOD BLD: ABNORMAL
EOSINOPHIL # BLD AUTO: 0.2 K/UL (ref 0–0.5)
EOSINOPHIL NFR BLD: 2.4 % (ref 0–8)
ERYTHROCYTE [DISTWIDTH] IN BLOOD BY AUTOMATED COUNT: 13.2 % (ref 11.5–14.5)
ERYTHROCYTE [SEDIMENTATION RATE] IN BLOOD BY WESTERGREN METHOD: 12 MM/HR (ref 0–20)
EST. GFR  (NO RACE VARIABLE): >60 ML/MIN/1.73 M^2
GLUCOSE SERPL-MCNC: 76 MG/DL (ref 70–110)
HCT VFR BLD AUTO: 44.2 % (ref 37–48.5)
HGB BLD-MCNC: 14 G/DL (ref 12–16)
IMM GRANULOCYTES # BLD AUTO: 0.02 K/UL (ref 0–0.04)
IMM GRANULOCYTES NFR BLD AUTO: 0.3 % (ref 0–0.5)
LYMPHOCYTES # BLD AUTO: 2.5 K/UL (ref 1–4.8)
LYMPHOCYTES NFR BLD: 35.7 % (ref 18–48)
MCH RBC QN AUTO: 29.4 PG (ref 27–31)
MCHC RBC AUTO-ENTMCNC: 31.7 G/DL (ref 32–36)
MCV RBC AUTO: 93 FL (ref 82–98)
MONOCYTES # BLD AUTO: 0.5 K/UL (ref 0.3–1)
MONOCYTES NFR BLD: 7.5 % (ref 4–15)
NEUTROPHILS # BLD AUTO: 3.8 K/UL (ref 1.8–7.7)
NEUTROPHILS NFR BLD: 53.5 % (ref 38–73)
NRBC BLD-RTO: 0 /100 WBC
PLATELET # BLD AUTO: 239 K/UL (ref 150–450)
PMV BLD AUTO: 10.8 FL (ref 9.2–12.9)
POTASSIUM SERPL-SCNC: 5.1 MMOL/L (ref 3.5–5.1)
PROT SERPL-MCNC: 6.6 G/DL (ref 6–8.4)
RBC # BLD AUTO: 4.76 M/UL (ref 4–5.4)
SODIUM SERPL-SCNC: 142 MMOL/L (ref 136–145)
WBC # BLD AUTO: 7.06 K/UL (ref 3.9–12.7)

## 2024-04-10 PROCEDURE — 36415 COLL VENOUS BLD VENIPUNCTURE: CPT | Mod: PO | Performed by: INTERNAL MEDICINE

## 2024-04-10 PROCEDURE — 80053 COMPREHEN METABOLIC PANEL: CPT | Performed by: INTERNAL MEDICINE

## 2024-04-10 PROCEDURE — 86140 C-REACTIVE PROTEIN: CPT | Performed by: INTERNAL MEDICINE

## 2024-04-10 PROCEDURE — 85651 RBC SED RATE NONAUTOMATED: CPT | Mod: PO | Performed by: INTERNAL MEDICINE

## 2024-04-10 PROCEDURE — 85025 COMPLETE CBC W/AUTO DIFF WBC: CPT | Performed by: INTERNAL MEDICINE

## 2024-04-10 NOTE — PROGRESS NOTES
[facility] OUTPATIENT THERAPY AND WELLNESS   Physical Therapy Treatment Note      Name: Christine Boyd [facility]  Clinic Number: [id number]    Therapy Diagnosis:   Encounter Diagnoses   Name Primary?    Decreased strength of lower extremity Yes    Decreased functional activity tolerance      Physician: Rebekah Smith PA-C    Visit Date: 4/15/2024    Physician Orders: PT Eval and Treat  Medical Diagnosis from Referral: M54.16 (ICD-10-CM) - Lumbar radiculopathy   Evaluation Date: 4/3/2024  Authorization Period Expiration: 12/31/24  Plan of Care Expiration: 6/3/24  Progress Note Due: 5/3/24  Date of Surgery: N/A  Visit # / Visits authorized: 2 / 20 (1 / 1 Eval)  FOTO: 1 / 3     Precautions: Anxiety, Essential HTN, Obesity, Hypothyroidism, RA, Lupus      Time In: 7:20 AM  Time Out: 8:05 AM  Total Billable Time: 40 minutes    PTA Visit #: 0/5     Subjective     Patient reports: that she did have some relief for a few days after last session. The numbness and tingling did return but is less in intensity than it was initially.    She was compliant with home exercise program.  Response to previous treatment: no adverse reactions  Functional change: in progress    Pain: 4/10  Location: left back and buttocks      Objective      Objective Measures updated at progress report unless specified.     Treatment     Christine received the treatments listed below:      Therapeutic Exercises to develop strength, endurance, ROM, and flexibility for 20 minutes including:    Prone Press Ups 2x10 reps  Prone Hip Extensions 2x10 reps B  Supine Tibial Nerve Glide 2x10 reps on L  Supine Hamstring Stretch 10x10s L + strap  Standing Lumbar Extensions 2x10 reps     Neuromuscular Re-education activities to improve: Posture for 25 minutes. The following activities were included:     Supine Hip Bridge 3x10 reps  Supine PPT x20 reps (3s holds)  Supine Straight Leg Raise 2x10 reps B + TA   Side-Lying Clamshells 2x10 reps B + RTB  Standing Hip Abduction 2x10  reps B  Standing Hip Extension 2x10 reps B     Possible for Next Session: Mechanical Lumbar Traction    Supervised Modalities after being cleared for contradictions: Mechanical Traction:  Christine received intermittent mechanical traction to the lumbar spine at a force of 100 pounds for a total of 15 minutes. Hold time of 30 seconds and rest time for 10 seconds. Patient tolerated treatment well without any adverse effects.    Patient Education and Home Exercises       Education provided:   - Home Exercise Program Review  - Post Exercise Soreness  - Maintaining a pain free range of motion with all activities  - Anatomy/Physiology of the Lumbar Spine and the surrounding musculature.    Written Home Exercises Provided: Patient instructed to cont prior HEP. Exercises were reviewed and Christine was able to demonstrate them prior to the end of the session.  Christine demonstrated good  understanding of the education provided. See Electronic Medical Record under Patient Instructions for exercises provided during therapy sessions    Assessment     Patient with positive response to mechanical lumbar traction last session. Extension based exercises continue to assist with pain relief and tissue extensibility. Increased symptoms reported with prone leg extensions but this improved with another round of standing lumbar extensions. Mechanical lumbar traction was utilized again this date to assist with pain relief, joint mobility, and tissue extensibility.     Christine Is progressing well towards her goals.   Patient prognosis is Good.     Patient will continue to benefit from skilled outpatient physical therapy to address the deficits listed in the problem list box on initial evaluation, provide pt/family education and to maximize pt's level of independence in the home and community environment.     Patient's spiritual, cultural and educational needs considered and pt agreeable to plan of care and goals.     Anticipated barriers to  physical therapy: work schedule    Goals:   Short Term Goals: 4 weeks (progressing, not met)  - Patient will demonstrate improved lumbar spine range of motion, especially into flexion, by at least 25% with minimal increase in symptoms for improved ability to perform ADL's.  - Patient will be able to ascend/descend at least 1 step with minimal to no increase in symptoms for improved ability to perform household and community based activities.  - Patient will be able to sit for at least 30 minutes with minimal to no increase in symptoms for improved performance of work related duties.  - Patient will demonstrate improved LE strength, especially into hip flexion on the Left by at least 1/2 grade via MMT for increased ability to perform functional tasks.     Long Term Goals: 8 weeks (progressing, not met)  - Patient will demonstrate improved lumbar spine range of motion, especially into extension, by at least 25% with minimal increase in symptoms for improved ability to perform ADL's.  - Patient will be able to ascend/descend at least 3 steps with minimal to no increase in symptoms for improved ability to perform household and community based activities.  - Patient will be able to sit for at least 1 hour with minimal to no increase in symptoms for improved performance of work related duties.  - Patient will demonstrate improved LE strength, especially into hip extension on the Left by at least 1/2 grade via MMT for increased ability to perform functional tasks.  - Patient will demonstrate improved FOTO score that is greater than or equal to the predicted value for increased ability to perform ADL's.  - Patient will demonstrate independence with Home Exercise Program for continued improvements outside the clinical setting.    Plan     Continue with established POC for improved functional mobility overall.    *A portion of this treatment session is provided with the assistance of a skilled rehbailitation technician under  the supervision of a licensed physical therapist.    Mary German, PT, DPT, Cert. DN

## 2024-04-12 DIAGNOSIS — R60.9 EDEMA, UNSPECIFIED TYPE: ICD-10-CM

## 2024-04-15 ENCOUNTER — CLINICAL SUPPORT (OUTPATIENT)
Dept: REHABILITATION | Facility: HOSPITAL | Age: 54
End: 2024-04-15
Payer: COMMERCIAL

## 2024-04-15 DIAGNOSIS — R68.89 DECREASED FUNCTIONAL ACTIVITY TOLERANCE: ICD-10-CM

## 2024-04-15 DIAGNOSIS — R29.898 DECREASED STRENGTH OF LOWER EXTREMITY: Primary | ICD-10-CM

## 2024-04-15 PROCEDURE — 97110 THERAPEUTIC EXERCISES: CPT | Mod: PN

## 2024-04-15 PROCEDURE — 97012 MECHANICAL TRACTION THERAPY: CPT | Mod: PN

## 2024-04-15 PROCEDURE — 97112 NEUROMUSCULAR REEDUCATION: CPT | Mod: PN

## 2024-04-16 RX ORDER — FUROSEMIDE 20 MG/1
20 TABLET ORAL DAILY
Qty: 90 TABLET | Refills: 1 | Status: SHIPPED | OUTPATIENT
Start: 2024-04-16

## 2024-04-18 NOTE — PROGRESS NOTES
[facility] OUTPATIENT THERAPY AND WELLNESS   Physical Therapy Treatment Note      Name: Christine Boyd [facility]  Clinic Number: [id number]    Therapy Diagnosis:   Encounter Diagnoses   Name Primary?    Decreased strength of lower extremity Yes    Decreased functional activity tolerance      Physician: Rebekah Smith PA-C    Visit Date: 4/22/2024    Physician Orders: PT Eval and Treat  Medical Diagnosis from Referral: M54.16 (ICD-10-CM) - Lumbar radiculopathy   Evaluation Date: 4/3/2024  Authorization Period Expiration: 12/31/24  Plan of Care Expiration: 6/3/24  Progress Note Due: 5/3/24  Date of Surgery: N/A  Visit # / Visits authorized: 3 / 20 (1 / 1 Eval)  FOTO: 2 / 3     Precautions: Anxiety, Essential HTN, Obesity, Hypothyroidism, RA, Lupus      Time In: 7:05 AM  Time Out: 7:55 AM  Total Billable Time: 50 minutes    PTA Visit #: 0/5     Subjective     Patient reports: that she is not feeling well this morning due to starting her menstrual cycle. Has been keeping up with her exercises. She was able to get traction last Thursday and that continues to feel beneficial.     She was compliant with home exercise program.  Response to previous treatment: no adverse reactions  Functional change: in progress    Pain: 3/10  Location: left back and buttocks      Objective      Objective Measures updated at progress report unless specified.     FOTO: 14    Treatment     Christine received the treatments listed below:      Therapeutic Exercises to develop strength, endurance, ROM, and flexibility for 15 minutes including:    Standing Gastroc Stretch 10x10s B (slant board level 3)  Standing Hamstring Stretch 10x10s B  Standing Lumbar Extensions 4x10 reps     Neuromuscular Re-education activities to improve: Posture for 25 minutes. The following activities were included:     Standing Hip Abduction 3x10 reps B  Standing Hip Extension 3x10 reps B  Seated Stability Ball Pelvic Tilts 3x10 reps  Seated Stability Ball Marches 3x10  reps  Standing Shoulder Extensions 3x10 reps (17# CC Machine)  Standing Stability Ball Push Downs 3x10 reps (3s holds)     Therapeutic Activity for improved functional activity training x10 minutes:    Sit to Stands 2x10 reps + blue foam pad  Side Steps x2 laps + YTB around shins  Deadlifts 2x10 reps + #7.5 KB    Possible for Next Session: Mechanical Lumbar Traction      Patient Education and Home Exercises       Education provided:   - Home Exercise Program Review  - Post Exercise Soreness  - Maintaining a pain free range of motion with all activities  - Anatomy/Physiology of the Lumbar Spine and the surrounding musculature.    Written Home Exercises Provided: Patient instructed to cont prior HEP. Exercises were reviewed and Christine was able to demonstrate them prior to the end of the session.  Christine demonstrated good  understanding of the education provided. See Electronic Medical Record under Patient Instructions for exercises provided during therapy sessions    Assessment     Patient limited due to menstrual cycle symptoms/complications but able to complete all standing activities well. Lumbar extension based exercises continue to feel beneficial and are pain relieving in nature. Radicular symptoms have reduced drastically since beginning therapy as well. Traction was held today per patient request. Core activation and stability was a main focal point this date. FOTO score has improved since beginning therapy, indicative of increased ability to perform ADL's.    Christine Is progressing well towards her goals.   Patient prognosis is Good.     Patient will continue to benefit from skilled outpatient physical therapy to address the deficits listed in the problem list box on initial evaluation, provide pt/family education and to maximize pt's level of independence in the home and community environment.     Patient's spiritual, cultural and educational needs considered and pt agreeable to plan of care and goals.      Anticipated barriers to physical therapy: work schedule    Goals:   Short Term Goals: 4 weeks (progressing, not met)  - Patient will demonstrate improved lumbar spine range of motion, especially into flexion, by at least 25% with minimal increase in symptoms for improved ability to perform ADL's.  - Patient will be able to ascend/descend at least 1 step with minimal to no increase in symptoms for improved ability to perform household and community based activities.  - Patient will be able to sit for at least 30 minutes with minimal to no increase in symptoms for improved performance of work related duties.  - Patient will demonstrate improved LE strength, especially into hip flexion on the Left by at least 1/2 grade via MMT for increased ability to perform functional tasks.     Long Term Goals: 8 weeks (progressing, not met)  - Patient will demonstrate improved lumbar spine range of motion, especially into extension, by at least 25% with minimal increase in symptoms for improved ability to perform ADL's.  - Patient will be able to ascend/descend at least 3 steps with minimal to no increase in symptoms for improved ability to perform household and community based activities.  - Patient will be able to sit for at least 1 hour with minimal to no increase in symptoms for improved performance of work related duties.  - Patient will demonstrate improved LE strength, especially into hip extension on the Left by at least 1/2 grade via MMT for increased ability to perform functional tasks.  - Patient will demonstrate improved FOTO score that is greater than or equal to the predicted value for increased ability to perform ADL's.  - Patient will demonstrate independence with Home Exercise Program for continued improvements outside the clinical setting.    Plan     Continue with established POC for improved functional mobility overall.    Mary German, PT, DPT, Cert. DN

## 2024-04-22 ENCOUNTER — CLINICAL SUPPORT (OUTPATIENT)
Dept: REHABILITATION | Facility: HOSPITAL | Age: 54
End: 2024-04-22
Payer: COMMERCIAL

## 2024-04-22 DIAGNOSIS — R29.898 DECREASED STRENGTH OF LOWER EXTREMITY: Primary | ICD-10-CM

## 2024-04-22 DIAGNOSIS — R68.89 DECREASED FUNCTIONAL ACTIVITY TOLERANCE: ICD-10-CM

## 2024-04-22 PROCEDURE — 97110 THERAPEUTIC EXERCISES: CPT | Mod: PN

## 2024-04-22 PROCEDURE — 97112 NEUROMUSCULAR REEDUCATION: CPT | Mod: PN

## 2024-04-22 PROCEDURE — 97530 THERAPEUTIC ACTIVITIES: CPT | Mod: PN

## 2024-04-22 NOTE — PROGRESS NOTES
[facility]   Physical Therapy Treatment Note      [facility]  [facility] Number: [id number]    Therapy Diagnosis:   Encounter Diagnoses   Name Primary?    Decreased strength of lower extremity Yes    Decreased functional activity tolerance      Physician: Rebekah Smith PA-C    Visit Date: 4/24/2024    Physician Orders: PT Eval and Treat  Medical Diagnosis from Referral: M54.16 (ICD-10-CM) - Lumbar radiculopathy   Evaluation Date: 4/3/2024  Authorization Period Expiration: 12/31/24  Plan of Care Expiration: 6/3/24  Progress Note Due: 5/3/24  Date of Surgery: N/A  Visit # / Visits authorized: 3 / 20 (1 / 1 Eval)  FOTO: 2 / 3     Precautions: Anxiety, Essential HTN, Obesity, Hypothyroidism, RA, Lupus      Time In: 7:15 AM (pt arrived late)  Time Out: 8:00 AM  Total Billable Time: 10 minutes    PTA Visit #: 0/5     Subjective     Patient reports: that she started having increased left sided low back pain last night and this morning. The exercises have not helped so far.     She was compliant with home exercise program.  Response to previous treatment: no adverse reactions  Functional change: in progress    Pain: 7/10, 3/10 post treatment  Location: left back and buttocks      Objective      Objective Measures updated at progress report unless specified.     Treatment     Christine received the treatments listed below:      Therapeutic Exercises to develop strength, endurance, ROM, and flexibility for 8 minutes including:    Patient Education  Standing Lumbar Extensions 2x10 reps       Possible for Next Session: Mechanical Lumbar Traction    Supervised Modalities after being cleared for contradictions: Mechanical Traction:  Christine received intermittent mechanical traction to the lumbar spine at a force of 110 pounds for a total of 20 minutes. Hold time of 30 seconds and rest time for 10 seconds. Patient tolerated treatment well without any adverse effects.    Supervised Modalities  after being cleared for contradictions: IFC Electrical Stimulation:  Christine received IFC Electrical Stimulation for pain control applied to the Lumbar Spine/Musculature. Pt received stimulation at 100% scan for 8 minutes. Christine tolderated treatment well without any adverse effects.      Patient received a hot pack for 8 minutes to the lumbar spine post treatment session. Patient supine with LE's over wedge in combination with IFC-electrical stimulation    Patient Education and Home Exercises       Education provided:   - Home Exercise Program Review  - Post Exercise Soreness  - Maintaining a pain free range of motion with all activities  - Anatomy/Physiology of the Lumbar Spine and the surrounding musculature.    Written Home Exercises Provided: Patient instructed to cont prior HEP. Exercises were reviewed and Christine was able to demonstrate them prior to the end of the session.  Christine demonstrated good  understanding of the education provided. See Electronic Medical Record under Patient Instructions for exercises provided during therapy sessions    Assessment     Patient with increased left side low back pain and radicular symptoms this date. Mechanical traction was utilized to assist with pain relief. Patient reported decreased pain post treatment session.    Christine Is progressing well towards her goals.   Patient prognosis is Good.     Patient will continue to benefit from skilled outpatient physical therapy to address the deficits listed in the problem list box on initial evaluation, provide pt/family education and to maximize pt's level of independence in the home and community environment.     Patient's spiritual, cultural and educational needs considered and pt agreeable to plan of care and goals.     Anticipated barriers to physical therapy: work schedule    Goals:   Short Term Goals: 4 weeks (progressing, not met)  - Patient will demonstrate improved lumbar spine range of motion, especially into flexion,  by at least 25% with minimal increase in symptoms for improved ability to perform ADL's.  - Patient will be able to ascend/descend at least 1 step with minimal to no increase in symptoms for improved ability to perform household and community based activities.  - Patient will be able to sit for at least 30 minutes with minimal to no increase in symptoms for improved performance of work related duties.  - Patient will demonstrate improved LE strength, especially into hip flexion on the Left by at least 1/2 grade via MMT for increased ability to perform functional tasks.     Long Term Goals: 8 weeks (progressing, not met)  - Patient will demonstrate improved lumbar spine range of motion, especially into extension, by at least 25% with minimal increase in symptoms for improved ability to perform ADL's.  - Patient will be able to ascend/descend at least 3 steps with minimal to no increase in symptoms for improved ability to perform household and community based activities.  - Patient will be able to sit for at least 1 hour with minimal to no increase in symptoms for improved performance of work related duties.  - Patient will demonstrate improved LE strength, especially into hip extension on the Left by at least 1/2 grade via MMT for increased ability to perform functional tasks.  - Patient will demonstrate improved FOTO score that is greater than or equal to the predicted value for increased ability to perform ADL's.  - Patient will demonstrate independence with Home Exercise Program for continued improvements outside the clinical setting.    Plan     Continue with established POC for improved functional mobility overall.    Mary German, PT, DPT, Cert. DN

## 2024-04-24 ENCOUNTER — CLINICAL SUPPORT (OUTPATIENT)
Dept: REHABILITATION | Facility: HOSPITAL | Age: 54
End: 2024-04-24
Payer: COMMERCIAL

## 2024-04-24 DIAGNOSIS — R68.89 DECREASED FUNCTIONAL ACTIVITY TOLERANCE: ICD-10-CM

## 2024-04-24 DIAGNOSIS — L40.50 PSORIATIC ARTHRITIS: ICD-10-CM

## 2024-04-24 DIAGNOSIS — R29.898 DECREASED STRENGTH OF LOWER EXTREMITY: Primary | ICD-10-CM

## 2024-04-24 PROCEDURE — 97014 ELECTRIC STIMULATION THERAPY: CPT | Mod: PN

## 2024-04-24 PROCEDURE — 97110 THERAPEUTIC EXERCISES: CPT | Mod: PN

## 2024-04-24 PROCEDURE — 97012 MECHANICAL TRACTION THERAPY: CPT | Mod: PN

## 2024-04-25 RX ORDER — METHOTREXATE 25 MG/.5ML
25 INJECTION, SOLUTION SUBCUTANEOUS
Qty: 4 EACH | Refills: 0 | Status: ACTIVE | OUTPATIENT
Start: 2024-04-25 | End: 2024-05-17 | Stop reason: SDUPTHER

## 2024-04-25 NOTE — PROGRESS NOTES
[facility]   Physical Therapy Treatment Note      Name: Christine Alfaro  Clinic Number: [id number]    Therapy Diagnosis:   Encounter Diagnoses   Name Primary?    Decreased strength of lower extremity Yes    Decreased functional activity tolerance      Physician: Rebekah Smith PA-C    Visit Date: 4/29/2024    Physician Orders: PT Eval and Treat  Medical Diagnosis from Referral: M54.16 (ICD-10-CM) - Lumbar radiculopathy   Evaluation Date: 4/3/2024  Authorization Period Expiration: 12/31/24  Plan of Care Expiration: 6/3/24  Progress Note Due: 5/3/24  Date of Surgery: N/A  Visit # / Visits authorized: 4 / 20 (1 / 1 Eval)  FOTO: 2 / 3     Precautions: Anxiety, Essential HTN, Obesity, Hypothyroidism, RA, Lupus      Time In: 7:29 AM (pt arrived late)  Time Out: 8:00 AM  Total Billable Time: 10 minutes    PTA Visit #: 0/5     Subjective     Patient reports: that she is doing okay this morning. Has noticed more pain/discomfort when picking up her 18 pound grand-daughter. Felt much better after last session.    She was compliant with home exercise program.  Response to previous treatment: no adverse reactions  Functional change: in progress    Pain: 4/10  Location: left back and buttocks      Objective      Objective Measures updated at progress report unless specified.     Treatment     Christine received the treatments listed below:      Therapeutic Exercises to develop strength, endurance, ROM, and flexibility for 8 minutes including:    Patient Education on lifting mechanics       Supervised Modalities after being cleared for contradictions: Mechanical Traction:  Christine received intermittent mechanical traction to the lumbar spine at a force of 110 pounds for a total of 15 minutes. Hold time of 30 seconds and rest time for 10 seconds. Patient tolerated treatment well without any adverse effects.      Supervised Modalities after being cleared for contradictions: IFC Electrical Stimulation:   Christine received IFC Electrical Stimulation for pain control applied to the Lumbar Spine/Musculature. Pt received stimulation at 100% scan for 8 minutes. Christine tolderated treatment well without any adverse effects.      Patient received a hot pack for 8 minutes to the lumbar spine post treatment session. Patient supine with  in combination with IFC-electrical stimulation. (Wedge denied today)    Patient Education and Home Exercises       Education provided:   - Home Exercise Program Review  - Post Exercise Soreness  - Maintaining a pain free range of motion with all activities  - Anatomy/Physiology of the Lumbar Spine and the surrounding musculature.    Written Home Exercises Provided: Patient instructed to cont prior HEP. Exercises were reviewed and Christine was able to demonstrate them prior to the end of the session.  Christine demonstrated good  understanding of the education provided. See Electronic Medical Record under Patient Instructions for exercises provided during therapy sessions    Assessment     Patient arrived late, limiting treatment session. Mechanical lumbar traction was utilized and continues to feel beneficial. No adverse reactions observed with treatment. Educated patient on proper lifting mechanics - will plan to incorporate these specific activities next session.    Christine Is progressing well towards her goals.   Patient prognosis is Good.     Patient will continue to benefit from skilled outpatient physical therapy to address the deficits listed in the problem list box on initial evaluation, provide pt/family education and to maximize pt's level of independence in the home and community environment.     Patient's spiritual, cultural and educational needs considered and pt agreeable to plan of care and goals.     Anticipated barriers to physical therapy: work schedule    Goals:   Short Term Goals: 4 weeks (progressing, not met)  - Patient will demonstrate improved lumbar spine range of motion,  especially into flexion, by at least 25% with minimal increase in symptoms for improved ability to perform ADL's.  - Patient will be able to ascend/descend at least 1 step with minimal to no increase in symptoms for improved ability to perform household and community based activities.  - Patient will be able to sit for at least 30 minutes with minimal to no increase in symptoms for improved performance of work related duties.  - Patient will demonstrate improved LE strength, especially into hip flexion on the Left by at least 1/2 grade via MMT for increased ability to perform functional tasks.     Long Term Goals: 8 weeks (progressing, not met)  - Patient will demonstrate improved lumbar spine range of motion, especially into extension, by at least 25% with minimal increase in symptoms for improved ability to perform ADL's.  - Patient will be able to ascend/descend at least 3 steps with minimal to no increase in symptoms for improved ability to perform household and community based activities.  - Patient will be able to sit for at least 1 hour with minimal to no increase in symptoms for improved performance of work related duties.  - Patient will demonstrate improved LE strength, especially into hip extension on the Left by at least 1/2 grade via MMT for increased ability to perform functional tasks.  - Patient will demonstrate improved FOTO score that is greater than or equal to the predicted value for increased ability to perform ADL's.  - Patient will demonstrate independence with Home Exercise Program for continued improvements outside the clinical setting.    Plan     Continue with established POC for improved functional mobility overall.    Mary German, PT, DPT, Cert. DN

## 2024-04-29 ENCOUNTER — CLINICAL SUPPORT (OUTPATIENT)
Dept: REHABILITATION | Facility: HOSPITAL | Age: 54
End: 2024-04-29
Payer: COMMERCIAL

## 2024-04-29 ENCOUNTER — PATIENT MESSAGE (OUTPATIENT)
Dept: PAIN MEDICINE | Facility: CLINIC | Age: 54
End: 2024-04-29
Payer: COMMERCIAL

## 2024-04-29 DIAGNOSIS — M54.9 DORSALGIA, UNSPECIFIED: Primary | ICD-10-CM

## 2024-04-29 DIAGNOSIS — R29.898 DECREASED STRENGTH OF LOWER EXTREMITY: Primary | ICD-10-CM

## 2024-04-29 DIAGNOSIS — M54.16 LUMBAR RADICULOPATHY, CHRONIC: ICD-10-CM

## 2024-04-29 DIAGNOSIS — R68.89 DECREASED FUNCTIONAL ACTIVITY TOLERANCE: ICD-10-CM

## 2024-04-29 PROCEDURE — 97012 MECHANICAL TRACTION THERAPY: CPT | Mod: PN

## 2024-04-29 PROCEDURE — 97014 ELECTRIC STIMULATION THERAPY: CPT | Mod: PN

## 2024-04-29 PROCEDURE — 97110 THERAPEUTIC EXERCISES: CPT | Mod: PN

## 2024-04-29 NOTE — PROGRESS NOTES
[facility]   Physical Therapy Treatment Note      Name: Christine Boyd [facility]  Clinic Number: [id number]    Therapy Diagnosis:   Encounter Diagnoses   Name Primary?    Decreased strength of lower extremity Yes    Decreased functional activity tolerance      Physician: Rebekah Smith PA-C    Visit Date: 5/1/2024    Physician Orders: PT Eval and Treat  Medical Diagnosis from Referral: M54.16 (ICD-10-CM) - Lumbar radiculopathy   Evaluation Date: 4/3/2024  Authorization Period Expiration: 12/31/24  Plan of Care Expiration: 6/3/24  Progress Note Due: 5/3/24  Date of Surgery: N/A  Visit # / Visits authorized: 5 / 20 (1 / 1 Eval)  FOTO: 2 / 3     Precautions: Anxiety, Essential HTN, Obesity, Hypothyroidism, RA, Lupus      Time In: 7:05 AM   Time Out: 7:55 AM  Total Billable Time: 25 minutes    PTA Visit #: 0/5     Subjective     Patient reports: that she has an MRI on the 15th of this month. The symptoms continue to be on the left side. The pain was so bad last night that she threw up. Traction continues to help for about 24 hours then the pain has been returning.    She was compliant with home exercise program.  Response to previous treatment: no adverse reactions  Functional change: in progress    Pain: 7/10  Location: left back and buttocks      Objective      Objective Measures updated at progress report unless specified.     Treatment     Christine received the treatments listed below:      Therapeutic Exercises to develop strength, endurance, ROM, and flexibility for 15 minutes including:    Patient Education  Standing Lumbar Extensions 2x10 reps  Prone Press Ups x10 reps  Prone Hip Extensions 2x10 reps B  Standing Hamstring Stretch 10x10s        Neuromuscular Re-education activities to improve: Posture for 10 minutes. The following activities were included:  Supine Hip Bridge 3x10 reps  Seated Stability Ball Pelvic Tilts 3x10 reps  Seated Stability Ball Marches 3x10 reps  Seated Pelvic  Lateral Tilts 3x10 reps     Supervised Modalities after being cleared for contradictions: Mechanical Traction:  Christine received intermittent mechanical traction to the lumbar spine at a force of 115 pounds for a total of 15 minutes. Hold time of 30 seconds and rest time for 10 seconds. Patient tolerated treatment well without any adverse effects.      Supervised Modalities after being cleared for contradictions: IFC Electrical Stimulation:  Christine received IFC Electrical Stimulation for pain control applied to the Lumbar Spine/Musculature. Pt received stimulation at 100% scan for 10 minutes. Christine tolderated treatment well without any adverse effects.      Patient received a hot pack for 10 minutes to the lumbar spine post treatment session. Patient supine with LE's over wedge in combination with IFC-electrical stimulation.    Patient Education and Home Exercises       Education provided:   - Home Exercise Program Review  - Post Exercise Soreness  - Maintaining a pain free range of motion with all activities  - Anatomy/Physiology of the Lumbar Spine and the surrounding musculature.    Written Home Exercises Provided: Patient instructed to cont prior HEP. Exercises were reviewed and Christine was able to demonstrate them prior to the end of the session.  Christine demonstrated good  understanding of the education provided. See Electronic Medical Record under Patient Instructions for exercises provided during therapy sessions    Assessment     Patient with increased pain and discomfort along the left buttocks and down the LLE. Symptoms decreased slightly with a combination of exercise and mechanical lumbar traction. The patient will undergo an MRI in the near future to further assess symptoms.     Christine Is progressing well towards her goals.   Patient prognosis is Good.     Patient will continue to benefit from skilled outpatient physical therapy to address the deficits listed in the problem list box on initial  evaluation, provide pt/family education and to maximize pt's level of independence in the home and community environment.     Patient's spiritual, cultural and educational needs considered and pt agreeable to plan of care and goals.     Anticipated barriers to physical therapy: work schedule    Goals:   Short Term Goals: 4 weeks (progressing, not met)  - Patient will demonstrate improved lumbar spine range of motion, especially into flexion, by at least 25% with minimal increase in symptoms for improved ability to perform ADL's.  - Patient will be able to ascend/descend at least 1 step with minimal to no increase in symptoms for improved ability to perform household and community based activities.  - Patient will be able to sit for at least 30 minutes with minimal to no increase in symptoms for improved performance of work related duties.  - Patient will demonstrate improved LE strength, especially into hip flexion on the Left by at least 1/2 grade via MMT for increased ability to perform functional tasks.     Long Term Goals: 8 weeks (progressing, not met)  - Patient will demonstrate improved lumbar spine range of motion, especially into extension, by at least 25% with minimal increase in symptoms for improved ability to perform ADL's.  - Patient will be able to ascend/descend at least 3 steps with minimal to no increase in symptoms for improved ability to perform household and community based activities.  - Patient will be able to sit for at least 1 hour with minimal to no increase in symptoms for improved performance of work related duties.  - Patient will demonstrate improved LE strength, especially into hip extension on the Left by at least 1/2 grade via MMT for increased ability to perform functional tasks.  - Patient will demonstrate improved FOTO score that is greater than or equal to the predicted value for increased ability to perform ADL's.  - Patient will demonstrate independence with Home Exercise  Program for continued improvements outside the clinical setting.    Plan     Continue with established POC for improved functional mobility overall.    Mary German, PT, DPT, Cert. DN

## 2024-04-30 ENCOUNTER — PATIENT MESSAGE (OUTPATIENT)
Dept: ORTHOPEDICS | Facility: CLINIC | Age: 54
End: 2024-04-30
Payer: COMMERCIAL

## 2024-04-30 RX ORDER — TRAMADOL HYDROCHLORIDE 50 MG/1
50 TABLET ORAL EVERY 8 HOURS PRN
Qty: 20 TABLET | Refills: 0 | Status: SHIPPED | OUTPATIENT
Start: 2024-04-30 | End: 2024-05-21 | Stop reason: SDUPTHER

## 2024-04-30 NOTE — TELEPHONE ENCOUNTER
Dr. Cobb -   Would you consider prescribing tramadol to help with back pain while she awaits MRI and further treatment recommendations?    Thanks  Rebekah Smith PA-C  [facility]

## 2024-05-01 ENCOUNTER — CLINICAL SUPPORT (OUTPATIENT)
Dept: REHABILITATION | Facility: HOSPITAL | Age: 54
End: 2024-05-01
Payer: COMMERCIAL

## 2024-05-01 DIAGNOSIS — R29.898 DECREASED STRENGTH OF LOWER EXTREMITY: Primary | ICD-10-CM

## 2024-05-01 DIAGNOSIS — R68.89 DECREASED FUNCTIONAL ACTIVITY TOLERANCE: ICD-10-CM

## 2024-05-01 PROCEDURE — 97014 ELECTRIC STIMULATION THERAPY: CPT | Mod: PN

## 2024-05-01 PROCEDURE — 97012 MECHANICAL TRACTION THERAPY: CPT | Mod: PN

## 2024-05-01 PROCEDURE — 97112 NEUROMUSCULAR REEDUCATION: CPT | Mod: PN

## 2024-05-01 PROCEDURE — 97110 THERAPEUTIC EXERCISES: CPT | Mod: PN

## 2024-05-02 ENCOUNTER — OFFICE VISIT (OUTPATIENT)
Dept: ORTHOPEDICS | Facility: CLINIC | Age: 54
End: 2024-05-02
Payer: COMMERCIAL

## 2024-05-02 VITALS — WEIGHT: 241.63 LBS | HEIGHT: 65 IN | BODY MASS INDEX: 40.26 KG/M2

## 2024-05-02 DIAGNOSIS — M25.572 LEFT ANKLE PAIN, UNSPECIFIED CHRONICITY: Primary | ICD-10-CM

## 2024-05-02 PROCEDURE — 1159F MED LIST DOCD IN RCRD: CPT | Mod: CPTII,S$GLB,, | Performed by: ORTHOPAEDIC SURGERY

## 2024-05-02 PROCEDURE — 99999 PR PBB SHADOW E&M-EST. PATIENT-LVL IV: CPT | Mod: PBBFAC,,, | Performed by: ORTHOPAEDIC SURGERY

## 2024-05-02 PROCEDURE — 4010F ACE/ARB THERAPY RXD/TAKEN: CPT | Mod: CPTII,S$GLB,, | Performed by: ORTHOPAEDIC SURGERY

## 2024-05-02 PROCEDURE — 99213 OFFICE O/P EST LOW 20 MIN: CPT | Mod: S$GLB,,, | Performed by: ORTHOPAEDIC SURGERY

## 2024-05-02 PROCEDURE — 3008F BODY MASS INDEX DOCD: CPT | Mod: CPTII,S$GLB,, | Performed by: ORTHOPAEDIC SURGERY

## 2024-05-02 RX ORDER — AMITRIPTYLINE HYDROCHLORIDE 25 MG/1
1 TABLET, FILM COATED ORAL NIGHTLY
COMMUNITY

## 2024-05-02 RX ORDER — BUSPIRONE HYDROCHLORIDE 10 MG/1
TABLET ORAL
COMMUNITY

## 2024-05-02 RX ORDER — LEVOTHYROXINE SODIUM 125 UG/1
250 TABLET ORAL EVERY MORNING
COMMUNITY
Start: 2024-03-19 | End: 2024-06-13

## 2024-05-02 NOTE — PROGRESS NOTES
53 years old follow-up a peroneal tendinitis which been present now for about 6 weeks time.  She has seen some improvement     Exam shows good motion strengthening ankle no signs infection compartments are soft skin is intact.  Still somewhat tender along the peroneal tendons     Assessment:  Chronic peroneal tendinitis left ankle     Plan:  Home exercise program, follow up in several weeks time

## 2024-05-13 ENCOUNTER — PATIENT MESSAGE (OUTPATIENT)
Dept: RHEUMATOLOGY | Facility: CLINIC | Age: 54
End: 2024-05-13
Payer: COMMERCIAL

## 2024-05-13 DIAGNOSIS — L40.50 PSORIATIC ARTHRITIS: Primary | ICD-10-CM

## 2024-05-14 ENCOUNTER — PATIENT MESSAGE (OUTPATIENT)
Dept: BARIATRICS | Facility: CLINIC | Age: 54
End: 2024-05-14
Payer: COMMERCIAL

## 2024-05-14 DIAGNOSIS — E78.5 DYSLIPIDEMIA: ICD-10-CM

## 2024-05-14 RX ORDER — ATORVASTATIN CALCIUM 40 MG/1
40 TABLET, FILM COATED ORAL NIGHTLY
Qty: 90 TABLET | Refills: 1 | Status: SHIPPED | OUTPATIENT
Start: 2024-05-14

## 2024-05-14 NOTE — TELEPHONE ENCOUNTER
No care due was identified.  [facility] Embedded Care Due Messages. Reference number: [id number].   5/14/2024 4:18:19 PM CDT

## 2024-05-14 NOTE — TELEPHONE ENCOUNTER
Refill Decision Note   Christine Alfaro  is requesting a refill authorization.  Brief Assessment and Rationale for Refill:  Approve     Medication Therapy Plan:         Comments:     Note composed:5:04 PM 05/14/2024

## 2024-05-15 ENCOUNTER — PATIENT MESSAGE (OUTPATIENT)
Dept: PAIN MEDICINE | Facility: CLINIC | Age: 54
End: 2024-05-15
Payer: COMMERCIAL

## 2024-05-15 ENCOUNTER — HOSPITAL ENCOUNTER (OUTPATIENT)
Dept: RADIOLOGY | Facility: HOSPITAL | Age: 54
Discharge: HOME | End: 2024-05-15
Attending: PHYSICIAN ASSISTANT
Payer: COMMERCIAL

## 2024-05-15 ENCOUNTER — PATIENT MESSAGE (OUTPATIENT)
Dept: RHEUMATOLOGY | Facility: CLINIC | Age: 54
End: 2024-05-15
Payer: COMMERCIAL

## 2024-05-15 DIAGNOSIS — L40.50 PSORIATIC ARTHRITIS: ICD-10-CM

## 2024-05-15 DIAGNOSIS — M54.9 DORSALGIA, UNSPECIFIED: ICD-10-CM

## 2024-05-15 DIAGNOSIS — M54.16 LUMBAR RADICULOPATHY, CHRONIC: ICD-10-CM

## 2024-05-15 PROCEDURE — 72148 MRI LUMBAR SPINE W/O DYE: CPT | Mod: 26,,, | Performed by: RADIOLOGY

## 2024-05-15 PROCEDURE — 72148 MRI LUMBAR SPINE W/O DYE: CPT | Mod: TC,PO

## 2024-05-15 RX ORDER — SECUKINUMAB 150 MG/ML
150 INJECTION SUBCUTANEOUS
Qty: 2 ML | Refills: 11 | Status: ACTIVE | OUTPATIENT
Start: 2024-05-15

## 2024-05-15 NOTE — TELEPHONE ENCOUNTER
Please call her.    Her MRI shows some arthritic changes that can cause her symptoms.  Recommend a follow up in clinic to discuss results and further recommendations.  She had MRI this morning.      In addition, RA, lupus can certainly cause pain as well.  She should follow up with her rheumatologist to discuss the role of these conditions further.    Rebekah Smith PA-C  [facility]

## 2024-05-17 ENCOUNTER — PATIENT MESSAGE (OUTPATIENT)
Dept: RHEUMATOLOGY | Facility: CLINIC | Age: 54
End: 2024-05-17
Payer: COMMERCIAL

## 2024-05-17 DIAGNOSIS — L40.50 PSORIATIC ARTHRITIS: ICD-10-CM

## 2024-05-17 RX ORDER — METHOTREXATE 25 MG/.5ML
25 INJECTION, SOLUTION SUBCUTANEOUS
Qty: 4 EACH | Refills: 0 | Status: ACTIVE | OUTPATIENT
Start: 2024-05-17 | End: 2024-06-18 | Stop reason: SDUPTHER

## 2024-05-21 ENCOUNTER — OFFICE VISIT (OUTPATIENT)
Dept: PAIN MEDICINE | Facility: CLINIC | Age: 54
End: 2024-05-21
Payer: COMMERCIAL

## 2024-05-21 VITALS
SYSTOLIC BLOOD PRESSURE: 141 MMHG | HEART RATE: 76 BPM | WEIGHT: 241.38 LBS | DIASTOLIC BLOOD PRESSURE: 87 MMHG | BODY MASS INDEX: 40.17 KG/M2

## 2024-05-21 DIAGNOSIS — M54.9 DORSALGIA, UNSPECIFIED: ICD-10-CM

## 2024-05-21 DIAGNOSIS — M54.16 LUMBAR RADICULOPATHY, CHRONIC: ICD-10-CM

## 2024-05-21 DIAGNOSIS — M54.16 LUMBAR RADICULOPATHY: ICD-10-CM

## 2024-05-21 DIAGNOSIS — M71.38 SYNOVIAL CYST OF LUMBAR FACET JOINT: Primary | ICD-10-CM

## 2024-05-21 PROCEDURE — 3008F BODY MASS INDEX DOCD: CPT | Mod: CPTII,S$GLB,, | Performed by: PHYSICIAN ASSISTANT

## 2024-05-21 PROCEDURE — 99214 OFFICE O/P EST MOD 30 MIN: CPT | Mod: S$GLB,,, | Performed by: PHYSICIAN ASSISTANT

## 2024-05-21 PROCEDURE — 3079F DIAST BP 80-89 MM HG: CPT | Mod: CPTII,S$GLB,, | Performed by: PHYSICIAN ASSISTANT

## 2024-05-21 PROCEDURE — 1159F MED LIST DOCD IN RCRD: CPT | Mod: CPTII,S$GLB,, | Performed by: PHYSICIAN ASSISTANT

## 2024-05-21 PROCEDURE — 99999 PR PBB SHADOW E&M-EST. PATIENT-LVL IV: CPT | Mod: PBBFAC,,, | Performed by: PHYSICIAN ASSISTANT

## 2024-05-21 PROCEDURE — 3077F SYST BP >= 140 MM HG: CPT | Mod: CPTII,S$GLB,, | Performed by: PHYSICIAN ASSISTANT

## 2024-05-21 PROCEDURE — 4010F ACE/ARB THERAPY RXD/TAKEN: CPT | Mod: CPTII,S$GLB,, | Performed by: PHYSICIAN ASSISTANT

## 2024-05-21 NOTE — TELEPHONE ENCOUNTER
Dr. Cobb -   Please consider refill of tramadol to help with pain.    She is being scheduled for left L4/5 facet synovial cyst drainage.    She leaves for vacation next week.

## 2024-05-21 NOTE — PROGRESS NOTES
[facility]    PCP:   Layo Mccrary MD    CC:   Chief Complaint   Patient presents with    Follow-up          HPI:   Christine Alfaro is a 53 y.o. year old female patient who has a past medical history of Anxiety, GERD (gastroesophageal reflux disease), Hyperlipidemia, Hypertension, Hypothyroidism, Insomnia, Observed sleep apnea, Prediabetes, Rheumatoid arthritis, unspecified, and Vitamin D deficiency.  Ms. King presents for follow up of lower back and left leg pain.  She has been to PT ans has tride flexeril, gabapentin, tramadol.  Pain persists.  She continues to feel in the lower back to the left side.  It radiates to the left posterior leg to the ankle.  She has chronic ankle weakness from an old injury.  Pain is associated with numbness and tinglign.  She is taking tramadol for pain.  Viveros cannot tolerate steroids.      Initial HPI:   She presents in self referral for lower back and left leg pain.  Whe underwent weight loss surgery in 2023 losing about 110 pounds.  She started to exercise more with biking and walking, but does sit a lot for work as a .  Pain started without focal injury.  She feels pain in the left lower back to the sacral region, posterior left thigh, knee, calf.  Pain increases with sitting and improves with sitting on a tennis ball.  She has tried flexeril, gabapentin.    Denies bowel/ bladder incontinence.    Past and current medications:  Antineuropathics: gabapentin  NSAIDs:  Antidepressants: wellbutrin  Muscle relaxers: flexeril  Opioids: tramadol  Antiplatelets/Anticoagulants:  ASA  - tries to avoid steroid because she has had signfiicnat local swelling after injections in the past.     Physical Therapy/ Chiropractic care:  PT with no improvement,.    Pain Intervention History:  none    Past Spine Surgical History:  none        History:    Current Outpatient Medications:     alclometasone (ACLOVATE) 0.05 % cream, AAA face bid prn redness or  scaling or itching, Disp: 60 g, Rfl: 3    amitriptyline (ELAVIL) 25 MG tablet, Take 1 tablet by mouth every evening., Disp: , Rfl:     atorvastatin (LIPITOR) 40 MG tablet, TAKE ONE TABLET BY MOUTH EVERY EVENING, Disp: 90 tablet, Rfl: 1    betamethasone dipropionate 0.05 % cream, Use bid, Disp: 45 g, Rfl: 3    busPIRone (BUSPAR) 10 MG tablet, , Disp: , Rfl:     cyclobenzaprine (FLEXERIL) 10 MG tablet, TAKE ONE TABLET EVERY EVENING, Disp: 150 tablet, Rfl: 3    econazole nitrate 1 % cream, Use bid, Disp: 30 g, Rfl: 2    folic acid (FOLVITE) 1 MG tablet, Take 1 tablet (1 mg total) by mouth once daily., Disp: 30 tablet, Rfl: 11    furosemide (LASIX) 20 MG tablet, Take 1 tablet (20 mg total) by mouth once daily., Disp: 90 tablet, Rfl: 1    gabapentin (NEURONTIN) 300 MG capsule, Take 1 capsule (300 mg total) by mouth 3 (three) times daily., Disp: 90 capsule, Rfl: 11    hydrOXYchloroQUINE (PLAQUENIL) 200 mg tablet, Take 200 mg by mouth 2 (two) times daily., Disp: , Rfl:     ipratropium (ATROVENT) 21 mcg (0.03 %) nasal spray, 2 sprays by Each Nostril route 3 (three) times daily., Disp: 30 mL, Rfl: 0    levothyroxine (SYNTHROID) 125 MCG tablet, Take 250 mcg by mouth every morning., Disp: , Rfl:     lisinopriL (PRINIVIL,ZESTRIL) 5 MG tablet, Take 1 tablet (5 mg total) by mouth 2 (two) times a day., Disp: 180 tablet, Rfl: 3    methotrexate, PF, (RASUVO, PF,) 25 mg/0.5 mL AtIn, Inject 0.5 mLs (25 mg total) into the skin every 7 days., Disp: 4 each, Rfl: 0    omeprazole (PRILOSEC) 40 MG capsule, Take 1 capsule (40 mg total) by mouth every morning. Open capsule and take with apple sauce, Disp: 30 capsule, Rfl: 2    secukinumab (COSENTYX PEN) 150 mg/mL PnIj, Inject 150 mg into the skin every 28 days., Disp: 2 mL, Rfl: 11    sodium chloride 0.9% SolP 100 mL with abatacept (with maltose) 250 mg SolR 1,000 mg, Inject 1,000 mg into the vein every 28 days., Disp: , Rfl:     XIIDRA 5 % Dpet, APPLY 1 DROP TO BOTH EYES BID, Disp: , Rfl:      albuterol (PROVENTIL HFA) 90 mcg/actuation inhaler, Inhale 2 puffs into the lungs every 6 (six) hours as needed for Wheezing. Rescue, Disp: 18 g, Rfl: 0    aspirin (ECOTRIN) 81 MG EC tablet, Take 1 tablet (81 mg total) by mouth once daily., Disp: 90 tablet, Rfl: 3    buPROPion (WELLBUTRIN SR) 100 MG TBSR 12 hr tablet, Take ONE tablet (100 mg total) by mouth TWO (two) times daily, Disp: 180 tablet, Rfl: 1    folic acid (FOLVITE) 1 MG tablet, Take 2 tablets (2 mg total) by mouth once daily., Disp: 60 tablet, Rfl: 11    traMADoL (ULTRAM) 50 mg tablet, Take 1 tablet (50 mg total) by mouth every 8 (eight) hours as needed for Pain., Disp: 20 tablet, Rfl: 0    Past Medical History:   Diagnosis Date    Anxiety     GERD (gastroesophageal reflux disease)     Hyperlipidemia     Hypertension     Hypothyroidism     Insomnia     Observed sleep apnea     Prediabetes     Rheumatoid arthritis, unspecified     Vitamin D deficiency        Past Surgical History:   Procedure Laterality Date    BTL      CHOLECYSTECTOMY      COLONOSCOPY N/A 8/30/2022    Procedure: COLONOSCOPY;  Surgeon: Drew Peraza MD;  Location: [facility];  Service: Endoscopy;  Laterality: N/A;    LAPAROSCOPIC SLEEVE GASTRECTOMY N/A 2/2/2023    Procedure: GASTRECTOMY, SLEEVE, LAPAROSCOPIC with intraop EGD;  Surgeon: Titi Ribeiro Jr., MD;  Location: [address];  Service: General;  Laterality: N/A;  PRE-CERT, PLEASE DO NOT SUBMIT FOR AUTH UNTIL 1/12/2023    SURGICAL REMOVAL OF BONE SPUR      left heel    TONSILLECTOMY      TUBAL LIGATION  8/1995       Family History   Problem Relation Name Age of Onset    Heart disease Mother Joanna Martines     Bone cancer Mother Joanna Martines     Cancer Mother Joanna Martines         cervical    Hypertension Mother Joanna Martines     Heart disease Father iTti Boyd     Diabetes type I Father Titi Boyd     Lung cancer Father Titi Boyd     Diabetes Father Titi Boyd     Hypertension Father Titi Boyd     Heart disease Brother  Daniel Boyd     No Known Problems Daughter      No Known Problems Daughter      Breast cancer Neg Hx      Ovarian cancer Neg Hx         Social History     Socioeconomic History    Marital status:    Tobacco Use    Smoking status: Never     Passive exposure: Never    Smokeless tobacco: Never   Substance and Sexual Activity    Alcohol use: Not Currently     Alcohol/week: 0.0 standard drinks of alcohol    Drug use: Never    Sexual activity: Yes     Partners: Male     Birth control/protection: See Surgical Hx     Social Determinants of Health     Financial Resource Strain: Low Risk  (1/29/2024)    Overall Financial Resource Strain (CARDIA)     Difficulty of Paying Living Expenses: Not hard at all   Food Insecurity: No Food Insecurity (1/29/2024)    Hunger Vital Sign     Worried About Running Out of Food in the Last Year: Never true     Ran Out of Food in the Last Year: Never true   Transportation Needs: No Transportation Needs (1/29/2024)    PRAPARE - Transportation     Lack of Transportation (Medical): No     Lack of Transportation (Non-Medical): No   Physical Activity: Insufficiently Active (1/29/2024)    Exercise Vital Sign     Days of Exercise per Week: 3 days     Minutes of Exercise per Session: 30 min   Stress: No Stress Concern Present (1/29/2024)    [place] [employer] - Occupational Stress Questionnaire     Feeling of Stress : Only a little   Housing Stability: Low Risk  (1/29/2024)    Housing Stability Vital Sign     Unable to Pay for Housing in the Last Year: No     Number of Places Lived in the Last Year: 1     Unstable Housing in the Last Year: No       Review of patient's allergies indicates:   Allergen Reactions    Sulfamethoxazole-trimethoprim     Sulfa (sulfonamide antibiotics) Rash and Swelling       Labs:  Lab Results   Component Value Date    HGBA1C 5.0 09/05/2023       Lab Results   Component Value Date    WBC 5.94 05/15/2024    HGB 14.3 05/15/2024    HCT 42.6 05/15/2024     MCV 91 05/15/2024     05/15/2024           Review of Systems:  Low back pain  .eft leg pain..  Balance of review of systems is negative.    Physical Exam:  Vitals:    05/21/24 1551   BP: (!) 141/87   Pulse: 76   Weight: 109.5 kg (241 lb 6.5 oz)   PainSc:   6   PainLoc: Hip     Body mass index is 40.17 kg/m².    Pain disability index:      5/21/2024     3:52 PM 4/2/2024    10:37 AM   Last 3 PDI Scores   Pain Disability Index (PDI) 29 39       Gen: NAD  Psych: mood appropriate for given condition  HEENT: eyes anicteric   CV: RRR, 2+ radial pulse  Respiratory: non-labored, no signs of respiratory distress  Abd: non-distended  Skin: warm, dry and intact.  Gait: Able to heel walk, toe walk. No antalgic gait.     Coordination:   Tandem walking coordination: normal    Cervical spine: ROM is full in flexion, extension and lateral rotation without increased pain.  Spurling's maneuver causes no neck pain to either side.  Myofascial exam: No Tenderness to palpation across cervical paraspinous region bilaterally.    Lumbar spine:  Lumbar spine: ROM is full with flexion extension and oblique extension with no increased pain.   Pain with axial facet loading on the left.  Amadeo's test causes no increased pain on either side.    Supine straight leg raise is positive on the left.  Internal and external rotation of the hip causes no increased pain on either side.  Myofascial exam: No tenderness to palpation across lumbar paraspinous muscles. No tenderness to palpation over the bilateral greater trochanters and bilateral SI joint    Sensory:  Intact and symmetrical to light touch in C4-T1 dermatomes bilaterally. Intact and symmetrical to light touch in L1-S1 dermatomes bilaterally.    Motor:    Right Left   C4 Shoulder Abduction  5  5   C5 Elbow Flexion    5  5   C6 Wrist Extension  5  5   C7 Elbow Extension   5  5   C8/T1 Hand Intrinsics   5  5        Right Left   L2/3 Iliacus Hip flexion  5  5   L3/4 Qudratus Femoris  Knee Extension  5  5   L4/5 Tib Anterior Ankle Dorsiflexion   5  5   L5/S1 Extensor Hallicus Longus Great toe extension  5  5   S1/S2 Gastroc/Soleus Plantar Flexion  5  5      Right Left   Triceps DTR 0+ 0+   Biceps DTR 0+ 0+   Brachioradialis DTR 0+ 0+   Patellar DTR 0+ 0+   Achilles DTR 0+ 0+   Kramer Absent  Absent   Clonus Absent Absent   Babinski Absent Absent       Imaging:  MRI lumbar spine from 5-15-24 reviewed.  Greatest degenerative changes noted at L4/5.  L4-5: There is mild disc space narrowing, trace anterolisthesis of L4 on L5. There is moderate to marked facet joint arthropathy with bilateral ligamentum flavum thickening. There also bilateral facet joint effusions. There is a mild disc bulge with subtle dorsal annular fissure in a tiny central disc protrusion. There is borderline to mild spinal stenosis. There is only mild right foraminal stenosis but there is a small 4 x 2 mm synovial cyst contributing to mild left lateral recess stenosis. On the right, there is a 8 x 7 mm far lateral foraminal synovial cysts contributing to at least moderate foraminal stenosis.       Assessment:   Christine Alfaro is a 53 y.o. year old female patient who has a past medical history of Anxiety, GERD (gastroesophageal reflux disease), Hyperlipidemia, Hypertension, Hypothyroidism, Insomnia, Observed sleep apnea, Prediabetes, Rheumatoid arthritis, unspecified, and Vitamin D deficiency. She presents in self referral for lower back and left leg pain.  Left L5 radiculopathy from left L4/5 facet hypertrophy with foraminal cyst causing foraminal narrowing.  Right facet cyst with no right sided symptoms.      Plan:  - discussed medications, exercise, PT, EH, cyst drainage and referral to neurosurgery; she cannot tolerate steroids  - she woult like to try left L4/5 synovial cyst drainage at this time. PT and m edications have not helped; procedure risks and benefits discussed. Order placed.  - if no improvement, consider  referral to neurosurgery  - I will reach out to Dr. Cobb regarding refill of tramadol to help with pain.  She has an upcoming vacatino next week.    Addendum 5/29/24:  I spoke with Dr. Cobb further about left L4/5 synovial cyst drainage.  In looking at the imaging, he did not believe the cyst could be drained easily enough or sufficiently enough to make a difference in her pain.  He recommends EH.  However she has had a bad swelling reaction to steroid injections in her knee prior and she is hesitant.  I called her today 5/29/24 and discussed the recommendations with her.  I discussed with her seeing an allergist prior to EH to further assess and make sure she is safe to try EH.  If she does not want to pursue EH, then we can refer to neurosurgery for an opinion.  She would like to see neurosurgery then after consider EH with an allergist appt if needed.    Problem List Items Addressed This Visit    None  Visit Diagnoses       Synovial cyst of lumbar facet joint    -  Primary    Relevant Orders    Ambulatory referral/consult to Neurosurgery    Lumbar radiculopathy        Relevant Orders    Ambulatory referral/consult to Neurosurgery              Follow Up: RTC with pain physician after cyst drainage.    : Reviewed and consistent with medication use as prescribed.      Rebekah Smith PA-C  [facility]

## 2024-05-22 ENCOUNTER — TELEPHONE (OUTPATIENT)
Dept: PAIN MEDICINE | Facility: CLINIC | Age: 54
End: 2024-05-22
Payer: COMMERCIAL

## 2024-05-22 DIAGNOSIS — F41.9 ANXIETY: ICD-10-CM

## 2024-05-22 RX ORDER — BUPROPION HYDROCHLORIDE 100 MG/1
TABLET, EXTENDED RELEASE ORAL
Qty: 180 TABLET | Refills: 1 | Status: SHIPPED | OUTPATIENT
Start: 2024-05-22

## 2024-05-22 RX ORDER — TRAMADOL HYDROCHLORIDE 50 MG/1
50 TABLET ORAL EVERY 8 HOURS PRN
Qty: 20 TABLET | Refills: 0 | Status: SHIPPED | OUTPATIENT
Start: 2024-05-22 | End: 2024-05-31 | Stop reason: SDUPTHER

## 2024-05-22 NOTE — TELEPHONE ENCOUNTER
No care due was identified.  [facility] Embedded Care Due Messages. Reference number: [id number].   5/22/2024 10:19:54 AM CDT

## 2024-05-22 NOTE — TELEPHONE ENCOUNTER
Refill Routing Note   Medication(s) are not appropriate for processing by [facility] for the following reason(s):        Required vitals abnormal    [facility] action(s):  Defer             Appointments  past 12m or future 3m with PCP    Date Provider   Last Visit   9/8/2023 Layo Mccrary MD   Next Visit   6/21/2024 Layo Mccrary MD   ED visits in past 90 days: 0        Note composed:11:45 AM 05/22/2024

## 2024-05-29 ENCOUNTER — OFFICE VISIT (OUTPATIENT)
Dept: RHEUMATOLOGY | Facility: CLINIC | Age: 54
End: 2024-05-29
Payer: COMMERCIAL

## 2024-05-29 VITALS
DIASTOLIC BLOOD PRESSURE: 64 MMHG | SYSTOLIC BLOOD PRESSURE: 106 MMHG | WEIGHT: 229.5 LBS | BODY MASS INDEX: 38.19 KG/M2 | HEART RATE: 88 BPM

## 2024-05-29 DIAGNOSIS — Z79.899 IMMUNODEFICIENCY DUE TO DRUG THERAPY: ICD-10-CM

## 2024-05-29 DIAGNOSIS — D84.821 DRUG-INDUCED IMMUNODEFICIENCY: ICD-10-CM

## 2024-05-29 DIAGNOSIS — M32.9 SYSTEMIC LUPUS ERYTHEMATOSUS, UNSPECIFIED SLE TYPE, UNSPECIFIED ORGAN INVOLVEMENT STATUS: ICD-10-CM

## 2024-05-29 DIAGNOSIS — D84.821 IMMUNODEFICIENCY DUE TO DRUG THERAPY: ICD-10-CM

## 2024-05-29 DIAGNOSIS — L40.50 PSORIATIC ARTHRITIS: Primary | ICD-10-CM

## 2024-05-29 DIAGNOSIS — Z79.899 DRUG-INDUCED IMMUNODEFICIENCY: ICD-10-CM

## 2024-05-29 PROCEDURE — 3008F BODY MASS INDEX DOCD: CPT | Mod: CPTII,S$GLB,, | Performed by: INTERNAL MEDICINE

## 2024-05-29 PROCEDURE — 3078F DIAST BP <80 MM HG: CPT | Mod: CPTII,S$GLB,, | Performed by: INTERNAL MEDICINE

## 2024-05-29 PROCEDURE — 99999 PR PBB SHADOW E&M-EST. PATIENT-LVL II: CPT | Mod: PBBFAC,,, | Performed by: INTERNAL MEDICINE

## 2024-05-29 PROCEDURE — 99214 OFFICE O/P EST MOD 30 MIN: CPT | Mod: S$GLB,,, | Performed by: INTERNAL MEDICINE

## 2024-05-29 PROCEDURE — 4010F ACE/ARB THERAPY RXD/TAKEN: CPT | Mod: CPTII,S$GLB,, | Performed by: INTERNAL MEDICINE

## 2024-05-29 PROCEDURE — 3074F SYST BP LT 130 MM HG: CPT | Mod: CPTII,S$GLB,, | Performed by: INTERNAL MEDICINE

## 2024-05-29 RX ORDER — FOLIC ACID 1 MG/1
2 TABLET ORAL DAILY
Qty: 60 TABLET | Refills: 11 | Status: SHIPPED | OUTPATIENT
Start: 2024-05-29 | End: 2025-05-29

## 2024-05-29 NOTE — PROGRESS NOTES
Subjective:       Patient ID: Christine Alfaro is a 50 y.o. female.    Chief Complaint: Disease Management    HPI 50 year old with PMH of Hashimotos disease, psoriasis,HTN, HL, ETELVINA , GERD, mitral valve regurgitation, left ankle spur removal  here for evaluation. She has been on cpap for many years.  She does not wake up well rested. She has been tired for years, but worse over the last 8 months. Last year, she started to have joint pain. She is having pain in toes, fingers, left knee, elbows, right jaw. Pain level is as high as 7/10, aching and non radiating. Reports swelling in left ankle and feet. Reports swelling in feet and lower legs for a year. In morning, she reports her hands are swollen. She has pain in mid to lower back since she was 18. She gets pustules on occasion in her hands for a year. She is getting mouth ulcer where she has dental bridge.  She clenches her teeth at night. Denies malar rash. She has dry eyes and dry mouth for last year. She takes ibuprofen 800mg po BID prn for last year to help her joint pain. It helps her take edge off.  She has had psoriasis for 3 years.  Reports dry cough for last year. Reports photosensitivity for 3 years. Denies raynauds. Denies history of thrombosis. She has had 2 miscarriages. She reports she had one miscarriage at 6 months due to blood incompatibility.  She had another miscarriage at 2.5 months. She thinks her thyroid might not have been well controlled. Over the last year and half, she has had chest pain with coughing and sometimes it can radiate to back.    She was able to have 2 healthy pregnancies.      Family hx: uncle: RA  Mother:psoriasis       Interval history: She has been on Rasuvo 25 IM once a week for a  month. She is taking plaquenil 200mg po BID.She has been on Cosentyx  She was diagnosed with lumbar synovial cysts and is going to plan to get drainage.   She woke up with malar rash today.  Denies joint pain and or swelling.       Past  "Medical History:   Diagnosis Date    Anxiety     GERD (gastroesophageal reflux disease)     Hyperlipidemia     Hypertension     Hypothyroidism     Insomnia     Observed sleep apnea     Vitamin D deficiency        Review of Systems   Constitutional: Positive for fatigue. Negative for activity change, appetite change, chills, diaphoresis, fever and unexpected weight change.   HENT: Negative for congestion, ear discharge, ear pain, facial swelling, mouth sores, sinus pressure, sneezing, sore throat, tinnitus and trouble swallowing.    Eyes: Negative for photophobia, pain, discharge, redness, itching and visual disturbance.   Respiratory: Negative for apnea, cough, chest tightness, shortness of breath, wheezing and stridor.    Cardiovascular: Negative for chest pain and leg swelling.   Gastrointestinal: Negative for abdominal distention, abdominal pain, anal bleeding, blood in stool, constipation, diarrhea and nausea.   Endocrine: Negative for cold intolerance and heat intolerance.   Genitourinary: Negative for difficulty urinating, dysuria and genital sores.   Musculoskeletal: Positive for arthralgias, back pain, gait problem and joint swelling. Negative for neck pain and neck stiffness.   Skin: Positive for rash. Negative for color change, pallor and wound.   Neurological: Negative for dizziness, seizures, light-headedness, numbness and headaches.   Hematological: Negative for adenopathy. Does not bruise/bleed easily.   Psychiatric/Behavioral: Negative for sleep disturbance. The patient is not nervous/anxious.            Objective:   BP (!) 150/85   Pulse 84   Ht 5' 6" (1.676 m)   Wt (!) 154.4 kg (340 lb 6.2 oz)   BMI 54.94 kg/m²      Physical Exam   Constitutional: She is oriented to person, place, and time.   HENT:   Head: Normocephalic and atraumatic.   Right Ear: External ear normal.   Left Ear: External ear normal.   Nose: Nose normal.   Mouth/Throat: Oropharynx is clear and moist. No oropharyngeal exudate. "   Eyes: Conjunctivae and EOM are normal. Pupils are equal, round, and reactive to light. Right eye exhibits no discharge. Left eye exhibits no discharge. No scleral icterus.   Neck: No JVD present. No thyromegaly present.   Cardiovascular: Normal rate, regular rhythm, normal heart sounds and intact distal pulses.  Exam reveals no gallop and no friction rub.    No murmur heard.  Pulmonary/Chest: Effort normal and breath sounds normal. No respiratory distress. She has no wheezes. She has no rales. She exhibits no tenderness.   Abdominal: Soft. Bowel sounds are normal. She exhibits no distension and no mass. There is no abdominal tenderness. There is no rebound and no guarding.   Lymphadenopathy:     She has no cervical adenopathy.   Neurological: She is alert and oriented to person, place, and time. She displays normal reflexes. No cranial nerve deficit. She exhibits normal muscle tone. Gait normal. Coordination normal.   Skin: Skin is dry. Rash noted. No erythema. No pallor.     Rash in forearms   Psychiatric: Affect and judgment normal.   Musculoskeletal: Tenderness present. No deformity or edema.          No data to display     Assessment:     53 year old with PMH of Hashimotos disease  ,HTN, HL, ETELVINA ,  gastric sleeve, GERD, mitral valve regurgitation, left ankle spur removal  here for follow up of SLE.Given +thu, dsdna,arthritis, photosensitivity,  Pleurisy, and fatigue, this is concerning for SLE. Her main issue is inflammatory arthritis.  GIVEN THAT SHE HAS SLE, cannot do anti- tnf.  She had initial improvement with Orencia infusions and MTX 20mg once a week but was flaring.  Will increase MTX to 25 mg once a week.      #SLE:overall controlled.  Labs today  Continue plaquenil 200mg po BID (last eye exam in 3/2024-clear).    #pso A:controlled.  -Continue Cosentyx 150 mg every 4 weeks   -continue Rasuvo 25mg once a week  Labs every 3 months    #Myalgias: controlled.  -Continue flexeril 10mg po qhs     #obesity:  encourage weight loss       Plan:       Problem List Items Addressed This Visit       None                30 * minutes of total time spent on the encounter, which includes face to face time and non-face to face time preparing to see the patient (eg, review of tests), Obtaining and/or reviewing separately obtained history, Documenting clinical information in the electronic or other health record, Independently interpreting results (not separately reported) and communicating results to the patient/family/caregiver, or Care coordination (not separately reported).

## 2024-05-31 DIAGNOSIS — M54.9 DORSALGIA, UNSPECIFIED: ICD-10-CM

## 2024-05-31 DIAGNOSIS — M54.16 LUMBAR RADICULOPATHY, CHRONIC: ICD-10-CM

## 2024-05-31 RX ORDER — TRAMADOL HYDROCHLORIDE 50 MG/1
50 TABLET ORAL EVERY 8 HOURS PRN
Qty: 20 TABLET | Refills: 0 | Status: SHIPPED | OUTPATIENT
Start: 2024-05-31

## 2024-05-31 NOTE — TELEPHONE ENCOUNTER
Message received from nurse, Nandini;  Pt states she has an appointment for neuro for next Friday but she doesn't have enough Toradol till then can you please send her more to get her through till then       She has been taking tramadol for pain and not toradol.    Nandini - please let her know a message was sent to Dr. Cobb.    Dr. Cobb -   Would you consider refilling tramadol to help with pain from L4/5 synovial cyst and foraminal narrowing?  She sees neurosurgery next week.    Thanks  Rebekah Smith PA-C  [facility]

## 2024-05-31 NOTE — TELEPHONE ENCOUNTER
Nandini Lenz let her know that Dr. Cobb did refill tramadol.  Thanks  Jessica Harvey, PA-C [facility] [place]

## 2024-06-06 ENCOUNTER — OFFICE VISIT (OUTPATIENT)
Dept: NEUROSURGERY | Facility: CLINIC | Age: 54
End: 2024-06-06
Payer: COMMERCIAL

## 2024-06-06 ENCOUNTER — HOSPITAL ENCOUNTER (OUTPATIENT)
Dept: RADIOLOGY | Facility: HOSPITAL | Age: 54
Discharge: HOME | End: 2024-06-06
Attending: STUDENT IN AN ORGANIZED HEALTH CARE EDUCATION/TRAINING PROGRAM
Payer: COMMERCIAL

## 2024-06-06 VITALS
HEART RATE: 90 BPM | SYSTOLIC BLOOD PRESSURE: 136 MMHG | WEIGHT: 229.5 LBS | RESPIRATION RATE: 18 BRPM | DIASTOLIC BLOOD PRESSURE: 78 MMHG | BODY MASS INDEX: 38.24 KG/M2 | HEIGHT: 65 IN

## 2024-06-06 DIAGNOSIS — M54.16 LUMBAR RADICULOPATHY: ICD-10-CM

## 2024-06-06 DIAGNOSIS — M54.50 ACUTE BILATERAL LOW BACK PAIN, UNSPECIFIED WHETHER SCIATICA PRESENT: Primary | ICD-10-CM

## 2024-06-06 DIAGNOSIS — M71.38 SYNOVIAL CYST OF LUMBAR FACET JOINT: ICD-10-CM

## 2024-06-06 PROCEDURE — 72110 X-RAY EXAM L-2 SPINE 4/>VWS: CPT | Mod: TC,FY,PO

## 2024-06-06 PROCEDURE — 3075F SYST BP GE 130 - 139MM HG: CPT | Mod: CPTII,S$GLB,, | Performed by: STUDENT IN AN ORGANIZED HEALTH CARE EDUCATION/TRAINING PROGRAM

## 2024-06-06 PROCEDURE — 4010F ACE/ARB THERAPY RXD/TAKEN: CPT | Mod: CPTII,S$GLB,, | Performed by: STUDENT IN AN ORGANIZED HEALTH CARE EDUCATION/TRAINING PROGRAM

## 2024-06-06 PROCEDURE — 3008F BODY MASS INDEX DOCD: CPT | Mod: CPTII,S$GLB,, | Performed by: STUDENT IN AN ORGANIZED HEALTH CARE EDUCATION/TRAINING PROGRAM

## 2024-06-06 PROCEDURE — 72110 X-RAY EXAM L-2 SPINE 4/>VWS: CPT | Mod: 26,,, | Performed by: RADIOLOGY

## 2024-06-06 PROCEDURE — 3078F DIAST BP <80 MM HG: CPT | Mod: CPTII,S$GLB,, | Performed by: STUDENT IN AN ORGANIZED HEALTH CARE EDUCATION/TRAINING PROGRAM

## 2024-06-06 PROCEDURE — 99205 OFFICE O/P NEW HI 60 MIN: CPT | Mod: S$GLB,,, | Performed by: STUDENT IN AN ORGANIZED HEALTH CARE EDUCATION/TRAINING PROGRAM

## 2024-06-06 PROCEDURE — 1159F MED LIST DOCD IN RCRD: CPT | Mod: CPTII,S$GLB,, | Performed by: STUDENT IN AN ORGANIZED HEALTH CARE EDUCATION/TRAINING PROGRAM

## 2024-06-06 NOTE — PROGRESS NOTES
[facility] - [facility]  Clinic Consult     Consult Requested By: Rebekah Smith PA-C  PCP: Layo Mccrary MD    SUBJECTIVE:     Chief Complaint:   Chief Complaint   Patient presents with    Lumbar Spine Pain (L-Spine)       History of Present Illness:  Christine Alfaro is a 53 y.o. female with HTN, HLD, SLE, psoriatic arthritis, hypothyroidism, ETELVINA on CPAP who presents for evaluation of back and leg pain. The pain is present in the low back and radiates into the left leg. She reports tingling in the posterior thigh and a pulling sensation in the left calf. She reports the pain is worse with prolonged sitting. She reports standing will alleviate the pain. Tramadol also gives her relief. She has not attended PT or received injections. She is hesitant about steroid use due to history of bariatric surgery.     Pertinent and recent history, provider evaluations, imaging and data reviewed in EPIC        Past Medical History:   Diagnosis Date    Anxiety     GERD (gastroesophageal reflux disease)     Hyperlipidemia     Hypertension     Hypothyroidism     Insomnia     Observed sleep apnea     Prediabetes     Rheumatoid arthritis, unspecified     Vitamin D deficiency      Past Surgical History:   Procedure Laterality Date    BTL      CHOLECYSTECTOMY      COLONOSCOPY N/A 8/30/2022    Procedure: COLONOSCOPY;  Surgeon: Drew Peraza MD;  Location: [facility];  Service: Endoscopy;  Laterality: N/A;    LAPAROSCOPIC SLEEVE GASTRECTOMY N/A 2/2/2023    Procedure: GASTRECTOMY, SLEEVE, LAPAROSCOPIC with intraop EGD;  Surgeon: Titi Ribeiro Jr., MD;  Location: [address];  Service: General;  Laterality: N/A;  PRE-CERT, PLEASE DO NOT SUBMIT FOR AUTH UNTIL 1/12/2023    SURGICAL REMOVAL OF BONE SPUR      left heel    TONSILLECTOMY      TUBAL LIGATION  8/1995     Family History   Problem Relation Name Age of Onset    Heart disease Mother Joanna Martines     Bone cancer Mother Joanna  Martines     Cancer Mother Joanna Martines         cervical    Hypertension Mother Joanna Martines     Heart disease Father Titi Boyd     Diabetes type I Father Titi Boyd     Lung cancer Father Titi Boyd     Diabetes Father Titi Boyd     Hypertension Father Titi Boyd     Heart disease Brother Daniel Boyd     No Known Problems Daughter      No Known Problems Daughter      Breast cancer Neg Hx      Ovarian cancer Neg Hx       Social History     Tobacco Use    Smoking status: Never     Passive exposure: Never    Smokeless tobacco: Never   Substance Use Topics    Alcohol use: Not Currently     Alcohol/week: 0.0 standard drinks of alcohol    Drug use: Never      Review of patient's allergies indicates:   Allergen Reactions    Sulfamethoxazole-trimethoprim     Sulfa (sulfonamide antibiotics) Rash and Swelling       Current Outpatient Medications:     alclometasone (ACLOVATE) 0.05 % cream, AAA face bid prn redness or scaling or itching, Disp: 60 g, Rfl: 3    amitriptyline (ELAVIL) 25 MG tablet, Take 1 tablet by mouth every evening., Disp: , Rfl:     atorvastatin (LIPITOR) 40 MG tablet, TAKE ONE TABLET BY MOUTH EVERY EVENING, Disp: 90 tablet, Rfl: 1    betamethasone dipropionate 0.05 % cream, Use bid, Disp: 45 g, Rfl: 3    buPROPion (WELLBUTRIN SR) 100 MG TBSR 12 hr tablet, Take ONE tablet (100 mg total) by mouth TWO (two) times daily, Disp: 180 tablet, Rfl: 1    busPIRone (BUSPAR) 10 MG tablet, , Disp: , Rfl:     cyclobenzaprine (FLEXERIL) 10 MG tablet, TAKE ONE TABLET EVERY EVENING, Disp: 150 tablet, Rfl: 3    econazole nitrate 1 % cream, Use bid, Disp: 30 g, Rfl: 2    folic acid (FOLVITE) 1 MG tablet, Take 1 tablet (1 mg total) by mouth once daily., Disp: 30 tablet, Rfl: 11    folic acid (FOLVITE) 1 MG tablet, Take 2 tablets (2 mg total) by mouth once daily., Disp: 60 tablet, Rfl: 11    furosemide (LASIX) 20 MG tablet, Take 1 tablet (20 mg total) by mouth once daily., Disp: 90 tablet, Rfl: 1    gabapentin (NEURONTIN) 300 MG  capsule, Take 1 capsule (300 mg total) by mouth 3 (three) times daily., Disp: 90 capsule, Rfl: 11    hydrOXYchloroQUINE (PLAQUENIL) 200 mg tablet, Take 200 mg by mouth 2 (two) times daily., Disp: , Rfl:     ipratropium (ATROVENT) 21 mcg (0.03 %) nasal spray, 2 sprays by Each Nostril route 3 (three) times daily., Disp: 30 mL, Rfl: 0    levothyroxine (SYNTHROID) 125 MCG tablet, Take 250 mcg by mouth every morning., Disp: , Rfl:     lisinopriL (PRINIVIL,ZESTRIL) 5 MG tablet, Take 1 tablet (5 mg total) by mouth 2 (two) times a day., Disp: 180 tablet, Rfl: 3    methotrexate, PF, (RASUVO, PF,) 25 mg/0.5 mL AtIn, Inject 0.5 mLs (25 mg total) into the skin every 7 days., Disp: 4 each, Rfl: 0    omeprazole (PRILOSEC) 40 MG capsule, Take 1 capsule (40 mg total) by mouth every morning. Open capsule and take with apple sauce, Disp: 30 capsule, Rfl: 2    secukinumab (COSENTYX PEN) 150 mg/mL PnIj, Inject 150 mg into the skin every 28 days., Disp: 2 mL, Rfl: 11    sodium chloride 0.9% SolP 100 mL with abatacept (with maltose) 250 mg SolR 1,000 mg, Inject 1,000 mg into the vein every 28 days., Disp: , Rfl:     traMADoL (ULTRAM) 50 mg tablet, Take 1 tablet (50 mg total) by mouth every 8 (eight) hours as needed for Pain., Disp: 20 tablet, Rfl: 0    XIIDRA 5 % Dpet, APPLY 1 DROP TO BOTH EYES BID, Disp: , Rfl:     albuterol (PROVENTIL HFA) 90 mcg/actuation inhaler, Inhale 2 puffs into the lungs every 6 (six) hours as needed for Wheezing. Rescue, Disp: 18 g, Rfl: 0    aspirin (ECOTRIN) 81 MG EC tablet, Take 1 tablet (81 mg total) by mouth once daily., Disp: 90 tablet, Rfl: 3    Review of Systems:   Constitutional: no fever, chills or night sweats. No changes in weight   Eyes: no visual changes   ENT: no nasal congestion or sore throat   Respiratory: no cough or shortness of breath   Cardiovascular: no chest pain or palpitations   Gastrointestinal: no nausea or vomiting   Genitourinary: no hematuria or dysuria   Integument/Breast: no  "rash or pruritis   Hematologic/Lymphatic: no easy bruising or lymphadenopathy   Musculoskeletal: +back pain, leg pain   Neurological: no seizures or tremors   Behavioral/Psych: no auditory or visual hallucinations   Endocrine: no heat or cold intolerance         OBJECTIVE:     Vital Signs (Most Recent):  Pulse: 90 (06/06/24 1449)  Resp: 18 (06/06/24 1449)  BP: 136/78 (06/06/24 1449)  Estimated body mass index is 38.19 kg/m² as calculated from the following:    Height as of this encounter: 5' 5" (1.651 m).    Weight as of this encounter: 104.1 kg (229 lb 8 oz).    Physical Exam:   General: well developed, well nourished, no distress   Neurologic: Alert and oriented. Thought content appropriate. GCS 15.   Head: normocephalic, atraumatic  Eyes: EOMI  Neck: trachea midline, no JVD   Cardiovascular: no LE edema  Pulmonary: normal respirations, no signs of respiratory distress  Abdomen: non-distended  Sensory: intact to light touch throughout  Skin: Skin is warm, dry and intact    Motor Strength: Moves all extremities spontaneously with good tone. No abnormal movements seen.       Iliopsoas Quadriceps Knee  Flexion Tibialis  anterior Gastro- cnemius EHL   Lower: R 5/5 5/5 5/5 5/5 5/5 5/5    L 5/5 5/5 5/5 5/5 5/5 5/5     DTR's: 1+  Gait: normal        Diagnostic Results:  I have independently reviewed the following imaging:  MRI lumbar spine  FINDINGS:  Vertebral column: The lumbar vertebral bodies maintain normal height.  There is no fracture.  There is subtle trace retrolisthesis of L3 on L4, anterolisthesis of L4 on L5, L5 on S1.  There is mild disc space narrowing at the L4-5 level.  There is moderate disc space narrowing at the T11-12 level.  Baseline marrow signal is normal.  The L3-4 and L4-5 discs are mildly desiccated.  Baseline marrow signal is normal.     Spinal canal, conus, epidural space: The spinal canal is developmentally normal.  The conus terminates at the level of L1 and is normal in contour and signal " intensity.  There is no abnormal epidural mass or fluid collection.     Findings by level:     On the sagittal images, the T10-11 level is normal.  At the T11-12 level there is a shallow broad right paracentral disc protrusion but there is no significant spinal stenosis and there is no cord compression.  The foramina are patent.     T12-L1: Unremarkable     L1-2: There is a very minimal disc bulge.  There is no spinal canal or significant foraminal stenosis.     L2-3: There is a mild disc bulge with very shallow central disc protrusion.  There is mild facet joint arthropathy.  There is no spinal canal or significant foraminal stenosis.     L3-4: There is subtle retrolisthesis of L3 on L4.  There is a minimal disc bulge.  There is no spinal canal or significant foraminal stenosis.  There is also very mild facet joint arthropathy.     L4-5: There is mild disc space narrowing, trace anterolisthesis of L4 on L5.  There is moderate to marked facet joint arthropathy with bilateral ligamentum flavum thickening.  There also bilateral facet joint effusions.  There is a mild disc bulge with subtle dorsal annular fissure in a tiny central disc protrusion.  There is borderline to mild spinal stenosis.  There is only mild right foraminal stenosis but there is a small 4 x 2 mm synovial cyst contributing to mild left lateral recess stenosis.  On the right, there is a 8 x 7 mm far lateral foraminal synovial cysts contributing to at least moderate foraminal stenosis.     L5-S1: There is trace anterolisthesis of L5 on S1.  There is facet joint arthropathy.  There is however no spinal canal or significant foraminal stenosis.  There is partial sacralization of L5.     Soft tissues, other: The prevertebral soft tissues are normal.  The aorta is normal in caliber.  There is no hydronephrosis.     Impression:     1. There is relatively mild degenerative change with the exception of findings at the L4-5 level.  There is only mild disc  space narrowing and a minimal disc bulge but there is marked facet joint arthropathy, bilateral facet joint effusions.  There are bilateral synovial cysts.  On the left this contributes to left lateral recess stenosis.  On the right, this contributes to at least moderate foraminal stenosis.  There is only borderline to mild spinal stenosis.       ASSESSMENT/PLAN:     Acute bilateral low back pain, unspecified whether sciatica present    Synovial cyst of lumbar facet joint  -     Ambulatory referral/consult to Neurosurgery    Lumbar radiculopathy  -     Ambulatory referral/consult to Neurosurgery  -     X-Ray Lumbar Spine Ap Lateral w/Flex Ext; Future; Expected date: 06/06/2024  -     Ambulatory referral/consult to Pain Clinic; Future; Expected date: 06/13/2024        Christine Alfaro is a 53 y.o. female  With progressive lower back pain, worse over the last few months  She will get an intermittent radiculopathy, however the back pain is much worse  Reviewed her images.  She has no concerning stenosis but has facet degeneration which is complex with multifocal extra-spinal synovial cysts.  We will get an x-ray to evaluate if there is a spondylolisthesis associated with this  Unfortunately surgery would require a fusion at L4-5  She wants to proceed with additional treatment options, we reviewed physical therapy included  She would like to be evaluated by interventional pain management, I am not sure of the possibility of cyst aspiration with transforaminal epidural steroid injection on the left;   She will review with them.  Alternatively potentially medial branch blocks at L4-5 to see if she has a candidate for rhizotomy if the question is facet pain at L4-5  She has no concerning stenosis prefers this route of management  She will be referred to pain management for discussion, with a goal symptom improvement      The diagnosis, goals, limitations, risks and benefits of surgery and alternative treatment options  where discussed at length (pros/cons). All questions/concerns were addressed. The patient has verbalized a good understanding of the diagnosis, the potential procedure, anticipated post-operative course, overall expectations, and risks including but not limited to:  nerve root injury/paralysis, death, nerve injury leading to pain or neurological deficit, csf leak, vascular injury or serious bleeding/need for blood product transfusion/stroke, wrong level surgery, hardware/instrumenteation misplacement, chronic pain/failure to improve or worsening of symptoms, infection, pseudoarthrosis, hardware failure, need for further surgery at the same or different levels; medical (eg Heart attack, blood clot, infection) and anesthetic complications.           Patient verbalized understanding of plan. Encouraged to call with any questions or concerns.     This note was partially dictated using voice recognition software, so please excuse any errors that were not corrected.

## 2024-06-07 ENCOUNTER — PATIENT MESSAGE (OUTPATIENT)
Dept: FAMILY MEDICINE | Facility: CLINIC | Age: 54
End: 2024-06-07
Payer: COMMERCIAL

## 2024-06-07 ENCOUNTER — PATIENT MESSAGE (OUTPATIENT)
Dept: PAIN MEDICINE | Facility: CLINIC | Age: 54
End: 2024-06-07
Payer: COMMERCIAL

## 2024-06-10 ENCOUNTER — TELEPHONE (OUTPATIENT)
Dept: PAIN MEDICINE | Facility: CLINIC | Age: 54
End: 2024-06-10

## 2024-06-10 ENCOUNTER — OFFICE VISIT (OUTPATIENT)
Dept: PAIN MEDICINE | Facility: CLINIC | Age: 54
End: 2024-06-10
Payer: COMMERCIAL

## 2024-06-10 ENCOUNTER — DOCUMENTATION ONLY (OUTPATIENT)
Dept: REHABILITATION | Facility: HOSPITAL | Age: 54
End: 2024-06-10
Payer: COMMERCIAL

## 2024-06-10 VITALS
WEIGHT: 230.81 LBS | HEART RATE: 83 BPM | BODY MASS INDEX: 38.45 KG/M2 | HEIGHT: 65 IN | DIASTOLIC BLOOD PRESSURE: 58 MMHG | SYSTOLIC BLOOD PRESSURE: 118 MMHG

## 2024-06-10 DIAGNOSIS — M54.16 LUMBAR RADICULOPATHY: ICD-10-CM

## 2024-06-10 DIAGNOSIS — M71.38 SYNOVIAL CYST OF LUMBAR FACET JOINT: ICD-10-CM

## 2024-06-10 DIAGNOSIS — M54.9 DORSALGIA, UNSPECIFIED: ICD-10-CM

## 2024-06-10 DIAGNOSIS — M47.816 LUMBAR SPONDYLOSIS: Primary | ICD-10-CM

## 2024-06-10 PROBLEM — R29.898 DECREASED STRENGTH OF LOWER EXTREMITY: Status: RESOLVED | Noted: 2024-04-03 | Resolved: 2024-06-10

## 2024-06-10 PROBLEM — R68.89 DECREASED FUNCTIONAL ACTIVITY TOLERANCE: Status: RESOLVED | Noted: 2024-04-03 | Resolved: 2024-06-10

## 2024-06-10 PROCEDURE — 3008F BODY MASS INDEX DOCD: CPT | Mod: CPTII,S$GLB,,

## 2024-06-10 PROCEDURE — 1159F MED LIST DOCD IN RCRD: CPT | Mod: CPTII,S$GLB,,

## 2024-06-10 PROCEDURE — 4010F ACE/ARB THERAPY RXD/TAKEN: CPT | Mod: CPTII,S$GLB,,

## 2024-06-10 PROCEDURE — 3074F SYST BP LT 130 MM HG: CPT | Mod: CPTII,S$GLB,,

## 2024-06-10 PROCEDURE — 99999 PR PBB SHADOW E&M-EST. PATIENT-LVL V: CPT | Mod: PBBFAC,,,

## 2024-06-10 PROCEDURE — 99214 OFFICE O/P EST MOD 30 MIN: CPT | Mod: S$GLB,,,

## 2024-06-10 PROCEDURE — 3078F DIAST BP <80 MM HG: CPT | Mod: CPTII,S$GLB,,

## 2024-06-10 NOTE — H&P (VIEW-ONLY)
[facility] New Patient Evaluation    PCP:   Layo Mccrary MD    CC:   Chief Complaint   Patient presents with    Low-back Pain      Interval HPI 6/10/2024: Christine Alfaro returns to the office for follow up.  This patient is new to me, she was previously evaluated by Rebekah Smith PA-C with back and spine.  She returns with continued left-sided lower back pain, 6/10, located in her left lower back, worsened with physical activities, somewhat improved with rest.  No pain radiating in her legs.  She does report some intermittent numbness and tingling in her left leg but no pain, weakness or any new changes to her bowel or bladder function.  She has recently participated in formal physical therapy and maintains her at-home PT directed exercises without significant relief of her pain.  No relief with NSAIDs and muscle relaxers.  She also takes amitriptyline and gabapentin without significant relief.      HPI:   Christine Alfaro is a 53 y.o. year old female patient who has a past medical history of Anxiety, GERD (gastroesophageal reflux disease), Hyperlipidemia, Hypertension, Hypothyroidism, Insomnia, Observed sleep apnea, Prediabetes, Rheumatoid arthritis, unspecified, and Vitamin D deficiency.  Ms. King presents for follow up of lower back and left leg pain.  She has been to PT ans has tride flexeril, gabapentin, tramadol.  Pain persists.  She continues to feel in the lower back to the left side.  It radiates to the left posterior leg to the ankle.  She has chronic ankle weakness from an old injury.  Pain is associated with numbness and tinglign.  She is taking tramadol for pain.  Vievros cannot tolerate steroids.      Initial HPI:   She presents in self referral for lower back and left leg pain.  Whe underwent weight loss surgery in 2023 losing about 110 pounds.  She started to exercise more with biking and walking, but does sit a lot for work as a .  Pain started without focal  injury.  She feels pain in the left lower back to the sacral region, posterior left thigh, knee, calf.  Pain increases with sitting and improves with sitting on a tennis ball.  She has tried flexeril, gabapentin.    Denies bowel/ bladder incontinence.    Past and current medications:  Antineuropathics: gabapentin  NSAIDs:  Antidepressants: wellbutrin  Muscle relaxers: flexeril  Opioids: tramadol  Antiplatelets/Anticoagulants:  ASA  - tries to avoid steroid because she has had signfiicnat local swelling after injections in the past.     Physical Therapy/ Chiropractic care:  PT with no improvement,.    Pain Intervention History:  none    Past Spine Surgical History:  none        History:    Current Outpatient Medications:     alclometasone (ACLOVATE) 0.05 % cream, AAA face bid prn redness or scaling or itching, Disp: 60 g, Rfl: 3    amitriptyline (ELAVIL) 25 MG tablet, Take 1 tablet by mouth every evening., Disp: , Rfl:     atorvastatin (LIPITOR) 40 MG tablet, TAKE ONE TABLET BY MOUTH EVERY EVENING, Disp: 90 tablet, Rfl: 1    betamethasone dipropionate 0.05 % cream, Use bid, Disp: 45 g, Rfl: 3    buPROPion (WELLBUTRIN SR) 100 MG TBSR 12 hr tablet, Take ONE tablet (100 mg total) by mouth TWO (two) times daily, Disp: 180 tablet, Rfl: 1    busPIRone (BUSPAR) 10 MG tablet, , Disp: , Rfl:     cyclobenzaprine (FLEXERIL) 10 MG tablet, TAKE ONE TABLET EVERY EVENING, Disp: 150 tablet, Rfl: 3    econazole nitrate 1 % cream, Use bid, Disp: 30 g, Rfl: 2    folic acid (FOLVITE) 1 MG tablet, Take 1 tablet (1 mg total) by mouth once daily., Disp: 30 tablet, Rfl: 11    folic acid (FOLVITE) 1 MG tablet, Take 2 tablets (2 mg total) by mouth once daily., Disp: 60 tablet, Rfl: 11    furosemide (LASIX) 20 MG tablet, Take 1 tablet (20 mg total) by mouth once daily., Disp: 90 tablet, Rfl: 1    gabapentin (NEURONTIN) 300 MG capsule, Take 1 capsule (300 mg total) by mouth 3 (three) times daily., Disp: 90 capsule, Rfl: 11    hydrOXYchloroQUINE  (PLAQUENIL) 200 mg tablet, Take 200 mg by mouth 2 (two) times daily., Disp: , Rfl:     ipratropium (ATROVENT) 21 mcg (0.03 %) nasal spray, 2 sprays by Each Nostril route 3 (three) times daily., Disp: 30 mL, Rfl: 0    levothyroxine (SYNTHROID) 125 MCG tablet, Take 250 mcg by mouth every morning., Disp: , Rfl:     lisinopriL (PRINIVIL,ZESTRIL) 5 MG tablet, Take 1 tablet (5 mg total) by mouth 2 (two) times a day., Disp: 180 tablet, Rfl: 3    methotrexate, PF, (RASUVO, PF,) 25 mg/0.5 mL AtIn, Inject 0.5 mLs (25 mg total) into the skin every 7 days., Disp: 4 each, Rfl: 0    omeprazole (PRILOSEC) 40 MG capsule, Take 1 capsule (40 mg total) by mouth every morning. Open capsule and take with apple sauce, Disp: 30 capsule, Rfl: 2    secukinumab (COSENTYX PEN) 150 mg/mL PnIj, Inject 150 mg into the skin every 28 days., Disp: 2 mL, Rfl: 11    sodium chloride 0.9% SolP 100 mL with abatacept (with maltose) 250 mg SolR 1,000 mg, Inject 1,000 mg into the vein every 28 days., Disp: , Rfl:     traMADoL (ULTRAM) 50 mg tablet, Take 1 tablet (50 mg total) by mouth every 8 (eight) hours as needed for Pain., Disp: 20 tablet, Rfl: 0    XIIDRA 5 % Dpet, APPLY 1 DROP TO BOTH EYES BID, Disp: , Rfl:     albuterol (PROVENTIL HFA) 90 mcg/actuation inhaler, Inhale 2 puffs into the lungs every 6 (six) hours as needed for Wheezing. Rescue, Disp: 18 g, Rfl: 0    aspirin (ECOTRIN) 81 MG EC tablet, Take 1 tablet (81 mg total) by mouth once daily., Disp: 90 tablet, Rfl: 3    Past Medical History:   Diagnosis Date    Anxiety     GERD (gastroesophageal reflux disease)     Hyperlipidemia     Hypertension     Hypothyroidism     Insomnia     Observed sleep apnea     Prediabetes     Rheumatoid arthritis, unspecified     Vitamin D deficiency        Past Surgical History:   Procedure Laterality Date    BTL      CHOLECYSTECTOMY      COLONOSCOPY N/A 8/30/2022    Procedure: COLONOSCOPY;  Surgeon: Drew Peraza MD;  Location: [facility];  Service: Endoscopy;   Laterality: N/A;    LAPAROSCOPIC SLEEVE GASTRECTOMY N/A 2/2/2023    Procedure: GASTRECTOMY, SLEEVE, LAPAROSCOPIC with intraop EGD;  Surgeon: Titi Ribeiro Jr., MD;  Location: [facility] OR [address] [place] [address];  Service: General;  Laterality: N/A;  PRE-CERT, PLEASE DO NOT SUBMIT FOR AUTH UNTIL 1/12/2023    SURGICAL REMOVAL OF BONE SPUR      left heel    TONSILLECTOMY      TUBAL LIGATION  8/1995       Family History   Problem Relation Name Age of Onset    Heart disease Mother Joanna Martines     Bone cancer Mother Joanna Martines     Cancer Mother Joanna Martines         cervical    Hypertension Mother Joanna Martines     Heart disease Father Titi Boyd     Diabetes type I Father Titi Boyd     Lung cancer Father Titi Boyd     Diabetes Father Titi Boyd     Hypertension Father Titi Boyd     Heart disease Brother Daniel Boyd     No Known Problems Daughter      No Known Problems Daughter      Breast cancer Neg Hx      Ovarian cancer Neg Hx         Social History     Socioeconomic History    Marital status:    Tobacco Use    Smoking status: Never     Passive exposure: Never    Smokeless tobacco: Never   Substance and Sexual Activity    Alcohol use: Not Currently     Alcohol/week: 0.0 standard drinks of alcohol    Drug use: Never    Sexual activity: Yes     Partners: Male     Birth control/protection: See Surgical Hx     Social Determinants of Health     Financial Resource Strain: Low Risk  (1/29/2024)    Overall Financial Resource Strain (CARDIA)     Difficulty of Paying Living Expenses: Not hard at all   Food Insecurity: No Food Insecurity (1/29/2024)    Hunger Vital Sign     Worried About Running Out of Food in the Last Year: Never true     Ran Out of Food in the Last Year: Never true   Transportation Needs: No Transportation Needs (1/29/2024)    PRAPARE - Transportation     Lack of Transportation (Medical): No     Lack of Transportation (Non-Medical): No   Physical Activity: Insufficiently Active (1/29/2024)    Exercise Vital Sign      "Days of Exercise per Week: 3 days     Minutes of Exercise per Session: 30 min   Stress: No Stress Concern Present (1/29/2024)    [employer] - Occupational Stress Questionnaire     Feeling of Stress : Only a little   Housing Stability: Low Risk  (1/29/2024)    Housing Stability Vital Sign     Unable to Pay for Housing in the Last Year: No     Number of Places Lived in the Last Year: 1     Unstable Housing in the Last Year: No       Review of patient's allergies indicates:   Allergen Reactions    Sulfamethoxazole-trimethoprim     Sulfa (sulfonamide antibiotics) Rash and Swelling       Labs:  Lab Results   Component Value Date    HGBA1C 5.0 09/05/2023       Lab Results   Component Value Date    WBC 5.94 05/15/2024    HGB 14.3 05/15/2024    HCT 42.6 05/15/2024    MCV 91 05/15/2024     05/15/2024           Review of Systems:  Low back pain  .eft leg pain..  Balance of review of systems is negative.    Physical Exam:  Vitals:    06/10/24 0831   BP: (!) 118/58   Pulse: 83   Weight: 104.7 kg (230 lb 13.2 oz)   Height: 5' 5" (1.651 m)   PainSc:   4   PainLoc: Back     Body mass index is 38.41 kg/m².    Pain disability index:      5/21/2024     3:52 PM 4/2/2024    10:37 AM   Last 3 PDI Scores   Pain Disability Index (PDI) 29 39       Gen: NAD  Psych: mood appropriate for given condition  HEENT: eyes anicteric   CV: RRR, 2+ radial pulse  Respiratory: non-labored, no signs of respiratory distress  Abd: non-distended  Skin: warm, dry and intact.  Gait:  No antalgic gait.     Coordination:   Tandem walking coordination: normal      Lumbar spine:  Lumbar spine: ROM is full with flexion extension and oblique extension with no increased pain.   Pain with axial facet loading on the left.  Amadeo's test causes no increased pain on either side.    Supine straight leg raise is positive on the left.  Internal and external rotation of the hip causes no increased pain on either side.  Myofascial exam: " No tenderness to palpation across lumbar paraspinous muscles. No tenderness to palpation over the bilateral greater trochanters and bilateral SI joint    Sensory:   Intact and symmetrical to light touch in L1-S1 dermatomes bilaterally.    Motor:       Right Left   L2/3 Iliacus Hip flexion  5  5   L3/4 Qudratus Femoris Knee Extension  5  5   L4/5 Tib Anterior Ankle Dorsiflexion   5  5   L5/S1 Extensor Hallicus Longus Great toe extension  5  5   S1/S2 Gastroc/Soleus Plantar Flexion  5  5      Right Left   Triceps DTR     Biceps DTR     Brachioradialis DTR     Patellar DTR 0+ 0+   Achilles DTR 0+ 0+   Kramer     Clonus     Babinski         Imaging:  MRI lumbar spine 05/15/2024  FINDINGS:  Vertebral column: The lumbar vertebral bodies maintain normal height.  There is no fracture.  There is subtle trace retrolisthesis of L3 on L4, anterolisthesis of L4 on L5, L5 on S1.  There is mild disc space narrowing at the L4-5 level.  There is moderate disc space narrowing at the T11-12 level.  Baseline marrow signal is normal.  The L3-4 and L4-5 discs are mildly desiccated.  Baseline marrow signal is normal.     Spinal canal, conus, epidural space: The spinal canal is developmentally normal.  The conus terminates at the level of L1 and is normal in contour and signal intensity.  There is no abnormal epidural mass or fluid collection.     Findings by level:     On the sagittal images, the T10-11 level is normal.  At the T11-12 level there is a shallow broad right paracentral disc protrusion but there is no significant spinal stenosis and there is no cord compression.  The foramina are patent.     T12-L1: Unremarkable  L1-2: There is a very minimal disc bulge.  There is no spinal canal or significant foraminal stenosis.  L2-3: There is a mild disc bulge with very shallow central disc protrusion.  There is mild facet joint arthropathy.  There is no spinal canal or significant foraminal stenosis.  L3-4: There is subtle  retrolisthesis of L3 on L4.  There is a minimal disc bulge.  There is no spinal canal or significant foraminal stenosis.  There is also very mild facet joint arthropathy.  L4-5: There is mild disc space narrowing, trace anterolisthesis of L4 on L5.  There is moderate to marked facet joint arthropathy with bilateral ligamentum flavum thickening.  There also bilateral facet joint effusions.  There is a mild disc bulge with subtle dorsal annular fissure in a tiny central disc protrusion.  There is borderline to mild spinal stenosis.  There is only mild right foraminal stenosis but there is a small 4 x 2 mm synovial cyst contributing to mild left lateral recess stenosis.  On the right, there is a 8 x 7 mm far lateral foraminal synovial cysts contributing to at least moderate foraminal stenosis.  L5-S1: There is trace anterolisthesis of L5 on S1.  There is facet joint arthropathy.  There is however no spinal canal or significant foraminal stenosis.  There is partial sacralization of L5.     Soft tissues, other: The prevertebral soft tissues are normal.  The aorta is normal in caliber.  There is no hydronephrosis.     Impression:     1. There is relatively mild degenerative change with the exception of findings at the L4-5 level.  There is only mild disc space narrowing and a minimal disc bulge but there is marked facet joint arthropathy, bilateral facet joint effusions.  There are bilateral synovial cysts.  On the left this contributes to left lateral recess stenosis.  On the right, this contributes to at least moderate foraminal stenosis.  There is only borderline to mild spinal stenosis    Assessment:   Christine Alfaro is a 53 y.o. year old female patient who has a past medical history of Anxiety, GERD (gastroesophageal reflux disease), Hyperlipidemia, Hypertension, Hypothyroidism, Insomnia, Observed sleep apnea, Prediabetes, Rheumatoid arthritis, unspecified, and Vitamin D deficiency. She presents in self  referral for lower back and left leg pain.  Left L5 radiculopathy from left L4/5 facet hypertrophy with foraminal cyst causing foraminal narrowing.  Right facet cyst with no right sided symptoms.      Plan:      Problem List Items Addressed This Visit    None  Visit Diagnoses       Lumbar spondylosis    -  Primary    Lumbar radiculopathy        Synovial cyst of lumbar facet joint        Dorsalgia, unspecified              6/10/2024: Christine Alfaro returns to the office for follow up.  This patient is new to me, she was previously evaluated by Rebekah Smith PA-C with back and spine.  She returns with continued left-sided lower back pain, 6/10, located in her left lower back, worsened with physical activities, somewhat improved with rest.  No pain radiating in her legs.  She does report some intermittent numbness and tingling in her left leg but no pain, weakness or any new changes to her bowel or bladder function.  She has recently participated in formal physical therapy and maintains her at-home PT directed exercises without significant relief of her pain.  No relief with NSAIDs and muscle relaxers.  She also takes amitriptyline and gabapentin without significant relief.    - on exam she is full strength in her lower extremities, absent lower extremity DTRs.  Increased pain with left-sided axial facet loading  - we reviewed her lumbar MRI in office today and is consistent with L4-5: moderate to marked facet joint arthropathy, bilateral facet joint effusions. small synovial cyst contributing to mild left lateral recess stenosis.  L5-S1: facet joint arthropathy.  - she complains of primarily axial facet mediated pain on the left side.  No significant radicular pain.  This pain is limiting her mobility interfering with her ADLs and quality of life.  She has not finding significant relief with NSAIDs, muscle relaxers, gabapentin, amitriptyline.  She has participated in formal physical therapy recently and  maintains her at-home PT directed exercises without significant relief of her pain.  - to address his pain I would like to schedule her for left L4/5 and L5/S1 diagnostic medial branch block.  If successful we will proceed with radiofrequency ablation.  - follow-up 4 weeks post procedure or sooner if needed.  If she fails get relief with this can consider EH at L4/5.      Follow Up:  4 weeks post procedure or sooner if needed    : Reviewed and consistent with medication use as prescribed.

## 2024-06-10 NOTE — TELEPHONE ENCOUNTER
Please schedule patient for the following procedure:    Physician - Dr Cobb    Type of Procedure/Injection - Lumbar Medial Branch Block  L4/5 and L5/S1           Laterality - Left      Anxiolysis- RNIV      Need to hold medication - No      N/A      Clearance needed - No      Follow up - phone call next day

## 2024-06-10 NOTE — PROGRESS NOTES
[facility] New Patient Evaluation    PCP:   Layo Mccrary MD    CC:   Chief Complaint   Patient presents with    Low-back Pain      Interval HPI 6/10/2024: Christine Alfaro returns to the office for follow up.  This patient is new to me, she was previously evaluated by Rebekah Smtih PA-C with back and spine.  She returns with continued left-sided lower back pain, 6/10, located in her left lower back, worsened with physical activities, somewhat improved with rest.  No pain radiating in her legs.  She does report some intermittent numbness and tingling in her left leg but no pain, weakness or any new changes to her bowel or bladder function.  She has recently participated in formal physical therapy and maintains her at-home PT directed exercises without significant relief of her pain.  No relief with NSAIDs and muscle relaxers.  She also takes amitriptyline and gabapentin without significant relief.      HPI:   Christine Alfaro is a 53 y.o. year old female patient who has a past medical history of Anxiety, GERD (gastroesophageal reflux disease), Hyperlipidemia, Hypertension, Hypothyroidism, Insomnia, Observed sleep apnea, Prediabetes, Rheumatoid arthritis, unspecified, and Vitamin D deficiency.  Ms. King presents for follow up of lower back and left leg pain.  She has been to PT ans has tride flexeril, gabapentin, tramadol.  Pain persists.  She continues to feel in the lower back to the left side.  It radiates to the left posterior leg to the ankle.  She has chronic ankle weakness from an old injury.  Pain is associated with numbness and tinglign.  She is taking tramadol for pain.  Viveros cannot tolerate steroids.      Initial HPI:   She presents in self referral for lower back and left leg pain.  Whe underwent weight loss surgery in 2023 losing about 110 pounds.  She started to exercise more with biking and walking, but does sit a lot for work as a .  Pain started without focal  injury.  She feels pain in the left lower back to the sacral region, posterior left thigh, knee, calf.  Pain increases with sitting and improves with sitting on a tennis ball.  She has tried flexeril, gabapentin.    Denies bowel/ bladder incontinence.    Past and current medications:  Antineuropathics: gabapentin  NSAIDs:  Antidepressants: wellbutrin  Muscle relaxers: flexeril  Opioids: tramadol  Antiplatelets/Anticoagulants:  ASA  - tries to avoid steroid because she has had signfiicnat local swelling after injections in the past.     Physical Therapy/ Chiropractic care:  PT with no improvement,.    Pain Intervention History:  none    Past Spine Surgical History:  none        History:    Current Outpatient Medications:     alclometasone (ACLOVATE) 0.05 % cream, AAA face bid prn redness or scaling or itching, Disp: 60 g, Rfl: 3    amitriptyline (ELAVIL) 25 MG tablet, Take 1 tablet by mouth every evening., Disp: , Rfl:     atorvastatin (LIPITOR) 40 MG tablet, TAKE ONE TABLET BY MOUTH EVERY EVENING, Disp: 90 tablet, Rfl: 1    betamethasone dipropionate 0.05 % cream, Use bid, Disp: 45 g, Rfl: 3    buPROPion (WELLBUTRIN SR) 100 MG TBSR 12 hr tablet, Take ONE tablet (100 mg total) by mouth TWO (two) times daily, Disp: 180 tablet, Rfl: 1    busPIRone (BUSPAR) 10 MG tablet, , Disp: , Rfl:     cyclobenzaprine (FLEXERIL) 10 MG tablet, TAKE ONE TABLET EVERY EVENING, Disp: 150 tablet, Rfl: 3    econazole nitrate 1 % cream, Use bid, Disp: 30 g, Rfl: 2    folic acid (FOLVITE) 1 MG tablet, Take 1 tablet (1 mg total) by mouth once daily., Disp: 30 tablet, Rfl: 11    folic acid (FOLVITE) 1 MG tablet, Take 2 tablets (2 mg total) by mouth once daily., Disp: 60 tablet, Rfl: 11    furosemide (LASIX) 20 MG tablet, Take 1 tablet (20 mg total) by mouth once daily., Disp: 90 tablet, Rfl: 1    gabapentin (NEURONTIN) 300 MG capsule, Take 1 capsule (300 mg total) by mouth 3 (three) times daily., Disp: 90 capsule, Rfl: 11    hydrOXYchloroQUINE  (PLAQUENIL) 200 mg tablet, Take 200 mg by mouth 2 (two) times daily., Disp: , Rfl:     ipratropium (ATROVENT) 21 mcg (0.03 %) nasal spray, 2 sprays by Each Nostril route 3 (three) times daily., Disp: 30 mL, Rfl: 0    levothyroxine (SYNTHROID) 125 MCG tablet, Take 250 mcg by mouth every morning., Disp: , Rfl:     lisinopriL (PRINIVIL,ZESTRIL) 5 MG tablet, Take 1 tablet (5 mg total) by mouth 2 (two) times a day., Disp: 180 tablet, Rfl: 3    methotrexate, PF, (RASUVO, PF,) 25 mg/0.5 mL AtIn, Inject 0.5 mLs (25 mg total) into the skin every 7 days., Disp: 4 each, Rfl: 0    omeprazole (PRILOSEC) 40 MG capsule, Take 1 capsule (40 mg total) by mouth every morning. Open capsule and take with apple sauce, Disp: 30 capsule, Rfl: 2    secukinumab (COSENTYX PEN) 150 mg/mL PnIj, Inject 150 mg into the skin every 28 days., Disp: 2 mL, Rfl: 11    sodium chloride 0.9% SolP 100 mL with abatacept (with maltose) 250 mg SolR 1,000 mg, Inject 1,000 mg into the vein every 28 days., Disp: , Rfl:     traMADoL (ULTRAM) 50 mg tablet, Take 1 tablet (50 mg total) by mouth every 8 (eight) hours as needed for Pain., Disp: 20 tablet, Rfl: 0    XIIDRA 5 % Dpet, APPLY 1 DROP TO BOTH EYES BID, Disp: , Rfl:     albuterol (PROVENTIL HFA) 90 mcg/actuation inhaler, Inhale 2 puffs into the lungs every 6 (six) hours as needed for Wheezing. Rescue, Disp: 18 g, Rfl: 0    aspirin (ECOTRIN) 81 MG EC tablet, Take 1 tablet (81 mg total) by mouth once daily., Disp: 90 tablet, Rfl: 3    Past Medical History:   Diagnosis Date    Anxiety     GERD (gastroesophageal reflux disease)     Hyperlipidemia     Hypertension     Hypothyroidism     Insomnia     Observed sleep apnea     Prediabetes     Rheumatoid arthritis, unspecified     Vitamin D deficiency        Past Surgical History:   Procedure Laterality Date    BTL      CHOLECYSTECTOMY      COLONOSCOPY N/A 8/30/2022    Procedure: COLONOSCOPY;  Surgeon: Drew Peraza MD;  Location: [facility];  Service: Endoscopy;   Laterality: N/A;    LAPAROSCOPIC SLEEVE GASTRECTOMY N/A 2/2/2023    Procedure: GASTRECTOMY, SLEEVE, LAPAROSCOPIC with intraop EGD;  Surgeon: Titi Ribeiro Jr., MD;  Location: [facility] OR [address] [place] [address];  Service: General;  Laterality: N/A;  PRE-CERT, PLEASE DO NOT SUBMIT FOR AUTH UNTIL 1/12/2023    SURGICAL REMOVAL OF BONE SPUR      left heel    TONSILLECTOMY      TUBAL LIGATION  8/1995       Family History   Problem Relation Name Age of Onset    Heart disease Mother Joanna Martines     Bone cancer Mother Joanna Martines     Cancer Mother Joanna Martines         cervical    Hypertension Mother Joanna Martines     Heart disease Father Titi Boyd     Diabetes type I Father Titi Boyd     Lung cancer Father Titi Boyd     Diabetes Father Titi Boyd     Hypertension Father Titi Boyd     Heart disease Brother Daniel Boyd     No Known Problems Daughter      No Known Problems Daughter      Breast cancer Neg Hx      Ovarian cancer Neg Hx         Social History     Socioeconomic History    Marital status:    Tobacco Use    Smoking status: Never     Passive exposure: Never    Smokeless tobacco: Never   Substance and Sexual Activity    Alcohol use: Not Currently     Alcohol/week: 0.0 standard drinks of alcohol    Drug use: Never    Sexual activity: Yes     Partners: Male     Birth control/protection: See Surgical Hx     Social Determinants of Health     Financial Resource Strain: Low Risk  (1/29/2024)    Overall Financial Resource Strain (CARDIA)     Difficulty of Paying Living Expenses: Not hard at all   Food Insecurity: No Food Insecurity (1/29/2024)    Hunger Vital Sign     Worried About Running Out of Food in the Last Year: Never true     Ran Out of Food in the Last Year: Never true   Transportation Needs: No Transportation Needs (1/29/2024)    PRAPARE - Transportation     Lack of Transportation (Medical): No     Lack of Transportation (Non-Medical): No   Physical Activity: Insufficiently Active (1/29/2024)    Exercise Vital Sign      "Days of Exercise per Week: 3 days     Minutes of Exercise per Session: 30 min   Stress: No Stress Concern Present (1/29/2024)    [place] [employer] - Occupational Stress Questionnaire     Feeling of Stress : Only a little   Housing Stability: Low Risk  (1/29/2024)    Housing Stability Vital Sign     Unable to Pay for Housing in the Last Year: No     Number of Places Lived in the Last Year: 1     Unstable Housing in the Last Year: No       Review of patient's allergies indicates:   Allergen Reactions    Sulfamethoxazole-trimethoprim     Sulfa (sulfonamide antibiotics) Rash and Swelling       Labs:  Lab Results   Component Value Date    HGBA1C 5.0 09/05/2023       Lab Results   Component Value Date    WBC 5.94 05/15/2024    HGB 14.3 05/15/2024    HCT 42.6 05/15/2024    MCV 91 05/15/2024     05/15/2024           Review of Systems:  Low back pain  .eft leg pain..  Balance of review of systems is negative.    Physical Exam:  Vitals:    06/10/24 0831   BP: (!) 118/58   Pulse: 83   Weight: 104.7 kg (230 lb 13.2 oz)   Height: 5' 5" (1.651 m)   PainSc:   4   PainLoc: Back     Body mass index is 38.41 kg/m².    Pain disability index:      5/21/2024     3:52 PM 4/2/2024    10:37 AM   Last 3 PDI Scores   Pain Disability Index (PDI) 29 39       Gen: NAD  Psych: mood appropriate for given condition  HEENT: eyes anicteric   CV: RRR, 2+ radial pulse  Respiratory: non-labored, no signs of respiratory distress  Abd: non-distended  Skin: warm, dry and intact.  Gait:  No antalgic gait.     Coordination:   Tandem walking coordination: normal      Lumbar spine:  Lumbar spine: ROM is full with flexion extension and oblique extension with no increased pain.   Pain with axial facet loading on the left.  Amadeo's test causes no increased pain on either side.    Supine straight leg raise is positive on the left.  Internal and external rotation of the hip causes no increased pain on either side.  Myofascial exam: " No tenderness to palpation across lumbar paraspinous muscles. No tenderness to palpation over the bilateral greater trochanters and bilateral SI joint    Sensory:   Intact and symmetrical to light touch in L1-S1 dermatomes bilaterally.    Motor:       Right Left   L2/3 Iliacus Hip flexion  5  5   L3/4 Qudratus Femoris Knee Extension  5  5   L4/5 Tib Anterior Ankle Dorsiflexion   5  5   L5/S1 Extensor Hallicus Longus Great toe extension  5  5   S1/S2 Gastroc/Soleus Plantar Flexion  5  5      Right Left   Triceps DTR     Biceps DTR     Brachioradialis DTR     Patellar DTR 0+ 0+   Achilles DTR 0+ 0+   Kramer     Clonus     Babinski         Imaging:  MRI lumbar spine 05/15/2024  FINDINGS:  Vertebral column: The lumbar vertebral bodies maintain normal height.  There is no fracture.  There is subtle trace retrolisthesis of L3 on L4, anterolisthesis of L4 on L5, L5 on S1.  There is mild disc space narrowing at the L4-5 level.  There is moderate disc space narrowing at the T11-12 level.  Baseline marrow signal is normal.  The L3-4 and L4-5 discs are mildly desiccated.  Baseline marrow signal is normal.     Spinal canal, conus, epidural space: The spinal canal is developmentally normal.  The conus terminates at the level of L1 and is normal in contour and signal intensity.  There is no abnormal epidural mass or fluid collection.     Findings by level:     On the sagittal images, the T10-11 level is normal.  At the T11-12 level there is a shallow broad right paracentral disc protrusion but there is no significant spinal stenosis and there is no cord compression.  The foramina are patent.     T12-L1: Unremarkable  L1-2: There is a very minimal disc bulge.  There is no spinal canal or significant foraminal stenosis.  L2-3: There is a mild disc bulge with very shallow central disc protrusion.  There is mild facet joint arthropathy.  There is no spinal canal or significant foraminal stenosis.  L3-4: There is subtle  retrolisthesis of L3 on L4.  There is a minimal disc bulge.  There is no spinal canal or significant foraminal stenosis.  There is also very mild facet joint arthropathy.  L4-5: There is mild disc space narrowing, trace anterolisthesis of L4 on L5.  There is moderate to marked facet joint arthropathy with bilateral ligamentum flavum thickening.  There also bilateral facet joint effusions.  There is a mild disc bulge with subtle dorsal annular fissure in a tiny central disc protrusion.  There is borderline to mild spinal stenosis.  There is only mild right foraminal stenosis but there is a small 4 x 2 mm synovial cyst contributing to mild left lateral recess stenosis.  On the right, there is a 8 x 7 mm far lateral foraminal synovial cysts contributing to at least moderate foraminal stenosis.  L5-S1: There is trace anterolisthesis of L5 on S1.  There is facet joint arthropathy.  There is however no spinal canal or significant foraminal stenosis.  There is partial sacralization of L5.     Soft tissues, other: The prevertebral soft tissues are normal.  The aorta is normal in caliber.  There is no hydronephrosis.     Impression:     1. There is relatively mild degenerative change with the exception of findings at the L4-5 level.  There is only mild disc space narrowing and a minimal disc bulge but there is marked facet joint arthropathy, bilateral facet joint effusions.  There are bilateral synovial cysts.  On the left this contributes to left lateral recess stenosis.  On the right, this contributes to at least moderate foraminal stenosis.  There is only borderline to mild spinal stenosis    Assessment:   Christine Alfaro is a 53 y.o. year old female patient who has a past medical history of Anxiety, GERD (gastroesophageal reflux disease), Hyperlipidemia, Hypertension, Hypothyroidism, Insomnia, Observed sleep apnea, Prediabetes, Rheumatoid arthritis, unspecified, and Vitamin D deficiency. She presents in self  referral for lower back and left leg pain.  Left L5 radiculopathy from left L4/5 facet hypertrophy with foraminal cyst causing foraminal narrowing.  Right facet cyst with no right sided symptoms.      Plan:      Problem List Items Addressed This Visit    None  Visit Diagnoses       Lumbar spondylosis    -  Primary    Lumbar radiculopathy        Synovial cyst of lumbar facet joint        Dorsalgia, unspecified              6/10/2024: Christine Alfaro returns to the office for follow up.  This patient is new to me, she was previously evaluated by Rebekah Smith PA-C with back and spine.  She returns with continued left-sided lower back pain, 6/10, located in her left lower back, worsened with physical activities, somewhat improved with rest.  No pain radiating in her legs.  She does report some intermittent numbness and tingling in her left leg but no pain, weakness or any new changes to her bowel or bladder function.  She has recently participated in formal physical therapy and maintains her at-home PT directed exercises without significant relief of her pain.  No relief with NSAIDs and muscle relaxers.  She also takes amitriptyline and gabapentin without significant relief.    - on exam she is full strength in her lower extremities, absent lower extremity DTRs.  Increased pain with left-sided axial facet loading  - we reviewed her lumbar MRI in office today and is consistent with L4-5: moderate to marked facet joint arthropathy, bilateral facet joint effusions. small synovial cyst contributing to mild left lateral recess stenosis.  L5-S1: facet joint arthropathy.  - she complains of primarily axial facet mediated pain on the left side.  No significant radicular pain.  This pain is limiting her mobility interfering with her ADLs and quality of life.  She has not finding significant relief with NSAIDs, muscle relaxers, gabapentin, amitriptyline.  She has participated in formal physical therapy recently and  maintains her at-home PT directed exercises without significant relief of her pain.  - to address his pain I would like to schedule her for left L4/5 and L5/S1 diagnostic medial branch block.  If successful we will proceed with radiofrequency ablation.  - follow-up 4 weeks post procedure or sooner if needed.  If she fails get relief with this can consider EH at L4/5.      Follow Up:  4 weeks post procedure or sooner if needed    : Reviewed and consistent with medication use as prescribed.

## 2024-06-10 NOTE — PROGRESS NOTES
[facility]  Physical Therapy Discharge Note    Name: Christine Alfaro  Clinic Number: [id number]    Therapy Diagnosis:        Encounter Diagnoses   Name Primary?    Decreased strength of lower extremity Yes    Decreased functional activity tolerance        Physician: Rebekah Smith PA-C     Visit Date: 5/1/2024    Physician Orders: PT Eval and Treat  Medical Diagnosis from Referral: M54.16 (ICD-10-CM) - Lumbar radiculopathy   Evaluation Date: 4/3/2024  Authorization Period Expiration: 12/31/24  Plan of Care Expiration: 6/3/24  Progress Note Due: 5/3/24  Date of Surgery: N/A  Visit # / Visits authorized: 5 / 20 (1 / 1 Eval)  FOTO: 2 / 3     Precautions: Anxiety, Essential HTN, Obesity, Hypothyroidism, RA, Lupus     Date of Last visit: 5/1/24  Total Visits Received: 6    ASSESSMENT      This patient has not attended therapy since 5/1/24. This patient is now discharged from skilled outpatient therapy.    Discharge reason: Patient has not attended therapy since 5/1/24    Discharge FOTO Score: N/A    Goals:   Short Term Goals: 4 weeks (not met - pt stopped attending therapy)  - Patient will demonstrate improved lumbar spine range of motion, especially into flexion, by at least 25% with minimal increase in symptoms for improved ability to perform ADL's.  - Patient will be able to ascend/descend at least 1 step with minimal to no increase in symptoms for improved ability to perform household and community based activities.  - Patient will be able to sit for at least 30 minutes with minimal to no increase in symptoms for improved performance of work related duties.  - Patient will demonstrate improved LE strength, especially into hip flexion on the Left by at least 1/2 grade via MMT for increased ability to perform functional tasks.     Long Term Goals: 8 weeks (not met - pt stopped attending therapy)  - Patient will demonstrate improved lumbar spine range of motion, especially into  extension, by at least 25% with minimal increase in symptoms for improved ability to perform ADL's.  - Patient will be able to ascend/descend at least 3 steps with minimal to no increase in symptoms for improved ability to perform household and community based activities.  - Patient will be able to sit for at least 1 hour with minimal to no increase in symptoms for improved performance of work related duties.  - Patient will demonstrate improved LE strength, especially into hip extension on the Left by at least 1/2 grade via MMT for increased ability to perform functional tasks.  - Patient will demonstrate improved FOTO score that is greater than or equal to the predicted value for increased ability to perform ADL's.  - Patient will demonstrate independence with Home Exercise Program for continued improvements outside the clinical setting.    PLAN     This patient is discharged from skilled outpatient Physical Therapy services.    Mary German, PT, DPT, Cert. DN

## 2024-06-11 ENCOUNTER — PATIENT MESSAGE (OUTPATIENT)
Dept: BARIATRICS | Facility: CLINIC | Age: 54
End: 2024-06-11
Payer: COMMERCIAL

## 2024-06-11 DIAGNOSIS — M47.816 LUMBAR SPONDYLOSIS: ICD-10-CM

## 2024-06-11 DIAGNOSIS — M54.16 LUMBAR RADICULOPATHY: Primary | ICD-10-CM

## 2024-06-11 RX ORDER — SODIUM CHLORIDE, SODIUM LACTATE, POTASSIUM CHLORIDE, CALCIUM CHLORIDE 600; 310; 30; 20 MG/100ML; MG/100ML; MG/100ML; MG/100ML
INJECTION, SOLUTION INTRAVENOUS CONTINUOUS
OUTPATIENT
Start: 2024-06-11

## 2024-06-12 ENCOUNTER — LAB VISIT (OUTPATIENT)
Dept: LAB | Facility: HOSPITAL | Age: 54
End: 2024-06-12
Attending: FAMILY MEDICINE
Payer: COMMERCIAL

## 2024-06-12 DIAGNOSIS — L40.50 PSORIATIC ARTHRITIS: ICD-10-CM

## 2024-06-12 DIAGNOSIS — E03.9 HYPOTHYROIDISM, UNSPECIFIED TYPE: ICD-10-CM

## 2024-06-12 DIAGNOSIS — R63.2 INCREASED APPETITE: ICD-10-CM

## 2024-06-12 LAB
T4 FREE SERPL-MCNC: 1.25 NG/DL (ref 0.71–1.51)
TSH SERPL DL<=0.005 MIU/L-ACNC: 0.03 UIU/ML (ref 0.4–4)
TSH SERPL DL<=0.005 MIU/L-ACNC: 0.03 UIU/ML (ref 0.4–4)

## 2024-06-12 PROCEDURE — 84439 ASSAY OF FREE THYROXINE: CPT | Performed by: FAMILY MEDICINE

## 2024-06-12 PROCEDURE — 86480 TB TEST CELL IMMUN MEASURE: CPT | Performed by: INTERNAL MEDICINE

## 2024-06-12 PROCEDURE — 36415 COLL VENOUS BLD VENIPUNCTURE: CPT | Mod: PO | Performed by: FAMILY MEDICINE

## 2024-06-12 PROCEDURE — 84443 ASSAY THYROID STIM HORMONE: CPT | Performed by: FAMILY MEDICINE

## 2024-06-13 DIAGNOSIS — E03.9 HYPOTHYROIDISM, UNSPECIFIED TYPE: Primary | ICD-10-CM

## 2024-06-13 LAB
GAMMA INTERFERON BACKGROUND BLD IA-ACNC: 0.03 IU/ML
M TB IFN-G CD4+ BCKGRND COR BLD-ACNC: 0.06 IU/ML
M TB IFN-G CD4+ BCKGRND COR BLD-ACNC: 0.08 IU/ML
MITOGEN IGNF BCKGRD COR BLD-ACNC: 9.97 IU/ML
TB GOLD PLUS: NEGATIVE

## 2024-06-13 RX ORDER — LEVOTHYROXINE SODIUM 112 UG/1
112 TABLET ORAL
Qty: 30 TABLET | Refills: 11 | Status: SHIPPED | OUTPATIENT
Start: 2024-06-13 | End: 2025-06-13

## 2024-06-14 ENCOUNTER — TELEPHONE (OUTPATIENT)
Dept: FAMILY MEDICINE | Facility: CLINIC | Age: 54
End: 2024-06-14
Payer: COMMERCIAL

## 2024-06-14 NOTE — TELEPHONE ENCOUNTER
----- Message from Layo Mccrary MD sent at 6/13/2024  7:11 AM CDT -----  Call patient, schedule repeat lab work in about 6 weeks for TSH

## 2024-06-18 DIAGNOSIS — L40.50 PSORIATIC ARTHRITIS: ICD-10-CM

## 2024-06-18 RX ORDER — METHOTREXATE 25 MG/.5ML
25 INJECTION, SOLUTION SUBCUTANEOUS
Qty: 4 EACH | Refills: 2 | Status: ACTIVE | OUTPATIENT
Start: 2024-06-18

## 2024-06-19 ENCOUNTER — TELEPHONE (OUTPATIENT)
Dept: PAIN MEDICINE | Facility: CLINIC | Age: 54
End: 2024-06-19
Payer: COMMERCIAL

## 2024-06-19 NOTE — TELEPHONE ENCOUNTER
Peer to peer completed.    She is approved.    Approval dates June 25th - Sept 9/22    Authorization code:  [id number]

## 2024-06-21 ENCOUNTER — OFFICE VISIT (OUTPATIENT)
Dept: FAMILY MEDICINE | Facility: CLINIC | Age: 54
End: 2024-06-21
Payer: COMMERCIAL

## 2024-06-21 VITALS
HEIGHT: 65 IN | WEIGHT: 231.5 LBS | SYSTOLIC BLOOD PRESSURE: 116 MMHG | DIASTOLIC BLOOD PRESSURE: 78 MMHG | RESPIRATION RATE: 18 BRPM | BODY MASS INDEX: 38.57 KG/M2 | HEART RATE: 98 BPM

## 2024-06-21 DIAGNOSIS — E55.9 VITAMIN D DEFICIENCY: ICD-10-CM

## 2024-06-21 DIAGNOSIS — Z00.00 WELLNESS EXAMINATION: Primary | ICD-10-CM

## 2024-06-21 DIAGNOSIS — M32.9 SLE WITH NORMAL KIDNEYS: ICD-10-CM

## 2024-06-21 DIAGNOSIS — E74.39 GLUCOSE INTOLERANCE: ICD-10-CM

## 2024-06-21 DIAGNOSIS — E03.9 HYPOTHYROIDISM, UNSPECIFIED TYPE: ICD-10-CM

## 2024-06-21 DIAGNOSIS — Z23 IMMUNIZATION DUE: ICD-10-CM

## 2024-06-21 DIAGNOSIS — M54.16 LUMBAR RADICULOPATHY, CHRONIC: ICD-10-CM

## 2024-06-21 DIAGNOSIS — L40.50 PSORIATIC ARTHRITIS: ICD-10-CM

## 2024-06-21 DIAGNOSIS — F41.9 ANXIETY: ICD-10-CM

## 2024-06-21 DIAGNOSIS — G47.00 INSOMNIA, UNSPECIFIED TYPE: ICD-10-CM

## 2024-06-21 DIAGNOSIS — D84.821 DRUG-INDUCED IMMUNODEFICIENCY: ICD-10-CM

## 2024-06-21 DIAGNOSIS — E78.5 DYSLIPIDEMIA: ICD-10-CM

## 2024-06-21 DIAGNOSIS — M06.9 RHEUMATOID ARTHRITIS FLARE: ICD-10-CM

## 2024-06-21 DIAGNOSIS — E72.12 METHYLENETETRAHYDROFOLATE REDUCTASE DEFICIENCY: ICD-10-CM

## 2024-06-21 DIAGNOSIS — E66.01 OBESITY, MORBID, BMI 50 OR HIGHER: ICD-10-CM

## 2024-06-21 DIAGNOSIS — M54.9 DORSALGIA, UNSPECIFIED: ICD-10-CM

## 2024-06-21 DIAGNOSIS — Z79.899 DRUG-INDUCED IMMUNODEFICIENCY: ICD-10-CM

## 2024-06-21 DIAGNOSIS — G47.33 OSA ON CPAP: ICD-10-CM

## 2024-06-21 DIAGNOSIS — I10 ESSENTIAL HYPERTENSION: ICD-10-CM

## 2024-06-21 PROCEDURE — 99999 PR PBB SHADOW E&M-EST. PATIENT-LVL IV: CPT | Mod: PBBFAC,,, | Performed by: FAMILY MEDICINE

## 2024-06-21 RX ORDER — PHENTERMINE HYDROCHLORIDE 37.5 MG/1
37.5 TABLET ORAL
Qty: 90 TABLET | Refills: 0 | Status: SHIPPED | OUTPATIENT
Start: 2024-06-21 | End: 2024-07-21

## 2024-06-21 RX ORDER — TRAMADOL HYDROCHLORIDE 50 MG/1
50 TABLET ORAL EVERY 8 HOURS PRN
Qty: 60 TABLET | Refills: 3 | Status: SHIPPED | OUTPATIENT
Start: 2024-06-21

## 2024-06-21 NOTE — PROGRESS NOTES
THIS DOCUMENT WAS MADE IN PART WITH VOICE RECOGNITION SOFTWARE.  OCCASIONALLY THIS SOFTWARE WILL MISINTERPRET WORDS OR PHRASES.    Assessment and Plan:    1. Wellness examination        2. Immunization due        3. Anxiety        4. Dyslipidemia        5. Essential hypertension        6. SLE with normal kidneys        7. Rheumatoid arthritis flare        8. Drug-induced immunodeficiency        9. Hypothyroidism, unspecified type        10. Glucose intolerance        11. Vitamin D deficiency        12. Insomnia, unspecified type        13. Psoriatic arthritis        14. Obesity, morbid, BMI 50 or higher  phentermine (ADIPEX-P) 37.5 mg tablet      15. 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 Dorsalgia, unspecified  traMADoL (ULTRAM) 50 mg tablet      18. Lumbar radiculopathy, chronic  traMADoL (ULTRAM) 50 mg tablet        Medication phentermine   Follow-up in 3 months, anticipate about 12 lb weight loss   Monitor for side effects     New medication tramadol to use p.r.n. back pain, safer option over anti-inflammatories    Screening mammogram for breast cancer screening     Counseled on immunizations     Recheck thyroid in 6 weeks      ______________________________________________________________________  Subjective:    Chief Complaint:  Chief Complaint   Patient presents with    Medication Problem     Discuss medication         HPI:  Christine is a 53 y.o. year old     Thyroid   Still titrating thyroid dose, has had significant weight loss with bariatric surgery which has fluctuated her levels     Wt   Continues to fall, but has hit a slow spot, interested in appetite suppressant   Per patient, bariatric surgery on board with this plan     Back Pain   Upcoming nerve block with : Dr Cobb     Other chronic conditions reviewed, see bolded text below      Anxiety   Rx-Buspar   Prev rx : Wellbutrin (ineffective)   + Service animal / VARSHA    Dyslipidemia  Rx- atorvastatin 40  mg    Htn  Rx : Lisinopril 5 mg BID      Hypothyroidism   Rx-levothyroxine 112 mcg    Edema   Rx : Lasix 20 mg      Glucose intolerance, pre diabetes  Recent A1c 5.0%   Reports medication compliance      SLE   Rheum : MD Marcos   Followed by Rheumatology on [place]   Taking combination of Cosentyx, prednisone, Plaquenil, Methotrexate, Gabapentin, Cyclobenzaprine  Reports feeling much better since having lupus diagnosed and treated     Psoriasis   Rx : Aclovate + Betamethasone   Derm : on [place]     GERD   Prev rx : PPI   No HB / Dysphagia     History bariatric surgery   Max weight 341 lb   Current weight 231 lb                Past Medical History:  Past Medical History:   Diagnosis Date    Anxiety     GERD (gastroesophageal reflux disease)     Hyperlipidemia     Hypertension     Hypothyroidism     Insomnia     Observed sleep apnea     Prediabetes     Rheumatoid arthritis, unspecified     Vitamin D deficiency        Past Surgical History:  Past Surgical History:   Procedure Laterality Date    BTL      CHOLECYSTECTOMY      COLONOSCOPY N/A 8/30/2022    Procedure: COLONOSCOPY;  Surgeon: Drew Peraza MD;  Location: [facility];  Service: Endoscopy;  Laterality: N/A;    LAPAROSCOPIC SLEEVE GASTRECTOMY N/A 2/2/2023    Procedure: GASTRECTOMY, SLEEVE, LAPAROSCOPIC with intraop EGD;  Surgeon: Titi Ribeiro Jr., MD;  Location: [facility] OR [address] [place] [address];  Service: General;  Laterality: N/A;  PRE-CERT, PLEASE DO NOT SUBMIT FOR AUTH UNTIL 1/12/2023    SURGICAL REMOVAL OF BONE SPUR      left heel    TONSILLECTOMY      TUBAL LIGATION  8/1995       Family History:  Family History   Problem Relation Name Age of Onset    Heart disease Mother Joanna Martines     Bone cancer Mother Joanna Martines     Cancer Mother Joanna Martines         cervical    Hypertension Mother Joanna Martines     Heart disease Father Titi Boyd     Diabetes type I Father Titi Boyd     Lung cancer Father Titi Boyd     Diabetes Father Titi Boyd     Hypertension  Father Titi Boyd     Heart disease Brother Daniel Boyd     No Known Problems Daughter      No Known Problems Daughter      Breast cancer Neg Hx      Ovarian cancer Neg Hx         Social History:  Social History     Socioeconomic History    Marital status:    Tobacco Use    Smoking status: Never     Passive exposure: Never    Smokeless tobacco: Never   Substance and Sexual Activity    Alcohol use: Not Currently     Alcohol/week: 0.0 standard drinks of alcohol    Drug use: Never    Sexual activity: Yes     Partners: Male     Birth control/protection: See Surgical Hx     Social Determinants of Health     Financial Resource Strain: Low Risk  (1/29/2024)    Overall Financial Resource Strain (CARDIA)     Difficulty of Paying Living Expenses: Not hard at all   Food Insecurity: No Food Insecurity (1/29/2024)    Hunger Vital Sign     Worried About Running Out of Food in the Last Year: Never true     Ran Out of Food in the Last Year: Never true   Transportation Needs: No Transportation Needs (1/29/2024)    PRAPARE - Transportation     Lack of Transportation (Medical): No     Lack of Transportation (Non-Medical): No   Physical Activity: Insufficiently Active (1/29/2024)    Exercise Vital Sign     Days of Exercise per Week: 3 days     Minutes of Exercise per Session: 30 min   Stress: No Stress Concern Present (1/29/2024)    [employer] [place] [employer] - Occupational Stress Questionnaire     Feeling of Stress : Only a little   Housing Stability: Low Risk  (1/29/2024)    Housing Stability Vital Sign     Unable to Pay for Housing in the Last Year: No     Number of Places Lived in the Last Year: 1     Unstable Housing in the Last Year: No       Medications:  Current Outpatient Medications on File Prior to Visit   Medication Sig Dispense Refill    albuterol (PROVENTIL HFA) 90 mcg/actuation inhaler Inhale 2 puffs into the lungs every 6 (six) hours as needed for Wheezing. Rescue 18 g 0    alclometasone (ACLOVATE)  0.05 % cream AAA face bid prn redness or scaling or itching 60 g 3    atorvastatin (LIPITOR) 40 MG tablet TAKE ONE TABLET BY MOUTH EVERY EVENING 90 tablet 1    betamethasone dipropionate 0.05 % cream Use bid 45 g 3    buPROPion (WELLBUTRIN SR) 100 MG TBSR 12 hr tablet Take ONE tablet (100 mg total) by mouth TWO (two) times daily 180 tablet 1    cyclobenzaprine (FLEXERIL) 10 MG tablet TAKE ONE TABLET EVERY EVENING 150 tablet 3    econazole nitrate 1 % cream Use bid 30 g 2    folic acid (FOLVITE) 1 MG tablet Take 2 tablets (2 mg total) by mouth once daily. 60 tablet 11    furosemide (LASIX) 20 MG tablet Take 1 tablet (20 mg total) by mouth once daily. 90 tablet 1    gabapentin (NEURONTIN) 300 MG capsule Take 1 capsule (300 mg total) by mouth 3 (three) times daily. 90 capsule 11    hydrOXYchloroQUINE (PLAQUENIL) 200 mg tablet Take 200 mg by mouth 2 (two) times daily.      ipratropium (ATROVENT) 21 mcg (0.03 %) nasal spray 2 sprays by Each Nostril route 3 (three) times daily. 30 mL 0    levothyroxine (SYNTHROID) 112 MCG tablet Take 1 tablet (112 mcg total) by mouth before breakfast. 30 tablet 11    lisinopriL (PRINIVIL,ZESTRIL) 5 MG tablet Take 1 tablet (5 mg total) by mouth 2 (two) times a day. 180 tablet 3    methotrexate, PF, (RASUVO, PF,) 25 mg/0.5 mL AtIn Inject 0.5 mLs (25 mg total) into the skin every 7 days. 4 each 2    secukinumab (COSENTYX PEN) 150 mg/mL PnIj Inject 150 mg into the skin every 28 days. 2 mL 11    traMADoL (ULTRAM) 50 mg tablet Take 1 tablet (50 mg total) by mouth every 8 (eight) hours as needed for Pain. 20 tablet 0    XIIDRA 5 % Dpet APPLY 1 DROP TO BOTH EYES BID      amitriptyline (ELAVIL) 25 MG tablet Take 1 tablet by mouth every evening.      aspirin (ECOTRIN) 81 MG EC tablet Take 1 tablet (81 mg total) by mouth once daily. (Patient not taking: Reported on 6/21/2024) 90 tablet 3    busPIRone (BUSPAR) 10 MG tablet  (Patient not taking: Reported on 6/21/2024)      folic acid (FOLVITE) 1 MG  "tablet Take 1 tablet (1 mg total) by mouth once daily. (Patient not taking: Reported on 6/21/2024) 30 tablet 11    omeprazole (PRILOSEC) 40 MG capsule Take 1 capsule (40 mg total) by mouth every morning. Open capsule and take with apple sauce (Patient not taking: Reported on 6/21/2024) 30 capsule 2    sodium chloride 0.9% SolP 100 mL with abatacept (with maltose) 250 mg SolR 1,000 mg Inject 1,000 mg into the vein every 28 days. (Patient not taking: Reported on 6/21/2024)       No current facility-administered medications on file prior to visit.       Allergies:  Sulfamethoxazole-trimethoprim and Sulfa (sulfonamide antibiotics)    Immunizations:  Immunization History   Administered Date(s) Administered    COVID-19 MRNA, LN-S PF (MODERNA HALF 0.25 ML DOSE) 01/11/2022    COVID-19, MRNA, LN-S, PF (MODERNA FULL 0.5 ML DOSE) 03/11/2021, 04/15/2021    DTaP 01/01/1973, 02/01/1973, 04/01/1973, 08/01/1975, 04/01/1978    IPV 01/01/1973, 02/01/1973, 08/01/1975, 01/05/1978    Influenza Split 11/18/2007    MMR 01/12/1978, 10/19/1995    Td (ADULT) 04/17/1989, 08/07/2004    Tdap 11/26/2019       Review of Systems:  Review of Systems   All other systems reviewed and are negative.      Objective:    Vitals:  Vitals:    06/21/24 1355   BP: 116/78   Pulse: 98   Resp: 18   Weight: 105 kg (231 lb 7.7 oz)   Height: 5' 5" (1.651 m)   PainSc:   4   PainLoc: Generalized       Physical Exam  Vitals reviewed.   Constitutional:       General: She is not in acute distress.  HENT:      Head: Normocephalic and atraumatic.   Eyes:      Pupils: Pupils are equal, round, and reactive to light.   Cardiovascular:      Rate and Rhythm: Normal rate and regular rhythm.      Heart sounds: No murmur heard.     No friction rub.   Pulmonary:      Effort: Pulmonary effort is normal.      Breath sounds: Normal breath sounds.   Abdominal:      General: Bowel sounds are normal. There is no distension.      Palpations: Abdomen is soft.      Tenderness: There is no " abdominal tenderness.   Musculoskeletal:      Cervical back: Neck supple.   Skin:     General: Skin is warm and dry.      Findings: No rash.   Psychiatric:         Behavior: Behavior normal.           Layo Mccrary MD  Family Medicine

## 2024-06-25 ENCOUNTER — HOSPITAL ENCOUNTER (OUTPATIENT)
Facility: HOSPITAL | Age: 54
Discharge: HOME | End: 2024-06-25
Attending: ANESTHESIOLOGY | Admitting: ANESTHESIOLOGY
Payer: COMMERCIAL

## 2024-06-25 ENCOUNTER — HOSPITAL ENCOUNTER (OUTPATIENT)
Dept: RADIOLOGY | Facility: HOSPITAL | Age: 54
Discharge: HOME | End: 2024-06-25
Attending: ANESTHESIOLOGY | Admitting: ANESTHESIOLOGY
Payer: COMMERCIAL

## 2024-06-25 VITALS
OXYGEN SATURATION: 98 % | HEART RATE: 85 BPM | HEIGHT: 65 IN | BODY MASS INDEX: 38.45 KG/M2 | SYSTOLIC BLOOD PRESSURE: 128 MMHG | WEIGHT: 230.81 LBS | DIASTOLIC BLOOD PRESSURE: 63 MMHG | TEMPERATURE: 98 F | RESPIRATION RATE: 20 BRPM

## 2024-06-25 DIAGNOSIS — M54.50 LOWER BACK PAIN: ICD-10-CM

## 2024-06-25 DIAGNOSIS — M54.16 LUMBAR RADICULOPATHY: ICD-10-CM

## 2024-06-25 DIAGNOSIS — M47.816 LUMBAR SPONDYLOSIS: Primary | ICD-10-CM

## 2024-06-25 LAB
B-HCG UR QL: NEGATIVE
CTP QC/QA: YES

## 2024-06-25 PROCEDURE — 64484 NJX AA&/STRD TFRM EPI L/S EA: CPT | Mod: LT,,, | Performed by: ANESTHESIOLOGY

## 2024-06-25 PROCEDURE — 64494 INJ PARAVERT F JNT L/S 2 LEV: CPT | Mod: PO,LT | Performed by: ANESTHESIOLOGY

## 2024-06-25 PROCEDURE — 64493 INJ PARAVERT F JNT L/S 1 LEV: CPT | Mod: PO,LT | Performed by: ANESTHESIOLOGY

## 2024-06-25 PROCEDURE — 25000003 PHARM REV CODE 250: Mod: PO | Performed by: ANESTHESIOLOGY

## 2024-06-25 PROCEDURE — 64483 NJX AA&/STRD TFRM EPI L/S 1: CPT | Mod: LT,,, | Performed by: ANESTHESIOLOGY

## 2024-06-25 PROCEDURE — 76000 FLUOROSCOPY <1 HR PHYS/QHP: CPT | Mod: TC,PO

## 2024-06-25 PROCEDURE — 64494 INJ PARAVERT F JNT L/S 2 LEV: CPT | Mod: LT,,, | Performed by: ANESTHESIOLOGY

## 2024-06-25 PROCEDURE — 64484 NJX AA&/STRD TFRM EPI L/S EA: CPT | Mod: PO,LT | Performed by: ANESTHESIOLOGY

## 2024-06-25 PROCEDURE — 63600175 PHARM REV CODE 636 W HCPCS: Mod: PO | Performed by: ANESTHESIOLOGY

## 2024-06-25 PROCEDURE — 64493 INJ PARAVERT F JNT L/S 1 LEV: CPT | Mod: LT,,, | Performed by: ANESTHESIOLOGY

## 2024-06-25 PROCEDURE — 99152 MOD SED SAME PHYS/QHP 5/>YRS: CPT | Mod: ,,, | Performed by: ANESTHESIOLOGY

## 2024-06-25 PROCEDURE — 81025 URINE PREGNANCY TEST: CPT | Mod: PO | Performed by: ANESTHESIOLOGY

## 2024-06-25 RX ORDER — BUPIVACAINE HYDROCHLORIDE 2.5 MG/ML
INJECTION, SOLUTION EPIDURAL; INFILTRATION; INTRACAUDAL
Status: DISCONTINUED | OUTPATIENT
Start: 2024-06-25 | End: 2024-06-25 | Stop reason: HOSPADM

## 2024-06-25 RX ORDER — LIDOCAINE HYDROCHLORIDE 10 MG/ML
INJECTION, SOLUTION EPIDURAL; INFILTRATION; INTRACAUDAL; PERINEURAL
Status: DISCONTINUED | OUTPATIENT
Start: 2024-06-25 | End: 2024-06-25 | Stop reason: HOSPADM

## 2024-06-25 RX ORDER — SODIUM CHLORIDE, SODIUM LACTATE, POTASSIUM CHLORIDE, CALCIUM CHLORIDE 600; 310; 30; 20 MG/100ML; MG/100ML; MG/100ML; MG/100ML
INJECTION, SOLUTION INTRAVENOUS CONTINUOUS
Status: DISCONTINUED | OUTPATIENT
Start: 2024-06-25 | End: 2024-06-25 | Stop reason: HOSPADM

## 2024-06-25 RX ORDER — MIDAZOLAM HYDROCHLORIDE 1 MG/ML
INJECTION INTRAMUSCULAR; INTRAVENOUS
Status: DISCONTINUED | OUTPATIENT
Start: 2024-06-25 | End: 2024-06-25 | Stop reason: HOSPADM

## 2024-06-25 NOTE — DISCHARGE SUMMARY
[facility]  Discharge Note  Short Stay    Admit Date: 6/25/2024    Discharge Date: 6/25/2024    Attending Physician: Sampson Cobb     Discharge Provider: Sampson Cobb    Diagnoses:  There are no hospital problems to display for this patient.      Discharged Condition: Good    Final Diagnoses: Lumbar radiculopathy [M54.16]    Disposition: Home or Self Care    Hospital Course: No complications, uneventful    Outcome of Hospitalization, Treatment, Procedure, or Surgery:  Patient was admitted for outpatient interventional pain management procedure. The patient tolerated the procedure well with no complications.    Follow up/Patient Instructions:  Follow up as scheduled in Pain Management office in 2-3 weeks.  Patient has received instructions and follow up date and time.    Medications:  Continue previous medications    Discharge Procedure Orders   Notify your health care provider if you experience any of the following:  temperature >100.4     Notify your health care provider if you experience any of the following:  persistent nausea and vomiting or diarrhea     Notify your health care provider if you experience any of the following:  severe uncontrolled pain     Notify your health care provider if you experience any of the following:  redness, tenderness, or signs of infection (pain, swelling, redness, odor or green/yellow discharge around incision site)     Notify your health care provider if you experience any of the following:  difficulty breathing or increased cough     Notify your health care provider if you experience any of the following:  severe persistent headache     Notify your health care provider if you experience any of the following:  worsening rash     Notify your health care provider if you experience any of the following:  persistent dizziness, light-headedness, or visual disturbances     Notify your health care provider if you experience any of the following:  increased confusion or  weakness     Activity as tolerated

## 2024-06-25 NOTE — OP NOTE
Procedure Note    Procedure Date: 6/25/2024    Procedure Performed:  left Diagnostic Lumbar Medial Branch @ L4/5 and L5/S1, with Fluoroscopic Guidance    Indications:   Christine Alfaro has chronic, moderate to severe axial lower back pain present for over the past 3 months that has failed to respond to physical therapy and PT-directed home exercises. There is no untreated radiculopathy or claudication present.  The patient has clinical and radiologic findings suggestive of facet mediated pain.     Pre-op diagnosis: Lumbar Spondylosis    Post-op diagnosis: same    Physician: Sampson Cobb MD    IV Anxiolysis medications: versed 2mg    Medications injected: Bupivacaine 0.25%, 0.5 cc per level    Local anesthetic used: 1% Lidocaine, 3 ml    Estimated Blood Loss: none    Complications:  none    Technique:  The patient was interviewed in the holding area and Risks/Benefits were discussed, including, but not limited to, the possibility of new or different pain, bleeding or infection.   All questions were answered.  The patient understood and accepted risks.  Consent was reviewed.  A time out was taken to identify the patient, procedure and side of the procedure. The patient was placed in a prone position, then prepped and draped in the usual sterile fashion using ChloraPrep x2 and sterile towels.  The levels were determined under fluoroscopic guidance and then marked.  1% Lidocaine was given by raising a wheal at the skin over each site and then infiltrated approximately 2cm deeper.  A 25-gauge 3.5 inch needle was introduced to the anatomic location of the L3-5 medial branch nerves on the left side.  Then, after negative aspiration, 0.5 cc of Bupivacaine 0.25% was injected @ each level.  The patient tolerated the procedure well.  The patient tolerated the procedure well and was transferred to the [facility] in stable condition.  The patient was monitored after the procedure.  Patient was given post procedure and  discharge instructions to follow at home.  The patient was discharged in a stable condition.    Event Time In   In Facility 1321   In Pre-Procedure 1452   Physician Available    Anesthesia Available    Pre-Op: Bedside Procedure Start    Pre-Op: Bedside Procedure Stop    Pre-Procedure Complete 1515   Out of Pre-Procedure    Anesthesia Start    Anesthesia Start Data Collection    Setup Start    Setup Complete    In Room 1608   Prep Start    Procedure Prep Complete    MD notified pt. ready    Procedure Start 1613   Procedure Closing    Emergence    Procedure Finish 1620   Sedation Start 1607   Scope In    Extent Reached    Scope Out    Sedation End 1620   Out of Room 1621   Cleanup Start    Cleanup Complete    Cosmetic Start    Cosmetic Stop    Pain Mgmt In Room    Pain Mgmt Out Room    In Recovery    Anesthesia Finish    Bedside Procedure Start    Bedside Procedure Stop    Recovery Care Complete    Out of Recovery    To Phase II    In Phase II    Pain Mgmt Recovery Start    Pain Mgmt Recovery Stop    Obs Rec Start    Obs Rec Stop    Phase II Care Complete    Out of Phase II    Procedural Care Complete    Discharge    Pain Follow Up Needed    Pain Follow Up Complete

## 2024-06-25 NOTE — INTERVAL H&P NOTE
The patient has been examined and the H&P has been reviewed:    I concur with the findings and no changes have occurred since H&P was written.  The risks and benefits of this intervention, and alternative therapies were discussed with the patient.  The discussion of risks included infection, bleeding, need for additional procedures or surgery, nerve damage.  Questions regarding the procedure, risks, expected outcome, and possible side effects were solicited and answered to the patient's satisfaction.  Christine Boyd Meriromario wishes to proceed with the injection or procedure.  Written consent was obtained.  ASA 2, MP II      There are no hospital problems to display for this patient.

## 2024-06-26 ENCOUNTER — TELEPHONE (OUTPATIENT)
Dept: PAIN MEDICINE | Facility: CLINIC | Age: 54
End: 2024-06-26
Payer: COMMERCIAL

## 2024-06-26 ENCOUNTER — PATIENT MESSAGE (OUTPATIENT)
Dept: PAIN MEDICINE | Facility: CLINIC | Age: 54
End: 2024-06-26
Payer: COMMERCIAL

## 2024-06-26 NOTE — TELEPHONE ENCOUNTER
Attempted to reach patient for lumbar block follow up. No answer and unable to leave a message. [email or web address] message sent to patient.

## 2024-06-28 DIAGNOSIS — M47.816 LUMBAR SPONDYLOSIS: Primary | ICD-10-CM

## 2024-06-28 RX ORDER — SODIUM CHLORIDE, SODIUM LACTATE, POTASSIUM CHLORIDE, CALCIUM CHLORIDE 600; 310; 30; 20 MG/100ML; MG/100ML; MG/100ML; MG/100ML
INJECTION, SOLUTION INTRAVENOUS CONTINUOUS
OUTPATIENT
Start: 2024-06-28

## 2024-06-28 NOTE — TELEPHONE ENCOUNTER
Spoke with patient regarding results from 1st diagnostic lumbar medial branch block. Clarified that patient had between 5-6 hours of pain relief following block. Based off answers provided patient was scheduled for 2nd lumbar medial branch block L4/5, L5/S1. Pre op information reviewed with patient.

## 2024-07-03 ENCOUNTER — PATIENT MESSAGE (OUTPATIENT)
Dept: BARIATRICS | Facility: CLINIC | Age: 54
End: 2024-07-03
Payer: COMMERCIAL

## 2024-07-09 ENCOUNTER — PATIENT MESSAGE (OUTPATIENT)
Dept: BARIATRICS | Facility: CLINIC | Age: 54
End: 2024-07-09
Payer: COMMERCIAL

## 2024-07-12 ENCOUNTER — HOSPITAL ENCOUNTER (OUTPATIENT)
Dept: RADIOLOGY | Facility: HOSPITAL | Age: 54
Discharge: HOME | End: 2024-07-12
Attending: FAMILY MEDICINE
Payer: COMMERCIAL

## 2024-07-12 DIAGNOSIS — Z12.39 ENCOUNTER FOR SCREENING FOR MALIGNANT NEOPLASM OF BREAST, UNSPECIFIED SCREENING MODALITY: ICD-10-CM

## 2024-07-12 PROCEDURE — 77067 SCR MAMMO BI INCL CAD: CPT | Mod: TC,PO

## 2024-07-12 PROCEDURE — 77063 BREAST TOMOSYNTHESIS BI: CPT | Mod: 26,,, | Performed by: RADIOLOGY

## 2024-07-12 PROCEDURE — 77067 SCR MAMMO BI INCL CAD: CPT | Mod: 26,,, | Performed by: RADIOLOGY

## 2024-07-12 PROCEDURE — 77063 BREAST TOMOSYNTHESIS BI: CPT | Mod: TC,PO

## 2024-07-17 ENCOUNTER — HOSPITAL ENCOUNTER (OUTPATIENT)
Dept: RADIOLOGY | Facility: HOSPITAL | Age: 54
Discharge: HOME | End: 2024-07-17
Attending: ANESTHESIOLOGY | Admitting: ANESTHESIOLOGY
Payer: COMMERCIAL

## 2024-07-17 ENCOUNTER — HOSPITAL ENCOUNTER (OUTPATIENT)
Facility: HOSPITAL | Age: 54
Discharge: HOME | End: 2024-07-17
Attending: ANESTHESIOLOGY | Admitting: ANESTHESIOLOGY
Payer: COMMERCIAL

## 2024-07-17 VITALS
HEART RATE: 79 BPM | OXYGEN SATURATION: 98 % | WEIGHT: 231 LBS | RESPIRATION RATE: 100 BRPM | HEIGHT: 65 IN | TEMPERATURE: 97 F | BODY MASS INDEX: 38.49 KG/M2 | DIASTOLIC BLOOD PRESSURE: 74 MMHG | SYSTOLIC BLOOD PRESSURE: 133 MMHG

## 2024-07-17 DIAGNOSIS — M54.50 LOWER BACK PAIN: ICD-10-CM

## 2024-07-17 DIAGNOSIS — M47.816 LUMBAR SPONDYLOSIS: Primary | ICD-10-CM

## 2024-07-17 LAB
B-HCG UR QL: NEGATIVE
CTP QC/QA: YES

## 2024-07-17 PROCEDURE — 25000003 PHARM REV CODE 250: Mod: PO | Performed by: ANESTHESIOLOGY

## 2024-07-17 PROCEDURE — 64494 INJ PARAVERT F JNT L/S 2 LEV: CPT | Mod: PO,LT | Performed by: ANESTHESIOLOGY

## 2024-07-17 PROCEDURE — 81025 URINE PREGNANCY TEST: CPT | Mod: PO | Performed by: ANESTHESIOLOGY

## 2024-07-17 PROCEDURE — 76000 FLUOROSCOPY <1 HR PHYS/QHP: CPT | Mod: TC,PO

## 2024-07-17 PROCEDURE — 64494 INJ PARAVERT F JNT L/S 2 LEV: CPT | Mod: LT,,, | Performed by: ANESTHESIOLOGY

## 2024-07-17 PROCEDURE — 64493 INJ PARAVERT F JNT L/S 1 LEV: CPT | Mod: PO,LT | Performed by: ANESTHESIOLOGY

## 2024-07-17 PROCEDURE — 64493 INJ PARAVERT F JNT L/S 1 LEV: CPT | Mod: LT,,, | Performed by: ANESTHESIOLOGY

## 2024-07-17 PROCEDURE — 63600175 PHARM REV CODE 636 W HCPCS: Mod: PO | Performed by: ANESTHESIOLOGY

## 2024-07-17 RX ORDER — SODIUM CHLORIDE, SODIUM LACTATE, POTASSIUM CHLORIDE, CALCIUM CHLORIDE 600; 310; 30; 20 MG/100ML; MG/100ML; MG/100ML; MG/100ML
INJECTION, SOLUTION INTRAVENOUS CONTINUOUS
Status: DISCONTINUED | OUTPATIENT
Start: 2024-07-17 | End: 2024-07-17 | Stop reason: HOSPADM

## 2024-07-17 RX ORDER — LIDOCAINE HYDROCHLORIDE 10 MG/ML
INJECTION, SOLUTION EPIDURAL; INFILTRATION; INTRACAUDAL; PERINEURAL
Status: DISCONTINUED | OUTPATIENT
Start: 2024-07-17 | End: 2024-07-17 | Stop reason: HOSPADM

## 2024-07-17 RX ORDER — BUPIVACAINE HYDROCHLORIDE 2.5 MG/ML
INJECTION, SOLUTION EPIDURAL; INFILTRATION; INTRACAUDAL
Status: DISCONTINUED | OUTPATIENT
Start: 2024-07-17 | End: 2024-07-17 | Stop reason: HOSPADM

## 2024-07-17 RX ORDER — MIDAZOLAM HYDROCHLORIDE 1 MG/ML
INJECTION INTRAMUSCULAR; INTRAVENOUS
Status: DISCONTINUED | OUTPATIENT
Start: 2024-07-17 | End: 2024-07-17 | Stop reason: HOSPADM

## 2024-07-17 NOTE — H&P
[facility] - Surgery  History & Physical - Short Stay  Pain Management       SUBJECTIVE:     Procedure: Procedure(s) (LRB):  Block-nerve-medial branch-lumbar    L4/5, L5/S1 (Left)    Chief Complaint/Reason for Admission:  Lumbar spondylosis [M47.816]    PTA Medications   Medication Sig    atorvastatin (LIPITOR) 40 MG tablet TAKE ONE TABLET BY MOUTH EVERY EVENING    busPIRone (BUSPAR) 10 MG tablet     cyclobenzaprine (FLEXERIL) 10 MG tablet TAKE ONE TABLET EVERY EVENING    folic acid (FOLVITE) 1 MG tablet Take 2 tablets (2 mg total) by mouth once daily.    furosemide (LASIX) 20 MG tablet Take 1 tablet (20 mg total) by mouth once daily.    gabapentin (NEURONTIN) 300 MG capsule Take 1 capsule (300 mg total) by mouth 3 (three) times daily.    hydrOXYchloroQUINE (PLAQUENIL) 200 mg tablet Take 200 mg by mouth 2 (two) times daily.    levothyroxine (SYNTHROID) 112 MCG tablet Take 1 tablet (112 mcg total) by mouth before breakfast.    lisinopriL (PRINIVIL,ZESTRIL) 5 MG tablet Take 1 tablet (5 mg total) by mouth 2 (two) times a day.    methotrexate, PF, (RASUVO, PF,) 25 mg/0.5 mL AtIn Inject 0.5 mLs (25 mg total) into the skin every 7 days.    phentermine (ADIPEX-P) 37.5 mg tablet Take 1 tablet (37.5 mg total) by mouth before breakfast.    secukinumab (COSENTYX PEN) 150 mg/mL PnIj Inject 150 mg into the skin every 28 days.    traMADoL (ULTRAM) 50 mg tablet Take 1 tablet (50 mg total) by mouth every 8 (eight) hours as needed for Pain.    XIIDRA 5 % Dpet APPLY 1 DROP TO BOTH EYES BID    alclometasone (ACLOVATE) 0.05 % cream AAA face bid prn redness or scaling or itching    betamethasone dipropionate 0.05 % cream Use bid       Review of patient's allergies indicates:   Allergen Reactions    Sulfamethoxazole-trimethoprim     Sulfa (sulfonamide antibiotics) Rash and Swelling       Past Medical History:   Diagnosis Date    Anxiety     GERD (gastroesophageal reflux disease)     Hyperlipidemia     Hypertension     Hypothyroidism      Insomnia     Observed sleep apnea     Prediabetes     Rheumatoid arthritis, unspecified     Vitamin D deficiency      Past Surgical History:   Procedure Laterality Date    BTL      CHOLECYSTECTOMY      COLONOSCOPY N/A 8/30/2022    Procedure: COLONOSCOPY;  Surgeon: Drew Peraza MD;  Location: [facility];  Service: Endoscopy;  Laterality: N/A;    INJECTION OF ANESTHETIC AGENT AROUND MEDIAL BRANCH NERVES INNERVATING LUMBAR FACET JOINT Left 6/25/2024    Procedure: Block-nerve-medial branch-lumbar   L4/5, L5/S1;  Surgeon: Sampson Cobb MD;  Location: [facility] OR;  Service: Pain Management;  Laterality: Left;    LAPAROSCOPIC SLEEVE GASTRECTOMY N/A 2/2/2023    Procedure: GASTRECTOMY, SLEEVE, LAPAROSCOPIC with intraop EGD;  Surgeon: Titi Ribeiro Jr., MD;  Location: [address];  Service: General;  Laterality: N/A;  PRE-CERT, PLEASE DO NOT SUBMIT FOR AUTH UNTIL 1/12/2023    SURGICAL REMOVAL OF BONE SPUR      left heel    TONSILLECTOMY      TUBAL LIGATION  8/1995     Family History   Problem Relation Name Age of Onset    Ovarian cancer Mother Joanna Martines 36    Heart disease Mother Joanna Martines     Bone cancer Mother Joanna Martines     Cancer Mother Joanna Martines         cervical    Hypertension Mother Joanna Martines     Heart disease Father Titi Boyd     Diabetes type I Father Titi Boyd     Lung cancer Father Titi Boyd     Diabetes Father Titi Boyd     Hypertension Father Titi Boyd     No Known Problems Daughter      No Known Problems Daughter      Heart disease Brother Daniel Boyd     Breast cancer Neg Hx       Social History     Tobacco Use    Smoking status: Never     Passive exposure: Never    Smokeless tobacco: Never   Substance Use Topics    Alcohol use: Not Currently     Alcohol/week: 0.0 standard drinks of alcohol    Drug use: Never      No current facility-administered medications for this encounter.    Review of Systems:  General ROS: negative for - fever  Dermatological ROS: negative for  rash    OBJECTIVE:     Vital Signs (Most Recent):  Temp: 97.3 °F (36.3 °C) (07/17/24 1354)  Pulse: 87 (07/17/24 1354)  Resp: 18 (07/17/24 1354)  BP: (!) 142/77 (07/17/24 1354)  SpO2: 99 % (07/17/24 1354)  Body mass index is 38.44 kg/m².    Physical Exam:  General appearance - alert, well appearing, and in no distress  Mental status - AOx3  Eyes - pupils equal and reactive, extraocular eye movements intact  Heart - normal rate, regular rhythm, normal S1, S2, no murmurs, rubs, clicks or gallops  Chest - clear to auscultation, no wheezes, rales or rhonchi, symmetric air entry  Abdomen - soft, nontender, nondistended, no masses or organomegaly  Neurological - alert, oriented, normal speech, no focal findings or movement disorder noted  Extremities - peripheral pulses normal, no pedal edema, no clubbing or cyanosis      ASSESSMENT/PLAN:     There are no hospital problems to display for this patient.     Indication: The patient has clinical and radiologic findings suggestive of facet mediated pain and is s/p 1st diagnostic left lumbar L4/5 and L5/S1 medial branch blocks with at least 80% relief of their axial back pain lasting the duration of the local anesthetic utilized.    We will proceed with 2nd diagnostic left L4/5 and L5/S1 medial branch blocks.    The risks and benefits of this intervention, and alternative therapies were discussed with the patient.  The discussion of risks included infection, bleeding, need for additional procedures or surgery, nerve damage, paralysis, adverse medication reaction(s), stroke, and/or death.  Questions regarding the procedure, risks, expected outcome, and possible side effects were solicited and answered to the patient's satisfaction.  Christine wishes to proceed with the injection.  Verbal and written consent were verified.  ASA 2, MP II      Proceed with intervention as scheduled.    Sampson Cobb M.D.  Interventional Pain Medicine / Anesthesiology

## 2024-07-17 NOTE — H&P (VIEW-ONLY)
[place] - Surgery  History & Physical - Short Stay  Pain Management       SUBJECTIVE:     Procedure: Procedure(s) (LRB):  Block-nerve-medial branch-lumbar    L4/5, L5/S1 (Left)    Chief Complaint/Reason for Admission:  Lumbar spondylosis [M47.816]    PTA Medications   Medication Sig    atorvastatin (LIPITOR) 40 MG tablet TAKE ONE TABLET BY MOUTH EVERY EVENING    busPIRone (BUSPAR) 10 MG tablet     cyclobenzaprine (FLEXERIL) 10 MG tablet TAKE ONE TABLET EVERY EVENING    folic acid (FOLVITE) 1 MG tablet Take 2 tablets (2 mg total) by mouth once daily.    furosemide (LASIX) 20 MG tablet Take 1 tablet (20 mg total) by mouth once daily.    gabapentin (NEURONTIN) 300 MG capsule Take 1 capsule (300 mg total) by mouth 3 (three) times daily.    hydrOXYchloroQUINE (PLAQUENIL) 200 mg tablet Take 200 mg by mouth 2 (two) times daily.    levothyroxine (SYNTHROID) 112 MCG tablet Take 1 tablet (112 mcg total) by mouth before breakfast.    lisinopriL (PRINIVIL,ZESTRIL) 5 MG tablet Take 1 tablet (5 mg total) by mouth 2 (two) times a day.    methotrexate, PF, (RASUVO, PF,) 25 mg/0.5 mL AtIn Inject 0.5 mLs (25 mg total) into the skin every 7 days.    phentermine (ADIPEX-P) 37.5 mg tablet Take 1 tablet (37.5 mg total) by mouth before breakfast.    secukinumab (COSENTYX PEN) 150 mg/mL PnIj Inject 150 mg into the skin every 28 days.    traMADoL (ULTRAM) 50 mg tablet Take 1 tablet (50 mg total) by mouth every 8 (eight) hours as needed for Pain.    XIIDRA 5 % Dpet APPLY 1 DROP TO BOTH EYES BID    alclometasone (ACLOVATE) 0.05 % cream AAA face bid prn redness or scaling or itching    betamethasone dipropionate 0.05 % cream Use bid       Review of patient's allergies indicates:   Allergen Reactions    Sulfamethoxazole-trimethoprim     Sulfa (sulfonamide antibiotics) Rash and Swelling       Past Medical History:   Diagnosis Date    Anxiety     GERD (gastroesophageal reflux disease)     Hyperlipidemia     Hypertension     Hypothyroidism      Insomnia     Observed sleep apnea     Prediabetes     Rheumatoid arthritis, unspecified     Vitamin D deficiency      Past Surgical History:   Procedure Laterality Date    BTL      CHOLECYSTECTOMY      COLONOSCOPY N/A 8/30/2022    Procedure: COLONOSCOPY;  Surgeon: Drew Peraza MD;  Location: [facility];  Service: Endoscopy;  Laterality: N/A;    INJECTION OF ANESTHETIC AGENT AROUND MEDIAL BRANCH NERVES INNERVATING LUMBAR FACET JOINT Left 6/25/2024    Procedure: Block-nerve-medial branch-lumbar   L4/5, L5/S1;  Surgeon: Sampson Cobb MD;  Location: [facility] OR;  Service: Pain Management;  Laterality: Left;    LAPAROSCOPIC SLEEVE GASTRECTOMY N/A 2/2/2023    Procedure: GASTRECTOMY, SLEEVE, LAPAROSCOPIC with intraop EGD;  Surgeon: Titi Ribeiro Jr., MD;  Location: [address];  Service: General;  Laterality: N/A;  PRE-CERT, PLEASE DO NOT SUBMIT FOR AUTH UNTIL 1/12/2023    SURGICAL REMOVAL OF BONE SPUR      left heel    TONSILLECTOMY      TUBAL LIGATION  8/1995     Family History   Problem Relation Name Age of Onset    Ovarian cancer Mother Joanna Martines 36    Heart disease Mother Joanna Martines     Bone cancer Mother Joanna Martines     Cancer Mother Joanna Martines         cervical    Hypertension Mother Joanna Martines     Heart disease Father Titi Boyd     Diabetes type I Father Titi Boyd     Lung cancer Father Titi Boyd     Diabetes Father Titi Boyd     Hypertension Father Titi Boyd     No Known Problems Daughter      No Known Problems Daughter      Heart disease Brother Daniel Boyd     Breast cancer Neg Hx       Social History     Tobacco Use    Smoking status: Never     Passive exposure: Never    Smokeless tobacco: Never   Substance Use Topics    Alcohol use: Not Currently     Alcohol/week: 0.0 standard drinks of alcohol    Drug use: Never      No current facility-administered medications for this encounter.    Review of Systems:  General ROS: negative for - fever  Dermatological ROS: negative for  rash    OBJECTIVE:     Vital Signs (Most Recent):  Temp: 97.3 °F (36.3 °C) (07/17/24 1354)  Pulse: 87 (07/17/24 1354)  Resp: 18 (07/17/24 1354)  BP: (!) 142/77 (07/17/24 1354)  SpO2: 99 % (07/17/24 1354)  Body mass index is 38.44 kg/m².    Physical Exam:  General appearance - alert, well appearing, and in no distress  Mental status - AOx3  Eyes - pupils equal and reactive, extraocular eye movements intact  Heart - normal rate, regular rhythm, normal S1, S2, no murmurs, rubs, clicks or gallops  Chest - clear to auscultation, no wheezes, rales or rhonchi, symmetric air entry  Abdomen - soft, nontender, nondistended, no masses or organomegaly  Neurological - alert, oriented, normal speech, no focal findings or movement disorder noted  Extremities - peripheral pulses normal, no pedal edema, no clubbing or cyanosis      ASSESSMENT/PLAN:     There are no hospital problems to display for this patient.     Indication: The patient has clinical and radiologic findings suggestive of facet mediated pain and is s/p 1st diagnostic left lumbar L4/5 and L5/S1 medial branch blocks with at least 80% relief of their axial back pain lasting the duration of the local anesthetic utilized.    We will proceed with 2nd diagnostic left L4/5 and L5/S1 medial branch blocks.    The risks and benefits of this intervention, and alternative therapies were discussed with the patient.  The discussion of risks included infection, bleeding, need for additional procedures or surgery, nerve damage, paralysis, adverse medication reaction(s), stroke, and/or death.  Questions regarding the procedure, risks, expected outcome, and possible side effects were solicited and answered to the patient's satisfaction.  Christine wishes to proceed with the injection.  Verbal and written consent were verified.  ASA 2, MP II      Proceed with intervention as scheduled.    Sampson Cobb M.D.  Interventional Pain Medicine / Anesthesiology

## 2024-07-17 NOTE — PLAN OF CARE
VSS, all questions answered. Denies recent fever or illness. Pt states ready for procedure. Safety maintained. Care ongoing.

## 2024-07-17 NOTE — DISCHARGE SUMMARY
[facility]  Discharge Note  Short Stay    Admit Date: 7/17/2024    Discharge Date: 7/17/2024    Attending Physician: Sampson Cobb     Discharge Provider: Sampson Cobb    Diagnoses:  There are no hospital problems to display for this patient.      Discharged Condition: Good    Final Diagnoses: Lumbar spondylosis [M47.816]    Disposition: Home or Self Care    Hospital Course: No complications, uneventful    Outcome of Hospitalization, Treatment, Procedure, or Surgery:  Patient was admitted for outpatient interventional pain management procedure. The patient tolerated the procedure well with no complications.    Follow up/Patient Instructions:  Follow up as scheduled in Pain Management office in 2-3 weeks.  Patient has received instructions and follow up date and time.    Medications:  Continue previous medications    Discharge Procedure Orders   Notify your health care provider if you experience any of the following:  temperature >100.4     Notify your health care provider if you experience any of the following:  persistent nausea and vomiting or diarrhea     Notify your health care provider if you experience any of the following:  severe uncontrolled pain     Notify your health care provider if you experience any of the following:  redness, tenderness, or signs of infection (pain, swelling, redness, odor or green/yellow discharge around incision site)     Notify your health care provider if you experience any of the following:  difficulty breathing or increased cough     Notify your health care provider if you experience any of the following:  severe persistent headache     Notify your health care provider if you experience any of the following:  worsening rash     Notify your health care provider if you experience any of the following:  persistent dizziness, light-headedness, or visual disturbances     Notify your health care provider if you experience any of the following:  increased confusion or  weakness     Activity as tolerated

## 2024-07-17 NOTE — DISCHARGE INSTRUCTIONS
PAIN MANAGEMENT    HOME CARE INSTRUCTIONS   Do not use heat (such as a heating pad) for 24 hours.  You may apply an ice pack to the injection site for 20 minutes at a time for the first 24 hours for soreness/discomfort at injection site   Keep site clean and dry for 24 hours. If bandaid is present, remove when desired.   Do not drive until tomorrow.  Take care when walking after a lumbar injection.   Resume home medication as prescribed today.  Resume Aspirin, Plavix, or Coumadin the day after the procedure unless other wise instructed.    BLOCKS  Resume regular activities today.  Pain office will call in next 2 days.      CALL PHYSICIAN FOR:  Severe increase in your usual pain or the appearance of new pain.  Prolonged or increasing weakness or numbness in the legs or arms.  Fever greater than 100 degrees F.  Drainage, redness, active bleeding, or increased swelling at the injection site.  Headache that increases when your head is upright and decreases when you lie flat.    FOR EMERGENCIES:   Go directly to the emergency department for any shortness of breath, chest pain, or problems breathing.

## 2024-07-17 NOTE — OP NOTE
Procedure Note    Procedure Date: 7/17/2024    Procedure Performed:  left Diagnostic Lumbar Medial Branch @ L4/5 and L5/S1, with Fluoroscopic Guidance    Indications: The patient has clinical and radiologic findings suggestive of facet mediated pain and is s/p 1st diagnostic left lumbar L4/5 and L5/S1 medial branch blocks with at least 80% relief of their axial back pain lasting the duration of the local anesthetic utilized.     Pre-op diagnosis: Lumbar Spondylosis    Post-op diagnosis: same    Physician: Sampson Cobb MD    IV Anxiolysis medications: versed 2mg    Medications injected: Bupivacaine 0.25%, 0.5 cc per level    Local anesthetic used: 1% Lidocaine, 3 ml    Estimated Blood Loss: none    Complications:  none    Technique:  The patient was interviewed in the holding area and Risks/Benefits were discussed, including, but not limited to, the possibility of new or different pain, bleeding or infection.   All questions were answered.  The patient understood and accepted risks.  Consent was reviewed.  A time out was taken to identify the patient, procedure and side of the procedure. The patient was placed in a prone position, then prepped and draped in the usual sterile fashion using ChloraPrep x2 and sterile towels.  The levels were determined under fluoroscopic guidance and then marked.  1% Lidocaine was given by raising a wheal at the skin over each site and then infiltrated approximately 2cm deeper.  A 25-gauge 3.5 inch needle was introduced to the anatomic location of the L3-5 medial branch nerves on the left side.  Then, after negative aspiration, 0.5 cc of Bupivacaine 0.25% was injected @ each level.  The patient tolerated the procedure well.  The patient tolerated the procedure well and was transferred to the [facility] in stable condition.  The patient was monitored after the procedure.  Patient was given post procedure and discharge instructions to follow at home.  The patient was discharged in a stable  condition.    Event Time In   In Facility 1324   In Pre-Procedure 1331   Physician Available    Anesthesia Available    Pre-Op: Bedside Procedure Start    Pre-Op: Bedside Procedure Stop    Pre-Procedure Complete 1406   Out of Pre-Procedure    Anesthesia Start    Anesthesia Start Data Collection    Setup Start    Setup Complete    In Room 1426   Prep Start    Procedure Prep Complete    MD notified pt. ready    Procedure Start 1430   Procedure Closing    Emergence    Procedure Finish    Sedation Start 1427   Scope In    Extent Reached    Scope Out    Sedation End    Out of Room    Cleanup Start    Cleanup Complete    Cosmetic Start    Cosmetic Stop    Pain Mgmt In Room    Pain Mgmt Out Room    In Recovery    Anesthesia Finish    Bedside Procedure Start    Bedside Procedure Stop    Recovery Care Complete    Out of Recovery    To Phase II    In Phase II    Pain Mgmt Recovery Start    Pain Mgmt Recovery Stop    Obs Rec Start    Obs Rec Stop    Phase II Care Complete    Out of Phase II    Procedural Care Complete    Discharge    Pain Follow Up Needed    Pain Follow Up Complete

## 2024-07-22 ENCOUNTER — LAB VISIT (OUTPATIENT)
Dept: LAB | Facility: HOSPITAL | Age: 54
End: 2024-07-22
Attending: FAMILY MEDICINE
Payer: COMMERCIAL

## 2024-07-22 DIAGNOSIS — E03.9 HYPOTHYROIDISM, UNSPECIFIED TYPE: ICD-10-CM

## 2024-07-22 LAB
T4 FREE SERPL-MCNC: 0.78 NG/DL (ref 0.71–1.51)
TSH SERPL DL<=0.005 MIU/L-ACNC: 9.92 UIU/ML (ref 0.4–4)

## 2024-07-22 PROCEDURE — 84439 ASSAY OF FREE THYROXINE: CPT | Performed by: FAMILY MEDICINE

## 2024-07-22 PROCEDURE — 36415 COLL VENOUS BLD VENIPUNCTURE: CPT | Mod: PO | Performed by: FAMILY MEDICINE

## 2024-07-22 PROCEDURE — 84443 ASSAY THYROID STIM HORMONE: CPT | Performed by: FAMILY MEDICINE

## 2024-07-23 DIAGNOSIS — E03.9 HYPOTHYROIDISM, UNSPECIFIED TYPE: Primary | ICD-10-CM

## 2024-07-23 RX ORDER — LEVOTHYROXINE SODIUM 125 UG/1
125 TABLET ORAL
Qty: 30 TABLET | Refills: 11 | Status: SHIPPED | OUTPATIENT
Start: 2024-07-23 | End: 2025-07-23

## 2024-07-26 ENCOUNTER — OFFICE VISIT (OUTPATIENT)
Dept: CARDIOLOGY | Facility: CLINIC | Age: 54
End: 2024-07-26
Payer: COMMERCIAL

## 2024-07-26 VITALS
WEIGHT: 232.56 LBS | DIASTOLIC BLOOD PRESSURE: 77 MMHG | HEIGHT: 65 IN | SYSTOLIC BLOOD PRESSURE: 119 MMHG | BODY MASS INDEX: 38.75 KG/M2 | HEART RATE: 88 BPM

## 2024-07-26 DIAGNOSIS — E66.01 SEVERE OBESITY (BMI 35.0-39.9) WITH COMORBIDITY: Chronic | ICD-10-CM

## 2024-07-26 DIAGNOSIS — I47.10 PSVT (PAROXYSMAL SUPRAVENTRICULAR TACHYCARDIA): Chronic | ICD-10-CM

## 2024-07-26 DIAGNOSIS — E78.5 DYSLIPIDEMIA: Chronic | ICD-10-CM

## 2024-07-26 DIAGNOSIS — I34.0 NONRHEUMATIC MITRAL VALVE REGURGITATION: Primary | Chronic | ICD-10-CM

## 2024-07-26 DIAGNOSIS — I51.7 LAE (LEFT ATRIAL ENLARGEMENT): Chronic | ICD-10-CM

## 2024-07-26 DIAGNOSIS — I10 ESSENTIAL HYPERTENSION: Chronic | ICD-10-CM

## 2024-07-26 PROCEDURE — 4010F ACE/ARB THERAPY RXD/TAKEN: CPT | Mod: CPTII,S$GLB,, | Performed by: INTERNAL MEDICINE

## 2024-07-26 PROCEDURE — 1159F MED LIST DOCD IN RCRD: CPT | Mod: CPTII,S$GLB,, | Performed by: INTERNAL MEDICINE

## 2024-07-26 PROCEDURE — 3008F BODY MASS INDEX DOCD: CPT | Mod: CPTII,S$GLB,, | Performed by: INTERNAL MEDICINE

## 2024-07-26 PROCEDURE — 99999 PR PBB SHADOW E&M-EST. PATIENT-LVL III: CPT | Mod: PBBFAC,,, | Performed by: INTERNAL MEDICINE

## 2024-07-26 PROCEDURE — 99214 OFFICE O/P EST MOD 30 MIN: CPT | Mod: S$GLB,,, | Performed by: INTERNAL MEDICINE

## 2024-07-26 PROCEDURE — 3078F DIAST BP <80 MM HG: CPT | Mod: CPTII,S$GLB,, | Performed by: INTERNAL MEDICINE

## 2024-07-26 PROCEDURE — 3074F SYST BP LT 130 MM HG: CPT | Mod: CPTII,S$GLB,, | Performed by: INTERNAL MEDICINE

## 2024-07-26 RX ORDER — LISINOPRIL 5 MG/1
5 TABLET ORAL 2 TIMES DAILY
Qty: 180 TABLET | Refills: 2 | Status: SHIPPED | OUTPATIENT
Start: 2024-07-26 | End: 2025-07-26

## 2024-07-26 NOTE — PROGRESS NOTES
Subjective:    Patient ID:  Christine Alfaro is a 53 y.o. female who presents for Valvular Heart Disease and Hypertension        HPI    RECENT LABS NOTED FREE T4 OK, RECENT CMP OK, HOLTER PVCS SHORT RUN OF PSVT, WAS TAKING PHENTERAMINE, STOPPED, NO CHEST PAIN MUCH LESS PALPITATION NO TIA TYPE SYMPTOMS NO NEAR-SYNCOPE, SEE ROS  Past Medical History:   Diagnosis Date    Anxiety     GERD (gastroesophageal reflux disease)     Hyperlipidemia     Hypertension     Hypothyroidism     Insomnia     Observed sleep apnea     Prediabetes     Rheumatoid arthritis, unspecified     Vitamin D deficiency      Past Surgical History:   Procedure Laterality Date    BTL      CHOLECYSTECTOMY      COLONOSCOPY N/A 8/30/2022    Procedure: COLONOSCOPY;  Surgeon: Drew Peraza MD;  Location: [facility];  Service: Endoscopy;  Laterality: N/A;    INJECTION OF ANESTHETIC AGENT AROUND MEDIAL BRANCH NERVES INNERVATING LUMBAR FACET JOINT Left 6/25/2024    Procedure: Block-nerve-medial branch-lumbar   L4/5, L5/S1;  Surgeon: Sampson Cobb MD;  Location: [facility] OR;  Service: Pain Management;  Laterality: Left;    INJECTION OF ANESTHETIC AGENT AROUND MEDIAL BRANCH NERVES INNERVATING LUMBAR FACET JOINT Left 7/17/2024    Procedure: Block-nerve-medial branch-lumbar    L4/5, L5/S1;  Surgeon: Sampson Cobb MD;  Location: [facility] OR;  Service: Pain Management;  Laterality: Left;    LAPAROSCOPIC SLEEVE GASTRECTOMY N/A 2/2/2023    Procedure: GASTRECTOMY, SLEEVE, LAPAROSCOPIC with intraop EGD;  Surgeon: Titi Ribeiro Jr., MD;  Location: [address];  Service: General;  Laterality: N/A;  PRE-CERT, PLEASE DO NOT SUBMIT FOR AUTH UNTIL 1/12/2023    SURGICAL REMOVAL OF BONE SPUR      left heel    TONSILLECTOMY      TUBAL LIGATION  8/1995     Family History   Problem Relation Name Age of Onset    Ovarian cancer Mother Joanna Martines 36    Heart disease Mother Joanna Martines     Bone cancer Mother Joanna Martines     Cancer Mother Joanna Martines          cervical    Hypertension Mother Joanna Martines     Heart disease Father Titi Boyd     Diabetes type I Father Titi Boyd     Lung cancer Father Titi Boyd     Diabetes Father Titi Boyd     Hypertension Father Titi Boyd     No Known Problems Daughter      No Known Problems Daughter      Heart disease Brother Daniel Boyd     Breast cancer Neg Hx       Social History     Socioeconomic History    Marital status:    Tobacco Use    Smoking status: Never     Passive exposure: Never    Smokeless tobacco: Never   Substance and Sexual Activity    Alcohol use: Not Currently     Alcohol/week: 0.0 standard drinks of alcohol    Drug use: Never    Sexual activity: Yes     Partners: Male     Birth control/protection: See Surgical Hx     Social Determinants of Health     Financial Resource Strain: Low Risk  (7/22/2024)    Overall Financial Resource Strain (CARDIA)     Difficulty of Paying Living Expenses: Not hard at all   Food Insecurity: No Food Insecurity (7/22/2024)    Hunger Vital Sign     Worried About Running Out of Food in the Last Year: Never true     Ran Out of Food in the Last Year: Never true   Transportation Needs: No Transportation Needs (1/29/2024)    PRAPARE - Transportation     Lack of Transportation (Medical): No     Lack of Transportation (Non-Medical): No   Physical Activity: Insufficiently Active (7/22/2024)    Exercise Vital Sign     Days of Exercise per Week: 3 days     Minutes of Exercise per Session: 30 min   Stress: Stress Concern Present (7/22/2024)    [employer] - Occupational Stress Questionnaire     Feeling of Stress : To some extent   Housing Stability: Low Risk  (1/29/2024)    Housing Stability Vital Sign     Unable to Pay for Housing in the Last Year: No     Number of Places Lived in the Last Year: 1     Unstable Housing in the Last Year: No       Review of patient's allergies indicates:   Allergen Reactions    Sulfamethoxazole-trimethoprim     Sulfa (sulfonamide  antibiotics) Rash and Swelling       Current Outpatient Medications:     alclometasone (ACLOVATE) 0.05 % cream, AAA face bid prn redness or scaling or itching, Disp: 60 g, Rfl: 3    atorvastatin (LIPITOR) 40 MG tablet, TAKE ONE TABLET BY MOUTH EVERY EVENING, Disp: 90 tablet, Rfl: 1    betamethasone dipropionate 0.05 % cream, Use bid, Disp: 45 g, Rfl: 3    busPIRone (BUSPAR) 10 MG tablet, , Disp: , Rfl:     cyclobenzaprine (FLEXERIL) 10 MG tablet, TAKE ONE TABLET EVERY EVENING, Disp: 150 tablet, Rfl: 3    folic acid (FOLVITE) 1 MG tablet, Take 2 tablets (2 mg total) by mouth once daily., Disp: 60 tablet, Rfl: 11    furosemide (LASIX) 20 MG tablet, Take 1 tablet (20 mg total) by mouth once daily., Disp: 90 tablet, Rfl: 1    gabapentin (NEURONTIN) 300 MG capsule, Take 1 capsule (300 mg total) by mouth 3 (three) times daily., Disp: 90 capsule, Rfl: 11    hydrOXYchloroQUINE (PLAQUENIL) 200 mg tablet, Take 200 mg by mouth 2 (two) times daily., Disp: , Rfl:     levothyroxine (SYNTHROID) 125 MCG tablet, Take 1 tablet (125 mcg total) by mouth before breakfast., Disp: 30 tablet, Rfl: 11    methotrexate, PF, (RASUVO, PF,) 25 mg/0.5 mL AtIn, Inject 0.5 mLs (25 mg total) into the skin every 7 days., Disp: 4 each, Rfl: 2    secukinumab (COSENTYX PEN) 150 mg/mL PnIj, Inject 150 mg into the skin every 28 days., Disp: 2 mL, Rfl: 11    traMADoL (ULTRAM) 50 mg tablet, Take 1 tablet (50 mg total) by mouth every 8 (eight) hours as needed for Pain., Disp: 60 tablet, Rfl: 3    XIIDRA 5 % Dpet, APPLY 1 DROP TO BOTH EYES BID, Disp: , Rfl:     lisinopriL (PRINIVIL,ZESTRIL) 5 MG tablet, Take 1 tablet (5 mg total) by mouth 2 (two) times a day., Disp: 180 tablet, Rfl: 2    Review of Systems   Constitutional: Positive for weight loss. Negative for chills, diaphoresis, fever, malaise/fatigue and night sweats.   HENT:  Negative for congestion and nosebleeds.    Eyes:  Negative for blurred vision and visual disturbance.   Cardiovascular:  Negative  "for chest pain (OCC CRAMP), claudication, cyanosis, dyspnea on exertion (MILD), irregular heartbeat, leg swelling, near-syncope, orthopnea, palpitations (LESS), paroxysmal nocturnal dyspnea and syncope.   Respiratory:  Negative for cough, hemoptysis, shortness of breath and wheezing.    Endocrine: Negative for polyphagia and polyuria.   Hematologic/Lymphatic: Negative for adenopathy. Does not bruise/bleed easily.   Skin:  Negative for itching and rash.   Musculoskeletal:  Positive for back pain (CHRONIC). Negative for falls.   Gastrointestinal:  Negative for abdominal pain, change in bowel habit, hematemesis, jaundice, melena and nausea.   Genitourinary:  Negative for dysuria and flank pain.   Neurological:  Negative for brief paralysis, focal weakness, headaches, light-headedness, loss of balance, numbness and weakness.   Psychiatric/Behavioral:  Negative for altered mental status, depression and memory loss.    Allergic/Immunologic: Negative for environmental allergies and hives.        Objective:      Vitals:    07/26/24 1244   BP: 119/77   Pulse: 88   Weight: 105.5 kg (232 lb 9.4 oz)   Height: 5' 5" (1.651 m)   PainSc: 0-No pain     Body mass index is 38.7 kg/m².    Physical Exam  Constitutional:       Appearance: She is well-developed. She is obese.   HENT:      Head: Normocephalic and atraumatic.   Eyes:      General: No scleral icterus.     Extraocular Movements: Extraocular movements intact.   Neck:      Vascular: Normal carotid pulses. No JVD.   Cardiovascular:      Rate and Rhythm: Normal rate and regular rhythm. No extrasystoles are present.     Pulses:           Carotid pulses are 2+ on the right side and 2+ on the left side.       Radial pulses are 2+ on the right side and 2+ on the left side.        Posterior tibial pulses are 2+ on the right side and 2+ on the left side.      Heart sounds: Murmur heard.      Systolic murmur is present with a grade of 2/6 at the lower left sternal border.      No " gallop. No S4 sounds.   Pulmonary:      Breath sounds: No rales.   Abdominal:      Palpations: Abdomen is soft. There is no hepatomegaly.      Tenderness: There is no abdominal tenderness.   Musculoskeletal:      Cervical back: Neck supple.      Right lower leg: No edema.      Left lower leg: No edema.   Skin:     General: Skin is warm and dry.      Capillary Refill: Capillary refill takes less than 2 seconds.   Neurological:      General: No focal deficit present.      Mental Status: She is alert and oriented to person, place, and time.      Cranial Nerves: Cranial nerves 2-12 are intact.   Psychiatric:         Mood and Affect: Mood normal.         Speech: Speech normal.         Behavior: Behavior normal.               ..    Chemistry        Component Value Date/Time     05/15/2024 0743    K 4.4 05/15/2024 0743     05/15/2024 0743    CO2 28 05/15/2024 0743    BUN 10 05/15/2024 0743    CREATININE 0.7 05/15/2024 0743    GLU 75 05/15/2024 0743        Component Value Date/Time    CALCIUM 9.2 05/15/2024 0743    ALKPHOS 75 05/15/2024 0743    AST 19 05/15/2024 0743    ALT 28 05/15/2024 0743    BILITOT 0.7 05/15/2024 0743    ESTGFRAFRICA >60.0 07/25/2022 0719    EGFRNONAA >60.0 07/25/2022 0719            ..  Lab Results   Component Value Date    CHOL 142 01/30/2024    CHOL 161 08/03/2023    CHOL 126 03/30/2023     Lab Results   Component Value Date    HDL 67 01/30/2024    HDL 53 08/03/2023    HDL 49 03/30/2023     Lab Results   Component Value Date    LDLCALC 66.8 01/30/2024    LDLCALC 92.4 08/03/2023    LDLCALC 64.4 03/30/2023     Lab Results   Component Value Date    TRIG 41 01/30/2024    TRIG 78 08/03/2023    TRIG 63 03/30/2023     Lab Results   Component Value Date    CHOLHDL 47.2 01/30/2024    CHOLHDL 32.9 08/03/2023    CHOLHDL 38.9 03/30/2023     ..  Lab Results   Component Value Date    WBC 5.94 05/15/2024    HGB 14.3 05/15/2024    HCT 42.6 05/15/2024    MCV 91 05/15/2024     05/15/2024        Test(s) Reviewed  I have reviewed the following in detail:  [] Stress test   [] Angiography   [] Echocardiogram   [x] Labs   [x] Other:       Assessment:         ICD-10-CM ICD-9-CM   1. Nonrheumatic mitral valve regurgitation  I34.0 424.0   2. Dyslipidemia  E78.5 272.4   3. PSVT (paroxysmal supraventricular tachycardia)  I47.10 427.0   4. LAE (left atrial enlargement)  I51.7 429.3   5. Essential hypertension  I10 401.9   6. Severe obesity (BMI 35.0-39.9) with comorbidity  E66.01 278.01     Problem List Items Addressed This Visit          Cardiac/Vascular    PSVT (paroxysmal supraventricular tachycardia) (Chronic)    Overview     SHORT RUNS         Dyslipidemia    Essential hypertension    Nonrheumatic mitral valve regurgitation - Primary    LAE (left atrial enlargement)       Endocrine    Severe obesity (BMI 35.0-39.9) with comorbidity        Plan:     AVOID STIMULANTS,ALL CV CLINICALLY STABLE, NO ANGINA, NO HF, NO TIA, NO SIGNIFICANT CLINICAL ARRHYTHMIA,CONTINUE CURRENT MEDS, EDUCATION, DIET, EXERCISE , WEIGHT LOSS RETURN TO CLINIC IN 9 MONTHS      Nonrheumatic mitral valve regurgitation    Dyslipidemia    PSVT (paroxysmal supraventricular tachycardia)  Comments:  SHORT RUNS    LAE (left atrial enlargement)    Essential hypertension    Severe obesity (BMI 35.0-39.9) with comorbidity    Other orders  -     lisinopriL (PRINIVIL,ZESTRIL) 5 MG tablet; Take 1 tablet (5 mg total) by mouth 2 (two) times a day.  Dispense: 180 tablet; Refill: 2    RTC Low level/low impact aerobic exercise 5x's/wk. Heart healthy diet and risk factor modification.    See labs and med orders.    Aerobic exercise 5x's/wk. Heart healthy diet and risk factor modification.    See labs and med orders.

## 2024-07-27 ENCOUNTER — PATIENT MESSAGE (OUTPATIENT)
Dept: RHEUMATOLOGY | Facility: CLINIC | Age: 54
End: 2024-07-27
Payer: COMMERCIAL

## 2024-07-27 PROBLEM — I47.10 PSVT (PAROXYSMAL SUPRAVENTRICULAR TACHYCARDIA): Chronic | Status: ACTIVE | Noted: 2024-07-27

## 2024-07-29 ENCOUNTER — PATIENT MESSAGE (OUTPATIENT)
Dept: FAMILY MEDICINE | Facility: CLINIC | Age: 54
End: 2024-07-29
Payer: COMMERCIAL

## 2024-07-29 ENCOUNTER — OFFICE VISIT (OUTPATIENT)
Dept: OBSTETRICS AND GYNECOLOGY | Facility: CLINIC | Age: 54
End: 2024-07-29
Payer: COMMERCIAL

## 2024-07-29 VITALS
WEIGHT: 231.69 LBS | SYSTOLIC BLOOD PRESSURE: 120 MMHG | HEIGHT: 65 IN | DIASTOLIC BLOOD PRESSURE: 80 MMHG | BODY MASS INDEX: 38.6 KG/M2

## 2024-07-29 DIAGNOSIS — M32.9 SLE WITH NORMAL KIDNEYS: ICD-10-CM

## 2024-07-29 DIAGNOSIS — Z01.419 ROUTINE GYNECOLOGICAL EXAMINATION: Primary | ICD-10-CM

## 2024-07-29 DIAGNOSIS — L40.9 PSORIASIS: ICD-10-CM

## 2024-07-29 DIAGNOSIS — M06.9 RHEUMATOID ARTHRITIS FLARE: ICD-10-CM

## 2024-07-29 DIAGNOSIS — N76.2 ACUTE VULVITIS: ICD-10-CM

## 2024-07-29 PROCEDURE — 4010F ACE/ARB THERAPY RXD/TAKEN: CPT | Mod: CPTII,S$GLB,, | Performed by: OBSTETRICS & GYNECOLOGY

## 2024-07-29 PROCEDURE — 3008F BODY MASS INDEX DOCD: CPT | Mod: CPTII,S$GLB,, | Performed by: OBSTETRICS & GYNECOLOGY

## 2024-07-29 PROCEDURE — 87624 HPV HI-RISK TYP POOLED RSLT: CPT | Performed by: OBSTETRICS & GYNECOLOGY

## 2024-07-29 PROCEDURE — 3079F DIAST BP 80-89 MM HG: CPT | Mod: CPTII,S$GLB,, | Performed by: OBSTETRICS & GYNECOLOGY

## 2024-07-29 PROCEDURE — 99999 PR PBB SHADOW E&M-EST. PATIENT-LVL III: CPT | Mod: PBBFAC,,, | Performed by: OBSTETRICS & GYNECOLOGY

## 2024-07-29 PROCEDURE — 81514 NFCT DS BV&VAGINITIS DNA ALG: CPT | Performed by: OBSTETRICS & GYNECOLOGY

## 2024-07-29 PROCEDURE — 3074F SYST BP LT 130 MM HG: CPT | Mod: CPTII,S$GLB,, | Performed by: OBSTETRICS & GYNECOLOGY

## 2024-07-29 PROCEDURE — 99396 PREV VISIT EST AGE 40-64: CPT | Mod: S$GLB,,, | Performed by: OBSTETRICS & GYNECOLOGY

## 2024-07-29 PROCEDURE — 1159F MED LIST DOCD IN RCRD: CPT | Mod: CPTII,S$GLB,, | Performed by: OBSTETRICS & GYNECOLOGY

## 2024-07-29 PROCEDURE — 88175 CYTOPATH C/V AUTO FLUID REDO: CPT | Performed by: OBSTETRICS & GYNECOLOGY

## 2024-07-29 RX ORDER — HYDROXYCHLOROQUINE SULFATE 200 MG/1
200 TABLET, FILM COATED ORAL 2 TIMES DAILY
OUTPATIENT
Start: 2024-07-29

## 2024-07-29 RX ORDER — CLOBETASOL PROPIONATE 0.5 MG/G
CREAM TOPICAL NIGHTLY
Qty: 60 G | Refills: 1 | Status: SHIPPED | OUTPATIENT
Start: 2024-07-29

## 2024-07-29 NOTE — PROGRESS NOTES
Chief Complaint   Patient presents with    [facility]       History of Present Illness: Christine Alfaro is a 53 y.o. female that presents today 7/29/2024 with Patient's last menstrual period was 06/20/2024 (exact date).  for well gyn visit.    Past Medical History:   Diagnosis Date    Anxiety     GERD (gastroesophageal reflux disease)     Hyperlipidemia     Hypertension     Hypothyroidism     Insomnia     Observed sleep apnea     Prediabetes     Rheumatoid arthritis, unspecified     Vitamin D deficiency        Past Surgical History:   Procedure Laterality Date    BTL      CHOLECYSTECTOMY      COLONOSCOPY N/A 8/30/2022    Procedure: COLONOSCOPY;  Surgeon: Drew Peraza MD;  Location: [facility];  Service: Endoscopy;  Laterality: N/A;    INJECTION OF ANESTHETIC AGENT AROUND MEDIAL BRANCH NERVES INNERVATING LUMBAR FACET JOINT Left 6/25/2024    Procedure: Block-nerve-medial branch-lumbar   L4/5, L5/S1;  Surgeon: Sampson Cobb MD;  Location: [facility] OR;  Service: Pain Management;  Laterality: Left;    INJECTION OF ANESTHETIC AGENT AROUND MEDIAL BRANCH NERVES INNERVATING LUMBAR FACET JOINT Left 7/17/2024    Procedure: Block-nerve-medial branch-lumbar    L4/5, L5/S1;  Surgeon: Sampson Cobb MD;  Location: [facility] OR;  Service: Pain Management;  Laterality: Left;    LAPAROSCOPIC SLEEVE GASTRECTOMY N/A 2/2/2023    Procedure: GASTRECTOMY, SLEEVE, LAPAROSCOPIC with intraop EGD;  Surgeon: Titi Ribeiro Jr., MD;  Location: [address];  Service: General;  Laterality: N/A;  PRE-CERT, PLEASE DO NOT SUBMIT FOR AUTH UNTIL 1/12/2023    SURGICAL REMOVAL OF BONE SPUR      left heel    TONSILLECTOMY      TUBAL LIGATION  8/1995       Outpatient Medications Prior to Visit   Medication Sig Dispense Refill    alclometasone (ACLOVATE) 0.05 % cream AAA face bid prn redness or scaling or itching 60 g 3    atorvastatin (LIPITOR) 40 MG tablet TAKE ONE TABLET BY MOUTH EVERY EVENING 90 tablet 1     betamethasone dipropionate 0.05 % cream Use bid 45 g 3    busPIRone (BUSPAR) 10 MG tablet       cyclobenzaprine (FLEXERIL) 10 MG tablet TAKE ONE TABLET EVERY EVENING 150 tablet 3    folic acid (FOLVITE) 1 MG tablet Take 2 tablets (2 mg total) by mouth once daily. 60 tablet 11    furosemide (LASIX) 20 MG tablet Take 1 tablet (20 mg total) by mouth once daily. 90 tablet 1    gabapentin (NEURONTIN) 300 MG capsule Take 1 capsule (300 mg total) by mouth 3 (three) times daily. 90 capsule 11    hydrOXYchloroQUINE (PLAQUENIL) 200 mg tablet Take 200 mg by mouth 2 (two) times daily.      levothyroxine (SYNTHROID) 125 MCG tablet Take 1 tablet (125 mcg total) by mouth before breakfast. 30 tablet 11    lisinopriL (PRINIVIL,ZESTRIL) 5 MG tablet Take 1 tablet (5 mg total) by mouth 2 (two) times a day. 180 tablet 2    methotrexate, PF, (RASUVO, PF,) 25 mg/0.5 mL AtIn Inject 0.5 mLs (25 mg total) into the skin every 7 days. 4 each 2    secukinumab (COSENTYX PEN) 150 mg/mL PnIj Inject 150 mg into the skin every 28 days. 2 mL 11    traMADoL (ULTRAM) 50 mg tablet Take 1 tablet (50 mg total) by mouth every 8 (eight) hours as needed for Pain. 60 tablet 3    XIIDRA 5 % Dpet APPLY 1 DROP TO BOTH EYES BID       No facility-administered medications prior to visit.       Review of patient's allergies indicates:   Allergen Reactions    Sulfamethoxazole-trimethoprim     Sulfa (sulfonamide antibiotics) Rash and Swelling       Family History   Problem Relation Name Age of Onset    Ovarian cancer Mother Joanna Martines 36    Heart disease Mother Joanna Martines     Bone cancer Mother Joanna Martines     Cancer Mother Joanna Martines         cervical    Hypertension Mother Joanna Martines     Heart disease Father Titi Boyd     Diabetes type I Father Titi Boyd     Lung cancer Father Titi Boyd     Diabetes Father Titi Boyd     Hypertension Father Titi Boyd     No Known Problems Daughter      No Known Problems Daughter      Heart disease Brother Daniel Boyd      Breast cancer Neg Hx         Social History     Socioeconomic History    Marital status:    Tobacco Use    Smoking status: Never     Passive exposure: Never    Smokeless tobacco: Never   Substance and Sexual Activity    Alcohol use: Not Currently     Alcohol/week: 0.0 standard drinks of alcohol    Drug use: Never    Sexual activity: Yes     Partners: Male     Birth control/protection: See Surgical Hx     Social Determinants of Health     Financial Resource Strain: Low Risk  (2024)    Overall Financial Resource Strain (CARDIA)     Difficulty of Paying Living Expenses: Not hard at all   Food Insecurity: No Food Insecurity (2024)    Hunger Vital Sign     Worried About Running Out of Food in the Last Year: Never true     Ran Out of Food in the Last Year: Never true   Transportation Needs: No Transportation Needs (2024)    PRAPARE - Transportation     Lack of Transportation (Medical): No     Lack of Transportation (Non-Medical): No   Physical Activity: Insufficiently Active (2024)    Exercise Vital Sign     Days of Exercise per Week: 3 days     Minutes of Exercise per Session: 30 min   Stress: Stress Concern Present (2024)    [employer] - Occupational Stress Questionnaire     Feeling of Stress : To some extent   Housing Stability: Low Risk  (2024)    Housing Stability Vital Sign     Unable to Pay for Housing in the Last Year: No     Number of Places Lived in the Last Year: 1     Unstable Housing in the Last Year: No       OB History    Para Term  AB Living   3 3 2 1   2   SAB IAB Ectopic Multiple Live Births           3      # Outcome Date GA Lbr Aclvin/2nd Weight Sex Type Anes PTL Lv   3 Term      Vag-Spont   SHIRA   2 Term      Vag-Spont   SHIRA   1       Vag-Spont   ND       Review of Symptoms:  GENERAL: Denies weight gain or weight loss. Feeling well overall.   SKIN: Denies rash or lesions.   HEAD: Denies head injury or headache.  "  NODES: Denies enlarged lymph nodes.   CHEST: Denies chest pain or shortness of breath.   CARDIOVASCULAR: Denies palpitations or left sided chest pain.   ABDOMEN: No abdominal pain, constipation, diarrhea, nausea, vomiting or rectal bleeding.   URINARY: No frequency, dysuria, hematuria, or burning on urination.  HEMATOLOGIC: No easy bruisability or excessive bleeding.   MUSCULOSKELETAL: Denies joint pain or swelling.     /80   Ht 5' 5" (1.651 m)   Wt 105.1 kg (231 lb 11.3 oz)   LMP 06/20/2024 (Exact Date)   Physical Exam:  APPEARANCE: Well nourished, well developed, in no acute distress.  SKIN: Normal skin turgor, no lesions.  NECK: Neck symmetric without masses   RESPIRATORY: Normal respiratory effort with no retractions or use of accessory muscles  CARDIOVASCULAR: Peripheral vascular system with no swelling no varicosities and palpation of pulses normal  LYMPHATIC: No enlargements of the lymph nodes noted in the neck, axillae, or groin  ABDOMEN: Soft. No tenderness or masses. No hepatosplenomegaly. No hernias.  BREASTS: Symmetrical, no skin changes or visible lesions. No palpable masses, nipple discharge or adenopathy bilaterally.  PELVIC: Normal external female genitalia with quarter size left labia majora white plaque    Normal hair distribution. Adequate perineal body, normal urethral meatus. Urethra with no masses.  Bladder nontender. Vagina moist and well rugated without lesions or discharge. Cervix pink and without lesions. No significant cystocele or rectocele. Bimanual exam showed uterus normal size, shape, position, mobile and nontender. Adnexa without masses or tenderness. Urethra and bladder normal.   EXTREMITIES: No clubbing cyanosis or edema.    ASSESSMENT/PLAN:  Routine gynecological examination  -     Liquid-Based Pap Smear, Screening  -     HPV High Risk Genotypes, PCR    Rheumatoid arthritis flare    SLE with normal kidneys    Psoriasis  -     clobetasoL (TEMOVATE) 0.05 % cream; Apply " topically every evening.  Dispense: 60 g; Refill: 1    Acute vulvitis  -     Vaginosis Screen by DNA Probe; Future; Expected date: 07/29/2024          Patient was counseled today on Pelvic exams and Pap Smear guidelines.   We discussed STD screening if at high risk for a STD.  We discussed recommendation for breast cancer screening with mammogram every other year after the age of 40 and annually after the age of 50.    We discussed colon cancer screening when indicated.   Osteoporosis screening discussed when indicated.   She was advised to see her primary care physician for all other health maintenance.     FOLLOW-UP with me for next routine visit.

## 2024-07-30 LAB
BACTERIAL VAGINOSIS DNA: NEGATIVE
CANDIDA GLABRATA DNA: NEGATIVE
CANDIDA KRUSEI DNA: NEGATIVE
CANDIDA RRNA VAG QL PROBE: NEGATIVE
FINAL PATHOLOGIC DIAGNOSIS: NORMAL
Lab: NORMAL
T VAGINALIS RRNA GENITAL QL PROBE: NEGATIVE

## 2024-07-31 ENCOUNTER — PATIENT MESSAGE (OUTPATIENT)
Dept: FAMILY MEDICINE | Facility: CLINIC | Age: 54
End: 2024-07-31
Payer: COMMERCIAL

## 2024-08-09 ENCOUNTER — HOSPITAL ENCOUNTER (OUTPATIENT)
Facility: HOSPITAL | Age: 54
Discharge: HOME | End: 2024-08-09
Attending: ANESTHESIOLOGY | Admitting: ANESTHESIOLOGY
Payer: COMMERCIAL

## 2024-08-09 ENCOUNTER — HOSPITAL ENCOUNTER (OUTPATIENT)
Dept: RADIOLOGY | Facility: HOSPITAL | Age: 54
Discharge: HOME | End: 2024-08-09
Attending: ANESTHESIOLOGY
Payer: COMMERCIAL

## 2024-08-09 DIAGNOSIS — M47.816 LUMBAR SPONDYLOSIS: Primary | ICD-10-CM

## 2024-08-09 DIAGNOSIS — M54.50 LOWER BACK PAIN: ICD-10-CM

## 2024-08-09 LAB
B-HCG UR QL: NEGATIVE
CTP QC/QA: YES

## 2024-08-09 PROCEDURE — 63600175 PHARM REV CODE 636 W HCPCS: Mod: PO | Performed by: ANESTHESIOLOGY

## 2024-08-09 PROCEDURE — 81025 URINE PREGNANCY TEST: CPT | Mod: PO | Performed by: ANESTHESIOLOGY

## 2024-08-09 PROCEDURE — 25000003 PHARM REV CODE 250: Mod: PO | Performed by: ANESTHESIOLOGY

## 2024-08-09 PROCEDURE — 64635 DESTROY LUMB/SAC FACET JNT: CPT | Mod: LT,,, | Performed by: ANESTHESIOLOGY

## 2024-08-09 PROCEDURE — 64636 DESTROY L/S FACET JNT ADDL: CPT | Mod: PO,LT | Performed by: ANESTHESIOLOGY

## 2024-08-09 PROCEDURE — 64636 DESTROY L/S FACET JNT ADDL: CPT | Mod: LT,,, | Performed by: ANESTHESIOLOGY

## 2024-08-09 PROCEDURE — 99152 MOD SED SAME PHYS/QHP 5/>YRS: CPT | Mod: ,,, | Performed by: ANESTHESIOLOGY

## 2024-08-09 PROCEDURE — 64635 DESTROY LUMB/SAC FACET JNT: CPT | Mod: PO,LT | Performed by: ANESTHESIOLOGY

## 2024-08-09 RX ORDER — FENTANYL CITRATE 50 UG/ML
INJECTION, SOLUTION INTRAMUSCULAR; INTRAVENOUS
Status: DISCONTINUED | OUTPATIENT
Start: 2024-08-09 | End: 2024-08-09 | Stop reason: HOSPADM

## 2024-08-09 RX ORDER — MIDAZOLAM HYDROCHLORIDE 1 MG/ML
INJECTION INTRAMUSCULAR; INTRAVENOUS
Status: DISCONTINUED | OUTPATIENT
Start: 2024-08-09 | End: 2024-08-09 | Stop reason: HOSPADM

## 2024-08-09 RX ORDER — LIDOCAINE HYDROCHLORIDE 20 MG/ML
INJECTION, SOLUTION EPIDURAL; INFILTRATION; INTRACAUDAL; PERINEURAL
Status: DISCONTINUED | OUTPATIENT
Start: 2024-08-09 | End: 2024-08-09 | Stop reason: HOSPADM

## 2024-08-09 RX ORDER — BUPIVACAINE HYDROCHLORIDE 2.5 MG/ML
INJECTION, SOLUTION EPIDURAL; INFILTRATION; INTRACAUDAL
Status: DISCONTINUED | OUTPATIENT
Start: 2024-08-09 | End: 2024-08-09 | Stop reason: HOSPADM

## 2024-08-09 RX ORDER — SODIUM CHLORIDE, SODIUM LACTATE, POTASSIUM CHLORIDE, CALCIUM CHLORIDE 600; 310; 30; 20 MG/100ML; MG/100ML; MG/100ML; MG/100ML
INJECTION, SOLUTION INTRAVENOUS CONTINUOUS
Status: DISCONTINUED | OUTPATIENT
Start: 2024-08-09 | End: 2024-08-09 | Stop reason: HOSPADM

## 2024-08-09 RX ORDER — TRIAMCINOLONE ACETONIDE 40 MG/ML
INJECTION, SUSPENSION INTRA-ARTICULAR; INTRAMUSCULAR
Status: DISCONTINUED | OUTPATIENT
Start: 2024-08-09 | End: 2024-08-09 | Stop reason: HOSPADM

## 2024-08-09 RX ORDER — LIDOCAINE HYDROCHLORIDE 10 MG/ML
INJECTION, SOLUTION EPIDURAL; INFILTRATION; INTRACAUDAL; PERINEURAL
Status: DISCONTINUED | OUTPATIENT
Start: 2024-08-09 | End: 2024-08-09 | Stop reason: HOSPADM

## 2024-08-09 RX ADMIN — SODIUM CHLORIDE, SODIUM LACTATE, POTASSIUM CHLORIDE, AND CALCIUM CHLORIDE: .6; .31; .03; .02 INJECTION, SOLUTION INTRAVENOUS at 09:08

## 2024-08-09 NOTE — INTERVAL H&P NOTE
The patient has been examined and the H&P has been reviewed:    I concur with the findings and no changes have occurred since H&P was written.  Indication: The patient has clinical and radiologic findings suggestive of facet mediated pain and is s/p 2nd diagnostic left lumbar L4/5 and L5/S1 medial branch blocks with at least 80% relief of their axial back pain lasting the duration of the local anesthetic utilized.  We will proceed with left L4/5 and L5/S1 RFA. The risks and benefits of this intervention, and alternative therapies were discussed with the patient.  The discussion of risks included infection, bleeding, need for additional procedures or surgery, nerve damage.  Questions regarding the procedure, risks, expected outcome, and possible side effects were solicited and answered to the patient's satisfaction.  Christine Boyd Dominic wishes to proceed with the injection or procedure.  Written consent was obtained.  ASA 2, MP II        There are no hospital problems to display for this patient.

## 2024-08-09 NOTE — DISCHARGE SUMMARY
[facility]  Discharge Note  Short Stay    Admit Date: 8/9/2024    Discharge Date: 8/9/2024    Attending Physician: Sampson Cobb     Discharge Provider: Sampson Cobb    Diagnoses:  There are no hospital problems to display for this patient.      Discharged Condition: Good    Final Diagnoses: Lumbar spondylosis [M47.816]    Disposition: Home or Self Care    Hospital Course: No complications, uneventful    Outcome of Hospitalization, Treatment, Procedure, or Surgery:  Patient was admitted for outpatient interventional pain management procedure. The patient tolerated the procedure well with no complications.    Follow up/Patient Instructions:  Follow up as scheduled in Pain Management office in 2-3 weeks.  Patient has received instructions and follow up date and time.    Medications:  Continue previous medications    Discharge Procedure Orders   Notify your health care provider if you experience any of the following:  temperature >100.4     Notify your health care provider if you experience any of the following:  persistent nausea and vomiting or diarrhea     Notify your health care provider if you experience any of the following:  severe uncontrolled pain     Notify your health care provider if you experience any of the following:  redness, tenderness, or signs of infection (pain, swelling, redness, odor or green/yellow discharge around incision site)     Notify your health care provider if you experience any of the following:  difficulty breathing or increased cough     Notify your health care provider if you experience any of the following:  severe persistent headache     Notify your health care provider if you experience any of the following:  worsening rash     Notify your health care provider if you experience any of the following:  persistent dizziness, light-headedness, or visual disturbances     Notify your health care provider if you experience any of the following:  increased confusion or  weakness     Activity as tolerated

## 2024-08-09 NOTE — OP NOTE
Procedure Note    Procedure Date: 8/9/2024    Procedure Performed:  Radiofrequency ablation of the L4/5 and L5/S1 medial branch nerves on the  left side utilizing fluoroscopy    Indication: The patient has clinical and radiologic findings suggestive of facet mediated pain and is s/p 2nd diagnostic left lumbar L4/5 and L5/S1 medial branch blocks with at least 80% relief of their axial back pain lasting the duration of the local anesthetic utilized.       Pre-op diagnosis: Lumbar Spondylosis    Post-op diagnosis: same    Physician: Sampson Cobb MD    IV Sedation medications: Moderate sedation was achieved with midazolam 2 mg and fentanyl 100 mcg.  Continuous monitoring of EKG, blood pressure and pulse oximetry was provided by a registered nurse during the entire course of the procedure under my supervision and recorded in the patient's medical record.   Total time for sedation was 21 minutes.    Medications injected:  Pre-lesion, 1ml of 2% lidocaine at each level.  From a mixture of 5ml of 0.25% bupivacaine and 40mg of methylprednisolone, 1ml of this solution was injected at each level post-lesion.    Local anesthetic used: 1% Lidocaine, 3 ml    Estimated Blood Loss: none    Complications:  none    Technique:  The patient was interviewed in the holding area and Risks/Benefits were discussed, including, but not limited to, the possibility of new or different pain, bleeding or infection.   All questions were answered.  The patient understood and accepted risks.  Consent was reviewed.  A time out was taken to identify the patient, procedure and side of the procedure. The patient was placed in a prone position, then prepped and draped in the usual sterile fashion using ChloraPrep x2 and sterile towels.  The levels were determined under fluoroscopic guidance and then marked.  AP and oblique fluoroscopy were used to identify and collette the junctions between the superior articular processes and transverse processes at the L3-5  levels on the Left side.  The Left sacral ala was also identified and marked.  The skin and subcutaneous tissues in these identified areas were anesthetized with 1% lidocaine. A 20-gauge 100 mm VisibleGains RF needle was advanced towards each of these points under fluoroscopic guidance such that the tip of the needle was positioned posterior to the Neuro-foramen on lateral fluoroscopic view.  Then, each needle was positioned such that, on stimulation, the patient had an appropriate pain response between 0.3-0.5 V, with no motor response, other than Lumbar Paraspinal Muscles up to 2.0V.  One mL of 2% lidocaine was then injected at each level prior to lesioning, which was performed for 90 seconds at 80°C.  Once the lesion was complete, 1 mL of a solution consisting of 5 mL 0.25% bupivacaine and 40mg methylprednisolone was injected through each probe. The probes were removed with a 1% lidocaine flush.  The patient tolerated the procedure well.  The patient was monitored after the procedure.  Patient was given post procedure and discharge instructions to follow at home.  The patient was discharged in a stable condition.    Event Time In   In Facility 0816   In Pre-Procedure 0847   Physician Available    Anesthesia Available    Pre-Op: Bedside Procedure Start    Pre-Op: Bedside Procedure Stop    Pre-Procedure Complete 0907   Out of Pre-Procedure    Anesthesia Start    Anesthesia Start Data Collection    Setup Start    Setup Complete    In Room 0941   Prep Start    Procedure Prep Complete    MD notified pt. ready    Procedure Start 0948   Procedure Closing    Emergence    Procedure Finish 1001   Sedation Start 0940   Scope In    Extent Reached    Scope Out    Sedation End 1001   Out of Room 1005   Cleanup Start    Cleanup Complete    Cosmetic Start    Cosmetic Stop    Pain Mgmt In Room    Pain Mgmt Out Room    In Recovery    Anesthesia Finish    Bedside Procedure Start    Bedside Procedure Stop    Recovery Care Complete    Out  of Recovery    To Phase II    In Phase II    Pain Mgmt Recovery Start 1005   Pain Mgmt Recovery Stop    Obs Rec Start    Obs Rec Stop    Phase II Care Complete    Out of Phase II    Procedural Care Complete    Discharge    Pain Follow Up Needed    Pain Follow Up Complete

## 2024-08-09 NOTE — PROGRESS NOTES
DC instructions given verbally and in written form to the pt. All questions answered. Understanding verbalized.

## 2024-08-12 ENCOUNTER — PATIENT MESSAGE (OUTPATIENT)
Dept: BARIATRICS | Facility: CLINIC | Age: 54
End: 2024-08-12
Payer: COMMERCIAL

## 2024-08-12 VITALS
HEART RATE: 84 BPM | OXYGEN SATURATION: 100 % | BODY MASS INDEX: 38.49 KG/M2 | SYSTOLIC BLOOD PRESSURE: 130 MMHG | TEMPERATURE: 99 F | RESPIRATION RATE: 16 BRPM | HEIGHT: 65 IN | DIASTOLIC BLOOD PRESSURE: 72 MMHG | WEIGHT: 231 LBS

## 2024-08-14 ENCOUNTER — PATIENT MESSAGE (OUTPATIENT)
Dept: FAMILY MEDICINE | Facility: CLINIC | Age: 54
End: 2024-08-14
Payer: COMMERCIAL

## 2024-08-14 DIAGNOSIS — E66.01 OBESITY, MORBID, BMI 50 OR HIGHER: Primary | ICD-10-CM

## 2024-08-19 DIAGNOSIS — E78.5 DYSLIPIDEMIA: ICD-10-CM

## 2024-08-19 RX ORDER — ATORVASTATIN CALCIUM 40 MG/1
40 TABLET, FILM COATED ORAL NIGHTLY
Qty: 90 TABLET | Refills: 1 | Status: SHIPPED | OUTPATIENT
Start: 2024-08-19

## 2024-08-19 NOTE — TELEPHONE ENCOUNTER
Refill Decision Note   Christine Alfaro  is requesting a refill authorization.  Brief Assessment and Rationale for Refill:  Approve     Medication Therapy Plan:         Comments:     Note composed:3:58 PM 08/19/2024

## 2024-08-19 NOTE — TELEPHONE ENCOUNTER
No care due was identified.  Health [facility] Embedded Care Due Messages. Reference number: [id number].   8/19/2024 8:16:02 AM CDT

## 2024-08-27 ENCOUNTER — PATIENT OUTREACH (OUTPATIENT)
Dept: ADMINISTRATIVE | Facility: HOSPITAL | Age: 54
End: 2024-08-27
Payer: COMMERCIAL

## 2024-08-27 ENCOUNTER — PATIENT MESSAGE (OUTPATIENT)
Dept: ADMINISTRATIVE | Facility: HOSPITAL | Age: 54
End: 2024-08-27
Payer: COMMERCIAL

## 2024-08-27 ENCOUNTER — TELEPHONE (OUTPATIENT)
Dept: FAMILY MEDICINE | Facility: CLINIC | Age: 54
End: 2024-08-27
Payer: COMMERCIAL

## 2024-08-27 RX ORDER — BUPROPION HYDROCHLORIDE 100 MG/1
100 TABLET, EXTENDED RELEASE ORAL 2 TIMES DAILY
COMMUNITY
Start: 2024-08-26

## 2024-08-27 NOTE — TELEPHONE ENCOUNTER
----- Message from Jessica Simms sent at 8/27/2024 12:23 PM CDT -----  Regarding: Needs Medical Advice  Contact: patient at [phone]  Type: Needs Medical Advice  Who Called:  patient at [phone]    Additional Information: would like a call to discuss medication wellbutrin vs. Buspar. Please call and advise. Thank you

## 2024-08-27 NOTE — TELEPHONE ENCOUNTER
Returned pt phone call, concerned about taking the wellbutrin and buspar together, was the wellbutrin supposed to replace buspar?

## 2024-08-27 NOTE — PROGRESS NOTES
Population Health Chart Review & Patient Outreach Details      Additional [facility] Health Notes:               Updates Requested / Reviewed:      Updated Care Coordination Note and Immunizations Reconciliation Completed or Queried: [place]         Health Maintenance Topics Overdue:      [facility] Score: 0     Patient is not due for any topics at this time.                       Health Maintenance Topic(s) Outreach Outcomes & Actions Taken:    Lab(s) - Outreach Outcomes & Actions Taken  : Pt up to date-portal message out

## 2024-09-02 ENCOUNTER — PATIENT MESSAGE (OUTPATIENT)
Dept: PAIN MEDICINE | Facility: CLINIC | Age: 54
End: 2024-09-02
Payer: COMMERCIAL

## 2024-09-03 ENCOUNTER — PATIENT MESSAGE (OUTPATIENT)
Dept: BARIATRICS | Facility: CLINIC | Age: 54
End: 2024-09-03
Payer: COMMERCIAL

## 2024-09-06 ENCOUNTER — TELEPHONE (OUTPATIENT)
Dept: FAMILY MEDICINE | Facility: CLINIC | Age: 54
End: 2024-09-06
Payer: COMMERCIAL

## 2024-09-06 NOTE — TELEPHONE ENCOUNTER
----- Message from Nan Lancaster sent at 9/6/2024 10:02 AM CDT -----  Regarding: advice  Type:  Needs Medical Advice    Who Called:  Dr. Larissa Bill Call Back Number: [phone] cell: [phone]    Additional Information:  dr wheatley needs a call back to discuss prescribing meds to pt. please call to discuss.

## 2024-09-06 NOTE — TELEPHONE ENCOUNTER
Spoke with patient's dentist, she was just wanting to make sure I felt comfortable with her being prescribed Valium for preprocedural anxiety

## 2024-09-10 ENCOUNTER — PATIENT MESSAGE (OUTPATIENT)
Dept: BARIATRICS | Facility: CLINIC | Age: 54
End: 2024-09-10
Payer: COMMERCIAL

## 2024-09-10 DIAGNOSIS — L40.50 PSORIATIC ARTHRITIS: ICD-10-CM

## 2024-09-10 RX ORDER — METHOTREXATE 25 MG/.5ML
25 INJECTION, SOLUTION SUBCUTANEOUS
Qty: 4 EACH | Refills: 2 | OUTPATIENT
Start: 2024-09-10

## 2024-09-13 ENCOUNTER — OFFICE VISIT (OUTPATIENT)
Dept: PAIN MEDICINE | Facility: CLINIC | Age: 54
End: 2024-09-13
Payer: COMMERCIAL

## 2024-09-13 VITALS
HEIGHT: 65 IN | DIASTOLIC BLOOD PRESSURE: 67 MMHG | WEIGHT: 234.69 LBS | SYSTOLIC BLOOD PRESSURE: 144 MMHG | HEART RATE: 89 BPM | BODY MASS INDEX: 39.1 KG/M2

## 2024-09-13 DIAGNOSIS — M54.9 DORSALGIA, UNSPECIFIED: ICD-10-CM

## 2024-09-13 DIAGNOSIS — M71.38 SYNOVIAL CYST OF LUMBAR FACET JOINT: ICD-10-CM

## 2024-09-13 DIAGNOSIS — L40.50 PSORIATIC ARTHRITIS: ICD-10-CM

## 2024-09-13 DIAGNOSIS — M54.16 LUMBAR RADICULOPATHY: ICD-10-CM

## 2024-09-13 DIAGNOSIS — M47.816 LUMBAR SPONDYLOSIS: Primary | ICD-10-CM

## 2024-09-13 PROCEDURE — 99999 PR PBB SHADOW E&M-EST. PATIENT-LVL III: CPT | Mod: PBBFAC,,,

## 2024-09-13 RX ORDER — METHOTREXATE 25 MG/.5ML
25 INJECTION, SOLUTION SUBCUTANEOUS
Qty: 4 EACH | Refills: 2 | Status: CANCELLED | OUTPATIENT
Start: 2024-09-13

## 2024-09-13 NOTE — PROGRESS NOTES
[facility] Follow Up Evaluation    PCP:   Lyao Mccrary MD    CC:   Chief Complaint   Patient presents with    Follow-up    Hip Pain     Radiating pain to left side         Interval HPI 9/13/2024: Christine Alfaro returns to the office for follow up.  She is s/p left L4/5 and L5/S1 RFA on 08/09/2024 with 90% relief of her previous left-sided lower back pain.  She does report some remaining left-sided lateral hip pain but overall tolerable.  She denies any new numbness, weakness or any pain radiating down her legs.      HPI:   Christine Alfaro is a 53 y.o. year old female patient who has a past medical history of Anxiety, GERD (gastroesophageal reflux disease), Hyperlipidemia, Hypertension, Hypothyroidism, Insomnia, Observed sleep apnea, Prediabetes, Rheumatoid arthritis, unspecified, and Vitamin D deficiency.  Ms. King presents for follow up of lower back and left leg pain.  She has been to PT ans has tride flexeril, gabapentin, tramadol.  Pain persists.  She continues to feel in the lower back to the left side.  It radiates to the left posterior leg to the ankle.  She has chronic ankle weakness from an old injury.  Pain is associated with numbness and tinglign.  She is taking tramadol for pain.  Viveros cannot tolerate steroids.      Initial HPI:   She presents in self referral for lower back and left leg pain.  Whe underwent weight loss surgery in 2023 losing about 110 pounds.  She started to exercise more with biking and walking, but does sit a lot for work as a .  Pain started without focal injury.  She feels pain in the left lower back to the sacral region, posterior left thigh, knee, calf.  Pain increases with sitting and improves with sitting on a tennis ball.  She has tried flexeril, gabapentin.    Denies bowel/ bladder incontinence.    Past and current medications:  Antineuropathics: gabapentin  NSAIDs:  Antidepressants: wellbutrin  Muscle relaxers: flexeril  Opioids:  tramadol  Antiplatelets/Anticoagulants:  ASA  - tries to avoid steroid because she has had signfiicnat local swelling after injections in the past.     Physical Therapy/ Chiropractic care:  PT with no improvement,.    Pain Intervention History:  - s/p left L4/5 and L5/S1 RFA on 08/09/2024 with 90% relief     Past Spine Surgical History:  none        History:    Current Outpatient Medications:     alclometasone (ACLOVATE) 0.05 % cream, AAA face bid prn redness or scaling or itching, Disp: 60 g, Rfl: 3    atorvastatin (LIPITOR) 40 MG tablet, TAKE ONE TABLET BY MOUTH EVERY EVENING, Disp: 90 tablet, Rfl: 1    betamethasone dipropionate 0.05 % cream, Use bid, Disp: 45 g, Rfl: 3    buPROPion (WELLBUTRIN SR) 100 MG TBSR 12 hr tablet, Take 100 mg by mouth 2 (two) times daily., Disp: , Rfl:     busPIRone (BUSPAR) 10 MG tablet, , Disp: , Rfl:     clobetasoL (TEMOVATE) 0.05 % cream, Apply topically every evening., Disp: 60 g, Rfl: 1    cyclobenzaprine (FLEXERIL) 10 MG tablet, TAKE ONE TABLET EVERY EVENING, Disp: 150 tablet, Rfl: 3    folic acid (FOLVITE) 1 MG tablet, Take 2 tablets (2 mg total) by mouth once daily., Disp: 60 tablet, Rfl: 11    furosemide (LASIX) 20 MG tablet, Take 1 tablet (20 mg total) by mouth once daily., Disp: 90 tablet, Rfl: 1    gabapentin (NEURONTIN) 300 MG capsule, Take 1 capsule (300 mg total) by mouth 3 (three) times daily., Disp: 90 capsule, Rfl: 11    hydroxychloroquine (PLAQUENIL) 200 mg tablet, Take 1 tablet (200 mg total) by mouth 2 (two) times daily., Disp: 180 tablet, Rfl: 4    levothyroxine (SYNTHROID) 125 MCG tablet, Take 1 tablet (125 mcg total) by mouth before breakfast., Disp: 30 tablet, Rfl: 11    lisinopriL (PRINIVIL,ZESTRIL) 5 MG tablet, Take 1 tablet (5 mg total) by mouth 2 (two) times a day., Disp: 180 tablet, Rfl: 2    methotrexate, PF, (RASUVO, PF,) 25 mg/0.5 mL AtIn, Inject 0.5 mLs (25 mg total) into the skin every 7 days., Disp: 4 each, Rfl: 2    naltrexone-bupropion (CONTRAVE)  8-90 mg TbSR, Take 1 each by mouth 2 (two) times daily., Disp: 60 tablet, Rfl: 5    secukinumab (COSENTYX PEN) 150 mg/mL PnIj, Inject 150 mg into the skin every 28 days., Disp: 2 mL, Rfl: 11    XIIDRA 5 % Dpet, APPLY 1 DROP TO BOTH EYES BID, Disp: , Rfl:     Past Medical History:   Diagnosis Date    Anxiety     GERD (gastroesophageal reflux disease)     Hyperlipidemia     Hypertension     Hypothyroidism     Insomnia     Observed sleep apnea     Prediabetes     Rheumatoid arthritis, unspecified     Vitamin D deficiency        Past Surgical History:   Procedure Laterality Date    BTL      CHOLECYSTECTOMY      COLONOSCOPY N/A 8/30/2022    Procedure: COLONOSCOPY;  Surgeon: Drew Peraza MD;  Location: [facility];  Service: Endoscopy;  Laterality: N/A;    INJECTION OF ANESTHETIC AGENT AROUND MEDIAL BRANCH NERVES INNERVATING LUMBAR FACET JOINT Left 6/25/2024    Procedure: Block-nerve-medial branch-lumbar   L4/5, L5/S1;  Surgeon: Sampson Cobb MD;  Location: [facility] OR;  Service: Pain Management;  Laterality: Left;    INJECTION OF ANESTHETIC AGENT AROUND MEDIAL BRANCH NERVES INNERVATING LUMBAR FACET JOINT Left 7/17/2024    Procedure: Block-nerve-medial branch-lumbar    L4/5, L5/S1;  Surgeon: Sampson Cobb MD;  Location: [facility] OR;  Service: Pain Management;  Laterality: Left;    LAPAROSCOPIC SLEEVE GASTRECTOMY N/A 2/2/2023    Procedure: GASTRECTOMY, SLEEVE, LAPAROSCOPIC with intraop EGD;  Surgeon: Titi Ribeiro Jr., MD;  Location: [address];  Service: General;  Laterality: N/A;  PRE-CERT, PLEASE DO NOT SUBMIT FOR AUTH UNTIL 1/12/2023    RADIOFREQUENCY ABLATION OF LUMBAR MEDIAL BRANCH NERVE AT SINGLE LEVEL Left 8/9/2024    Procedure: Radiofrequency Ablation, Nerve, Spinal, Lumbar, Medial Branch, L4/5 and L5/S1;  Surgeon: Sampson Cobb MD;  Location: [facility] OR;  Service: Pain Management;  Laterality: Left;    SURGICAL REMOVAL OF BONE SPUR      left heel    TONSILLECTOMY      TUBAL LIGATION  8/1995        Family History   Problem Relation Name Age of Onset    Ovarian cancer Mother Joanna Martines 36    Heart disease Mother Joanna Martines     Bone cancer Mother Joanna Martines     Cancer Mother Joanna Martines         cervical    Hypertension Mother Joanna Martines     Heart disease Father Titi Boyd     Diabetes type I Father Titi Boyd     Lung cancer Father Titi Boyd     Diabetes Father Titi Boyd     Hypertension Father Titi Boyd     No Known Problems Daughter      No Known Problems Daughter      Heart disease Brother Daniel Boyd     Breast cancer Neg Hx         Social History     Socioeconomic History    Marital status:    Tobacco Use    Smoking status: Never     Passive exposure: Never    Smokeless tobacco: Never   Substance and Sexual Activity    Alcohol use: Not Currently     Alcohol/week: 0.0 standard drinks of alcohol    Drug use: Never    Sexual activity: Yes     Partners: Male     Birth control/protection: See Surgical Hx     Social Determinants of Health     Financial Resource Strain: Low Risk  (7/22/2024)    Overall Financial Resource Strain (CARDIA)     Difficulty of Paying Living Expenses: Not hard at all   Food Insecurity: No Food Insecurity (7/22/2024)    Hunger Vital Sign     Worried About Running Out of Food in the Last Year: Never true     Ran Out of Food in the Last Year: Never true   Transportation Needs: No Transportation Needs (1/29/2024)    PRAPARE - Transportation     Lack of Transportation (Medical): No     Lack of Transportation (Non-Medical): No   Physical Activity: Insufficiently Active (7/22/2024)    Exercise Vital Sign     Days of Exercise per Week: 3 days     Minutes of Exercise per Session: 30 min   Stress: Stress Concern Present (7/22/2024)    [employer] - Occupational Stress Questionnaire     Feeling of Stress : To some extent   Housing Stability: Low Risk  (1/29/2024)    Housing Stability Vital Sign     Unable to Pay for Housing in the Last Year: No      "Number of Places Lived in the Last Year: 1     Unstable Housing in the Last Year: No       Review of patient's allergies indicates:   Allergen Reactions    Sulfa (sulfonamide antibiotics) Rash and Swelling       Labs:  Lab Results   Component Value Date    HGBA1C 5.0 09/05/2023       Lab Results   Component Value Date    WBC 5.94 05/15/2024    HGB 14.3 05/15/2024    HCT 42.6 05/15/2024    MCV 91 05/15/2024     05/15/2024           Review of Systems:  Low back pain  .eft leg pain..  Balance of review of systems is negative.    Physical Exam:  Vitals:    09/13/24 1303   BP: (!) 144/67   Pulse: 89   Weight: 106.4 kg (234 lb 10.9 oz)   Height: 5' 5" (1.651 m)   PainSc:   3   PainLoc: Back     Body mass index is 39.05 kg/m².    Pain disability index:      9/13/2024     1:00 PM 5/21/2024     3:52 PM 4/2/2024    10:37 AM   Last 3 PDI Scores   Pain Disability Index (PDI) 23 29 39       Gen: NAD  Psych: mood appropriate for given condition  HEENT: eyes anicteric   CV: RRR, 2+ radial pulse  Respiratory: non-labored, no signs of respiratory distress  Abd: non-distended  Skin: warm, dry and intact.  Gait:  No antalgic gait.         Lumbar spine:  Lumbar spine: ROM is full with flexion extension and oblique extension with no increased pain.   Pain with axial facet loading on the left.  Amadeo's test causes no increased pain on either side.    Supine straight leg raise is positive on the left.  Internal and external rotation of the hip causes no increased pain on either side.  Myofascial exam: No tenderness to palpation across lumbar paraspinous muscles. No tenderness to palpation over the bilateral greater trochanters and bilateral SI joint    Sensory:   Intact and symmetrical to light touch in L1-S1 dermatomes bilaterally.    Motor:       Right Left   L2/3 Iliacus Hip flexion  5  5   L3/4 Qudratus Femoris Knee Extension  5  5   L4/5 Tib Anterior Ankle Dorsiflexion   5  5   L5/S1 Extensor Hallicus Longus Great toe " extension  5  5   S1/S2 Gastroc/Soleus Plantar Flexion  5  5      Right Left   Triceps DTR     Biceps DTR     Brachioradialis DTR     Patellar DTR 0+ 0+   Achilles DTR 0+ 0+   Kramer     Clonus     Babinski         Imaging:  MRI lumbar spine 05/15/2024  FINDINGS:  Vertebral column: The lumbar vertebral bodies maintain normal height.  There is no fracture.  There is subtle trace retrolisthesis of L3 on L4, anterolisthesis of L4 on L5, L5 on S1.  There is mild disc space narrowing at the L4-5 level.  There is moderate disc space narrowing at the T11-12 level.  Baseline marrow signal is normal.  The L3-4 and L4-5 discs are mildly desiccated.  Baseline marrow signal is normal.     Spinal canal, conus, epidural space: The spinal canal is developmentally normal.  The conus terminates at the level of L1 and is normal in contour and signal intensity.  There is no abnormal epidural mass or fluid collection.     Findings by level:     On the sagittal images, the T10-11 level is normal.  At the T11-12 level there is a shallow broad right paracentral disc protrusion but there is no significant spinal stenosis and there is no cord compression.  The foramina are patent.     T12-L1: Unremarkable  L1-2: There is a very minimal disc bulge.  There is no spinal canal or significant foraminal stenosis.  L2-3: There is a mild disc bulge with very shallow central disc protrusion.  There is mild facet joint arthropathy.  There is no spinal canal or significant foraminal stenosis.  L3-4: There is subtle retrolisthesis of L3 on L4.  There is a minimal disc bulge.  There is no spinal canal or significant foraminal stenosis.  There is also very mild facet joint arthropathy.  L4-5: There is mild disc space narrowing, trace anterolisthesis of L4 on L5.  There is moderate to marked facet joint arthropathy with bilateral ligamentum flavum thickening.  There also bilateral facet joint effusions.  There is a mild disc bulge with subtle dorsal  annular fissure in a tiny central disc protrusion.  There is borderline to mild spinal stenosis.  There is only mild right foraminal stenosis but there is a small 4 x 2 mm synovial cyst contributing to mild left lateral recess stenosis.  On the right, there is a 8 x 7 mm far lateral foraminal synovial cysts contributing to at least moderate foraminal stenosis.  L5-S1: There is trace anterolisthesis of L5 on S1.  There is facet joint arthropathy.  There is however no spinal canal or significant foraminal stenosis.  There is partial sacralization of L5.     Soft tissues, other: The prevertebral soft tissues are normal.  The aorta is normal in caliber.  There is no hydronephrosis.     Impression:     1. There is relatively mild degenerative change with the exception of findings at the L4-5 level.  There is only mild disc space narrowing and a minimal disc bulge but there is marked facet joint arthropathy, bilateral facet joint effusions.  There are bilateral synovial cysts.  On the left this contributes to left lateral recess stenosis.  On the right, this contributes to at least moderate foraminal stenosis.  There is only borderline to mild spinal stenosis    Assessment:   Christine Alfaro is a 53 y.o. year old female patient who has a past medical history of Anxiety, GERD (gastroesophageal reflux disease), Hyperlipidemia, Hypertension, Hypothyroidism, Insomnia, Observed sleep apnea, Prediabetes, Rheumatoid arthritis, unspecified, and Vitamin D deficiency. She presents in self referral for lower back and left leg pain.  Left L5 radiculopathy from left L4/5 facet hypertrophy with foraminal cyst causing foraminal narrowing.  Right facet cyst with no right sided symptoms.      Plan:      Problem List Items Addressed This Visit    None  Visit Diagnoses       Lumbar spondylosis    -  Primary    Lumbar radiculopathy        Synovial cyst of lumbar facet joint        Dorsalgia, unspecified                9/13/2024: Christine  MICHEL Alfaro returns to the office for follow up.  She is s/p left L4/5 and L5/S1 RFA on 08/09/2024 with 90% relief of her previous left-sided lower back pain.  She does report some remaining left-sided lateral hip pain but overall tolerable.  She denies any new numbness, weakness or any pain radiating down her legs.      - on exam she is full strength in her lower extremities, absent lower extremity DTRs.  Increased pain with left-sided axial facet loading  - s/p left L4/5 and L5/S1 RFA on 08/09/2024 with 90% relief  - I independently reviewed her lumbar MRI in in his consistent with L4-5: moderate to marked facet joint arthropathy, bilateral facet joint effusions. small synovial cyst contributing to mild left lateral recess stenosis.  L5-S1: facet joint arthropathy.  - she responded very well to the left-sided lumbar RFA, previous pain in that area has nearly resolved.  - her remaining left-sided lateral pain may be originating from the cyst at L4/5 or perhaps SI joint pain.  - at this time it is manageable, we will continue to maximize conservative therapy.  - follow up as needed, can repeat RFA in the future when indicated      Follow Up:  As needed      : Reviewed and consistent with medication use as prescribed.

## 2024-09-15 ENCOUNTER — PATIENT MESSAGE (OUTPATIENT)
Dept: FAMILY MEDICINE | Facility: CLINIC | Age: 54
End: 2024-09-15
Payer: COMMERCIAL

## 2024-09-16 ENCOUNTER — TELEPHONE (OUTPATIENT)
Dept: FAMILY MEDICINE | Facility: CLINIC | Age: 54
End: 2024-09-16
Payer: COMMERCIAL

## 2024-09-16 RX ORDER — METHOTREXATE 25 MG/.5ML
25 INJECTION, SOLUTION SUBCUTANEOUS
Qty: 4 EACH | Refills: 0 | Status: ACTIVE | OUTPATIENT
Start: 2024-09-16

## 2024-09-18 DIAGNOSIS — R60.9 EDEMA, UNSPECIFIED TYPE: ICD-10-CM

## 2024-09-18 RX ORDER — FUROSEMIDE 20 MG/1
20 TABLET ORAL DAILY
Qty: 90 TABLET | Refills: 2 | Status: SHIPPED | OUTPATIENT
Start: 2024-09-18

## 2024-09-20 ENCOUNTER — LAB VISIT (OUTPATIENT)
Dept: LAB | Facility: HOSPITAL | Age: 54
End: 2024-09-20
Attending: INTERNAL MEDICINE
Payer: COMMERCIAL

## 2024-09-20 DIAGNOSIS — L40.50 PSORIATIC ARTHRITIS: ICD-10-CM

## 2024-09-20 LAB
BILIRUB UR QL STRIP: NEGATIVE
CLARITY UR REFRACT.AUTO: CLEAR
COLOR UR AUTO: YELLOW
CREAT UR-MCNC: 124 MG/DL (ref 15–325)
GLUCOSE UR QL STRIP: NEGATIVE
HGB UR QL STRIP: NEGATIVE
KETONES UR QL STRIP: NEGATIVE
LEUKOCYTE ESTERASE UR QL STRIP: NEGATIVE
NITRITE UR QL STRIP: NEGATIVE
PH UR STRIP: 7 [PH] (ref 5–8)
PROT UR QL STRIP: ABNORMAL
PROT UR-MCNC: <7 MG/DL (ref 0–15)
PROT/CREAT UR: NORMAL MG/G{CREAT} (ref 0–0.2)
SP GR UR STRIP: 1.02 (ref 1–1.03)
URN SPEC COLLECT METH UR: ABNORMAL

## 2024-09-20 PROCEDURE — 81003 URINALYSIS AUTO W/O SCOPE: CPT | Performed by: INTERNAL MEDICINE

## 2024-09-20 PROCEDURE — 84156 ASSAY OF PROTEIN URINE: CPT | Performed by: INTERNAL MEDICINE

## 2024-10-01 ENCOUNTER — PATIENT MESSAGE (OUTPATIENT)
Dept: BARIATRICS | Facility: CLINIC | Age: 54
End: 2024-10-01
Payer: COMMERCIAL

## 2024-10-06 ENCOUNTER — PATIENT MESSAGE (OUTPATIENT)
Dept: RHEUMATOLOGY | Facility: CLINIC | Age: 54
End: 2024-10-06
Payer: COMMERCIAL

## 2024-10-07 RX ORDER — CYCLOBENZAPRINE HCL 10 MG
10 TABLET ORAL NIGHTLY
Qty: 150 TABLET | Refills: 3 | Status: SHIPPED | OUTPATIENT
Start: 2024-10-07

## 2024-10-07 NOTE — TELEPHONE ENCOUNTER
Refill Routing Note   Medication(s) are not appropriate for processing by [facility] for the following reason(s):        Outside of protocol    [facility] action(s):  Route             Appointments  past 12m or future 3m with PCP    Date Provider   Last Visit   6/21/2024 Layo Mccrary MD   Next Visit   12/6/2024 Layo Mccrary MD   ED visits in past 90 days: 0        Note composed:2:23 PM 10/07/2024

## 2024-10-08 ENCOUNTER — PATIENT MESSAGE (OUTPATIENT)
Dept: BARIATRICS | Facility: CLINIC | Age: 54
End: 2024-10-08
Payer: COMMERCIAL

## 2024-10-09 DIAGNOSIS — L40.50 PSORIATIC ARTHRITIS: ICD-10-CM

## 2024-10-10 RX ORDER — METHOTREXATE 25 MG/.5ML
25 INJECTION, SOLUTION SUBCUTANEOUS
Qty: 4 EACH | Refills: 3 | Status: ACTIVE | OUTPATIENT
Start: 2024-10-10

## 2024-10-18 ENCOUNTER — PATIENT MESSAGE (OUTPATIENT)
Dept: OBSTETRICS AND GYNECOLOGY | Facility: CLINIC | Age: 54
End: 2024-10-18
Payer: COMMERCIAL

## 2024-10-28 ENCOUNTER — PATIENT MESSAGE (OUTPATIENT)
Dept: GASTROENTEROLOGY | Facility: CLINIC | Age: 54
End: 2024-10-28
Payer: COMMERCIAL

## 2024-10-28 DIAGNOSIS — F41.9 ANXIETY DISORDER, UNSPECIFIED: ICD-10-CM

## 2024-10-28 RX ORDER — BUPROPION HYDROCHLORIDE 100 MG/1
100 TABLET, EXTENDED RELEASE ORAL 2 TIMES DAILY
Qty: 180 TABLET | Refills: 2 | Status: SHIPPED | OUTPATIENT
Start: 2024-10-28

## 2024-11-01 ENCOUNTER — OFFICE VISIT (OUTPATIENT)
Dept: OBSTETRICS AND GYNECOLOGY | Facility: CLINIC | Age: 54
End: 2024-11-01
Payer: COMMERCIAL

## 2024-11-01 VITALS — DIASTOLIC BLOOD PRESSURE: 82 MMHG | SYSTOLIC BLOOD PRESSURE: 124 MMHG

## 2024-11-01 DIAGNOSIS — N92.4 ABNORMAL PERIMENOPAUSAL BLEEDING: Primary | ICD-10-CM

## 2024-11-01 PROCEDURE — 99999 PR PBB SHADOW E&M-EST. PATIENT-LVL III: CPT | Mod: PBBFAC,,, | Performed by: OBSTETRICS & GYNECOLOGY

## 2024-11-01 RX ORDER — NORETHINDRONE 5 MG/1
5 TABLET ORAL DAILY
Qty: 90 TABLET | Refills: 1 | Status: SHIPPED | OUTPATIENT
Start: 2024-11-01 | End: 2025-01-30

## 2024-11-02 ENCOUNTER — PATIENT MESSAGE (OUTPATIENT)
Dept: BARIATRICS | Facility: CLINIC | Age: 54
End: 2024-11-02
Payer: COMMERCIAL

## 2024-11-08 ENCOUNTER — HOSPITAL ENCOUNTER (OUTPATIENT)
Dept: RADIOLOGY | Facility: HOSPITAL | Age: 54
Discharge: HOME | End: 2024-11-08
Attending: OBSTETRICS & GYNECOLOGY
Payer: COMMERCIAL

## 2024-11-08 DIAGNOSIS — N92.4 ABNORMAL PERIMENOPAUSAL BLEEDING: ICD-10-CM

## 2024-11-08 PROCEDURE — 76830 TRANSVAGINAL US NON-OB: CPT | Mod: 26,,, | Performed by: STUDENT IN AN ORGANIZED HEALTH CARE EDUCATION/TRAINING PROGRAM

## 2024-11-08 PROCEDURE — 76856 US EXAM PELVIC COMPLETE: CPT | Mod: 26,,, | Performed by: STUDENT IN AN ORGANIZED HEALTH CARE EDUCATION/TRAINING PROGRAM

## 2024-11-08 PROCEDURE — 76856 US EXAM PELVIC COMPLETE: CPT | Mod: TC,PO

## 2024-11-11 ENCOUNTER — PATIENT MESSAGE (OUTPATIENT)
Dept: OBSTETRICS AND GYNECOLOGY | Facility: CLINIC | Age: 54
End: 2024-11-11
Payer: COMMERCIAL

## 2024-11-12 ENCOUNTER — PATIENT MESSAGE (OUTPATIENT)
Dept: BARIATRICS | Facility: CLINIC | Age: 54
End: 2024-11-12
Payer: COMMERCIAL

## 2024-11-14 ENCOUNTER — OFFICE VISIT (OUTPATIENT)
Dept: OBSTETRICS AND GYNECOLOGY | Facility: CLINIC | Age: 54
End: 2024-11-14
Payer: COMMERCIAL

## 2024-11-14 VITALS
SYSTOLIC BLOOD PRESSURE: 124 MMHG | HEIGHT: 66 IN | WEIGHT: 234.81 LBS | BODY MASS INDEX: 37.74 KG/M2 | DIASTOLIC BLOOD PRESSURE: 72 MMHG

## 2024-11-14 DIAGNOSIS — N92.4 ABNORMAL PERIMENOPAUSAL BLEEDING: Primary | ICD-10-CM

## 2024-11-14 PROCEDURE — 58100 BIOPSY OF UTERUS LINING: CPT | Mod: S$GLB,,, | Performed by: OBSTETRICS & GYNECOLOGY

## 2024-11-14 PROCEDURE — 99999 PR PBB SHADOW E&M-EST. PATIENT-LVL III: CPT | Mod: PBBFAC,,, | Performed by: OBSTETRICS & GYNECOLOGY

## 2024-11-14 PROCEDURE — 99499 UNLISTED E&M SERVICE: CPT | Mod: S$GLB,,, | Performed by: OBSTETRICS & GYNECOLOGY

## 2024-11-14 NOTE — PROGRESS NOTES
Chief Complaint   Patient presents with    Endometrial Biopsy       History of Present Illness: Christine Alfaro is a 53 y.o. female that presents today 11/14/2024 with LMP Patient's last menstrual period was 10/04/2024 (exact date). for   Chief Complaint   Patient presents with    Endometrial Biopsy     She reports in 2023 she went 9 months with no periods.  She then reports from April to October no periods. She reports heavy period in October. She reports desire for hysterectomy     Past Medical History:   Diagnosis Date    Anxiety     GERD (gastroesophageal reflux disease)     Hyperlipidemia     Hypertension     Hypothyroidism     Insomnia     Observed sleep apnea     Prediabetes     Rheumatoid arthritis, unspecified     Vitamin D deficiency        Past Surgical History:   Procedure Laterality Date    BTL      CHOLECYSTECTOMY      COLONOSCOPY N/A 8/30/2022    Procedure: COLONOSCOPY;  Surgeon: Drew Peraza MD;  Location: [facility];  Service: Endoscopy;  Laterality: N/A;    INJECTION OF ANESTHETIC AGENT AROUND MEDIAL BRANCH NERVES INNERVATING LUMBAR FACET JOINT Left 6/25/2024    Procedure: Block-nerve-medial branch-lumbar   L4/5, L5/S1;  Surgeon: Sampson Cobb MD;  Location: [facility] OR;  Service: Pain Management;  Laterality: Left;    INJECTION OF ANESTHETIC AGENT AROUND MEDIAL BRANCH NERVES INNERVATING LUMBAR FACET JOINT Left 7/17/2024    Procedure: Block-nerve-medial branch-lumbar    L4/5, L5/S1;  Surgeon: Sampson Cobb MD;  Location: [facility] OR;  Service: Pain Management;  Laterality: Left;    LAPAROSCOPIC SLEEVE GASTRECTOMY N/A 2/2/2023    Procedure: GASTRECTOMY, SLEEVE, LAPAROSCOPIC with intraop EGD;  Surgeon: Titi Ribeiro Jr., MD;  Location: [address];  Service: General;  Laterality: N/A;  PRE-CERT, PLEASE DO NOT SUBMIT FOR AUTH UNTIL 1/12/2023    RADIOFREQUENCY ABLATION OF LUMBAR MEDIAL BRANCH NERVE AT SINGLE LEVEL Left 8/9/2024    Procedure: Radiofrequency Ablation, Nerve, Spinal,  Lumbar, Medial Branch, L4/5 and L5/S1;  Surgeon: Sampson Cobb MD;  Location: [facility] OR;  Service: Pain Management;  Laterality: Left;    SURGICAL REMOVAL OF BONE SPUR      left heel    TONSILLECTOMY      TUBAL LIGATION  8/1995       Outpatient Medications Prior to Visit   Medication Sig Dispense Refill    alclometasone (ACLOVATE) 0.05 % cream AAA face bid prn redness or scaling or itching 60 g 3    atorvastatin (LIPITOR) 40 MG tablet TAKE ONE TABLET BY MOUTH EVERY EVENING 90 tablet 1    betamethasone dipropionate 0.05 % cream Use bid 45 g 3    buPROPion (WELLBUTRIN SR) 100 MG TBSR 12 hr tablet Take ONE tablet by mouth TWO times daily 180 tablet 2    busPIRone (BUSPAR) 10 MG tablet       clobetasoL (TEMOVATE) 0.05 % cream Apply topically every evening. 60 g 1    cyclobenzaprine (FLEXERIL) 10 MG tablet TAKE ONE TABLET BY MOUTH EVERY EVENING 150 tablet 3    folic acid (FOLVITE) 1 MG tablet Take 2 tablets (2 mg total) by mouth once daily. 60 tablet 11    furosemide (LASIX) 20 MG tablet Take 1 tablet (20 mg total) by mouth once daily. 90 tablet 2    gabapentin (NEURONTIN) 300 MG capsule Take 1 capsule (300 mg total) by mouth 3 (three) times daily. 90 capsule 11    levothyroxine (SYNTHROID) 125 MCG tablet Take 1 tablet (125 mcg total) by mouth before breakfast. 30 tablet 11    lisinopriL (PRINIVIL,ZESTRIL) 5 MG tablet Take 1 tablet (5 mg total) by mouth 2 (two) times a day. 180 tablet 2    methotrexate, PF, (RASUVO, PF,) 25 mg/0.5 mL AtIn Inject 0.5 mLs (25 mg total) into the skin every 7 days. 4 each 3    naltrexone-bupropion (CONTRAVE) 8-90 mg TbSR Take 1 each by mouth 2 (two) times daily. 60 tablet 5    norethindrone (AYGESTIN) 5 mg Tab Take 1 tablet (5 mg total) by mouth once daily. 90 tablet 1    secukinumab (COSENTYX PEN) 150 mg/mL PnIj Inject 150 mg into the skin every 28 days. 2 mL 11    XIIDRA 5 % Dpet APPLY 1 DROP TO BOTH EYES BID       No facility-administered medications prior to visit.       Review of  "patient's allergies indicates:   Allergen Reactions    Sulfa (sulfonamide antibiotics) Rash and Swelling       Family History   Problem Relation Name Age of Onset    Ovarian cancer Mother Joanna Martines 36    Heart disease Mother Joanna Martines     Bone cancer Mother Joanna Martines     Cancer Mother Joanna Martines         cervical    Hypertension Mother Joanna Martines     Heart disease Father Titi Boyd     Diabetes type I Father Titi Boyd     Lung cancer Father Titi Boyd     Diabetes Father Titi Boyd     Hypertension Father Titi Boyd     No Known Problems Daughter      No Known Problems Daughter      Heart disease Brother Daniel Boyd     Breast cancer Neg Hx         Social History     Tobacco Use    Smoking status: Never     Passive exposure: Never    Smokeless tobacco: Never   Substance Use Topics    Alcohol use: Not Currently     Alcohol/week: 0.0 standard drinks of alcohol    Drug use: Never       OB History    Para Term  AB Living   3 3 2 1   2   SAB IAB Ectopic Multiple Live Births           3      # Outcome Date GA Lbr Calvin/2nd Weight Sex Type Anes PTL Lv   3 Term      Vag-Spont   SHRIA   2 Term      Vag-Spont   SHIRA   1       Vag-Spont   ND         /72   Ht 5' 6" (1.676 m)   Wt 106.5 kg (234 lb 12.6 oz)   LMP 10/04/2024 (Exact Date)   Physical Exam:  APPEARANCE: Well nourished, well developed, in no acute distress.  SKIN: Normal skin turgor, no lesions.  NECK: Neck symmetric without masses   RESPIRATORY: Normal respiratory effort with no retractions or use of accessory muscles  CARDIOVASCULAR: Peripheral vascular system with no swelling no varicosities and palpation of pulses normal  LYMPHATIC: No enlargements of the lymph nodes noted in the neck, axillae, or groin  ABDOMEN: Soft. No tenderness or masses. No hepatosplenomegaly. No hernias.  PELVIC: Normal external female genitalia without lesions. Normal hair distribution. Adequate perineal body, normal urethral meatus. Urethra with no masses.  " Bladder nontender. Vagina moist and well rugated without lesions or discharge. Cervix pink and without lesions. No significant cystocele or rectocele. Bimanual exam showed uterus normal size, shape, position, mobile and nontender. Adnexa without masses or tenderness. Urethra and bladder normal.  EXTREMITIES: No clubbing cyanosis or edema.      ENDOMETRIAL BIOPSY:    Female patient presents for an endometrial biopsy due to abnormal bleeding.      UPT is negative.    PRE ENDOMETRIAL BIOPSY COUNSELING:  The patient was informed of the risk of bleeding, infection, uterine perforation and pain and that the test will rule-out endometrial cancer with accuracy greater than 95%. She was counseled on the alternatives to endometrial biopsy and agrees to proceed.    PROCEDURE:  TIME OUT PERFORMED.  The cervix was visualized with a speculum.  A single tooth tenaculum was placed on the anterior lip prior to the biopsy.  A sterile endometrial pipelle was passed without difficulty to a depth of 8 cm.  Adequate endometrial tissue was obtained.    The specimen was placed in formalyn and sent to Pathology for histology evaluation.  The patient tolerated the procedure well.    ASSESSMENT:   Abnormal uterine bleeding  626.8.    POST ENDOMETRIAL BIOPSY COUNSELING:  Manage post biopsy cramping with NSAIDs or Tylenol.  Expect spotting or light bleeding for a few days.  Report bleeding heavier than a period, fever > 101.0 F, worsening pain or a foul smelling vaginal discharge.    Counseling lasted approximately 15 minutes and all her questions were answered.    FOLLOW-UP: Pending biopsy results.    ASSESSMENT/PLAN:  Abnormal perimenopausal bleeding  Comments:  plans to conitnue aygestin daily  Orders:  -     Specimen to Pathology, Ob/Gyn

## 2024-11-19 ENCOUNTER — PATIENT MESSAGE (OUTPATIENT)
Dept: RHEUMATOLOGY | Facility: CLINIC | Age: 54
End: 2024-11-19
Payer: COMMERCIAL

## 2024-11-22 ENCOUNTER — TELEPHONE (OUTPATIENT)
Dept: FAMILY MEDICINE | Facility: CLINIC | Age: 54
End: 2024-11-22
Payer: COMMERCIAL

## 2024-11-22 RX ORDER — METHYLPREDNISOLONE 32 MG/1
32 TABLET ORAL DAILY
Qty: 10 TABLET | Refills: 0 | Status: SHIPPED | OUTPATIENT
Start: 2024-11-22 | End: 2024-12-09

## 2024-11-26 DIAGNOSIS — L40.9 PSORIASIS: ICD-10-CM

## 2024-11-26 RX ORDER — CLOBETASOL PROPIONATE 0.5 MG/G
CREAM TOPICAL NIGHTLY
Qty: 90 G | Refills: 0 | Status: SHIPPED | OUTPATIENT
Start: 2024-11-26

## 2024-11-26 NOTE — TELEPHONE ENCOUNTER
Refill Decision Note   Christine Boyd Domniic  is requesting a refill authorization.  Brief Assessment and Rationale for Refill:  Approve     Medication Therapy Plan:       Medication Reconciliation Completed: No   Comments:     No Care Gaps recommended.     Note composed:4:38 PM 11/26/2024

## 2024-12-02 ENCOUNTER — PATIENT MESSAGE (OUTPATIENT)
Dept: OBSTETRICS AND GYNECOLOGY | Facility: CLINIC | Age: 54
End: 2024-12-02
Payer: COMMERCIAL

## 2024-12-02 ENCOUNTER — LAB VISIT (OUTPATIENT)
Dept: LAB | Facility: HOSPITAL | Age: 54
End: 2024-12-02
Attending: FAMILY MEDICINE
Payer: COMMERCIAL

## 2024-12-02 DIAGNOSIS — E03.9 HYPOTHYROIDISM, UNSPECIFIED TYPE: ICD-10-CM

## 2024-12-02 PROCEDURE — 84443 ASSAY THYROID STIM HORMONE: CPT | Performed by: FAMILY MEDICINE

## 2024-12-02 PROCEDURE — 84439 ASSAY OF FREE THYROXINE: CPT | Performed by: FAMILY MEDICINE

## 2024-12-02 PROCEDURE — 36415 COLL VENOUS BLD VENIPUNCTURE: CPT | Mod: PO | Performed by: FAMILY MEDICINE

## 2024-12-03 ENCOUNTER — PATIENT MESSAGE (OUTPATIENT)
Dept: BARIATRICS | Facility: CLINIC | Age: 54
End: 2024-12-03
Payer: COMMERCIAL

## 2024-12-03 DIAGNOSIS — E03.9 HYPOTHYROIDISM, UNSPECIFIED TYPE: Primary | ICD-10-CM

## 2024-12-03 LAB
T4 FREE SERPL-MCNC: 0.87 NG/DL (ref 0.71–1.51)
TSH SERPL DL<=0.005 MIU/L-ACNC: 5.48 UIU/ML (ref 0.4–4)

## 2024-12-03 RX ORDER — LEVOTHYROXINE SODIUM 137 UG/1
137 TABLET ORAL
Qty: 30 TABLET | Refills: 11 | Status: SHIPPED | OUTPATIENT
Start: 2024-12-03 | End: 2025-12-03

## 2024-12-09 ENCOUNTER — OFFICE VISIT (OUTPATIENT)
Dept: FAMILY MEDICINE | Facility: CLINIC | Age: 54
End: 2024-12-09
Payer: COMMERCIAL

## 2024-12-09 DIAGNOSIS — F41.9 ANXIETY DISORDER, UNSPECIFIED TYPE: ICD-10-CM

## 2024-12-09 DIAGNOSIS — E66.01 SEVERE OBESITY (BMI 35.0-39.9) WITH COMORBIDITY: Primary | ICD-10-CM

## 2024-12-09 DIAGNOSIS — K21.9 GASTROESOPHAGEAL REFLUX DISEASE, UNSPECIFIED WHETHER ESOPHAGITIS PRESENT: ICD-10-CM

## 2024-12-09 RX ORDER — BUSPIRONE HYDROCHLORIDE 10 MG/1
10 TABLET ORAL 2 TIMES DAILY
Qty: 180 TABLET | Refills: 3 | Status: SHIPPED | OUTPATIENT
Start: 2024-12-09

## 2024-12-09 RX ORDER — PANTOPRAZOLE SODIUM 40 MG/1
40 TABLET, DELAYED RELEASE ORAL DAILY
Qty: 90 TABLET | Refills: 3 | Status: SHIPPED | OUTPATIENT
Start: 2024-12-09 | End: 2025-12-09

## 2024-12-09 NOTE — PROGRESS NOTES
THIS DOCUMENT WAS MADE IN PART WITH VOICE RECOGNITION SOFTWARE.  OCCASIONALLY THIS SOFTWARE WILL MISINTERPRET WORDS OR PHRASES.    Assessment and Plan:        1. Severe obesity (BMI 35.0-39.9) with comorbidity        2. Anxiety disorder, unspecified type  busPIRone (BUSPAR) 10 MG tablet      3. Gastroesophageal reflux disease, unspecified whether esophagitis present  pantoprazole (PROTONIX) 40 MG tablet          PLAN          Assessment & Plan            Discontinue Contrave     Refilled buspirone    Protonix to use daily    ______________________________________________________________________  Subjective:    Chief Complaint:  No chief complaint on file.       HPI:  Christine is a 54 y.o. year old     The patient location is: [place]    Visit type: Audiovisual    Face to Face time with patient: 15 min  20 minutes of total time spent on the encounter, which includes face to face time and non-face to face time preparing to see the patient (eg, review of tests), Obtaining and/or reviewing separately obtained history, Documenting clinical information in the electronic or other health record, Independently interpreting results (not separately reported) and communicating results to the patient/family/caregiver, or Care coordination (not separately reported).     Each patient to whom he or she provides medical services by telemedicine is:  (1) informed of the relationship between the physician and patient and the respective role of any other health care provider with respect to management of the patient; and (2) notified that he or she may decline to receive medical services by telemedicine and may withdraw from such care at any time.    Notes:     History of Present Illness            Follow-up weight loss   Previously on Contrave   Medication worked minimally at helping her lose weight       Reflux concerns   Seen by dentist  Felipe complains of noting dental erosions concerning for reflux  Denies any altagracia heartburn at this  time   Previously on PPI before the weight loss occurred     Anxiety   On buspirone, medicine ran out   Reports worsening golf her anxiety off of the buspirone    Anxiety   Rx-Buspar   Prev rx : Wellbutrin (ineffective)   + Service animal / VARSHA    Dyslipidemia  Rx- atorvastatin 40 mg    Htn  Rx : Lisinopril 5 mg BID      Hypothyroidism   Rx-levothyroxine 112 mcg    Edema   Rx : Lasix 20 mg      Glucose intolerance, pre diabetes  Recent A1c 5.0%   Reports medication compliance      SLE   Rheum : MD [facility]   Followed by Rheumatology on [place]   Taking combination of Cosentyx, prednisone, Plaquenil, Methotrexate, Gabapentin, Cyclobenzaprine  Reports feeling much better since having lupus diagnosed and treated     Psoriasis   Rx : Aclovate + Betamethasone   Derm : on [place]     GERD   Prev rx : PPI   No HB / Dysphagia     History bariatric surgery   Max weight 341 lb   Current weight 220 lb  Prev rx : Contrave (ineffective)          Past Medical History:  Past Medical History:   Diagnosis Date    Anxiety     GERD (gastroesophageal reflux disease)     Hyperlipidemia     Hypertension     Hypothyroidism     Insomnia     Observed sleep apnea     Prediabetes     Rheumatoid arthritis, unspecified     Vitamin D deficiency        Past Surgical History:  Past Surgical History:   Procedure Laterality Date    BTL      CHOLECYSTECTOMY      COLONOSCOPY N/A 8/30/2022    Procedure: COLONOSCOPY;  Surgeon: Drew Peraza MD;  Location: [facility];  Service: Endoscopy;  Laterality: N/A;    INJECTION OF ANESTHETIC AGENT AROUND MEDIAL BRANCH NERVES INNERVATING LUMBAR FACET JOINT Left 6/25/2024    Procedure: Block-nerve-medial branch-lumbar   L4/5, L5/S1;  Surgeon: Sampson Cobb MD;  Location: [facility] OR;  Service: Pain Management;  Laterality: Left;    INJECTION OF ANESTHETIC AGENT AROUND MEDIAL BRANCH NERVES INNERVATING LUMBAR FACET JOINT Left 7/17/2024    Procedure: Block-nerve-medial branch-lumbar    L4/5, L5/S1;   Surgeon: Sampson Cobb MD;  Location: [facility] OR;  Service: Pain Management;  Laterality: Left;    LAPAROSCOPIC SLEEVE GASTRECTOMY N/A 2/2/2023    Procedure: GASTRECTOMY, SLEEVE, LAPAROSCOPIC with intraop EGD;  Surgeon: Titi Ribeiro Jr., MD;  Location: [facility] OR [address] [place] [address];  Service: General;  Laterality: N/A;  PRE-CERT, PLEASE DO NOT SUBMIT FOR AUTH UNTIL 1/12/2023    RADIOFREQUENCY ABLATION OF LUMBAR MEDIAL BRANCH NERVE AT SINGLE LEVEL Left 8/9/2024    Procedure: Radiofrequency Ablation, Nerve, Spinal, Lumbar, Medial Branch, L4/5 and L5/S1;  Surgeon: Sampson Cobb MD;  Location: [facility] OR;  Service: Pain Management;  Laterality: Left;    SURGICAL REMOVAL OF BONE SPUR      left heel    TONSILLECTOMY      TUBAL LIGATION  8/1995       Family History:  Family History   Problem Relation Name Age of Onset    Ovarian cancer Mother Joanna Martines 36    Heart disease Mother Joanna Martines     Bone cancer Mother Joanna Martines     Cancer Mother Joanna Martines         cervical    Hypertension Mother Joanna Martines     Heart disease Father Titi Boyd     Diabetes type I Father Titi Boyd     Lung cancer Father Titi Boyd     Diabetes Father Titi Boyd     Hypertension Father Titi Boyd     No Known Problems Daughter      No Known Problems Daughter      Heart disease Brother Daniel Boyd     Breast cancer Neg Hx         Social History:  Social History     Socioeconomic History    Marital status:    Tobacco Use    Smoking status: Never     Passive exposure: Never    Smokeless tobacco: Never   Substance and Sexual Activity    Alcohol use: Not Currently     Alcohol/week: 0.0 standard drinks of alcohol    Drug use: Never    Sexual activity: Yes     Partners: Male     Birth control/protection: See Surgical Hx     Social Drivers of Health     Financial Resource Strain: Low Risk  (7/22/2024)    Overall Financial Resource Strain (CARDIA)     Difficulty of Paying Living Expenses: Not hard at all   Food Insecurity: No Food Insecurity  (7/22/2024)    Hunger Vital Sign     Worried About Running Out of Food in the Last Year: Never true     Ran Out of Food in the Last Year: Never true   Transportation Needs: No Transportation Needs (1/29/2024)    PRAPARE - Transportation     Lack of Transportation (Medical): No     Lack of Transportation (Non-Medical): No   Physical Activity: Insufficiently Active (7/22/2024)    Exercise Vital Sign     Days of Exercise per Week: 3 days     Minutes of Exercise per Session: 30 min   Stress: Stress Concern Present (7/22/2024)    [facility] [employer] - Occupational Stress Questionnaire     Feeling of Stress : To some extent   Housing Stability: Low Risk  (1/29/2024)    Housing Stability Vital Sign     Unable to Pay for Housing in the Last Year: No     Number of Places Lived in the Last Year: 1     Unstable Housing in the Last Year: No       Medications:  Current Outpatient Medications on File Prior to Visit   Medication Sig Dispense Refill    alclometasone (ACLOVATE) 0.05 % cream AAA face bid prn redness or scaling or itching 60 g 3    atorvastatin (LIPITOR) 40 MG tablet TAKE ONE TABLET BY MOUTH EVERY EVENING 90 tablet 1    betamethasone dipropionate 0.05 % cream Use bid 45 g 3    buPROPion (WELLBUTRIN SR) 100 MG TBSR 12 hr tablet Take ONE tablet by mouth TWO times daily 180 tablet 2    clobetasoL (TEMOVATE) 0.05 % cream Apply topically every evening. 90 g 0    cyclobenzaprine (FLEXERIL) 10 MG tablet TAKE ONE TABLET BY MOUTH EVERY EVENING 150 tablet 3    folic acid (FOLVITE) 1 MG tablet Take 2 tablets (2 mg total) by mouth once daily. 60 tablet 11    furosemide (LASIX) 20 MG tablet Take 1 tablet (20 mg total) by mouth once daily. 90 tablet 2    gabapentin (NEURONTIN) 300 MG capsule Take 1 capsule (300 mg total) by mouth 3 (three) times daily. 90 capsule 11    levothyroxine (SYNTHROID) 137 MCG Tab tablet Take 1 tablet (137 mcg total) by mouth before breakfast. 30 tablet 11    lisinopriL  (PRINIVIL,ZESTRIL) 5 MG tablet Take 1 tablet (5 mg total) by mouth 2 (two) times a day. 180 tablet 2    methotrexate, PF, (RASUVO, PF,) 25 mg/0.5 mL AtIn Inject 0.5 mLs (25 mg total) into the skin every 7 days. 4 each 3    norethindrone (AYGESTIN) 5 mg Tab Take 1 tablet (5 mg total) by mouth once daily. 90 tablet 1    secukinumab (COSENTYX PEN) 150 mg/mL PnIj Inject 150 mg into the skin every 28 days. 2 mL 11    XIIDRA 5 % Dpet APPLY 1 DROP TO BOTH EYES BID      [DISCONTINUED] busPIRone (BUSPAR) 10 MG tablet       [DISCONTINUED] methylPREDNISolone (MEDROL) 32 MG tablet Take 1 tablet (32 mg total) by mouth once daily. for 10 days 10 tablet 0    [DISCONTINUED] naltrexone-bupropion (CONTRAVE) 8-90 mg TbSR Take 1 each by mouth 2 (two) times daily. 60 tablet 5     No current facility-administered medications on file prior to visit.       Allergies:  Sulfa (sulfonamide antibiotics)    Immunizations:  Immunization History   Administered Date(s) Administered    COVID-19 MRNA, LN-S PF (MODERNA HALF 0.25 ML DOSE) 01/11/2022    COVID-19, MRNA, LN-S, PF (MODERNA FULL 0.5 ML DOSE) 03/11/2021, 04/15/2021    DTaP 01/01/1973, 02/01/1973, 04/01/1973, 08/01/1975, 04/01/1978    IPV 01/01/1973, 02/01/1973, 08/01/1975, 01/05/1978    Influenza Split 11/18/2007    MMR 01/12/1978, 10/19/1995    Td (ADULT) 04/17/1989, 08/07/2004    Tdap 11/26/2019       Review of Systems:  Review of Systems   Constitutional:  Negative for activity change and unexpected weight change.   HENT:  Positive for trouble swallowing. Negative for hearing loss and rhinorrhea.    Eyes:  Negative for discharge and visual disturbance.   Respiratory:  Negative for chest tightness and wheezing.    Cardiovascular:  Negative for chest pain and palpitations.   Gastrointestinal:  Negative for blood in stool, constipation, diarrhea and vomiting.   Endocrine: Negative for polydipsia and polyuria.   Genitourinary:  Positive for menstrual problem. Negative for difficulty  urinating, dysuria and hematuria.   Musculoskeletal:  Positive for arthralgias and joint swelling. Negative for neck pain.   Neurological:  Positive for headaches. Negative for weakness.   Psychiatric/Behavioral:  Positive for dysphoric mood. Negative for confusion.    All other systems reviewed and are negative.      Objective:    Vitals:  There were no vitals filed for this visit.    Physical Exam  Vitals reviewed.   Constitutional:       General: She is not in acute distress.     Appearance: She is well-developed.   HENT:      Head: Normocephalic and atraumatic.   Pulmonary:      Effort: Pulmonary effort is normal. No respiratory distress.   Musculoskeletal:      Cervical back: Normal range of motion.   Psychiatric:         Behavior: Behavior normal.         Thought Content: Thought content normal.         Judgment: Judgment normal.             Layo Mccrary MD  Family Medicine

## 2024-12-10 ENCOUNTER — PATIENT MESSAGE (OUTPATIENT)
Dept: BARIATRICS | Facility: CLINIC | Age: 54
End: 2024-12-10
Payer: COMMERCIAL

## 2024-12-26 ENCOUNTER — PATIENT MESSAGE (OUTPATIENT)
Dept: FAMILY MEDICINE | Facility: CLINIC | Age: 54
End: 2024-12-26
Payer: COMMERCIAL

## 2024-12-26 DIAGNOSIS — G47.33 OBSTRUCTIVE SLEEP APNEA: Primary | ICD-10-CM

## 2024-12-31 RX ORDER — TIRZEPATIDE 2.5 MG/.5ML
2.5 INJECTION, SOLUTION SUBCUTANEOUS
Qty: 4 PEN | Refills: 0 | Status: SHIPPED | OUTPATIENT
Start: 2024-12-31

## 2025-01-01 RX ORDER — GABAPENTIN 300 MG/1
600 CAPSULE ORAL 3 TIMES DAILY
Qty: 90 CAPSULE | Refills: 11 | Status: SHIPPED | OUTPATIENT
Start: 2025-01-01

## 2025-01-01 RX ORDER — METHYLPREDNISOLONE 4 MG/1
TABLET ORAL
Qty: 120 TABLET | Refills: 3 | Status: SHIPPED | OUTPATIENT
Start: 2025-01-01

## 2025-01-03 ENCOUNTER — OFFICE VISIT (OUTPATIENT)
Dept: RHEUMATOLOGY | Facility: CLINIC | Age: 55
End: 2025-01-03
Payer: COMMERCIAL

## 2025-01-03 DIAGNOSIS — L40.50 PSORIATIC ARTHRITIS: ICD-10-CM

## 2025-01-03 DIAGNOSIS — Z79.899 IMMUNODEFICIENCY DUE TO DRUG THERAPY: ICD-10-CM

## 2025-01-03 DIAGNOSIS — D84.821 IMMUNODEFICIENCY DUE TO DRUG THERAPY: ICD-10-CM

## 2025-01-03 DIAGNOSIS — M06.9 RHEUMATOID ARTHRITIS FLARE: Primary | ICD-10-CM

## 2025-01-03 RX ORDER — SECUKINUMAB 150 MG/ML
300 INJECTION SUBCUTANEOUS
Qty: 2 ML | Refills: 11 | Status: ACTIVE | OUTPATIENT
Start: 2025-01-03

## 2025-01-03 NOTE — PROGRESS NOTES
1/2/2025     3:43 PM   Rapid3 Question Responses and Scores   MDHAQ Score 0.9   Psychologic Score 7.7   Pain Score 7   When you awakened in the morning OVER THE LAST WEEK, did you feel stiff? Yes   If Yes, please indicate the number of hours until you are as limber as you will be for the day 2   Fatigue Score 8   Global Health Score 8   RAPID3 Score 6     Answers submitted by the patient for this visit:  Rheumatology Questionnaire (Submitted on 1/2/2025)  fever: No  eye redness: No  mouth sores: Yes  headaches: No  shortness of breath: Yes  chest pain: No  trouble swallowing: Yes  diarrhea: No  constipation: No  unexpected weight change: No  genital sore: No  dysuria: No  During the last 3 days, have you had a skin rash?: Yes  Bruises or bleeds easily: No  cough: No

## 2025-01-03 NOTE — PROGRESS NOTES
Subjective:       Patient ID: Christine Alfaro is a 50 y.o. female.    Chief Complaint: Disease Management    HPI 50 year old with PMH of Hashimotos disease, psoriasis,HTN, HL, ETELVINA , GERD, mitral valve regurgitation, left ankle spur removal  here for evaluation. She has been on cpap for many years.  She does not wake up well rested. She has been tired for years, but worse over the last 8 months. Last year, she started to have joint pain. She is having pain in toes, fingers, left knee, elbows, right jaw. Pain level is as high as 7/10, aching and non radiating. Reports swelling in left ankle and feet. Reports swelling in feet and lower legs for a year. In morning, she reports her hands are swollen. She has pain in mid to lower back since she was 18. She gets pustules on occasion in her hands for a year. She is getting mouth ulcer where she has dental bridge.  She clenches her teeth at night. Denies malar rash. She has dry eyes and dry mouth for last year. She takes ibuprofen 800mg po BID prn for last year to help her joint pain. It helps her take edge off.  She has had psoriasis for 3 years.  Reports dry cough for last year. Reports photosensitivity for 3 years. Denies raynauds. Denies history of thrombosis. She has had 2 miscarriages. She reports she had one miscarriage at 6 months due to blood incompatibility.  She had another miscarriage at 2.5 months. She thinks her thyroid might not have been well controlled. Over the last year and half, she has had chest pain with coughing and sometimes it can radiate to back.    She was able to have 2 healthy pregnancies.      Family hx: uncle: RA  Mother:psoriasis       Interval history: She has been on Rasuvo 25 IM once a week. She is taking plaquenil 200mg po BID.She has been on Cosentyx one shot for a month.   She was diagnosed with lumbar synovial cysts and is going to plan to get drainage.   She woke up with malar rash today.  Denies joint pain and or swelling.  "      Past Medical History:   Diagnosis Date    Anxiety     GERD (gastroesophageal reflux disease)     Hyperlipidemia     Hypertension     Hypothyroidism     Insomnia     Observed sleep apnea     Vitamin D deficiency        Review of Systems   Constitutional: Positive for fatigue. Negative for activity change, appetite change, chills, diaphoresis, fever and unexpected weight change.   HENT: Negative for congestion, ear discharge, ear pain, facial swelling, mouth sores, sinus pressure, sneezing, sore throat, tinnitus and trouble swallowing.    Eyes: Negative for photophobia, pain, discharge, redness, itching and visual disturbance.   Respiratory: Negative for apnea, cough, chest tightness, shortness of breath, wheezing and stridor.    Cardiovascular: Negative for chest pain and leg swelling.   Gastrointestinal: Negative for abdominal distention, abdominal pain, anal bleeding, blood in stool, constipation, diarrhea and nausea.   Endocrine: Negative for cold intolerance and heat intolerance.   Genitourinary: Negative for difficulty urinating, dysuria and genital sores.   Musculoskeletal: Positive for arthralgias, back pain, gait problem and joint swelling. Negative for neck pain and neck stiffness.   Skin: Positive for rash. Negative for color change, pallor and wound.   Neurological: Negative for dizziness, seizures, light-headedness, numbness and headaches.   Hematological: Negative for adenopathy. Does not bruise/bleed easily.   Psychiatric/Behavioral: Negative for sleep disturbance. The patient is not nervous/anxious.            Objective:   BP (!) 150/85   Pulse 84   Ht 5' 6" (1.676 m)   Wt (!) 154.4 kg (340 lb 6.2 oz)   BMI 54.94 kg/m²      Physical Exam   Constitutional: She is oriented to person, place, and time.   HENT:   Head: Normocephalic and atraumatic.   Right Ear: External ear normal.   Left Ear: External ear normal.   Nose: Nose normal.   Mouth/Throat: Oropharynx is clear and moist. No oropharyngeal " exudate.   Eyes: Conjunctivae and EOM are normal. Pupils are equal, round, and reactive to light. Right eye exhibits no discharge. Left eye exhibits no discharge. No scleral icterus.   Neck: No JVD present. No thyromegaly present.   Cardiovascular: Normal rate, regular rhythm, normal heart sounds and intact distal pulses.  Exam reveals no gallop and no friction rub.    No murmur heard.  Pulmonary/Chest: Effort normal and breath sounds normal. No respiratory distress. She has no wheezes. She has no rales. She exhibits no tenderness.   Abdominal: Soft. Bowel sounds are normal. She exhibits no distension and no mass. There is no abdominal tenderness. There is no rebound and no guarding.   Lymphadenopathy:     She has no cervical adenopathy.   Neurological: She is alert and oriented to person, place, and time. She displays normal reflexes. No cranial nerve deficit. She exhibits normal muscle tone. Gait normal. Coordination normal.   Skin: Skin is dry. Rash noted. No erythema. No pallor.     Rash in forearms   Psychiatric: Affect and judgment normal.   Musculoskeletal: Tenderness present. No deformity or edema.          No data to display     Assessment:     54 year old with PMH of Hashimotos disease  ,HTN, HL, ETELVINA ,  gastric sleeve, GERD, mitral valve regurgitation, left ankle spur removal  here for follow up of SLE.  She had initial improvement with Orencia infusions and MTX 20mg once a week but was flaring.  Will increase MTX to 25 mg once a week.      #SLE:overall controlled. Given +thu, dsdna,arthritis, photosensitivity,  Pleurisy, and fatigue, this is concerning for SLE. GIVEN THAT SHE HAS SLE, cannot do anti- tnf.  Labs today  Continue plaquenil 200mg po BID (last eye exam in 3/2024-clear).    #pso A:flaring  -increase Cosentyx  from 150 mg every 4 weeks to 300mg every 4 weeks  Patient reports feeling better on Orencia infusions so can consider putting her back on it   -continue Rasuvo 25mg once a week  Increase  gabapentin from 300mg po TID to 600mg po TID   Labs every 3 months   Disp Refills Start End MARCO   methylPREDNISolone (MEDROL) 4 MG Tab 120 tablet 3 1/1/2025 -- No   Sig: Take 2 tablets daily for 2 weeks then 1 tablet daily     #Myalgias: controlled.  -Continue flexeril 10mg po qhs     #obesity: encourage weight loss         The patient location is: home  The chief complaint leading to consultation is: 47    Visit type: audiovisual    Face to Face time with patient: 30   minutes of total time spent on the encounter, which includes face to face time and non-face to face time preparing to see the patient (eg, review of tests), Obtaining and/or reviewing separately obtained history, Documenting clinical information in the electronic or other health record, Independently interpreting results (not separately reported) and communicating results to the patient/family/caregiver, or Care coordination (not separately reported).         Each patient to whom he or she provides medical services by telemedicine is:  (1) informed of the relationship between the physician and patient and the respective role of any other health care provider with respect to management of the patient; and (2) notified that he or she may decline to receive medical services by telemedicine and may withdraw from such care at any time.    Notes:

## 2025-01-06 ENCOUNTER — PATIENT MESSAGE (OUTPATIENT)
Dept: BARIATRICS | Facility: CLINIC | Age: 55
End: 2025-01-06
Payer: COMMERCIAL

## 2025-01-14 ENCOUNTER — PATIENT MESSAGE (OUTPATIENT)
Dept: BARIATRICS | Facility: CLINIC | Age: 55
End: 2025-01-14
Payer: COMMERCIAL

## 2025-01-15 DIAGNOSIS — M54.9 DORSALGIA, UNSPECIFIED: ICD-10-CM

## 2025-01-15 DIAGNOSIS — M54.16 LUMBAR RADICULOPATHY, CHRONIC: ICD-10-CM

## 2025-01-15 RX ORDER — TRAMADOL HYDROCHLORIDE 50 MG/1
50 TABLET ORAL EVERY 8 HOURS PRN
Qty: 60 TABLET | Refills: 3 | Status: SHIPPED | OUTPATIENT
Start: 2025-01-15

## 2025-01-24 ENCOUNTER — LAB VISIT (OUTPATIENT)
Dept: LAB | Facility: HOSPITAL | Age: 55
End: 2025-01-24
Attending: INTERNAL MEDICINE
Payer: COMMERCIAL

## 2025-01-24 DIAGNOSIS — L40.50 PSORIATIC ARTHRITIS: ICD-10-CM

## 2025-01-24 LAB
BASOPHILS # BLD AUTO: 0.05 K/UL (ref 0–0.2)
BASOPHILS NFR BLD: 0.7 % (ref 0–1.9)
C3 SERPL-MCNC: 112 MG/DL (ref 50–180)
C4 SERPL-MCNC: 19 MG/DL (ref 11–44)
CREAT UR-MCNC: 52 MG/DL (ref 15–325)
DIFFERENTIAL METHOD BLD: ABNORMAL
EOSINOPHIL # BLD AUTO: 0.1 K/UL (ref 0–0.5)
EOSINOPHIL NFR BLD: 1.3 % (ref 0–8)
ERYTHROCYTE [DISTWIDTH] IN BLOOD BY AUTOMATED COUNT: 13.8 % (ref 11.5–14.5)
ERYTHROCYTE [SEDIMENTATION RATE] IN BLOOD BY WESTERGREN METHOD: 1 MM/HR (ref 0–20)
HCT VFR BLD AUTO: 45.6 % (ref 37–48.5)
HGB BLD-MCNC: 14.5 G/DL (ref 12–16)
IMM GRANULOCYTES # BLD AUTO: 0.02 K/UL (ref 0–0.04)
IMM GRANULOCYTES NFR BLD AUTO: 0.3 % (ref 0–0.5)
LYMPHOCYTES # BLD AUTO: 3.5 K/UL (ref 1–4.8)
LYMPHOCYTES NFR BLD: 48.8 % (ref 18–48)
MCH RBC QN AUTO: 30.7 PG (ref 27–31)
MCHC RBC AUTO-ENTMCNC: 31.8 G/DL (ref 32–36)
MCV RBC AUTO: 97 FL (ref 82–98)
MONOCYTES # BLD AUTO: 0.5 K/UL (ref 0.3–1)
MONOCYTES NFR BLD: 6.6 % (ref 4–15)
NEUTROPHILS # BLD AUTO: 3 K/UL (ref 1.8–7.7)
NEUTROPHILS NFR BLD: 42.3 % (ref 38–73)
NRBC BLD-RTO: 0 /100 WBC
PLATELET # BLD AUTO: 219 K/UL (ref 150–450)
PMV BLD AUTO: 10.2 FL (ref 9.2–12.9)
PROT UR-MCNC: <7 MG/DL (ref 0–15)
PROT/CREAT UR: NORMAL MG/G{CREAT} (ref 0–0.2)
RBC # BLD AUTO: 4.72 M/UL (ref 4–5.4)
WBC # BLD AUTO: 7.09 K/UL (ref 3.9–12.7)

## 2025-01-24 PROCEDURE — 36415 COLL VENOUS BLD VENIPUNCTURE: CPT | Mod: PO | Performed by: INTERNAL MEDICINE

## 2025-01-24 PROCEDURE — 85651 RBC SED RATE NONAUTOMATED: CPT | Mod: PO | Performed by: INTERNAL MEDICINE

## 2025-01-24 PROCEDURE — 86225 DNA ANTIBODY NATIVE: CPT | Performed by: INTERNAL MEDICINE

## 2025-01-24 PROCEDURE — 80053 COMPREHEN METABOLIC PANEL: CPT | Performed by: INTERNAL MEDICINE

## 2025-01-24 PROCEDURE — 85025 COMPLETE CBC W/AUTO DIFF WBC: CPT | Performed by: INTERNAL MEDICINE

## 2025-01-24 PROCEDURE — 82570 ASSAY OF URINE CREATININE: CPT | Performed by: INTERNAL MEDICINE

## 2025-01-24 PROCEDURE — 86140 C-REACTIVE PROTEIN: CPT | Performed by: INTERNAL MEDICINE

## 2025-01-24 PROCEDURE — 86160 COMPLEMENT ANTIGEN: CPT | Mod: 59 | Performed by: INTERNAL MEDICINE

## 2025-01-24 PROCEDURE — 86160 COMPLEMENT ANTIGEN: CPT | Performed by: INTERNAL MEDICINE

## 2025-01-25 LAB
ALBUMIN SERPL BCP-MCNC: 3.7 G/DL (ref 3.5–5.2)
ALP SERPL-CCNC: 53 U/L (ref 40–150)
ALT SERPL W/O P-5'-P-CCNC: 20 U/L (ref 10–44)
ANION GAP SERPL CALC-SCNC: 10 MMOL/L (ref 8–16)
AST SERPL-CCNC: 17 U/L (ref 10–40)
BILIRUB SERPL-MCNC: 0.5 MG/DL (ref 0.1–1)
BUN SERPL-MCNC: 11 MG/DL (ref 6–20)
CALCIUM SERPL-MCNC: 9.1 MG/DL (ref 8.7–10.5)
CHLORIDE SERPL-SCNC: 107 MMOL/L (ref 95–110)
CO2 SERPL-SCNC: 23 MMOL/L (ref 23–29)
CREAT SERPL-MCNC: 0.9 MG/DL (ref 0.5–1.4)
CRP SERPL-MCNC: 0.7 MG/L (ref 0–8.2)
DSDNA AB SER-ACNC: NORMAL [IU]/ML
EST. GFR  (NO RACE VARIABLE): >60 ML/MIN/1.73 M^2
GLUCOSE SERPL-MCNC: 67 MG/DL (ref 70–110)
POTASSIUM SERPL-SCNC: 4.5 MMOL/L (ref 3.5–5.1)
PROT SERPL-MCNC: 6.6 G/DL (ref 6–8.4)
SODIUM SERPL-SCNC: 140 MMOL/L (ref 136–145)

## 2025-01-29 DIAGNOSIS — L40.50 PSORIATIC ARTHRITIS: ICD-10-CM

## 2025-01-29 RX ORDER — METHOTREXATE 25 MG/.5ML
25 INJECTION, SOLUTION SUBCUTANEOUS
Qty: 4 EACH | Refills: 3 | Status: ACTIVE | OUTPATIENT
Start: 2025-01-29

## 2025-01-31 DIAGNOSIS — N92.4 ABNORMAL PERIMENOPAUSAL BLEEDING: ICD-10-CM

## 2025-01-31 NOTE — TELEPHONE ENCOUNTER
Refill Routing Note   Medication(s) are not appropriate for processing by [facility] for the following reason(s):        Drug-disease interaction    [facility] action(s):  Defer        Medication Therapy Plan: Drug-Disease: norethindrone and SLE with normal kidneys    Pharmacist review requested: Yes     Appointments  past 12m or future 3m with PCP    Date Provider   Last Visit   11/14/2024 Mely Braxton MD   Next Visit   2/18/2025 Mely Braxton MD   ED visits in past 90 days: 0        Note composed:9:18 AM 01/31/2025

## 2025-01-31 NOTE — TELEPHONE ENCOUNTER
Refill Routing Note   Medication(s) are not appropriate for processing by [facility] for the following reason(s):        Clarification of medication (Rx) details    [facility] action(s):  Defer    Unclear if patient is to continue therapy.       Pharmacist review requested: Yes     Appointments  past 12m or future 3m with PCP    Date Provider   Last Visit   11/14/2024 Mely Braxton MD   Next Visit   2/18/2025 Mely Braxton MD   ED visits in past 90 days: 0        Note composed:10:16 AM 01/31/2025

## 2025-02-03 RX ORDER — NORETHINDRONE 5 MG/1
5 TABLET ORAL DAILY
Qty: 90 TABLET | Refills: 3 | Status: SHIPPED | OUTPATIENT
Start: 2025-02-03 | End: 2025-02-18 | Stop reason: SDUPTHER

## 2025-02-07 ENCOUNTER — OFFICE VISIT (OUTPATIENT)
Dept: FAMILY MEDICINE | Facility: CLINIC | Age: 55
End: 2025-02-07
Payer: COMMERCIAL

## 2025-02-07 VITALS
OXYGEN SATURATION: 99 % | SYSTOLIC BLOOD PRESSURE: 118 MMHG | RESPIRATION RATE: 16 BRPM | WEIGHT: 240.94 LBS | BODY MASS INDEX: 38.72 KG/M2 | DIASTOLIC BLOOD PRESSURE: 76 MMHG | HEIGHT: 66 IN | HEART RATE: 88 BPM

## 2025-02-07 DIAGNOSIS — E66.01 SEVERE OBESITY (BMI 35.0-39.9) WITH COMORBIDITY: ICD-10-CM

## 2025-02-07 DIAGNOSIS — R59.0 LAD (LYMPHADENOPATHY), CERVICAL: ICD-10-CM

## 2025-02-07 DIAGNOSIS — E04.1 THYROID NODULE: ICD-10-CM

## 2025-02-07 DIAGNOSIS — F41.9 ANXIETY DISORDER, UNSPECIFIED TYPE: Primary | ICD-10-CM

## 2025-02-07 DIAGNOSIS — E78.5 DYSLIPIDEMIA: ICD-10-CM

## 2025-02-07 DIAGNOSIS — R60.9 EDEMA, UNSPECIFIED TYPE: ICD-10-CM

## 2025-02-07 DIAGNOSIS — Z00.00 WELLNESS EXAMINATION: ICD-10-CM

## 2025-02-07 DIAGNOSIS — E03.9 HYPOTHYROIDISM, UNSPECIFIED TYPE: ICD-10-CM

## 2025-02-07 PROCEDURE — 3074F SYST BP LT 130 MM HG: CPT | Mod: CPTII,S$GLB,, | Performed by: FAMILY MEDICINE

## 2025-02-07 PROCEDURE — 99396 PREV VISIT EST AGE 40-64: CPT | Mod: S$GLB,,, | Performed by: FAMILY MEDICINE

## 2025-02-07 PROCEDURE — 3008F BODY MASS INDEX DOCD: CPT | Mod: CPTII,S$GLB,, | Performed by: FAMILY MEDICINE

## 2025-02-07 PROCEDURE — 3078F DIAST BP <80 MM HG: CPT | Mod: CPTII,S$GLB,, | Performed by: FAMILY MEDICINE

## 2025-02-07 PROCEDURE — 1159F MED LIST DOCD IN RCRD: CPT | Mod: CPTII,S$GLB,, | Performed by: FAMILY MEDICINE

## 2025-02-07 PROCEDURE — 99999 PR PBB SHADOW E&M-EST. PATIENT-LVL IV: CPT | Mod: PBBFAC,,, | Performed by: FAMILY MEDICINE

## 2025-02-07 RX ORDER — FUROSEMIDE 20 MG/1
20 TABLET ORAL DAILY
Qty: 90 TABLET | Refills: 2 | Status: SHIPPED | OUTPATIENT
Start: 2025-02-07

## 2025-02-07 RX ORDER — BUSPIRONE HYDROCHLORIDE 15 MG/1
15 TABLET ORAL 3 TIMES DAILY
Qty: 90 TABLET | Refills: 11 | Status: SHIPPED | OUTPATIENT
Start: 2025-02-07 | End: 2026-02-07

## 2025-02-07 NOTE — PROGRESS NOTES
THIS DOCUMENT WAS MADE IN PART WITH VOICE RECOGNITION SOFTWARE.  OCCASIONALLY THIS SOFTWARE WILL MISINTERPRET WORDS OR PHRASES.    Assessment and Plan:        1. Anxiety disorder, unspecified type  busPIRone (BUSPAR) 15 MG tablet      2. Severe obesity (BMI 35.0-39.9) with comorbidity        3. Dyslipidemia        4. Hypothyroidism, unspecified type        5. Wellness examination  Hemoglobin A1C    Lipid Panel    TSH    T4, Free      6. Edema, unspecified type  furosemide (LASIX) 20 MG tablet      7. LAD (lymphadenopathy), cervical  US Thyroid      8. Thyroid nodule  US Thyroid            PLAN          Assessment & Plan    PLAN SUMMARY:   A1C test ordered   Thyroid function test ordered   Thyroid ultrasound ordered   Cholesterol panel ordered   Continued prednisone as needed   Increased Buspirone to 15 mg, up to 3 times daily as needed   Refilled Lasix 20 mg   Follow up in 6 months for virtual appointment    HYPOGLYCEMIA:   Explained that mild low glucose (67) without glucose-lowering medications is generally not concerning.    LYMPHADENOPATHY:   Discussed that tender lymph nodes are typically benign and reactive, often related to allergy symptoms.   Contact office if lymph nodes enlarge or do not resolve.    THYROID DISORDER:   Explained thyroid nodules and criteria for biopsy consideration.   Thyroid function test ordered.   Thyroid ultrasound ordered.    ANXIETY:   Increased Buspirone to 15 mg, with instructions to take up to 3 times daily as needed.    EDEMA:   Refilled Lasix 20 mg.    INFLAMMATION:   Continued prednisone as needed.    HYPERLIPIDEMIA:   Cholesterol panel ordered.    DIABETES:   A1C test ordered.    FOLLOW UP:   Follow up in 6 months for virtual appointment.             ______________________________________________________________________  Subjective:    Chief Complaint:  Chief Complaint   Patient presents with    Follow-up        HPI:  Christine is a 54 y.o. year old         History of Present  Illness    CHIEF COMPLAINT:  Christine presents today for a wellness visit and follow-up.    AUTOIMMUNE CONDITIONS:  She recently experienced a lupus flare-up requiring 3 months of Medrol treatment. Current medications for lupus management include Cosentyx, Plaquenil, Methotrexate, Gabapentin, and Cyclobenzaprine as needed. Prednisone is used on an as-needed basis. She has a history of thyroiditis with Hashimoto's and thyroid nodules that did not meet criteria for biopsy. She currently takes thyroid medication at 137 mcg dose.    ANXIETY AND MENOPAUSE:  She reports worsening anxiety attributed to concurrent steroid therapy and menopausal hormonal changes. She takes Buspirone 10 mg twice daily with some improvement in symptoms. She reports bleeding throughout October and currently takes Podrestron for menopausal symptoms.    DERMATOLOGIC:  She continues to manage psoriasis under dermatologist care on the [facility]. Current treatment regimen includes Aclevate and betamethasone.    CURRENT SYMPTOMS:  She reports sore lymph nodes for 2-3 weeks with sneezing and itchy eyes. She denies runny nose and post-nasal drip.    WEIGHT MANAGEMENT:  Her previous maximum weight was around 350 lbs. She discontinued Contrave due to minimal effectiveness.    MEDICAL HISTORY:  She has sleep apnea. She denies chest pain or shortness of breath since stress test in 2022. She is a non-smoker and non-diabetic.    GASTROINTESTINAL:  She restarted PPI due to dental erosions noted by dentist. She denies breakthrough heartburn.    MEDICATIONS:  She reports taking Lasix 20mg for fluid management but indicates prescription has run out.      ROS:  ROS as indicated in HPI.           Anxiety   Rx - Buspar   Prev rx : Wellbutrin (ineffective)   + Service animal / VARSHA    Dyslipidemia  Rx- atorvastatin 40 mg    Htn  Rx : Lisinopril 5 mg BID      Hypothyroidism + nodules   Rx-levothyroxine 137 mcg    Edema   Rx : Lasix 20 mg      Glucose intolerance, pre  diabetes  Recent A1c 5 %   Reports medication compliance      SLE   Rheum : MD [facility]   Followed by Rheumatology on [place]   Rx : Cosentyx, prednisone PRN, Plaquenil, Methotrexate, Gabapentin, Cyclobenzaprine  Reports feeling much better since having lupus diagnosed and treated     Psoriasis   Rx : Aclovate + Betamethasone   Derm : [facility]     GERD   Rx : Protonix    No HB / Dysphagia     History bariatric surgery   Max weight 352 lb   Current weight 240  Prev rx : Contrave (ineffective)    ETELVINA  CPAP compliant           Past Medical History:  Past Medical History:   Diagnosis Date    Anxiety     GERD (gastroesophageal reflux disease)     Hyperlipidemia     Hypertension     Hypothyroidism     Insomnia     Observed sleep apnea     Prediabetes     Rheumatoid arthritis, unspecified     Vitamin D deficiency        Past Surgical History:  Past Surgical History:   Procedure Laterality Date    BTL      CHOLECYSTECTOMY      COLONOSCOPY N/A 8/30/2022    Procedure: COLONOSCOPY;  Surgeon: Drew Peraza MD;  Location: [facility];  Service: Endoscopy;  Laterality: N/A;    INJECTION OF ANESTHETIC AGENT AROUND MEDIAL BRANCH NERVES INNERVATING LUMBAR FACET JOINT Left 6/25/2024    Procedure: Block-nerve-medial branch-lumbar   L4/5, L5/S1;  Surgeon: Sampson Cobb MD;  Location: [facility] OR;  Service: Pain Management;  Laterality: Left;    INJECTION OF ANESTHETIC AGENT AROUND MEDIAL BRANCH NERVES INNERVATING LUMBAR FACET JOINT Left 7/17/2024    Procedure: Block-nerve-medial branch-lumbar    L4/5, L5/S1;  Surgeon: Sampson Cobb MD;  Location: [facility] OR;  Service: Pain Management;  Laterality: Left;    LAPAROSCOPIC SLEEVE GASTRECTOMY N/A 2/2/2023    Procedure: GASTRECTOMY, SLEEVE, LAPAROSCOPIC with intraop EGD;  Surgeon: Titi Ribeiro Jr., MD;  Location: [address];  Service: General;  Laterality: N/A;  PRE-CERT, PLEASE DO NOT SUBMIT FOR AUTH UNTIL 1/12/2023    RADIOFREQUENCY ABLATION OF LUMBAR MEDIAL BRANCH  NERVE AT SINGLE LEVEL Left 8/9/2024    Procedure: Radiofrequency Ablation, Nerve, Spinal, Lumbar, Medial Branch, L4/5 and L5/S1;  Surgeon: Sampson Cobb MD;  Location: [facility] OR;  Service: Pain Management;  Laterality: Left;    SURGICAL REMOVAL OF BONE SPUR      left heel    TONSILLECTOMY      TUBAL LIGATION  8/1995       Family History:  Family History   Problem Relation Name Age of Onset    Ovarian cancer Mother Joanna Martines 36    Heart disease Mother Joanna Martines     Bone cancer Mother Joanna Martines     Cancer Mother Joanna Martines         cervical    Hypertension Mother Joanna Martines     Heart disease Father Titi Boyd     Diabetes type I Father Titi Boyd     Lung cancer Father Titi Boyd     Diabetes Father Titi Boyd     Hypertension Father Titi Boyd     No Known Problems Daughter      No Known Problems Daughter      Heart disease Brother Daniel Boyd     Breast cancer Neg Hx         Social History:  Social History     Socioeconomic History    Marital status:    Tobacco Use    Smoking status: Never     Passive exposure: Never    Smokeless tobacco: Never   Substance and Sexual Activity    Alcohol use: Not Currently     Alcohol/week: 0.0 standard drinks of alcohol    Drug use: Never    Sexual activity: Yes     Partners: Male     Birth control/protection: See Surgical Hx     Social Drivers of Health     Financial Resource Strain: Low Risk  (7/22/2024)    Overall Financial Resource Strain (CARDIA)     Difficulty of Paying Living Expenses: Not hard at all   Food Insecurity: No Food Insecurity (7/22/2024)    Hunger Vital Sign     Worried About Running Out of Food in the Last Year: Never true     Ran Out of Food in the Last Year: Never true   Transportation Needs: No Transportation Needs (1/29/2024)    PRAPARE - Transportation     Lack of Transportation (Medical): No     Lack of Transportation (Non-Medical): No   Physical Activity: Insufficiently Active (7/22/2024)    Exercise Vital Sign     Days of Exercise per Week:  3 days     Minutes of Exercise per Session: 30 min   Stress: Stress Concern Present (7/22/2024)    [employer] - Occupational Stress Questionnaire     Feeling of Stress : To some extent   Housing Stability: Low Risk  (1/29/2024)    Housing Stability Vital Sign     Unable to Pay for Housing in the Last Year: No     Number of Places Lived in the Last Year: 1     Unstable Housing in the Last Year: No       Medications:  Current Outpatient Medications on File Prior to Visit   Medication Sig Dispense Refill    alclometasone (ACLOVATE) 0.05 % cream AAA face bid prn redness or scaling or itching 60 g 3    atorvastatin (LIPITOR) 40 MG tablet TAKE ONE TABLET BY MOUTH EVERY EVENING 90 tablet 1    betamethasone dipropionate 0.05 % cream Use bid 45 g 3    buPROPion (WELLBUTRIN SR) 100 MG TBSR 12 hr tablet Take ONE tablet by mouth TWO times daily 180 tablet 2    clobetasoL (TEMOVATE) 0.05 % cream Apply topically every evening. 90 g 0    cyclobenzaprine (FLEXERIL) 10 MG tablet TAKE ONE TABLET BY MOUTH EVERY EVENING 150 tablet 3    folic acid (FOLVITE) 1 MG tablet Take 2 tablets (2 mg total) by mouth once daily. 60 tablet 11    gabapentin (NEURONTIN) 300 MG capsule Take 2 capsules (600 mg total) by mouth 3 (three) times daily. 90 capsule 11    levothyroxine (SYNTHROID) 137 MCG Tab tablet Take 1 tablet (137 mcg total) by mouth before breakfast. 30 tablet 11    lisinopriL (PRINIVIL,ZESTRIL) 5 MG tablet Take 1 tablet (5 mg total) by mouth 2 (two) times a day. 180 tablet 2    methotrexate, PF, (RASUVO, PF,) 25 mg/0.5 mL AtIn Inject 0.5 mLs (25 mg total) into the skin every 7 days. 4 each 3    methylPREDNISolone (MEDROL) 4 MG Tab Take 2 tablets daily for 2 weeks then 1 tablet daily 120 tablet 3    norethindrone (AYGESTIN) 5 mg Tab Take 1 tablet (5 mg total) by mouth once daily. 90 tablet 3    pantoprazole (PROTONIX) 40 MG tablet Take 1 tablet (40 mg total) by mouth once daily. 90 tablet 3    secukinumab  (COSENTYX PEN) 150 mg/mL PnIj Inject 300 mg into the skin every 28 days. 2 mL 11    traMADoL (ULTRAM) 50 mg tablet Take 1 tablet (50 mg total) by mouth every 8 (eight) hours as needed. 60 tablet 3    XIIDRA 5 % Dpet APPLY 1 DROP TO BOTH EYES BID      [DISCONTINUED] busPIRone (BUSPAR) 10 MG tablet Take 1 tablet (10 mg total) by mouth 2 (two) times daily. 180 tablet 3    [DISCONTINUED] furosemide (LASIX) 20 MG tablet Take 1 tablet (20 mg total) by mouth once daily. 90 tablet 2    tirzepatide, weight loss, (ZEPBOUND) 2.5 mg/0.5 mL PnIj Inject 2.5 mg into the skin every 7 days. 4 Pen 0     No current facility-administered medications on file prior to visit.       Allergies:  Sulfa (sulfonamide antibiotics)    Immunizations:  Immunization History   Administered Date(s) Administered    COVID-19 MRNA, LN-S PF (MODERNA HALF 0.25 ML DOSE) 01/11/2022    COVID-19, MRNA, LN-S, PF (MODERNA FULL 0.5 ML DOSE) 03/11/2021, 04/15/2021    DTaP 01/01/1973, 02/01/1973, 04/01/1973, 08/01/1975, 04/01/1978    IPV 01/01/1973, 02/01/1973, 08/01/1975, 01/05/1978    Influenza Split 11/18/2007    MMR 01/12/1978, 10/19/1995    Td (ADULT) 04/17/1989, 08/07/2004    Tdap 11/26/2019       Review of Systems:  Review of Systems   Constitutional:  Negative for activity change and unexpected weight change.   HENT:  Positive for trouble swallowing. Negative for hearing loss and rhinorrhea.    Eyes:  Negative for discharge and visual disturbance.   Respiratory:  Negative for chest tightness and wheezing.    Cardiovascular:  Negative for chest pain and palpitations.   Gastrointestinal:  Negative for blood in stool, constipation, diarrhea and vomiting.   Endocrine: Negative for polydipsia and polyuria.   Genitourinary:  Positive for menstrual problem. Negative for difficulty urinating, dysuria and hematuria.   Musculoskeletal:  Positive for arthralgias and joint swelling. Negative for neck pain.   Neurological:  Positive for headaches. Negative for  "weakness.   Psychiatric/Behavioral:  Positive for dysphoric mood. Negative for confusion.    All other systems reviewed and are negative.      Objective:    Vitals:  Vitals:    02/07/25 1103   BP: 118/76   Pulse: 88   Resp: 16   SpO2: 99%   Weight: 109.3 kg (240 lb 15.4 oz)   Height: 5' 6" (1.676 m)   PainSc: 0-No pain       Physical Exam  Vitals reviewed.   Constitutional:       General: She is not in acute distress.  HENT:      Head: Normocephalic and atraumatic.   Eyes:      Pupils: Pupils are equal, round, and reactive to light.   Cardiovascular:      Rate and Rhythm: Normal rate and regular rhythm.      Heart sounds: No murmur heard.     No friction rub.   Pulmonary:      Effort: Pulmonary effort is normal.      Breath sounds: Normal breath sounds.   Abdominal:      General: Bowel sounds are normal. There is no distension.      Palpations: Abdomen is soft.      Tenderness: There is no abdominal tenderness.   Musculoskeletal:      Cervical back: Neck supple.   Skin:     General: Skin is warm and dry.      Findings: No rash.   Psychiatric:         Behavior: Behavior normal.             Layo Mccrary MD  Family Medicine      "

## 2025-02-10 ENCOUNTER — LAB VISIT (OUTPATIENT)
Dept: LAB | Facility: HOSPITAL | Age: 55
End: 2025-02-10
Attending: FAMILY MEDICINE
Payer: COMMERCIAL

## 2025-02-10 ENCOUNTER — PATIENT MESSAGE (OUTPATIENT)
Dept: BARIATRICS | Facility: CLINIC | Age: 55
End: 2025-02-10
Payer: COMMERCIAL

## 2025-02-10 DIAGNOSIS — Z00.00 WELLNESS EXAMINATION: ICD-10-CM

## 2025-02-10 LAB
CHOLEST SERPL-MCNC: 147 MG/DL (ref 120–199)
CHOLEST/HDLC SERPL: 2.4 {RATIO} (ref 2–5)
ESTIMATED AVG GLUCOSE: 97 MG/DL (ref 68–131)
HBA1C MFR BLD: 5 % (ref 4–5.6)
HDLC SERPL-MCNC: 62 MG/DL (ref 40–75)
HDLC SERPL: 42.2 % (ref 20–50)
LDLC SERPL CALC-MCNC: 75 MG/DL (ref 63–159)
NONHDLC SERPL-MCNC: 85 MG/DL
T4 FREE SERPL-MCNC: 1.11 NG/DL (ref 0.71–1.51)
TRIGL SERPL-MCNC: 50 MG/DL (ref 30–150)
TSH SERPL DL<=0.005 MIU/L-ACNC: 6.23 UIU/ML (ref 0.4–4)

## 2025-02-10 PROCEDURE — 80061 LIPID PANEL: CPT | Performed by: FAMILY MEDICINE

## 2025-02-10 PROCEDURE — 36415 COLL VENOUS BLD VENIPUNCTURE: CPT | Mod: PO | Performed by: FAMILY MEDICINE

## 2025-02-10 PROCEDURE — 83036 HEMOGLOBIN GLYCOSYLATED A1C: CPT | Performed by: FAMILY MEDICINE

## 2025-02-10 PROCEDURE — 84439 ASSAY OF FREE THYROXINE: CPT | Performed by: FAMILY MEDICINE

## 2025-02-10 PROCEDURE — 84443 ASSAY THYROID STIM HORMONE: CPT | Performed by: FAMILY MEDICINE

## 2025-02-11 DIAGNOSIS — E03.9 HYPOTHYROIDISM, UNSPECIFIED TYPE: Primary | ICD-10-CM

## 2025-02-11 RX ORDER — LEVOTHYROXINE SODIUM 150 UG/1
150 TABLET ORAL
Qty: 30 TABLET | Refills: 11 | Status: SHIPPED | OUTPATIENT
Start: 2025-02-11 | End: 2026-02-11

## 2025-02-13 ENCOUNTER — HOSPITAL ENCOUNTER (OUTPATIENT)
Dept: RADIOLOGY | Facility: HOSPITAL | Age: 55
Discharge: HOME | End: 2025-02-13
Attending: FAMILY MEDICINE
Payer: COMMERCIAL

## 2025-02-13 DIAGNOSIS — E04.1 THYROID NODULE: ICD-10-CM

## 2025-02-13 DIAGNOSIS — R59.0 LAD (LYMPHADENOPATHY), CERVICAL: ICD-10-CM

## 2025-02-13 PROCEDURE — 76536 US EXAM OF HEAD AND NECK: CPT | Mod: TC,PO

## 2025-02-13 PROCEDURE — 76536 US EXAM OF HEAD AND NECK: CPT | Mod: 26,,, | Performed by: RADIOLOGY

## 2025-02-18 ENCOUNTER — OFFICE VISIT (OUTPATIENT)
Dept: OBSTETRICS AND GYNECOLOGY | Facility: CLINIC | Age: 55
End: 2025-02-18
Payer: COMMERCIAL

## 2025-02-18 VITALS
WEIGHT: 241.19 LBS | BODY MASS INDEX: 38.93 KG/M2 | DIASTOLIC BLOOD PRESSURE: 60 MMHG | SYSTOLIC BLOOD PRESSURE: 114 MMHG

## 2025-02-18 DIAGNOSIS — R45.4 IRRITABILITY: Primary | ICD-10-CM

## 2025-02-18 DIAGNOSIS — M32.9 SLE WITH NORMAL KIDNEYS: ICD-10-CM

## 2025-02-18 DIAGNOSIS — R93.89 THICKENED ENDOMETRIUM: ICD-10-CM

## 2025-02-18 DIAGNOSIS — N92.4 ABNORMAL PERIMENOPAUSAL BLEEDING: ICD-10-CM

## 2025-02-18 DIAGNOSIS — Z90.3 S/P GASTRIC SLEEVE PROCEDURE: ICD-10-CM

## 2025-02-18 DIAGNOSIS — G47.33 OSA (OBSTRUCTIVE SLEEP APNEA): ICD-10-CM

## 2025-02-18 DIAGNOSIS — M06.9 RHEUMATOID ARTHRITIS FLARE: ICD-10-CM

## 2025-02-18 DIAGNOSIS — E66.812 CLASS 2 OBESITY WITH BODY MASS INDEX (BMI) OF 38.0 TO 38.9 IN ADULT, UNSPECIFIED OBESITY TYPE, UNSPECIFIED WHETHER SERIOUS COMORBIDITY PRESENT: ICD-10-CM

## 2025-02-18 RX ORDER — TIRZEPATIDE 2.5 MG/.5ML
2.5 INJECTION, SOLUTION SUBCUTANEOUS
Qty: 4 PEN | Refills: 0 | Status: SHIPPED | OUTPATIENT
Start: 2025-02-18

## 2025-02-18 RX ORDER — NORETHINDRONE 5 MG/1
5 TABLET ORAL DAILY
Qty: 90 TABLET | Refills: 3 | Status: SHIPPED | OUTPATIENT
Start: 2025-02-18

## 2025-02-18 RX ORDER — BUPROPION HYDROCHLORIDE 150 MG/1
150 TABLET, EXTENDED RELEASE ORAL 2 TIMES DAILY
Qty: 60 TABLET | Refills: 2 | Status: SHIPPED | OUTPATIENT
Start: 2025-02-18 | End: 2026-02-18

## 2025-02-18 NOTE — PROGRESS NOTES
Chief Complaint   Patient presents with    Follow-up       History of Present Illness: Christine Alfaro is a 54 y.o. female that presents today 2/18/2025 for   Chief Complaint   Patient presents with    Follow-up     PMB 10/24 with NO bleeding since. She is doing well agyestin.       She reports mood is bothering her the most. Feeling down. Anxiety is worse.     She reports prozac change to wellbutrin 1 year ago.     Past Medical History:   Diagnosis Date    Anxiety     GERD (gastroesophageal reflux disease)     Hyperlipidemia     Hypertension     Hypothyroidism     Insomnia     Observed sleep apnea     Prediabetes     Rheumatoid arthritis, unspecified     Vitamin D deficiency        Past Surgical History:   Procedure Laterality Date    BTL      CHOLECYSTECTOMY      COLONOSCOPY N/A 8/30/2022    Procedure: COLONOSCOPY;  Surgeon: Drew Peraza MD;  Location: [facility];  Service: Endoscopy;  Laterality: N/A;    INJECTION OF ANESTHETIC AGENT AROUND MEDIAL BRANCH NERVES INNERVATING LUMBAR FACET JOINT Left 6/25/2024    Procedure: Block-nerve-medial branch-lumbar   L4/5, L5/S1;  Surgeon: Sampson Cobb MD;  Location: [facility] OR;  Service: Pain Management;  Laterality: Left;    INJECTION OF ANESTHETIC AGENT AROUND MEDIAL BRANCH NERVES INNERVATING LUMBAR FACET JOINT Left 7/17/2024    Procedure: Block-nerve-medial branch-lumbar    L4/5, L5/S1;  Surgeon: Sampson Cobb MD;  Location: [facility] OR;  Service: Pain Management;  Laterality: Left;    LAPAROSCOPIC SLEEVE GASTRECTOMY N/A 2/2/2023    Procedure: GASTRECTOMY, SLEEVE, LAPAROSCOPIC with intraop EGD;  Surgeon: Titi Ribeiro Jr., MD;  Location: [address];  Service: General;  Laterality: N/A;  PRE-CERT, PLEASE DO NOT SUBMIT FOR AUTH UNTIL 1/12/2023    RADIOFREQUENCY ABLATION OF LUMBAR MEDIAL BRANCH NERVE AT SINGLE LEVEL Left 8/9/2024    Procedure: Radiofrequency Ablation, Nerve, Spinal, Lumbar, Medial Branch, L4/5 and L5/S1;  Surgeon: Sampson Cobb MD;   Location: [facility] OR;  Service: Pain Management;  Laterality: Left;    SURGICAL REMOVAL OF BONE SPUR      left heel    TONSILLECTOMY      TUBAL LIGATION  1995       Medications Prior to Visit[1]    Review of patient's allergies indicates:   Allergen Reactions    Sulfa (sulfonamide antibiotics) Rash and Swelling       Family History   Problem Relation Name Age of Onset    Ovarian cancer Mother Joanna Martines 36    Heart disease Mother Joanna Martines     Bone cancer Mother Joanna Martines     Cancer Mother Joanna Martines         cervical    Hypertension Mother Joanna Martines     Heart disease Father Titi Boyd     Diabetes type I Father Titi Boyd     Lung cancer Father Titi Boyd     Diabetes Father Titi Boyd     Hypertension Father Titi Boyd     No Known Problems Daughter      No Known Problems Daughter      Heart disease Brother Daniel Boyd     Breast cancer Neg Hx         Social History[2]    OB History    Para Term  AB Living   3 3 2 1  2   SAB IAB Ectopic Multiple Live Births       3      # Outcome Date GA Lbr Calvin/2nd Weight Sex Type Anes PTL Lv   3 Term      Vag-Spont   SHIRA   2 Term      Vag-Spont   SHIRA   1       Vag-Spont   ND         /60   Wt 109.4 kg (241 lb 2.9 oz)   LMP 2024 (Approximate)       ASSESSMENT/PLAN:  Irritability  Comments:  we discussed adding exercise daily   [facility]  Orders:  -     buPROPion (WELLBUTRIN SR) 150 MG TBSR 12 hr tablet; Take 1 tablet (150 mg total) by mouth 2 (two) times daily.  Dispense: 60 tablet; Refill: 2    S/P gastric sleeve procedure  -     tirzepatide, weight loss, (ZEPBOUND) 2.5 mg/0.5 mL PnIj; Inject 2.5 mg into the skin every 7 days.  Dispense: 4 Pen; Refill: 0    ETELVINA (obstructive sleep apnea)  -     tirzepatide, weight loss, (ZEPBOUND) 2.5 mg/0.5 mL PnIj; Inject 2.5 mg into the skin every 7 days.  Dispense: 4 Pen; Refill: 0    SLE with normal kidneys  -     buPROPion (WELLBUTRIN SR) 150 MG TBSR 12 hr tablet; Take 1 tablet (150 mg total)  by mouth 2 (two) times daily.  Dispense: 60 tablet; Refill: 2    Rheumatoid arthritis flare  -     buPROPion (WELLBUTRIN SR) 150 MG TBSR 12 hr tablet; Take 1 tablet (150 mg total) by mouth 2 (two) times daily.  Dispense: 60 tablet; Refill: 2    Class 2 obesity with body mass index (BMI) of 38.0 to 38.9 in adult, unspecified obesity type, unspecified whether serious comorbidity present  -     buPROPion (WELLBUTRIN SR) 150 MG TBSR 12 hr tablet; Take 1 tablet (150 mg total) by mouth 2 (two) times daily.  Dispense: 60 tablet; Refill: 2    Abnormal perimenopausal bleeding  -     norethindrone (AYGESTIN) 5 mg Tab; Take 1 tablet (5 mg total) by mouth once daily.  Dispense: 90 tablet; Refill: 3  -     US Pelvis Comp with Transvag NON-OB (xpd; Future; Expected date: 02/18/2025    Thickened endometrium  Comments:  repeat in 4 month prior to next follow-up  Orders:  -     US Pelvis Comp with Transvag NON-OB (xpd; Future; Expected date: 02/18/2025               [1]   Outpatient Medications Prior to Visit   Medication Sig Dispense Refill    alclometasone (ACLOVATE) 0.05 % cream AAA face bid prn redness or scaling or itching 60 g 3    atorvastatin (LIPITOR) 40 MG tablet TAKE ONE TABLET BY MOUTH EVERY EVENING 90 tablet 1    betamethasone dipropionate 0.05 % cream Use bid 45 g 3    busPIRone (BUSPAR) 15 MG tablet Take 1 tablet (15 mg total) by mouth 3 (three) times daily. 90 tablet 11    clobetasoL (TEMOVATE) 0.05 % cream Apply topically every evening. 90 g 0    cyclobenzaprine (FLEXERIL) 10 MG tablet TAKE ONE TABLET BY MOUTH EVERY EVENING 150 tablet 3    folic acid (FOLVITE) 1 MG tablet Take 2 tablets (2 mg total) by mouth once daily. 60 tablet 11    furosemide (LASIX) 20 MG tablet Take 1 tablet (20 mg total) by mouth once daily. 90 tablet 2    gabapentin (NEURONTIN) 300 MG capsule Take 2 capsules (600 mg total) by mouth 3 (three) times daily. 90 capsule 11    levothyroxine (SYNTHROID) 150 MCG tablet Take 1 tablet (150 mcg total)  by mouth before breakfast. 30 tablet 11    lisinopriL (PRINIVIL,ZESTRIL) 5 MG tablet Take 1 tablet (5 mg total) by mouth 2 (two) times a day. 180 tablet 2    methotrexate, PF, (RASUVO, PF,) 25 mg/0.5 mL AtIn Inject 0.5 mLs (25 mg total) into the skin every 7 days. 4 each 3    methylPREDNISolone (MEDROL) 4 MG Tab Take 2 tablets daily for 2 weeks then 1 tablet daily 120 tablet 3    pantoprazole (PROTONIX) 40 MG tablet Take 1 tablet (40 mg total) by mouth once daily. 90 tablet 3    secukinumab (COSENTYX PEN) 150 mg/mL PnIj Inject 300 mg into the skin every 28 days. 2 mL 11    traMADoL (ULTRAM) 50 mg tablet Take 1 tablet (50 mg total) by mouth every 8 (eight) hours as needed. 60 tablet 3    XIIDRA 5 % Dpet APPLY 1 DROP TO BOTH EYES BID      buPROPion (WELLBUTRIN SR) 100 MG TBSR 12 hr tablet Take ONE tablet by mouth TWO times daily 180 tablet 2    norethindrone (AYGESTIN) 5 mg Tab Take 1 tablet (5 mg total) by mouth once daily. 90 tablet 3    tirzepatide, weight loss, (ZEPBOUND) 2.5 mg/0.5 mL PnIj Inject 2.5 mg into the skin every 7 days. 4 Pen 0     No facility-administered medications prior to visit.   [2]   Social History  Tobacco Use    Smoking status: Never     Passive exposure: Never    Smokeless tobacco: Never   Substance Use Topics    Alcohol use: Not Currently     Alcohol/week: 0.0 standard drinks of alcohol    Drug use: Never

## 2025-02-24 ENCOUNTER — OFFICE VISIT (OUTPATIENT)
Dept: RHEUMATOLOGY | Facility: CLINIC | Age: 55
End: 2025-02-24
Payer: COMMERCIAL

## 2025-02-24 DIAGNOSIS — L40.50 PSORIATIC ARTHRITIS: Primary | ICD-10-CM

## 2025-02-24 DIAGNOSIS — D84.821 IMMUNODEFICIENCY DUE TO DRUG THERAPY: ICD-10-CM

## 2025-02-24 DIAGNOSIS — Z79.899 IMMUNODEFICIENCY DUE TO DRUG THERAPY: ICD-10-CM

## 2025-02-24 DIAGNOSIS — M32.9 SYSTEMIC LUPUS ERYTHEMATOSUS, UNSPECIFIED SLE TYPE, UNSPECIFIED ORGAN INVOLVEMENT STATUS: ICD-10-CM

## 2025-02-24 DIAGNOSIS — E78.5 DYSLIPIDEMIA: ICD-10-CM

## 2025-02-24 PROCEDURE — 1159F MED LIST DOCD IN RCRD: CPT | Mod: CPTII,95,, | Performed by: INTERNAL MEDICINE

## 2025-02-24 PROCEDURE — 3044F HG A1C LEVEL LT 7.0%: CPT | Mod: CPTII,95,, | Performed by: INTERNAL MEDICINE

## 2025-02-24 PROCEDURE — 98006 SYNCH AUDIO-VIDEO EST MOD 30: CPT | Mod: 95,,, | Performed by: INTERNAL MEDICINE

## 2025-02-24 RX ORDER — ATORVASTATIN CALCIUM 40 MG/1
40 TABLET, FILM COATED ORAL NIGHTLY
Qty: 90 TABLET | Refills: 3 | Status: SHIPPED | OUTPATIENT
Start: 2025-02-24

## 2025-02-24 NOTE — PROGRESS NOTES
Subjective:       Patient ID: Christine Alfaro is a 50 y.o. female.    Chief Complaint: Disease Management    HPI 50 year old with PMH of Hashimotos disease, psoriasis,HTN, HL, ETELVINA , GERD, mitral valve regurgitation, left ankle spur removal  here for evaluation. She has been on cpap for many years.  She does not wake up well rested. She has been tired for years, but worse over the last 8 months. Last year, she started to have joint pain. She is having pain in toes, fingers, left knee, elbows, right jaw. Pain level is as high as 7/10, aching and non radiating. Reports swelling in left ankle and feet. Reports swelling in feet and lower legs for a year. In morning, she reports her hands are swollen. She has pain in mid to lower back since she was 18. She gets pustules on occasion in her hands for a year. She is getting mouth ulcer where she has dental bridge.  She clenches her teeth at night. Denies malar rash. She has dry eyes and dry mouth for last year. She takes ibuprofen 800mg po BID prn for last year to help her joint pain. It helps her take edge off.  She has had psoriasis for 3 years.  Reports dry cough for last year. Reports photosensitivity for 3 years. Denies raynauds. Denies history of thrombosis. She has had 2 miscarriages. She reports she had one miscarriage at 6 months due to blood incompatibility.  She had another miscarriage at 2.5 months. She thinks her thyroid might not have been well controlled. Over the last year and half, she has had chest pain with coughing and sometimes it can radiate to back.    She was able to have 2 healthy pregnancies.      Family hx: uncle: RA  Mother:psoriasis       Interval history: She has been on Rasuvo 25 IM once a week. She is taking plaquenil 200mg po BID.She has been on Cosentyx 300mg and has 2 shots.   She was diagnosed with lumbar synovial cysts and is going to plan to get drainage.   She woke up with malar rash today.  Denies joint pain and or swelling.  "      Past Medical History:   Diagnosis Date    Anxiety     GERD (gastroesophageal reflux disease)     Hyperlipidemia     Hypertension     Hypothyroidism     Insomnia     Observed sleep apnea     Vitamin D deficiency        Review of Systems   Constitutional: Positive for fatigue. Negative for activity change, appetite change, chills, diaphoresis, fever and unexpected weight change.   HENT: Negative for congestion, ear discharge, ear pain, facial swelling, mouth sores, sinus pressure, sneezing, sore throat, tinnitus and trouble swallowing.    Eyes: Negative for photophobia, pain, discharge, redness, itching and visual disturbance.   Respiratory: Negative for apnea, cough, chest tightness, shortness of breath, wheezing and stridor.    Cardiovascular: Negative for chest pain and leg swelling.   Gastrointestinal: Negative for abdominal distention, abdominal pain, anal bleeding, blood in stool, constipation, diarrhea and nausea.   Endocrine: Negative for cold intolerance and heat intolerance.   Genitourinary: Negative for difficulty urinating, dysuria and genital sores.   Musculoskeletal: Positive for arthralgias, back pain, gait problem and joint swelling. Negative for neck pain and neck stiffness.   Skin: Positive for rash. Negative for color change, pallor and wound.   Neurological: Negative for dizziness, seizures, light-headedness, numbness and headaches.   Hematological: Negative for adenopathy. Does not bruise/bleed easily.   Psychiatric/Behavioral: Negative for sleep disturbance. The patient is not nervous/anxious.            Objective:   BP (!) 150/85   Pulse 84   Ht 5' 6" (1.676 m)   Wt (!) 154.4 kg (340 lb 6.2 oz)   BMI 54.94 kg/m²      Physical Exam   Constitutional: She is oriented to person, place, and time.   HENT:   Head: Normocephalic and atraumatic.   Right Ear: External ear normal.   Left Ear: External ear normal.   Nose: Nose normal.   Mouth/Throat: Oropharynx is clear and moist. No oropharyngeal " exudate.   Eyes: Conjunctivae and EOM are normal. Pupils are equal, round, and reactive to light. Right eye exhibits no discharge. Left eye exhibits no discharge. No scleral icterus.   Neck: No JVD present. No thyromegaly present.   Cardiovascular: Normal rate, regular rhythm, normal heart sounds and intact distal pulses.  Exam reveals no gallop and no friction rub.    No murmur heard.  Pulmonary/Chest: Effort normal and breath sounds normal. No respiratory distress. She has no wheezes. She has no rales. She exhibits no tenderness.   Abdominal: Soft. Bowel sounds are normal. She exhibits no distension and no mass. There is no abdominal tenderness. There is no rebound and no guarding.   Lymphadenopathy:     She has no cervical adenopathy.   Neurological: She is alert and oriented to person, place, and time. She displays normal reflexes. No cranial nerve deficit. She exhibits normal muscle tone. Gait normal. Coordination normal.   Skin: Skin is dry. Rash noted. No erythema. No pallor.     Rash in forearms   Psychiatric: Affect and judgment normal.   Musculoskeletal: Tenderness present. No deformity or edema.          No data to display     Assessment:     54 year old with PMH of Hashimotos disease  , depression, HTN, HL, ETELVINA ,  gastric sleeve, GERD, mitral valve regurgitation, left ankle spur removal  here for follow up of SLE.  She had initial improvement with Orencia infusions and MTX 20mg once a week but was flaring.  Will increase MTX to 25 mg once a week.        #pso A:: doing better with increased dose of Cosentyx.  If flares, will switch to Cosentyx infusions.  -continue Rasuvo 25mg once a week  Continue gabapentin  600mg po TID   Labs every 3 months      #SLE:overall controlled. Given +thu, dsdna,arthritis, photosensitivity,  Pleurisy, and fatigue, this is concerning for SLE. GIVEN THAT SHE HAS SLE, cannot do anti- tnf.  Continue plaquenil 200mg po BID (last eye exam in 3/2024-clear).She has upcoming eye exam  and will send  us the report.       #Myalgias: controlled.  -Continue flexeril 10mg po qhs     #obesity: encourage weight loss         The patient location is: home  The chief complaint leading to consultation is: 47    Visit type: audiovisual    Face to Face time with patient: 30   minutes of total time spent on the encounter, which includes face to face time and non-face to face time preparing to see the patient (eg, review of tests), Obtaining and/or reviewing separately obtained history, Documenting clinical information in the electronic or other health record, Independently interpreting results (not separately reported) and communicating results to the patient/family/caregiver, or Care coordination (not separately reported).         Each patient to whom he or she provides medical services by telemedicine is:  (1) informed of the relationship between the physician and patient and the respective role of any other health care provider with respect to management of the patient; and (2) notified that he or she may decline to receive medical services by telemedicine and may withdraw from such care at any time.    Notes:

## 2025-02-24 NOTE — TELEPHONE ENCOUNTER
Refill Decision Note   Christine Alfaro  is requesting a refill authorization.  Brief Assessment and Rationale for Refill:  Approve     Medication Therapy Plan:        Comments:     Note composed:1:17 PM 02/24/2025

## 2025-02-24 NOTE — TELEPHONE ENCOUNTER
No care due was identified.  [facility] Embedded Care Due Messages. Reference number: [id number].   2/24/2025 7:59:55 AM CST

## 2025-02-24 NOTE — PROGRESS NOTES
2/24/2025     7:38 AM   Rapid3 Question Responses and Scores   MDHAQ Score 0.5   Psychologic Score 6.6   Pain Score 3.5   When you awakened in the morning OVER THE LAST WEEK, did you feel stiff? Yes   If Yes, please indicate the number of hours until you are as limber as you will be for the day 1   Fatigue Score 3.5   Global Health Score 4.5   RAPID3 Score 3.22     Answers submitted by the patient for this visit:  Rheumatology Questionnaire (Submitted on 2/24/2025)  fever: No  eye redness: Yes  mouth sores: Yes  headaches: No  shortness of breath: No  chest pain: No  trouble swallowing: Yes  diarrhea: No  constipation: No  unexpected weight change: No  genital sore: No  dysuria: No  During the last 3 days, have you had a skin rash?: No  Bruises or bleeds easily: Yes  cough: Yes

## 2025-03-10 ENCOUNTER — PATIENT MESSAGE (OUTPATIENT)
Dept: BARIATRICS | Facility: CLINIC | Age: 55
End: 2025-03-10
Payer: COMMERCIAL

## 2025-03-25 DIAGNOSIS — M54.16 LUMBAR RADICULOPATHY, CHRONIC: ICD-10-CM

## 2025-03-25 DIAGNOSIS — M54.9 DORSALGIA, UNSPECIFIED: ICD-10-CM

## 2025-03-25 RX ORDER — TRAMADOL HYDROCHLORIDE 50 MG/1
50 TABLET ORAL EVERY 8 HOURS PRN
Qty: 60 TABLET | Refills: 3 | Status: SHIPPED | OUTPATIENT
Start: 2025-03-25

## 2025-03-25 NOTE — TELEPHONE ENCOUNTER
Refill Routing Note   Medication(s) are not appropriate for processing by [facility] for the following reason(s):        Outside of protocol    [facility] action(s):  Route               Appointments  past 12m or future 3m with PCP    Date Provider   Last Visit   2/7/2025 Layo Mccrary MD   Next Visit   Visit date not found Layo Mccrary MD   ED visits in past 90 days: 0        Note composed:1:27 PM 03/25/2025

## 2025-03-25 NOTE — TELEPHONE ENCOUNTER
No care due was identified.  [facility] Embedded Care Due Messages. Reference number: [id number].   3/25/2025 6:47:33 AM CDT

## 2025-04-01 ENCOUNTER — PATIENT MESSAGE (OUTPATIENT)
Dept: RHEUMATOLOGY | Facility: CLINIC | Age: 55
End: 2025-04-01
Payer: COMMERCIAL

## 2025-04-06 ENCOUNTER — PATIENT MESSAGE (OUTPATIENT)
Dept: FAMILY MEDICINE | Facility: CLINIC | Age: 55
End: 2025-04-06
Payer: COMMERCIAL

## 2025-04-06 DIAGNOSIS — F41.9 ANXIETY DISORDER, UNSPECIFIED TYPE: Primary | ICD-10-CM

## 2025-04-08 ENCOUNTER — PATIENT MESSAGE (OUTPATIENT)
Dept: BARIATRICS | Facility: CLINIC | Age: 55
End: 2025-04-08
Payer: COMMERCIAL

## 2025-04-25 ENCOUNTER — OFFICE VISIT (OUTPATIENT)
Dept: CARDIOLOGY | Facility: CLINIC | Age: 55
End: 2025-04-25
Payer: COMMERCIAL

## 2025-04-25 ENCOUNTER — PATIENT MESSAGE (OUTPATIENT)
Dept: RHEUMATOLOGY | Facility: CLINIC | Age: 55
End: 2025-04-25
Payer: COMMERCIAL

## 2025-04-25 VITALS
WEIGHT: 226.19 LBS | HEART RATE: 74 BPM | SYSTOLIC BLOOD PRESSURE: 106 MMHG | BODY MASS INDEX: 36.35 KG/M2 | HEIGHT: 66 IN | DIASTOLIC BLOOD PRESSURE: 75 MMHG

## 2025-04-25 DIAGNOSIS — I10 ESSENTIAL HYPERTENSION: Chronic | ICD-10-CM

## 2025-04-25 DIAGNOSIS — I51.7 LAE (LEFT ATRIAL ENLARGEMENT): Chronic | ICD-10-CM

## 2025-04-25 DIAGNOSIS — I47.10 PSVT (PAROXYSMAL SUPRAVENTRICULAR TACHYCARDIA): Chronic | ICD-10-CM

## 2025-04-25 DIAGNOSIS — E66.01 SEVERE OBESITY (BMI 35.0-39.9) WITH COMORBIDITY: Chronic | ICD-10-CM

## 2025-04-25 DIAGNOSIS — G47.33 OSA ON CPAP: Chronic | ICD-10-CM

## 2025-04-25 DIAGNOSIS — I34.0 NONRHEUMATIC MITRAL VALVE REGURGITATION: Primary | Chronic | ICD-10-CM

## 2025-04-25 DIAGNOSIS — E78.5 DYSLIPIDEMIA: Chronic | ICD-10-CM

## 2025-04-25 PROCEDURE — 99999 PR PBB SHADOW E&M-EST. PATIENT-LVL III: CPT | Mod: PBBFAC,,, | Performed by: INTERNAL MEDICINE

## 2025-04-25 RX ORDER — LISINOPRIL 5 MG/1
5 TABLET ORAL 2 TIMES DAILY
Qty: 180 TABLET | Refills: 2 | Status: SHIPPED | OUTPATIENT
Start: 2025-04-25 | End: 2026-04-25

## 2025-04-25 RX ORDER — ATORVASTATIN CALCIUM 40 MG/1
40 TABLET, FILM COATED ORAL NIGHTLY
Qty: 90 TABLET | Refills: 2 | Status: SHIPPED | OUTPATIENT
Start: 2025-04-25

## 2025-04-25 NOTE — PROGRESS NOTES
Subjective:    Patient ID:  Christine Alfaro is a 54 y.o. female who presents for Nonrheumatic mitral valve regurgitation        HPI    RECENT LABS NOTED HBA1C 5%, LDL 75, HDL 62, TRIGLYCERIDE 50, FREE T4 OK, DOING WELL, NO CP/SOB, SEE ROS  Past Medical History:   Diagnosis Date    Anxiety     GERD (gastroesophageal reflux disease)     Hyperlipidemia     Hypertension     Hypothyroidism     Insomnia     Observed sleep apnea     Prediabetes     Rheumatoid arthritis, unspecified     Vitamin D deficiency      Past Surgical History:   Procedure Laterality Date    BTL      CHOLECYSTECTOMY      COLONOSCOPY N/A 8/30/2022    Procedure: COLONOSCOPY;  Surgeon: Drew Peraza MD;  Location: [facility];  Service: Endoscopy;  Laterality: N/A;    INJECTION OF ANESTHETIC AGENT AROUND MEDIAL BRANCH NERVES INNERVATING LUMBAR FACET JOINT Left 6/25/2024    Procedure: Block-nerve-medial branch-lumbar   L4/5, L5/S1;  Surgeon: Sampson Cobb MD;  Location: [facility];  Service: Pain Management;  Laterality: Left;    INJECTION OF ANESTHETIC AGENT AROUND MEDIAL BRANCH NERVES INNERVATING LUMBAR FACET JOINT Left 7/17/2024    Procedure: Block-nerve-medial branch-lumbar    L4/5, L5/S1;  Surgeon: Sampson Cobb MD;  Location: [facility] OR;  Service: Pain Management;  Laterality: Left;    LAPAROSCOPIC SLEEVE GASTRECTOMY N/A 2/2/2023    Procedure: GASTRECTOMY, SLEEVE, LAPAROSCOPIC with intraop EGD;  Surgeon: Titi Ribeiro Jr., MD;  Location: [address];  Service: General;  Laterality: N/A;  PRE-CERT, PLEASE DO NOT SUBMIT FOR AUTH UNTIL 1/12/2023    RADIOFREQUENCY ABLATION OF LUMBAR MEDIAL BRANCH NERVE AT SINGLE LEVEL Left 8/9/2024    Procedure: Radiofrequency Ablation, Nerve, Spinal, Lumbar, Medial Branch, L4/5 and L5/S1;  Surgeon: Sampson Cobb MD;  Location: [facility] OR;  Service: Pain Management;  Laterality: Left;    SURGICAL REMOVAL OF BONE SPUR      left heel    TONSILLECTOMY      TUBAL LIGATION  8/1995     Family History    Problem Relation Name Age of Onset    Ovarian cancer Mother Joanna Martines 36    Heart disease Mother Joanna Martines     Bone cancer Mother Joanna Martines     Cancer Mother Joanna Martines         cervical    Hypertension Mother Joanna Martines     Heart disease Father Titi Boyd     Diabetes type I Father Titi Boyd     Lung cancer Father Titi Boyd     Diabetes Father Titi Boyd     Hypertension Father Titi Boyd     No Known Problems Daughter      No Known Problems Daughter      Heart disease Brother Daniel Boyd     Breast cancer Neg Hx       Social History     Socioeconomic History    Marital status:    Tobacco Use    Smoking status: Never     Passive exposure: Never    Smokeless tobacco: Never   Substance and Sexual Activity    Alcohol use: Not Currently     Alcohol/week: 0.0 standard drinks of alcohol    Drug use: Never    Sexual activity: Yes     Partners: Male     Birth control/protection: See Surgical Hx     Social Drivers of Health     Financial Resource Strain: Low Risk  (7/22/2024)    Overall Financial Resource Strain (CARDIA)     Difficulty of Paying Living Expenses: Not hard at all   Food Insecurity: No Food Insecurity (7/22/2024)    Hunger Vital Sign     Worried About Running Out of Food in the Last Year: Never true     Ran Out of Food in the Last Year: Never true   Transportation Needs: No Transportation Needs (1/29/2024)    PRAPARE - Transportation     Lack of Transportation (Medical): No     Lack of Transportation (Non-Medical): No   Physical Activity: Insufficiently Active (7/22/2024)    Exercise Vital Sign     Days of Exercise per Week: 3 days     Minutes of Exercise per Session: 30 min   Stress: Stress Concern Present (7/22/2024)    [employer] - Occupational Stress Questionnaire     Feeling of Stress : To some extent   Housing Stability: Low Risk  (1/29/2024)    Housing Stability Vital Sign     Unable to Pay for Housing in the Last Year: No     Number of Places Lived in the  "Last Year: 1     Unstable Housing in the Last Year: No       Review of patient's allergies indicates:   Allergen Reactions    Sulfa (sulfonamide antibiotics) Rash and Swelling     Current Medications[1]    Review of Systems   Constitutional: Negative for chills, diaphoresis, fever, malaise/fatigue, night sweats and weight loss (SOME).   HENT:  Negative for congestion and nosebleeds.    Eyes:  Negative for visual disturbance.   Cardiovascular:  Negative for chest pain (OCC CRAMP), claudication, cyanosis, dyspnea on exertion (MILD), irregular heartbeat, leg swelling, near-syncope, orthopnea, palpitations (LESS), paroxysmal nocturnal dyspnea and syncope.   Respiratory:  Negative for cough, hemoptysis, shortness of breath and wheezing.    Endocrine: Negative for polyphagia and polyuria.   Hematologic/Lymphatic: Negative for adenopathy. Does not bruise/bleed easily.   Skin:  Negative for itching and rash.   Musculoskeletal:  Negative for back pain (CHRONIC) and falls.   Gastrointestinal:  Negative for abdominal pain, change in bowel habit, hematemesis, jaundice, melena and nausea.   Genitourinary:  Negative for dysuria and flank pain.   Neurological:  Negative for brief paralysis, focal weakness, headaches, light-headedness, loss of balance, numbness and weakness.   Psychiatric/Behavioral:  Negative for altered mental status and depression.    Allergic/Immunologic: Negative for environmental allergies and hives.        Objective:      Vitals:    04/25/25 1234   BP: 106/75   Pulse: 74   Weight: 102.6 kg (226 lb 3.1 oz)   Height: 5' 6" (1.676 m)   PainSc: 0-No pain     Body mass index is 36.51 kg/m².    Physical Exam  Constitutional:       Appearance: She is well-developed. She is obese.   HENT:      Head: Normocephalic and atraumatic.   Eyes:      General: No scleral icterus.     Extraocular Movements: Extraocular movements intact.      Conjunctiva/sclera: Conjunctivae normal.   Neck:      Vascular: Normal carotid pulses. " No JVD.   Cardiovascular:      Rate and Rhythm: Normal rate and regular rhythm. No extrasystoles are present.     Pulses:           Carotid pulses are 2+ on the right side and 2+ on the left side.       Radial pulses are 2+ on the right side and 2+ on the left side.        Posterior tibial pulses are 2+ on the right side and 2+ on the left side.      Heart sounds: Murmur heard.      Systolic murmur is present with a grade of 1/6 at the lower left sternal border.      No gallop. No S4 sounds.   Pulmonary:      Effort: Pulmonary effort is normal.      Breath sounds: Normal breath sounds and air entry. No rales.   Abdominal:      Palpations: Abdomen is soft. There is no hepatomegaly.      Tenderness: There is no abdominal tenderness.   Musculoskeletal:      Cervical back: Neck supple.      Right lower leg: No edema.      Left lower leg: No edema.   Skin:     General: Skin is warm and dry.      Capillary Refill: Capillary refill takes less than 2 seconds.   Neurological:      General: No focal deficit present.      Mental Status: She is alert and oriented to person, place, and time.      Cranial Nerves: Cranial nerves 2-12 are intact.   Psychiatric:         Mood and Affect: Mood normal.         Speech: Speech normal.         Behavior: Behavior normal.                 ..    Chemistry        Component Value Date/Time     01/24/2025 0808    K 4.5 01/24/2025 0808     01/24/2025 0808    CO2 23 01/24/2025 0808    BUN 11 01/24/2025 0808    CREATININE 0.9 01/24/2025 0808    GLU 67 (L) 01/24/2025 0808        Component Value Date/Time    CALCIUM 9.1 01/24/2025 0808    ALKPHOS 53 01/24/2025 0808    AST 17 01/24/2025 0808    ALT 20 01/24/2025 0808    BILITOT 0.5 01/24/2025 0808    ESTGFRAFRICA >60.0 07/25/2022 0719    EGFRNONAA >60.0 07/25/2022 0719            ..  Lab Results   Component Value Date    CHOL 147 02/10/2025    CHOL 142 01/30/2024    CHOL 161 08/03/2023     Lab Results   Component Value Date    HDL 62  02/10/2025    HDL 67 01/30/2024    HDL 53 08/03/2023     Lab Results   Component Value Date    LDLCALC 75.0 02/10/2025    LDLCALC 66.8 01/30/2024    LDLCALC 92.4 08/03/2023     Lab Results   Component Value Date    TRIG 50 02/10/2025    TRIG 41 01/30/2024    TRIG 78 08/03/2023     Lab Results   Component Value Date    CHOLHDL 42.2 02/10/2025    CHOLHDL 47.2 01/30/2024    CHOLHDL 32.9 08/03/2023     ..  Lab Results   Component Value Date    WBC 7.09 01/24/2025    HGB 14.5 01/24/2025    HCT 45.6 01/24/2025    MCV 97 01/24/2025     01/24/2025       Test(s) Reviewed  I have reviewed the following in detail:  [] Stress test   [] Angiography   [] Echocardiogram   [x] Labs   [] Other:       Assessment:         ICD-10-CM ICD-9-CM   1. Nonrheumatic mitral valve regurgitation  I34.0 424.0   2. LAE (left atrial enlargement)  I51.7 429.3   3. Essential hypertension  I10 401.9   4. Dyslipidemia  E78.5 272.4   5. PSVT (paroxysmal supraventricular tachycardia)  I47.10 427.0   6. ETELVINA on CPAP  G47.33 327.23   7. Severe obesity (BMI 35.0-39.9) with comorbidity  E66.01 278.01     Problem List Items Addressed This Visit          Cardiac/Vascular    PSVT (paroxysmal supraventricular tachycardia) (Chronic)    Overview   SHORT RUNS         Dyslipidemia    Relevant Medications    atorvastatin (LIPITOR) 40 MG tablet    Essential hypertension    Nonrheumatic mitral valve regurgitation - Primary    LAE (left atrial enlargement)       Endocrine    Severe obesity (BMI 35.0-39.9) with comorbidity       Other    ETELVINA on CPAP        Plan:         ALL CV CLINICALLY STABLE, NO ANGINA, NO HF, NO TIA, NO CLINICAL ARRHYTHMIA,CONTINUE CURRENT MEDS, EDUCATION, DIET, EXERCISE , CONTINUE WITH WEIGHT LOSS, RTC IN 9 MO        Nonrheumatic mitral valve regurgitation    LAE (left atrial enlargement)    Essential hypertension    Dyslipidemia  -     atorvastatin (LIPITOR) 40 MG tablet; Take 1 tablet (40 mg total) by mouth every evening.  Dispense: 90  tablet; Refill: 2    PSVT (paroxysmal supraventricular tachycardia)    ETELVINA on CPAP    Severe obesity (BMI 35.0-39.9) with comorbidity    Other orders  -     lisinopriL (PRINIVIL,ZESTRIL) 5 MG tablet; Take 1 tablet (5 mg total) by mouth 2 (two) times a day.  Dispense: 180 tablet; Refill: 2    RTC Low level/low impact aerobic exercise 5x's/wk. Heart healthy diet and risk factor modification.    See labs and med orders.    Aerobic exercise 5x's/wk. Heart healthy diet and risk factor modification.    See labs and med orders.             [1]   Current Outpatient Medications:     alclometasone (ACLOVATE) 0.05 % cream, AAA face bid prn redness or scaling or itching, Disp: 60 g, Rfl: 3    betamethasone dipropionate 0.05 % cream, Use bid, Disp: 45 g, Rfl: 3    buPROPion (WELLBUTRIN SR) 150 MG TBSR 12 hr tablet, Take 1 tablet (150 mg total) by mouth 2 (two) times daily., Disp: 60 tablet, Rfl: 2    busPIRone (BUSPAR) 15 MG tablet, Take 1 tablet (15 mg total) by mouth 3 (three) times daily., Disp: 90 tablet, Rfl: 11    clobetasoL (TEMOVATE) 0.05 % cream, Apply topically every evening., Disp: 90 g, Rfl: 0    cyclobenzaprine (FLEXERIL) 10 MG tablet, TAKE ONE TABLET BY MOUTH EVERY EVENING, Disp: 150 tablet, Rfl: 3    folic acid (FOLVITE) 1 MG tablet, Take 2 tablets (2 mg total) by mouth once daily., Disp: 60 tablet, Rfl: 11    furosemide (LASIX) 20 MG tablet, Take 1 tablet (20 mg total) by mouth once daily., Disp: 90 tablet, Rfl: 2    gabapentin (NEURONTIN) 300 MG capsule, Take 2 capsules (600 mg total) by mouth 3 (three) times daily., Disp: 90 capsule, Rfl: 11    levothyroxine (SYNTHROID) 150 MCG tablet, Take 1 tablet (150 mcg total) by mouth before breakfast., Disp: 30 tablet, Rfl: 11    methotrexate, PF, (RASUVO, PF,) 25 mg/0.5 mL AtIn, Inject 0.5 mLs (25 mg total) into the skin every 7 days., Disp: 4 each, Rfl: 3    methylPREDNISolone (MEDROL) 4 MG Tab, Take 2 tablets daily for 2 weeks then 1 tablet daily, Disp: 120 tablet,  Rfl: 3    norethindrone (AYGESTIN) 5 mg Tab, Take 1 tablet (5 mg total) by mouth once daily., Disp: 90 tablet, Rfl: 3    pantoprazole (PROTONIX) 40 MG tablet, Take 1 tablet (40 mg total) by mouth once daily., Disp: 90 tablet, Rfl: 3    secukinumab (COSENTYX PEN) 150 mg/mL PnIj, Inject 300 mg into the skin every 28 days., Disp: 2 mL, Rfl: 11    traMADoL (ULTRAM) 50 mg tablet, Take 1 tablet (50 mg total) by mouth every 8 (eight) hours as needed., Disp: 60 tablet, Rfl: 3    XIIDRA 5 % Dpet, APPLY 1 DROP TO BOTH EYES BID, Disp: , Rfl:     atorvastatin (LIPITOR) 40 MG tablet, Take 1 tablet (40 mg total) by mouth every evening., Disp: 90 tablet, Rfl: 2    lisinopriL (PRINIVIL,ZESTRIL) 5 MG tablet, Take 1 tablet (5 mg total) by mouth 2 (two) times a day., Disp: 180 tablet, Rfl: 2    tirzepatide, weight loss, (ZEPBOUND) 2.5 mg/0.5 mL PnIj, Inject 2.5 mg into the skin every 7 days., Disp: 4 Pen, Rfl: 0

## 2025-04-28 ENCOUNTER — APPOINTMENT (OUTPATIENT)
Dept: LAB | Facility: HOSPITAL | Age: 55
End: 2025-04-28
Attending: INTERNAL MEDICINE
Payer: COMMERCIAL

## 2025-04-29 DIAGNOSIS — E66.812 CLASS 2 OBESITY WITH BODY MASS INDEX (BMI) OF 38.0 TO 38.9 IN ADULT, UNSPECIFIED OBESITY TYPE, UNSPECIFIED WHETHER SERIOUS COMORBIDITY PRESENT: ICD-10-CM

## 2025-04-29 DIAGNOSIS — M32.9 SLE WITH NORMAL KIDNEYS: ICD-10-CM

## 2025-04-29 DIAGNOSIS — R45.4 IRRITABILITY: ICD-10-CM

## 2025-04-29 DIAGNOSIS — M06.9 RHEUMATOID ARTHRITIS FLARE: ICD-10-CM

## 2025-04-29 RX ORDER — BUPROPION HYDROCHLORIDE 150 MG/1
150 TABLET, EXTENDED RELEASE ORAL 2 TIMES DAILY
Qty: 60 TABLET | Refills: 2 | Status: SHIPPED | OUTPATIENT
Start: 2025-04-29 | End: 2026-04-29

## 2025-04-29 NOTE — TELEPHONE ENCOUNTER
Refill Routing Note   Medication(s) are not appropriate for processing by [facility] for the following reason(s):        New or recently adjusted medication    [facility] action(s):  Defer             Appointments  past 12m or future 3m with PCP    Date Provider   Last Visit   2/18/2025 Mely Braxton MD   Next Visit   9/3/2025 Mely Braxton MD   ED visits in past 90 days: 0        Note composed:9:12 AM 04/29/2025

## 2025-05-02 ENCOUNTER — PATIENT MESSAGE (OUTPATIENT)
Dept: FAMILY MEDICINE | Facility: CLINIC | Age: 55
End: 2025-05-02
Payer: COMMERCIAL

## 2025-05-02 DIAGNOSIS — N30.00 ACUTE INFECTIVE CYSTITIS: Primary | ICD-10-CM

## 2025-05-05 RX ORDER — CEPHALEXIN 500 MG/1
500 CAPSULE ORAL EVERY 12 HOURS
Qty: 10 CAPSULE | Refills: 0 | Status: SHIPPED | OUTPATIENT
Start: 2025-05-05

## 2025-05-07 ENCOUNTER — RESULTS FOLLOW-UP (OUTPATIENT)
Dept: RHEUMATOLOGY | Facility: CLINIC | Age: 55
End: 2025-05-07

## 2025-05-13 ENCOUNTER — PATIENT MESSAGE (OUTPATIENT)
Dept: BARIATRICS | Facility: CLINIC | Age: 55
End: 2025-05-13
Payer: COMMERCIAL

## 2025-05-20 ENCOUNTER — OFFICE VISIT (OUTPATIENT)
Dept: RHEUMATOLOGY | Facility: CLINIC | Age: 55
End: 2025-05-20
Payer: COMMERCIAL

## 2025-05-20 DIAGNOSIS — D84.821 IMMUNODEFICIENCY DUE TO DRUG THERAPY: ICD-10-CM

## 2025-05-20 DIAGNOSIS — M32.9 SYSTEMIC LUPUS ERYTHEMATOSUS, UNSPECIFIED SLE TYPE, UNSPECIFIED ORGAN INVOLVEMENT STATUS: ICD-10-CM

## 2025-05-20 DIAGNOSIS — L40.50 PSORIATIC ARTHRITIS: Primary | ICD-10-CM

## 2025-05-20 DIAGNOSIS — Z79.899 IMMUNODEFICIENCY DUE TO DRUG THERAPY: ICD-10-CM

## 2025-05-20 PROCEDURE — 3044F HG A1C LEVEL LT 7.0%: CPT | Mod: CPTII,95,, | Performed by: INTERNAL MEDICINE

## 2025-05-20 PROCEDURE — G2211 COMPLEX E/M VISIT ADD ON: HCPCS | Mod: 95,,, | Performed by: INTERNAL MEDICINE

## 2025-05-20 PROCEDURE — 98007 SYNCH AUDIO-VIDEO EST HI 40: CPT | Mod: 95,,, | Performed by: INTERNAL MEDICINE

## 2025-05-20 PROCEDURE — 4010F ACE/ARB THERAPY RXD/TAKEN: CPT | Mod: CPTII,95,, | Performed by: INTERNAL MEDICINE

## 2025-05-20 NOTE — PROGRESS NOTES
Subjective:       Patient ID: Christine Alfaro is a 50 y.o. female.    Chief Complaint: Disease Management    HPI 50 year old with PMH of Hashimotos disease, psoriasis,HTN, HL, ETELVINA , GERD, mitral valve regurgitation, left ankle spur removal  here for evaluation. She has been on cpap for many years.  She does not wake up well rested. She has been tired for years, but worse over the last 8 months. Last year, she started to have joint pain. She is having pain in toes, fingers, left knee, elbows, right jaw. Pain level is as high as 7/10, aching and non radiating. Reports swelling in left ankle and feet. Reports swelling in feet and lower legs for a year. In morning, she reports her hands are swollen. She has pain in mid to lower back since she was 18. She gets pustules on occasion in her hands for a year. She is getting mouth ulcer where she has dental bridge.  She clenches her teeth at night. Denies malar rash. She has dry eyes and dry mouth for last year. She takes ibuprofen 800mg po BID prn for last year to help her joint pain. It helps her take edge off.  She has had psoriasis for 3 years.  Reports dry cough for last year. Reports photosensitivity for 3 years. Denies raynauds. Denies history of thrombosis. She has had 2 miscarriages. She reports she had one miscarriage at 6 months due to blood incompatibility.  She had another miscarriage at 2.5 months. She thinks her thyroid might not have been well controlled. Over the last year and half, she has had chest pain with coughing and sometimes it can radiate to back.    She was able to have 2 healthy pregnancies.      Family hx: uncle: RA  Mother:psoriasis       Interval history: She has been on Rasuvo 25 IM once a week with significant improvement pain and swelling. She is taking plaquenil 200mg po BID.She has been on Cosentyx 300mg and has 2 shots.   She was diagnosed with lumbar synovial cysts and is going to plan to get drainage.   She woke up with malar rash  "today.  Denies joint pain and or swelling.       Past Medical History:   Diagnosis Date    Anxiety     GERD (gastroesophageal reflux disease)     Hyperlipidemia     Hypertension     Hypothyroidism     Insomnia     Observed sleep apnea     Vitamin D deficiency        Review of Systems   Constitutional: Positive for fatigue. Negative for activity change, appetite change, chills, diaphoresis, fever and unexpected weight change.   HENT: Negative for congestion, ear discharge, ear pain, facial swelling, mouth sores, sinus pressure, sneezing, sore throat, tinnitus and trouble swallowing.    Eyes: Negative for photophobia, pain, discharge, redness, itching and visual disturbance.   Respiratory: Negative for apnea, cough, chest tightness, shortness of breath, wheezing and stridor.    Cardiovascular: Negative for chest pain and leg swelling.   Gastrointestinal: Negative for abdominal distention, abdominal pain, anal bleeding, blood in stool, constipation, diarrhea and nausea.   Endocrine: Negative for cold intolerance and heat intolerance.   Genitourinary: Negative for difficulty urinating, dysuria and genital sores.   Musculoskeletal: Positive for arthralgias, back pain, gait problem and joint swelling. Negative for neck pain and neck stiffness.   Skin: Positive for rash. Negative for color change, pallor and wound.   Neurological: Negative for dizziness, seizures, light-headedness, numbness and headaches.   Hematological: Negative for adenopathy. Does not bruise/bleed easily.   Psychiatric/Behavioral: Negative for sleep disturbance. The patient is not nervous/anxious.            Objective:   BP (!) 150/85   Pulse 84   Ht 5' 6" (1.676 m)   Wt (!) 154.4 kg (340 lb 6.2 oz)   BMI 54.94 kg/m²      Physical Exam   Constitutional: She is oriented to person, place, and time.   HENT:   Head: Normocephalic and atraumatic.   Right Ear: External ear normal.   Left Ear: External ear normal.   Nose: Nose normal.   Mouth/Throat: " Oropharynx is clear and moist. No oropharyngeal exudate.   Eyes: Conjunctivae and EOM are normal. Pupils are equal, round, and reactive to light. Right eye exhibits no discharge. Left eye exhibits no discharge. No scleral icterus.   Neck: No JVD present. No thyromegaly present.   Cardiovascular: Normal rate, regular rhythm, normal heart sounds and intact distal pulses.  Exam reveals no gallop and no friction rub.    No murmur heard.  Pulmonary/Chest: Effort normal and breath sounds normal. No respiratory distress. She has no wheezes. She has no rales. She exhibits no tenderness.   Abdominal: Soft. Bowel sounds are normal. She exhibits no distension and no mass. There is no abdominal tenderness. There is no rebound and no guarding.   Lymphadenopathy:     She has no cervical adenopathy.   Neurological: She is alert and oriented to person, place, and time. She displays normal reflexes. No cranial nerve deficit. She exhibits normal muscle tone. Gait normal. Coordination normal.   Skin: Skin is dry. Rash noted. No erythema. No pallor.     Rash in forearms   Psychiatric: Affect and judgment normal.   Musculoskeletal: Tenderness present. No deformity or edema.          No data to display     Assessment:     54 year old with PMH of Hashimotos disease  , depression, HTN, HL, ETELVINA ,  gastric sleeve, GERD, mitral valve regurgitation, left ankle spur removal  here for follow up of SLE.  She had initial improvement with Orencia infusions and MTX 20mg once a week but was flaring.  Will increase MTX to 25 mg once a week.        #pso A:: doing better with increased dose of Cosentyx but Cosentyx injections are giving her bruises.  - emailed our pharmacist about setting her up injection training and switching her to Cosentyx syringe  If flares, will switch to Cosentyx infusions.  -switch from Rasuvo 25mg once a week to MTX syringes  Continue gabapentin  600mg po TID   Labs every 3 months      #SLE:overall controlled. Given +thu,  dsdna,arthritis, photosensitivity,  Pleurisy, and fatigue, this is concerning for SLE. GIVEN THAT SHE HAS SLE, cannot do anti- tnf.  Continue plaquenil 200mg po BID (last eye exam in 3/2024-clear).She has upcoming eye exam and will send  us the report.   Labs every 3 months    #Myalgias: controlled.  -Continue flexeril 10mg po qhs     #obesity: encourage weight loss         The patient location is: home  The chief complaint leading to consultation is: 47    Visit type: audiovisual    Face to Face time with patient: 30   minutes of total time spent on the encounter, which includes face to face time and non-face to face time preparing to see the patient (eg, review of tests), Obtaining and/or reviewing separately obtained history, Documenting clinical information in the electronic or other health record, Independently interpreting results (not separately reported) and communicating results to the patient/family/caregiver, or Care coordination (not separately reported).         Each patient to whom he or she provides medical services by telemedicine is:  (1) informed of the relationship between the physician and patient and the respective role of any other health care provider with respect to management of the patient; and (2) notified that he or she may decline to receive medical services by telemedicine and may withdraw from such care at any time.    Notes:         ADDENDUM: Patient messaged back that she would like to do Cosentyx infusions and continue Rasuvo.  Therapy plan placed and consent given to my MA.

## 2025-05-21 ENCOUNTER — PATIENT MESSAGE (OUTPATIENT)
Dept: RHEUMATOLOGY | Facility: CLINIC | Age: 55
End: 2025-05-21
Payer: COMMERCIAL

## 2025-05-21 DIAGNOSIS — L40.50 PSORIATIC ARTHRITIS: ICD-10-CM

## 2025-05-22 DIAGNOSIS — M32.9 SYSTEMIC LUPUS ERYTHEMATOSUS, UNSPECIFIED SLE TYPE, UNSPECIFIED ORGAN INVOLVEMENT STATUS: ICD-10-CM

## 2025-05-22 DIAGNOSIS — L40.50 PSORIATIC ARTHRITIS: Primary | ICD-10-CM

## 2025-05-22 RX ORDER — HEPARIN 100 UNIT/ML
500 SYRINGE INTRAVENOUS
OUTPATIENT
Start: 2025-05-22

## 2025-05-22 RX ORDER — EPINEPHRINE 0.3 MG/.3ML
0.3 INJECTION SUBCUTANEOUS ONCE AS NEEDED
OUTPATIENT
Start: 2025-05-22

## 2025-05-22 RX ORDER — DIPHENHYDRAMINE HYDROCHLORIDE 50 MG/ML
50 INJECTION, SOLUTION INTRAMUSCULAR; INTRAVENOUS ONCE AS NEEDED
OUTPATIENT
Start: 2025-05-22

## 2025-05-22 RX ORDER — METHOTREXATE 25 MG/.5ML
25 INJECTION, SOLUTION SUBCUTANEOUS
Qty: 4 EACH | Refills: 3 | Status: ACTIVE | OUTPATIENT
Start: 2025-05-22

## 2025-05-22 RX ORDER — SODIUM CHLORIDE 0.9 % (FLUSH) 0.9 %
10 SYRINGE (ML) INJECTION
OUTPATIENT
Start: 2025-05-22

## 2025-05-29 ENCOUNTER — PATIENT MESSAGE (OUTPATIENT)
Dept: FAMILY MEDICINE | Facility: CLINIC | Age: 55
End: 2025-05-29
Payer: COMMERCIAL

## 2025-05-29 ENCOUNTER — TELEPHONE (OUTPATIENT)
Dept: INFUSION THERAPY | Facility: HOSPITAL | Age: 55
End: 2025-05-29
Payer: COMMERCIAL

## 2025-05-29 DIAGNOSIS — Z98.890 POST-OPERATIVE STATE: ICD-10-CM

## 2025-05-29 DIAGNOSIS — I10 ESSENTIAL HYPERTENSION: ICD-10-CM

## 2025-05-29 DIAGNOSIS — K21.9 GASTROESOPHAGEAL REFLUX DISEASE, UNSPECIFIED WHETHER ESOPHAGITIS PRESENT: ICD-10-CM

## 2025-05-29 DIAGNOSIS — G47.33 OSA ON CPAP: ICD-10-CM

## 2025-05-29 DIAGNOSIS — E78.5 DYSLIPIDEMIA: ICD-10-CM

## 2025-05-29 DIAGNOSIS — Z98.890 S/P GASTRIC SURGERY: ICD-10-CM

## 2025-05-29 DIAGNOSIS — R63.4 WEIGHT LOSS: ICD-10-CM

## 2025-05-29 NOTE — TELEPHONE ENCOUNTER
----- Message from Tonja sent at 5/28/2025  4:05 PM CDT -----  Type:  Patient Returning CallWho Called:  pt Who Left Message for Patient:  Mohamuds the patient know what this is regarding?:  yesBest Call Back Number:   [phone] Information:

## 2025-05-29 NOTE — TELEPHONE ENCOUNTER
Please review request and advise if you are aware of what labs are recommended and if you are able to order these, or if pt needs to f/u w/ Bariatrics.

## 2025-05-30 ENCOUNTER — LAB VISIT (OUTPATIENT)
Dept: LAB | Facility: HOSPITAL | Age: 55
End: 2025-05-30
Attending: INTERNAL MEDICINE
Payer: COMMERCIAL

## 2025-05-30 ENCOUNTER — INFUSION (OUTPATIENT)
Dept: INFUSION THERAPY | Facility: HOSPITAL | Age: 55
End: 2025-05-30
Attending: NURSE PRACTITIONER
Payer: COMMERCIAL

## 2025-05-30 VITALS
HEIGHT: 66 IN | RESPIRATION RATE: 18 BRPM | HEART RATE: 78 BPM | OXYGEN SATURATION: 100 % | BODY MASS INDEX: 34.58 KG/M2 | WEIGHT: 215.19 LBS | TEMPERATURE: 98 F | DIASTOLIC BLOOD PRESSURE: 73 MMHG | SYSTOLIC BLOOD PRESSURE: 112 MMHG

## 2025-05-30 DIAGNOSIS — I10 ESSENTIAL HYPERTENSION: ICD-10-CM

## 2025-05-30 DIAGNOSIS — Z98.890 POST-OPERATIVE STATE: ICD-10-CM

## 2025-05-30 DIAGNOSIS — E78.5 DYSLIPIDEMIA: ICD-10-CM

## 2025-05-30 DIAGNOSIS — Z98.890 S/P GASTRIC SURGERY: ICD-10-CM

## 2025-05-30 DIAGNOSIS — M32.9 SYSTEMIC LUPUS ERYTHEMATOSUS, UNSPECIFIED SLE TYPE, UNSPECIFIED ORGAN INVOLVEMENT STATUS: ICD-10-CM

## 2025-05-30 DIAGNOSIS — G47.33 OSA ON CPAP: ICD-10-CM

## 2025-05-30 DIAGNOSIS — R63.4 WEIGHT LOSS: ICD-10-CM

## 2025-05-30 DIAGNOSIS — K21.9 GASTROESOPHAGEAL REFLUX DISEASE, UNSPECIFIED WHETHER ESOPHAGITIS PRESENT: ICD-10-CM

## 2025-05-30 DIAGNOSIS — L40.50 PSORIATIC ARTHRITIS: Primary | ICD-10-CM

## 2025-05-30 DIAGNOSIS — L40.50 PSORIATIC ARTHRITIS: ICD-10-CM

## 2025-05-30 DIAGNOSIS — E03.9 HYPOTHYROIDISM, UNSPECIFIED TYPE: ICD-10-CM

## 2025-05-30 LAB
25(OH)D3+25(OH)D2 SERPL-MCNC: 36 NG/ML (ref 30–96)
ABSOLUTE EOSINOPHIL (OHS): 0.08 K/UL
ABSOLUTE MONOCYTE (OHS): 0.51 K/UL (ref 0.3–1)
ABSOLUTE NEUTROPHIL COUNT (OHS): 2.52 K/UL (ref 1.8–7.7)
ALBUMIN SERPL BCP-MCNC: 4.1 G/DL (ref 3.5–5.2)
ALP SERPL-CCNC: 78 UNIT/L (ref 40–150)
ALT SERPL W/O P-5'-P-CCNC: 30 UNIT/L (ref 10–44)
ANION GAP (OHS): 14 MMOL/L (ref 8–16)
AST SERPL-CCNC: 19 UNIT/L (ref 11–45)
BASOPHILS # BLD AUTO: 0.06 K/UL
BASOPHILS NFR BLD AUTO: 1 %
BILIRUB SERPL-MCNC: 0.6 MG/DL (ref 0.1–1)
BUN SERPL-MCNC: 9 MG/DL (ref 6–20)
CALCIUM SERPL-MCNC: 9.1 MG/DL (ref 8.7–10.5)
CHLORIDE SERPL-SCNC: 106 MMOL/L (ref 95–110)
CHOLEST SERPL-MCNC: 108 MG/DL (ref 120–199)
CHOLEST/HDLC SERPL: 2.1 {RATIO} (ref 2–5)
CO2 SERPL-SCNC: 22 MMOL/L (ref 23–29)
CREAT SERPL-MCNC: 0.8 MG/DL (ref 0.5–1.4)
ERYTHROCYTE [DISTWIDTH] IN BLOOD BY AUTOMATED COUNT: 13.6 % (ref 11.5–14.5)
GFR SERPLBLD CREATININE-BSD FMLA CKD-EPI: >60 ML/MIN/1.73/M2
GLUCOSE SERPL-MCNC: 68 MG/DL (ref 70–110)
HBV CORE AB SERPL QL IA: NORMAL
HBV SURFACE AG SERPL QL IA: NORMAL
HCT VFR BLD AUTO: 43.3 % (ref 37–48.5)
HDLC SERPL-MCNC: 52 MG/DL (ref 40–75)
HDLC SERPL: 48.1 % (ref 20–50)
HGB BLD-MCNC: 13.9 GM/DL (ref 12–16)
IMM GRANULOCYTES # BLD AUTO: 0.05 K/UL (ref 0–0.04)
IMM GRANULOCYTES NFR BLD AUTO: 0.8 % (ref 0–0.5)
IRON SATN MFR SERPL: 20 % (ref 20–50)
IRON SERPL-MCNC: 65 UG/DL (ref 30–160)
LDLC SERPL CALC-MCNC: 47.6 MG/DL (ref 63–159)
LYMPHOCYTES # BLD AUTO: 3.08 K/UL (ref 1–4.8)
MCH RBC QN AUTO: 30.8 PG (ref 27–31)
MCHC RBC AUTO-ENTMCNC: 32.1 G/DL (ref 32–36)
MCV RBC AUTO: 96 FL (ref 82–98)
NONHDLC SERPL-MCNC: 56 MG/DL
NUCLEATED RBC (/100WBC) (OHS): 0 /100 WBC
PLATELET # BLD AUTO: 207 K/UL (ref 150–450)
PMV BLD AUTO: 11 FL (ref 9.2–12.9)
POTASSIUM SERPL-SCNC: 3.6 MMOL/L (ref 3.5–5.1)
PROT SERPL-MCNC: 7.1 GM/DL (ref 6–8.4)
RBC # BLD AUTO: 4.52 M/UL (ref 4–5.4)
RELATIVE EOSINOPHIL (OHS): 1.3 %
RELATIVE LYMPHOCYTE (OHS): 48.9 % (ref 18–48)
RELATIVE MONOCYTE (OHS): 8.1 % (ref 4–15)
RELATIVE NEUTROPHIL (OHS): 39.9 % (ref 38–73)
SODIUM SERPL-SCNC: 142 MMOL/L (ref 136–145)
T4 FREE SERPL-MCNC: 1.24 NG/DL (ref 0.71–1.51)
TIBC SERPL-MCNC: 327 UG/DL (ref 250–450)
TRANSFERRIN SERPL-MCNC: 221 MG/DL (ref 200–375)
TRIGL SERPL-MCNC: 42 MG/DL (ref 30–150)
TSH SERPL-ACNC: 4.05 UIU/ML (ref 0.4–4)
VIT B12 SERPL-MCNC: 820 PG/ML (ref 210–950)
WBC # BLD AUTO: 6.3 K/UL (ref 3.9–12.7)

## 2025-05-30 PROCEDURE — 84425 ASSAY OF VITAMIN B-1: CPT

## 2025-05-30 PROCEDURE — 85025 COMPLETE CBC W/AUTO DIFF WBC: CPT

## 2025-05-30 PROCEDURE — A4216 STERILE WATER/SALINE, 10 ML: HCPCS | Mod: PN | Performed by: INTERNAL MEDICINE

## 2025-05-30 PROCEDURE — 25000003 PHARM REV CODE 250: Mod: PN | Performed by: INTERNAL MEDICINE

## 2025-05-30 PROCEDURE — 84443 ASSAY THYROID STIM HORMONE: CPT

## 2025-05-30 PROCEDURE — 86480 TB TEST CELL IMMUN MEASURE: CPT

## 2025-05-30 PROCEDURE — 82607 VITAMIN B-12: CPT

## 2025-05-30 PROCEDURE — 36415 COLL VENOUS BLD VENIPUNCTURE: CPT | Mod: PO

## 2025-05-30 PROCEDURE — 83540 ASSAY OF IRON: CPT

## 2025-05-30 PROCEDURE — 82306 VITAMIN D 25 HYDROXY: CPT

## 2025-05-30 PROCEDURE — 80061 LIPID PANEL: CPT

## 2025-05-30 PROCEDURE — 84439 ASSAY OF FREE THYROXINE: CPT

## 2025-05-30 PROCEDURE — 80053 COMPREHEN METABOLIC PANEL: CPT

## 2025-05-30 PROCEDURE — 86704 HEP B CORE ANTIBODY TOTAL: CPT

## 2025-05-30 PROCEDURE — 87340 HEPATITIS B SURFACE AG IA: CPT

## 2025-05-30 PROCEDURE — 96365 THER/PROPH/DIAG IV INF INIT: CPT | Mod: PN

## 2025-05-30 RX ORDER — DIPHENHYDRAMINE HYDROCHLORIDE 50 MG/ML
50 INJECTION, SOLUTION INTRAMUSCULAR; INTRAVENOUS ONCE AS NEEDED
Status: DISCONTINUED | OUTPATIENT
Start: 2025-05-30 | End: 2025-05-30 | Stop reason: HOSPADM

## 2025-05-30 RX ORDER — EPINEPHRINE 0.3 MG/.3ML
0.3 INJECTION SUBCUTANEOUS ONCE AS NEEDED
Status: DISCONTINUED | OUTPATIENT
Start: 2025-05-30 | End: 2025-05-30 | Stop reason: HOSPADM

## 2025-05-30 RX ORDER — SODIUM CHLORIDE 0.9 % (FLUSH) 0.9 %
10 SYRINGE (ML) INJECTION
Status: DISCONTINUED | OUTPATIENT
Start: 2025-05-30 | End: 2025-05-30 | Stop reason: HOSPADM

## 2025-05-30 RX ORDER — EPINEPHRINE 0.3 MG/.3ML
0.3 INJECTION SUBCUTANEOUS ONCE AS NEEDED
OUTPATIENT
Start: 2025-06-27

## 2025-05-30 RX ORDER — SODIUM CHLORIDE 0.9 % (FLUSH) 0.9 %
10 SYRINGE (ML) INJECTION
OUTPATIENT
Start: 2025-06-27

## 2025-05-30 RX ORDER — HEPARIN 100 UNIT/ML
500 SYRINGE INTRAVENOUS
OUTPATIENT
Start: 2025-06-27

## 2025-05-30 RX ORDER — DIPHENHYDRAMINE HYDROCHLORIDE 50 MG/ML
50 INJECTION, SOLUTION INTRAMUSCULAR; INTRAVENOUS ONCE AS NEEDED
OUTPATIENT
Start: 2025-06-27

## 2025-05-30 RX ORDER — HEPARIN 100 UNIT/ML
500 SYRINGE INTRAVENOUS
Status: DISCONTINUED | OUTPATIENT
Start: 2025-05-30 | End: 2025-05-30 | Stop reason: HOSPADM

## 2025-05-30 RX ADMIN — Medication 10 ML: at 02:05

## 2025-05-30 RX ADMIN — SODIUM CHLORIDE 585 MG: 9 INJECTION, SOLUTION INTRAVENOUS at 01:05

## 2025-05-30 RX ADMIN — SODIUM CHLORIDE: 9 INJECTION, SOLUTION INTRAVENOUS at 01:05

## 2025-05-30 NOTE — PLAN OF CARE
Problem: Adult Inpatient Plan of Care  Goal: Plan of Care Review  Outcome: Progressing  Flowsheets (Taken 5/30/2025 1434)  Plan of Care Reviewed With: patient  Goal: Patient-Specific Goal (Individualized)  Outcome: Progressing  Flowsheets (Taken 5/30/2025 1434)  Individualized Care Needs: recliner, snacks, conversation, education  Anxieties, Fears or Concerns: none  Patient/Family-Specific Goals (Include Timeframe): no s/s of reaction with tx     Problem: Fatigue  Goal: Improved Activity Tolerance  Outcome: Progressing  Intervention: Promote Improved Energy  Flowsheets (Taken 5/30/2025 1434)  Fatigue Management: paced activity encouraged  Sleep/Rest Enhancement: natural light exposure provided  Activity Management:   Ambulated -L4   Up in chair - L3  Environmental Support:   environmental consistency promoted   distractions minimized  Pt. tolerated IV Cosentyx well. VS stable, no adverse reactions noted. Education r/t medication reviewed. Consent signed by pt at chairside. Discussed future appointments, NAD noted. Pt. discharged to home ambulatory with no assistance needed.

## 2025-05-31 ENCOUNTER — RESULTS FOLLOW-UP (OUTPATIENT)
Dept: RHEUMATOLOGY | Facility: CLINIC | Age: 55
End: 2025-05-31

## 2025-06-02 LAB
MITOGEN MINUS NIL (OHS): 9.98
NIL TB SYNCED (OHS): 0.02
QUANTIFERON GOLD INTERP (OHS): NEGATIVE
TB1 AG MINUS NIL (OHS): 0.01
TB2 AG MINUS NIL (OHS): 0.01

## 2025-06-03 ENCOUNTER — PATIENT MESSAGE (OUTPATIENT)
Dept: BARIATRICS | Facility: CLINIC | Age: 55
End: 2025-06-03
Payer: COMMERCIAL

## 2025-06-04 LAB — W VITAMIN B1: 66 UG/L

## 2025-06-05 ENCOUNTER — RESULTS FOLLOW-UP (OUTPATIENT)
Dept: FAMILY MEDICINE | Facility: CLINIC | Age: 55
End: 2025-06-05

## 2025-06-06 RX ORDER — FOLIC ACID 1 MG/1
2 TABLET ORAL DAILY
Qty: 60 TABLET | Refills: 11 | Status: SHIPPED | OUTPATIENT
Start: 2025-06-06 | End: 2026-06-06

## 2025-06-21 ENCOUNTER — PATIENT MESSAGE (OUTPATIENT)
Dept: ADMINISTRATIVE | Facility: OTHER | Age: 55
End: 2025-06-21
Payer: COMMERCIAL

## 2025-06-26 ENCOUNTER — INFUSION (OUTPATIENT)
Dept: INFUSION THERAPY | Facility: HOSPITAL | Age: 55
End: 2025-06-26
Attending: NURSE PRACTITIONER
Payer: COMMERCIAL

## 2025-06-26 VITALS
WEIGHT: 211 LBS | SYSTOLIC BLOOD PRESSURE: 106 MMHG | TEMPERATURE: 98 F | HEIGHT: 66 IN | DIASTOLIC BLOOD PRESSURE: 73 MMHG | HEART RATE: 89 BPM | RESPIRATION RATE: 16 BRPM | OXYGEN SATURATION: 100 % | BODY MASS INDEX: 33.91 KG/M2

## 2025-06-26 DIAGNOSIS — L40.50 PSORIATIC ARTHRITIS: Primary | ICD-10-CM

## 2025-06-26 PROCEDURE — 96365 THER/PROPH/DIAG IV INF INIT: CPT | Mod: PN

## 2025-06-26 PROCEDURE — 63600175 PHARM REV CODE 636 W HCPCS: Mod: JW,TB,PN | Performed by: INTERNAL MEDICINE

## 2025-06-26 PROCEDURE — 25000003 PHARM REV CODE 250: Mod: PN | Performed by: INTERNAL MEDICINE

## 2025-06-26 RX ORDER — HEPARIN 100 UNIT/ML
500 SYRINGE INTRAVENOUS
OUTPATIENT
Start: 2025-06-27

## 2025-06-26 RX ORDER — DIPHENHYDRAMINE HYDROCHLORIDE 50 MG/ML
50 INJECTION, SOLUTION INTRAMUSCULAR; INTRAVENOUS ONCE AS NEEDED
OUTPATIENT
Start: 2025-06-27

## 2025-06-26 RX ORDER — SODIUM CHLORIDE 0.9 % (FLUSH) 0.9 %
10 SYRINGE (ML) INJECTION
OUTPATIENT
Start: 2025-06-27

## 2025-06-26 RX ORDER — EPINEPHRINE 0.3 MG/.3ML
0.3 INJECTION SUBCUTANEOUS ONCE AS NEEDED
OUTPATIENT
Start: 2025-06-27

## 2025-06-26 RX ADMIN — SODIUM CHLORIDE: 9 INJECTION, SOLUTION INTRAVENOUS at 03:06

## 2025-06-26 RX ADMIN — SODIUM CHLORIDE 167.5 MG: 9 INJECTION, SOLUTION INTRAVENOUS at 04:06

## 2025-06-26 NOTE — PLAN OF CARE
Problem: Adult Inpatient Plan of Care  Goal: Plan of Care Review  Outcome: Progressing  Flowsheets (Taken 6/26/2025 1646)  Plan of Care Reviewed With: patient  Goal: Patient-Specific Goal (Individualized)  Outcome: Progressing  Flowsheets (Taken 6/26/2025 1646)  Individualized Care Needs: recliner, pt uses personal phone to play games  Anxieties, Fears or Concerns: none  Patient/Family-Specific Goals (Include Timeframe): no reaction to tx     Problem: Fatigue  Goal: Improved Activity Tolerance  Outcome: Progressing   Pt tolerated Cosentyx infusion well.  No s/s of reaction noted.  Instructed to call MD with any problems

## 2025-07-05 DIAGNOSIS — M54.16 LUMBAR RADICULOPATHY, CHRONIC: ICD-10-CM

## 2025-07-05 DIAGNOSIS — M54.9 DORSALGIA, UNSPECIFIED: ICD-10-CM

## 2025-07-05 NOTE — TELEPHONE ENCOUNTER
No care due was identified.  [facility] Embedded Care Due Messages. Reference number: [id number].   7/05/2025 8:00:16 AM CDT

## 2025-07-07 ENCOUNTER — PATIENT MESSAGE (OUTPATIENT)
Dept: BARIATRICS | Facility: CLINIC | Age: 55
End: 2025-07-07
Payer: COMMERCIAL

## 2025-07-07 RX ORDER — TRAMADOL HYDROCHLORIDE 50 MG/1
50 TABLET, FILM COATED ORAL EVERY 8 HOURS PRN
Qty: 60 TABLET | Refills: 3 | Status: SHIPPED | OUTPATIENT
Start: 2025-07-07

## 2025-07-07 NOTE — TELEPHONE ENCOUNTER
Refill Routing Note   Medication(s) are not appropriate for processing by [facility] for the following reason(s):        Outside of protocol    [facility] action(s):  Route               Appointments  past 12m or future 3m with PCP    Date Provider   Last Visit   2/7/2025 Layo Mccrary MD   Next Visit   Visit date not found Layo Mccrary MD   ED visits in past 90 days: 0        Note composed:8:59 AM 07/07/2025

## 2025-07-08 ENCOUNTER — PATIENT MESSAGE (OUTPATIENT)
Dept: BARIATRICS | Facility: CLINIC | Age: 55
End: 2025-07-08
Payer: COMMERCIAL

## 2025-07-21 ENCOUNTER — TELEPHONE (OUTPATIENT)
Dept: INFUSION THERAPY | Facility: HOSPITAL | Age: 55
End: 2025-07-21
Payer: COMMERCIAL

## 2025-07-21 NOTE — TELEPHONE ENCOUNTER
Copied from CRM #[id number]. Topic: General Inquiry - Patient Advice  >> Jul 21, 2025 10:21 AM Zina wrote:  Type:  Needs Medical Advice    Who Called: pt    Would the patient rather a call back or a response via [employer]? wilner  Best Call Back Number: [phone]   Additional Information: requesting a call back to r/s Thursday appt until next week

## 2025-07-23 DIAGNOSIS — Z12.31 OTHER SCREENING MAMMOGRAM: ICD-10-CM

## 2025-07-28 ENCOUNTER — INFUSION (OUTPATIENT)
Dept: INFUSION THERAPY | Facility: HOSPITAL | Age: 55
End: 2025-07-28
Attending: NURSE PRACTITIONER
Payer: COMMERCIAL

## 2025-07-28 VITALS
OXYGEN SATURATION: 96 % | RESPIRATION RATE: 16 BRPM | BODY MASS INDEX: 33.03 KG/M2 | SYSTOLIC BLOOD PRESSURE: 130 MMHG | HEIGHT: 66 IN | HEART RATE: 62 BPM | WEIGHT: 205.5 LBS | DIASTOLIC BLOOD PRESSURE: 85 MMHG | TEMPERATURE: 98 F

## 2025-07-28 DIAGNOSIS — L40.50 PSORIATIC ARTHRITIS: Primary | ICD-10-CM

## 2025-07-28 PROCEDURE — 25000003 PHARM REV CODE 250: Mod: PN | Performed by: INTERNAL MEDICINE

## 2025-07-28 PROCEDURE — 63600175 PHARM REV CODE 636 W HCPCS: Mod: JZ,TB,PN | Performed by: INTERNAL MEDICINE

## 2025-07-28 PROCEDURE — 96365 THER/PROPH/DIAG IV INF INIT: CPT | Mod: PN

## 2025-07-28 RX ORDER — EPINEPHRINE 0.3 MG/.3ML
0.3 INJECTION SUBCUTANEOUS ONCE AS NEEDED
OUTPATIENT
Start: 2025-08-21

## 2025-07-28 RX ORDER — EPINEPHRINE 0.3 MG/.3ML
0.3 INJECTION SUBCUTANEOUS ONCE AS NEEDED
Status: DISCONTINUED | OUTPATIENT
Start: 2025-07-28 | End: 2025-07-28 | Stop reason: HOSPADM

## 2025-07-28 RX ORDER — SODIUM CHLORIDE 0.9 % (FLUSH) 0.9 %
10 SYRINGE (ML) INJECTION
Status: DISCONTINUED | OUTPATIENT
Start: 2025-07-28 | End: 2025-07-28 | Stop reason: HOSPADM

## 2025-07-28 RX ORDER — DIPHENHYDRAMINE HYDROCHLORIDE 50 MG/ML
50 INJECTION, SOLUTION INTRAMUSCULAR; INTRAVENOUS ONCE AS NEEDED
OUTPATIENT
Start: 2025-08-21

## 2025-07-28 RX ORDER — DIPHENHYDRAMINE HYDROCHLORIDE 50 MG/ML
50 INJECTION, SOLUTION INTRAMUSCULAR; INTRAVENOUS ONCE AS NEEDED
Status: DISCONTINUED | OUTPATIENT
Start: 2025-07-28 | End: 2025-07-28 | Stop reason: HOSPADM

## 2025-07-28 RX ORDER — HEPARIN 100 UNIT/ML
500 SYRINGE INTRAVENOUS
OUTPATIENT
Start: 2025-08-21

## 2025-07-28 RX ORDER — SODIUM CHLORIDE 0.9 % (FLUSH) 0.9 %
10 SYRINGE (ML) INJECTION
OUTPATIENT
Start: 2025-08-21

## 2025-07-28 RX ADMIN — SODIUM CHLORIDE: 9 INJECTION, SOLUTION INTRAVENOUS at 03:07

## 2025-07-28 RX ADMIN — SODIUM CHLORIDE 162.5 MG: 9 INJECTION, SOLUTION INTRAVENOUS at 03:07

## 2025-07-28 NOTE — PLAN OF CARE
Problem: Adult Inpatient Plan of Care  Goal: Plan of Care Review  Outcome: Ongoing  Flowsheets (Taken 7/28/2025 1631)  Plan of Care Reviewed With: patient     Pt to clinic today for cosentyx. Arrives on foot, by self. Treatment tolerated well with no changes noted nor reported throughout therapy. Pt aware of side effects and number to call for any needs and discharged to home in NAD. Follow-up visit and counseling of health maintenance recommendations related to infusion reviewed. Safety and monitoring maintained throughout visit.       Problem: Adult Inpatient Plan of Care  Goal: Patient-Specific Goal (Individualized)  Outcome: Ongoing  Flowsheets (Taken 7/28/2025 1631)  Individualized Care Needs: avoid piv in hand, audiobook  Anxieties, Fears or Concerns: veins roll  Patient/Family-Specific Goals (Include Timeframe): tolerate treatment     Problem: Fatigue  Goal: Improved Activity Tolerance  Outcome: Ongoing  Intervention: Promote Improved Energy  Flowsheets (Taken 7/28/2025 1631)  Sleep/Rest Enhancement:   natural light exposure provided   noise level reduced  Activity Management:   Ambulated -L4   Up in chair - L3  Environmental Support: personal routine supported

## 2025-07-30 ENCOUNTER — PATIENT MESSAGE (OUTPATIENT)
Dept: BARIATRICS | Facility: CLINIC | Age: 55
End: 2025-07-30
Payer: COMMERCIAL

## 2025-08-06 DIAGNOSIS — E66.812 CLASS 2 OBESITY WITH BODY MASS INDEX (BMI) OF 38.0 TO 38.9 IN ADULT, UNSPECIFIED OBESITY TYPE, UNSPECIFIED WHETHER SERIOUS COMORBIDITY PRESENT: ICD-10-CM

## 2025-08-06 DIAGNOSIS — M32.9 SLE WITH NORMAL KIDNEYS: ICD-10-CM

## 2025-08-06 DIAGNOSIS — R60.9 EDEMA, UNSPECIFIED TYPE: ICD-10-CM

## 2025-08-06 DIAGNOSIS — M06.9 RHEUMATOID ARTHRITIS FLARE: ICD-10-CM

## 2025-08-06 DIAGNOSIS — R45.4 IRRITABILITY: ICD-10-CM

## 2025-08-06 RX ORDER — BUPROPION HYDROCHLORIDE 150 MG/1
150 TABLET, EXTENDED RELEASE ORAL 2 TIMES DAILY
Qty: 180 TABLET | Refills: 1 | Status: SHIPPED | OUTPATIENT
Start: 2025-08-06

## 2025-08-06 NOTE — TELEPHONE ENCOUNTER
Refill Decision Note   Christine Alfaro  is requesting a refill authorization.  Brief Assessment and Rationale for Refill:  Approve     Medication Therapy Plan:        Comments:     Note composed:8:34 AM 08/06/2025

## 2025-08-07 RX ORDER — FUROSEMIDE 20 MG/1
20 TABLET ORAL DAILY
Qty: 90 TABLET | Refills: 1 | Status: SHIPPED | OUTPATIENT
Start: 2025-08-07

## 2025-08-12 ENCOUNTER — PATIENT MESSAGE (OUTPATIENT)
Dept: BARIATRICS | Facility: CLINIC | Age: 55
End: 2025-08-12
Payer: COMMERCIAL

## 2025-08-26 ENCOUNTER — PATIENT MESSAGE (OUTPATIENT)
Dept: ADMINISTRATIVE | Facility: OTHER | Age: 55
End: 2025-08-26
Payer: COMMERCIAL

## 2025-08-27 ENCOUNTER — OFFICE VISIT (OUTPATIENT)
Dept: RHEUMATOLOGY | Facility: CLINIC | Age: 55
End: 2025-08-27
Payer: COMMERCIAL

## 2025-08-27 DIAGNOSIS — D84.821 IMMUNODEFICIENCY DUE TO DRUG THERAPY: ICD-10-CM

## 2025-08-27 DIAGNOSIS — M32.9 SYSTEMIC LUPUS ERYTHEMATOSUS, UNSPECIFIED SLE TYPE, UNSPECIFIED ORGAN INVOLVEMENT STATUS: ICD-10-CM

## 2025-08-27 DIAGNOSIS — L40.50 PSORIATIC ARTHRITIS: Primary | ICD-10-CM

## 2025-08-27 DIAGNOSIS — Z79.899 IMMUNODEFICIENCY DUE TO DRUG THERAPY: ICD-10-CM

## 2025-08-27 PROCEDURE — G2211 COMPLEX E/M VISIT ADD ON: HCPCS | Mod: 95,,, | Performed by: INTERNAL MEDICINE

## 2025-08-27 PROCEDURE — 98006 SYNCH AUDIO-VIDEO EST MOD 30: CPT | Mod: 95,,, | Performed by: INTERNAL MEDICINE

## 2025-08-27 PROCEDURE — 3044F HG A1C LEVEL LT 7.0%: CPT | Mod: CPTII,95,, | Performed by: INTERNAL MEDICINE

## 2025-08-27 PROCEDURE — 4010F ACE/ARB THERAPY RXD/TAKEN: CPT | Mod: CPTII,95,, | Performed by: INTERNAL MEDICINE

## 2025-08-27 RX ORDER — HYDROXYCHLOROQUINE SULFATE 200 MG/1
200 TABLET, FILM COATED ORAL 2 TIMES DAILY
Qty: 180 TABLET | Refills: 4 | Status: SHIPPED | OUTPATIENT
Start: 2025-08-27 | End: 2025-11-25

## 2025-08-28 ENCOUNTER — LAB VISIT (OUTPATIENT)
Dept: LAB | Facility: HOSPITAL | Age: 55
End: 2025-08-28
Attending: INTERNAL MEDICINE
Payer: COMMERCIAL

## 2025-08-28 ENCOUNTER — INFUSION (OUTPATIENT)
Dept: INFUSION THERAPY | Facility: HOSPITAL | Age: 55
End: 2025-08-28
Attending: NURSE PRACTITIONER
Payer: COMMERCIAL

## 2025-08-28 VITALS
BODY MASS INDEX: 32.88 KG/M2 | WEIGHT: 204.56 LBS | HEART RATE: 75 BPM | SYSTOLIC BLOOD PRESSURE: 113 MMHG | DIASTOLIC BLOOD PRESSURE: 71 MMHG | RESPIRATION RATE: 16 BRPM | HEIGHT: 66 IN | OXYGEN SATURATION: 99 % | TEMPERATURE: 98 F

## 2025-08-28 DIAGNOSIS — M32.9 SYSTEMIC LUPUS ERYTHEMATOSUS, UNSPECIFIED SLE TYPE, UNSPECIFIED ORGAN INVOLVEMENT STATUS: ICD-10-CM

## 2025-08-28 DIAGNOSIS — L40.50 PSORIATIC ARTHRITIS: ICD-10-CM

## 2025-08-28 DIAGNOSIS — L40.50 PSORIATIC ARTHRITIS: Primary | ICD-10-CM

## 2025-08-28 LAB
ABSOLUTE EOSINOPHIL (OHS): 0.11 K/UL
ABSOLUTE MONOCYTE (OHS): 0.57 K/UL (ref 0.3–1)
ABSOLUTE NEUTROPHIL COUNT (OHS): 1.88 K/UL (ref 1.8–7.7)
ALBUMIN SERPL BCP-MCNC: 3.5 G/DL (ref 3.5–5.2)
ALP SERPL-CCNC: 68 UNIT/L (ref 40–150)
ALT SERPL W/O P-5'-P-CCNC: 26 UNIT/L (ref 10–44)
ANION GAP (OHS): 7 MMOL/L (ref 8–16)
AST SERPL-CCNC: 17 UNIT/L (ref 11–45)
BACTERIA #/AREA URNS HPF: ABNORMAL /HPF
BASOPHILS # BLD AUTO: 0.05 K/UL
BASOPHILS NFR BLD AUTO: 0.9 %
BILIRUB SERPL-MCNC: 0.2 MG/DL (ref 0.1–1)
BILIRUB UR QL STRIP.AUTO: NEGATIVE
BUN SERPL-MCNC: 9 MG/DL (ref 6–20)
C3 SERPL-MCNC: 118 MG/DL (ref 50–180)
C4 COMPLEMENT (OHS): 25 MG/DL (ref 11–44)
CALCIUM SERPL-MCNC: 8.6 MG/DL (ref 8.7–10.5)
CHLORIDE SERPL-SCNC: 111 MMOL/L (ref 95–110)
CLARITY UR: CLEAR
CO2 SERPL-SCNC: 23 MMOL/L (ref 23–29)
COLOR UR AUTO: YELLOW
CREAT SERPL-MCNC: 0.8 MG/DL (ref 0.5–1.4)
CREAT UR-MCNC: 139 MG/DL (ref 15–325)
CRP SERPL-MCNC: 9 MG/L
ERYTHROCYTE [DISTWIDTH] IN BLOOD BY AUTOMATED COUNT: 13.6 % (ref 11.5–14.5)
ERYTHROCYTE [SEDIMENTATION RATE] IN BLOOD BY PHOTOMETRIC METHOD: 12 MM/HR
GFR SERPLBLD CREATININE-BSD FMLA CKD-EPI: >60 ML/MIN/1.73/M2
GLUCOSE SERPL-MCNC: 96 MG/DL (ref 70–110)
GLUCOSE UR QL STRIP: NEGATIVE
HCT VFR BLD AUTO: 40.3 % (ref 37–48.5)
HGB BLD-MCNC: 13.6 GM/DL (ref 12–16)
HGB UR QL STRIP: NEGATIVE
IMM GRANULOCYTES # BLD AUTO: 0.01 K/UL (ref 0–0.04)
IMM GRANULOCYTES NFR BLD AUTO: 0.2 % (ref 0–0.5)
KETONES UR QL STRIP: ABNORMAL
LEUKOCYTE ESTERASE UR QL STRIP: ABNORMAL
LYMPHOCYTES # BLD AUTO: 2.77 K/UL (ref 1–4.8)
MCH RBC QN AUTO: 31.6 PG (ref 27–31)
MCHC RBC AUTO-ENTMCNC: 33.7 G/DL (ref 32–36)
MCV RBC AUTO: 94 FL (ref 82–98)
MICROSCOPIC COMMENT: ABNORMAL
NITRITE UR QL STRIP: NEGATIVE
NUCLEATED RBC (/100WBC) (OHS): 0 /100 WBC
PH UR STRIP: 6 [PH]
PLATELET # BLD AUTO: 171 K/UL (ref 150–450)
PMV BLD AUTO: 9.6 FL (ref 9.2–12.9)
POTASSIUM SERPL-SCNC: 4.1 MMOL/L (ref 3.5–5.1)
PROT SERPL-MCNC: 6.7 GM/DL (ref 6–8.4)
PROT UR QL STRIP: NEGATIVE
PROT UR-MCNC: 9 MG/DL
PROT/CREAT UR: 0.06 MG/G{CREAT}
RBC # BLD AUTO: 4.31 M/UL (ref 4–5.4)
RBC #/AREA URNS HPF: 2 /HPF (ref 0–4)
RELATIVE EOSINOPHIL (OHS): 2 %
RELATIVE LYMPHOCYTE (OHS): 51.4 % (ref 18–48)
RELATIVE MONOCYTE (OHS): 10.6 % (ref 4–15)
RELATIVE NEUTROPHIL (OHS): 34.9 % (ref 38–73)
SODIUM SERPL-SCNC: 141 MMOL/L (ref 136–145)
SP GR UR STRIP: 1.02
SQUAMOUS #/AREA URNS HPF: 8 /HPF
WBC # BLD AUTO: 5.39 K/UL (ref 3.9–12.7)
WBC #/AREA URNS HPF: 6 /HPF (ref 0–5)

## 2025-08-28 PROCEDURE — 84156 ASSAY OF PROTEIN URINE: CPT

## 2025-08-28 PROCEDURE — 82247 BILIRUBIN TOTAL: CPT | Mod: PN

## 2025-08-28 PROCEDURE — 63600175 PHARM REV CODE 636 W HCPCS: Mod: TB,PN | Performed by: INTERNAL MEDICINE

## 2025-08-28 PROCEDURE — 96365 THER/PROPH/DIAG IV INF INIT: CPT | Mod: PN

## 2025-08-28 PROCEDURE — 86140 C-REACTIVE PROTEIN: CPT

## 2025-08-28 PROCEDURE — 86160 COMPLEMENT ANTIGEN: CPT

## 2025-08-28 PROCEDURE — 85652 RBC SED RATE AUTOMATED: CPT

## 2025-08-28 PROCEDURE — 36415 COLL VENOUS BLD VENIPUNCTURE: CPT | Mod: PN

## 2025-08-28 PROCEDURE — 85025 COMPLETE CBC W/AUTO DIFF WBC: CPT | Mod: PN

## 2025-08-28 PROCEDURE — 86160 COMPLEMENT ANTIGEN: CPT | Mod: 59

## 2025-08-28 PROCEDURE — 81003 URINALYSIS AUTO W/O SCOPE: CPT | Mod: PN

## 2025-08-28 PROCEDURE — 25000003 PHARM REV CODE 250: Mod: PN | Performed by: INTERNAL MEDICINE

## 2025-08-28 PROCEDURE — A4216 STERILE WATER/SALINE, 10 ML: HCPCS | Mod: PN | Performed by: INTERNAL MEDICINE

## 2025-08-28 PROCEDURE — 86225 DNA ANTIBODY NATIVE: CPT

## 2025-08-28 RX ORDER — DIPHENHYDRAMINE HYDROCHLORIDE 50 MG/ML
50 INJECTION, SOLUTION INTRAMUSCULAR; INTRAVENOUS ONCE AS NEEDED
Status: DISCONTINUED | OUTPATIENT
Start: 2025-08-28 | End: 2025-08-28 | Stop reason: HOSPADM

## 2025-08-28 RX ORDER — SODIUM CHLORIDE 0.9 % (FLUSH) 0.9 %
10 SYRINGE (ML) INJECTION
OUTPATIENT
Start: 2025-08-28

## 2025-08-28 RX ORDER — HEPARIN 100 UNIT/ML
500 SYRINGE INTRAVENOUS
OUTPATIENT
Start: 2025-08-28

## 2025-08-28 RX ORDER — HEPARIN 100 UNIT/ML
500 SYRINGE INTRAVENOUS
Status: DISCONTINUED | OUTPATIENT
Start: 2025-08-28 | End: 2025-08-28 | Stop reason: HOSPADM

## 2025-08-28 RX ORDER — SODIUM CHLORIDE 0.9 % (FLUSH) 0.9 %
10 SYRINGE (ML) INJECTION
Status: DISCONTINUED | OUTPATIENT
Start: 2025-08-28 | End: 2025-08-28 | Stop reason: HOSPADM

## 2025-08-28 RX ORDER — EPINEPHRINE 0.3 MG/.3ML
0.3 INJECTION SUBCUTANEOUS ONCE AS NEEDED
Status: DISCONTINUED | OUTPATIENT
Start: 2025-08-28 | End: 2025-08-28 | Stop reason: HOSPADM

## 2025-08-28 RX ORDER — DIPHENHYDRAMINE HYDROCHLORIDE 50 MG/ML
50 INJECTION, SOLUTION INTRAMUSCULAR; INTRAVENOUS ONCE AS NEEDED
OUTPATIENT
Start: 2025-08-28 | End: 2037-01-24

## 2025-08-28 RX ORDER — EPINEPHRINE 0.3 MG/.3ML
0.3 INJECTION SUBCUTANEOUS ONCE AS NEEDED
OUTPATIENT
Start: 2025-08-28 | End: 2037-01-24

## 2025-08-28 RX ADMIN — SODIUM CHLORIDE: 9 INJECTION, SOLUTION INTRAVENOUS at 03:08

## 2025-08-28 RX ADMIN — Medication 10 ML: at 04:08

## 2025-08-28 RX ADMIN — SODIUM CHLORIDE 162.5 MG: 9 INJECTION, SOLUTION INTRAVENOUS at 03:08

## 2025-08-29 ENCOUNTER — PATIENT MESSAGE (OUTPATIENT)
Dept: RHEUMATOLOGY | Facility: CLINIC | Age: 55
End: 2025-08-29
Payer: COMMERCIAL

## 2025-08-29 ENCOUNTER — HOSPITAL ENCOUNTER (OUTPATIENT)
Dept: RADIOLOGY | Facility: HOSPITAL | Age: 55
Discharge: HOME | End: 2025-08-29
Attending: OBSTETRICS & GYNECOLOGY
Payer: COMMERCIAL

## 2025-08-29 LAB
DSDNA ANTIBODY (OHS): NORMAL
DSDNA ANTIBODY TITER (OHS): NORMAL

## 2025-09-03 ENCOUNTER — OFFICE VISIT (OUTPATIENT)
Dept: OBSTETRICS AND GYNECOLOGY | Facility: CLINIC | Age: 55
End: 2025-09-03
Payer: COMMERCIAL

## 2025-09-03 VITALS
WEIGHT: 206.81 LBS | BODY MASS INDEX: 33.24 KG/M2 | DIASTOLIC BLOOD PRESSURE: 68 MMHG | HEIGHT: 66 IN | SYSTOLIC BLOOD PRESSURE: 118 MMHG

## 2025-09-03 DIAGNOSIS — R93.89 THICKENED ENDOMETRIUM: Primary | ICD-10-CM

## 2025-09-03 DIAGNOSIS — M32.9 SLE WITH NORMAL KIDNEYS: ICD-10-CM

## 2025-09-03 DIAGNOSIS — Z79.899 DRUG-INDUCED IMMUNODEFICIENCY: ICD-10-CM

## 2025-09-03 DIAGNOSIS — M06.9 RHEUMATOID ARTHRITIS FLARE: ICD-10-CM

## 2025-09-03 DIAGNOSIS — Z90.3 S/P GASTRIC SLEEVE PROCEDURE: ICD-10-CM

## 2025-09-03 DIAGNOSIS — E66.811 CLASS 1 OBESITY WITH BODY MASS INDEX (BMI) OF 33.0 TO 33.9 IN ADULT, UNSPECIFIED OBESITY TYPE, UNSPECIFIED WHETHER SERIOUS COMORBIDITY PRESENT: ICD-10-CM

## 2025-09-03 DIAGNOSIS — N95.0 POSTMENOPAUSAL BLEEDING: ICD-10-CM

## 2025-09-03 DIAGNOSIS — G47.33 OSA (OBSTRUCTIVE SLEEP APNEA): ICD-10-CM

## 2025-09-03 DIAGNOSIS — R39.9 UTI SYMPTOMS: ICD-10-CM

## 2025-09-03 DIAGNOSIS — D84.821 DRUG-INDUCED IMMUNODEFICIENCY: ICD-10-CM

## 2025-09-03 LAB
BILIRUBIN, UA POC OHS: NEGATIVE
BLOOD, UA POC OHS: NEGATIVE
CLARITY, UA POC OHS: CLEAR
COLOR, UA POC OHS: YELLOW
GLUCOSE, UA POC OHS: NEGATIVE
KETONES, UA POC OHS: NEGATIVE
LEUKOCYTES, UA POC OHS: ABNORMAL
NITRITE, UA POC OHS: NEGATIVE
PH, UA POC OHS: 7
PROTEIN, UA POC OHS: NEGATIVE
SPECIFIC GRAVITY, UA POC OHS: 1.02
UROBILINOGEN, UA POC OHS: 0.2

## 2025-09-03 PROCEDURE — 99214 OFFICE O/P EST MOD 30 MIN: CPT | Mod: S$GLB,,, | Performed by: OBSTETRICS & GYNECOLOGY

## 2025-09-03 PROCEDURE — 3074F SYST BP LT 130 MM HG: CPT | Mod: CPTII,S$GLB,, | Performed by: OBSTETRICS & GYNECOLOGY

## 2025-09-03 PROCEDURE — 3078F DIAST BP <80 MM HG: CPT | Mod: CPTII,S$GLB,, | Performed by: OBSTETRICS & GYNECOLOGY

## 2025-09-03 PROCEDURE — 4010F ACE/ARB THERAPY RXD/TAKEN: CPT | Mod: CPTII,S$GLB,, | Performed by: OBSTETRICS & GYNECOLOGY

## 2025-09-03 PROCEDURE — 87086 URINE CULTURE/COLONY COUNT: CPT | Performed by: OBSTETRICS & GYNECOLOGY

## 2025-09-03 PROCEDURE — 3044F HG A1C LEVEL LT 7.0%: CPT | Mod: CPTII,S$GLB,, | Performed by: OBSTETRICS & GYNECOLOGY

## 2025-09-03 PROCEDURE — 99999 PR PBB SHADOW E&M-EST. PATIENT-LVL IV: CPT | Mod: PBBFAC,,, | Performed by: OBSTETRICS & GYNECOLOGY

## 2025-09-03 PROCEDURE — 1159F MED LIST DOCD IN RCRD: CPT | Mod: CPTII,S$GLB,, | Performed by: OBSTETRICS & GYNECOLOGY

## 2025-09-03 PROCEDURE — 3008F BODY MASS INDEX DOCD: CPT | Mod: CPTII,S$GLB,, | Performed by: OBSTETRICS & GYNECOLOGY

## 2025-09-03 PROCEDURE — 81003 URINALYSIS AUTO W/O SCOPE: CPT | Mod: QW,S$GLB,, | Performed by: OBSTETRICS & GYNECOLOGY

## 2025-09-03 RX ORDER — SODIUM CHLORIDE, SODIUM LACTATE, POTASSIUM CHLORIDE, CALCIUM CHLORIDE 600; 310; 30; 20 MG/100ML; MG/100ML; MG/100ML; MG/100ML
INJECTION, SOLUTION INTRAVENOUS CONTINUOUS
OUTPATIENT
Start: 2025-09-03

## 2025-09-03 RX ORDER — SEMAGLUTIDE 0.68 MG/ML
0.25 INJECTION, SOLUTION SUBCUTANEOUS
Qty: 4.5 ML | Refills: 0 | Status: SHIPPED | OUTPATIENT
Start: 2025-09-03

## 2025-09-03 RX ORDER — FAMOTIDINE 20 MG/1
20 TABLET, FILM COATED ORAL
Status: SHIPPED | OUTPATIENT
Start: 2025-09-03

## 2025-09-05 LAB — BACTERIA UR CULT: NORMAL

## (undated) DEVICE — GLOVE 7.5 PROTEXIS PI MICRO

## (undated) DEVICE — CANNULA VENOM 20G 10X100MM

## (undated) DEVICE — TROCAR ENDOPATH XCEL 5X100MM

## (undated) DEVICE — TOWEL OR DISP STRL BLUE 4/PK

## (undated) DEVICE — HANDLE SURG LIGHT NONRIGID

## (undated) DEVICE — DRAPE ABDOMINAL TIBURON 14X11

## (undated) DEVICE — STAPLER ECHELON FLEX 60MM 44CM

## (undated) DEVICE — NDL SPINAL 25GX3.5 SPINOCAN

## (undated) DEVICE — PAD ELECTROSURGICAL PAT PLATE

## (undated) DEVICE — TRAY NERVE BLOCK

## (undated) DEVICE — TROCAR ENDOPATH XCEL 12MM 10CM

## (undated) DEVICE — TUBING HF INSUFFLATION W/ FLTR

## (undated) DEVICE — APPLICATOR CHLORAPREP CLR 10.5

## (undated) DEVICE — ADHESIVE MASTISOL VIAL 48/BX

## (undated) DEVICE — ELECTRODE REM PLYHSV RETURN 9

## (undated) DEVICE — TUBING NEPTUNE 2 SMOKE 10IN

## (undated) DEVICE — CANNULA ENDOPATH XCEL 5X100MM

## (undated) DEVICE — SYR 10CC LUER LOCK

## (undated) DEVICE — SUT MCRYL PLUS 4-0 PS2 27IN

## (undated) DEVICE — SHEARS HARMONIC 5CM 36CM

## (undated) DEVICE — DRAPE CORETEMP FLD WRM 56X62IN

## (undated) DEVICE — RELOAD ECHELON FLEX BLU 60MM

## (undated) DEVICE — SEE MEDLINE ITEM 156902

## (undated) DEVICE — TROCAR ENDOPATH XCEL 12X100MM

## (undated) DEVICE — NDL HYPO REG 25G X 1 1/2

## (undated) DEVICE — RELOAD ECHELON FLEX GRN 60MM